# Patient Record
Sex: MALE | Race: BLACK OR AFRICAN AMERICAN | Employment: OTHER | URBAN - METROPOLITAN AREA
[De-identification: names, ages, dates, MRNs, and addresses within clinical notes are randomized per-mention and may not be internally consistent; named-entity substitution may affect disease eponyms.]

---

## 2017-01-03 RX ORDER — DILTIAZEM HYDROCHLORIDE 120 MG/1
120 CAPSULE, COATED, EXTENDED RELEASE ORAL DAILY
Qty: 30 CAPSULE | Refills: 0
Start: 2017-01-03 | End: 2017-01-04 | Stop reason: SDUPTHER

## 2017-01-04 DIAGNOSIS — I10 ESSENTIAL HYPERTENSION: Chronic | ICD-10-CM

## 2017-01-04 DIAGNOSIS — E87.6 POTASSIUM DEFICIENCY: ICD-10-CM

## 2017-01-04 RX ORDER — DILTIAZEM HYDROCHLORIDE 120 MG/1
120 CAPSULE, COATED, EXTENDED RELEASE ORAL DAILY
Qty: 30 CAPSULE | Refills: 0 | OUTPATIENT
Start: 2017-01-04 | End: 2017-01-16 | Stop reason: SDUPTHER

## 2017-01-04 RX ORDER — FUROSEMIDE 80 MG
80 TABLET ORAL 2 TIMES DAILY
Qty: 60 TABLET | Refills: 0 | OUTPATIENT
Start: 2017-01-04 | End: 2017-01-16 | Stop reason: SDUPTHER

## 2017-01-04 RX ORDER — LISINOPRIL 20 MG/1
20 TABLET ORAL DAILY
Qty: 30 TABLET | Refills: 0 | OUTPATIENT
Start: 2017-01-04 | End: 2017-01-16 | Stop reason: SDUPTHER

## 2017-01-04 RX ORDER — POTASSIUM CHLORIDE 20 MEQ/1
20 TABLET, EXTENDED RELEASE ORAL
Qty: 30 TABLET | Refills: 0 | OUTPATIENT
Start: 2017-01-04 | End: 2017-01-16 | Stop reason: SDUPTHER

## 2017-01-10 DIAGNOSIS — B37.0 THRUSH, ORAL: ICD-10-CM

## 2017-01-10 RX ORDER — MULTIVIT-MIN/IRON FUM/FOLIC AC 7.5 MG-4
1 TABLET ORAL DAILY
Qty: 30 TABLET | Refills: 0
Start: 2017-01-10 | End: 2017-03-06 | Stop reason: SDUPTHER

## 2017-01-16 ENCOUNTER — OFFICE VISIT (OUTPATIENT)
Dept: INTERNAL MEDICINE | Age: 47
End: 2017-01-16
Attending: STUDENT IN AN ORGANIZED HEALTH CARE EDUCATION/TRAINING PROGRAM

## 2017-01-16 VITALS
HEART RATE: 58 BPM | TEMPERATURE: 97.3 F | WEIGHT: 315 LBS | BODY MASS INDEX: 42.66 KG/M2 | OXYGEN SATURATION: 94 % | DIASTOLIC BLOOD PRESSURE: 76 MMHG | HEIGHT: 72 IN | RESPIRATION RATE: 18 BRPM | SYSTOLIC BLOOD PRESSURE: 128 MMHG

## 2017-01-16 DIAGNOSIS — I10 ESSENTIAL HYPERTENSION: Chronic | ICD-10-CM

## 2017-01-16 DIAGNOSIS — J03.90 TONSILLITIS WITH EXUDATE: ICD-10-CM

## 2017-01-16 DIAGNOSIS — I48.0 PAROXYSMAL A-FIB (HCC): ICD-10-CM

## 2017-01-16 DIAGNOSIS — J06.9 ACUTE URI OF MULTIPLE SITES: ICD-10-CM

## 2017-01-16 DIAGNOSIS — E87.6 POTASSIUM DEFICIENCY: ICD-10-CM

## 2017-01-16 DIAGNOSIS — J01.20 ACUTE ETHMOIDAL SINUSITIS, RECURRENCE NOT SPECIFIED: ICD-10-CM

## 2017-01-16 RX ORDER — ASPIRIN 81 MG/1
81 TABLET ORAL DAILY
Qty: 30 TABLET | Refills: 2 | Status: SHIPPED | OUTPATIENT
Start: 2017-01-16 | End: 2017-04-17 | Stop reason: SDUPTHER

## 2017-01-16 RX ORDER — FEXOFENADINE HCL 180 MG/1
180 TABLET ORAL DAILY
Qty: 30 TABLET | Refills: 3 | Status: SHIPPED | OUTPATIENT
Start: 2017-01-16 | End: 2017-04-17 | Stop reason: SDUPTHER

## 2017-01-16 RX ORDER — DILTIAZEM HYDROCHLORIDE 120 MG/1
120 CAPSULE, COATED, EXTENDED RELEASE ORAL DAILY
Qty: 30 CAPSULE | Refills: 0 | Status: SHIPPED | OUTPATIENT
Start: 2017-01-16 | End: 2017-02-20 | Stop reason: SDUPTHER

## 2017-01-16 RX ORDER — BLOOD PRESSURE TEST KIT
1 KIT MISCELLANEOUS DAILY
Qty: 1 KIT | Refills: 0 | Status: SHIPPED | OUTPATIENT
Start: 2017-01-16 | End: 2017-04-17 | Stop reason: SDUPTHER

## 2017-01-16 RX ORDER — POTASSIUM CHLORIDE 20 MEQ/1
20 TABLET, EXTENDED RELEASE ORAL
Qty: 30 TABLET | Refills: 2 | Status: SHIPPED | OUTPATIENT
Start: 2017-01-16 | End: 2017-04-27 | Stop reason: SDUPTHER

## 2017-01-16 RX ORDER — FLUTICASONE PROPIONATE 50 MCG
1 SPRAY, SUSPENSION (ML) NASAL DAILY
Qty: 1 BOTTLE | Refills: 3 | Status: SHIPPED | OUTPATIENT
Start: 2017-01-16 | End: 2017-04-17 | Stop reason: SDUPTHER

## 2017-01-16 RX ORDER — ECHINACEA PURPUREA EXTRACT 125 MG
1 TABLET ORAL PRN
Qty: 1 BOTTLE | Refills: 3 | Status: SHIPPED | OUTPATIENT
Start: 2017-01-16 | End: 2017-12-15 | Stop reason: SDUPTHER

## 2017-01-16 RX ORDER — FUROSEMIDE 80 MG
80 TABLET ORAL 2 TIMES DAILY
Qty: 60 TABLET | Refills: 2 | Status: SHIPPED | OUTPATIENT
Start: 2017-01-16 | End: 2017-04-17 | Stop reason: SDUPTHER

## 2017-01-16 RX ORDER — LISINOPRIL 20 MG/1
20 TABLET ORAL DAILY
Qty: 30 TABLET | Refills: 2 | Status: SHIPPED | OUTPATIENT
Start: 2017-01-16 | End: 2017-04-17 | Stop reason: SDUPTHER

## 2017-01-16 RX ORDER — ATORVASTATIN CALCIUM 20 MG/1
20 TABLET, FILM COATED ORAL DAILY
Qty: 30 TABLET | Refills: 3 | Status: SHIPPED | OUTPATIENT
Start: 2017-01-16 | End: 2017-04-17 | Stop reason: SDUPTHER

## 2017-01-16 RX ORDER — INSULIN GLARGINE 100 [IU]/ML
20 INJECTION, SOLUTION SUBCUTANEOUS NIGHTLY
Qty: 1 VIAL | Refills: 3 | Status: SHIPPED | OUTPATIENT
Start: 2017-01-16 | End: 2017-04-17 | Stop reason: SDUPTHER

## 2017-01-16 RX ORDER — OMEPRAZOLE 20 MG/1
20 CAPSULE, DELAYED RELEASE ORAL DAILY
Qty: 30 CAPSULE | Refills: 2 | Status: SHIPPED | OUTPATIENT
Start: 2017-01-16 | End: 2017-04-17 | Stop reason: SDUPTHER

## 2017-01-16 ASSESSMENT — ENCOUNTER SYMPTOMS
COUGH: 1
SORE THROAT: 0
DIARRHEA: 0
CONSTIPATION: 0
SPUTUM PRODUCTION: 0
DOUBLE VISION: 0
SHORTNESS OF BREATH: 0
BLURRED VISION: 0
VOMITING: 0
ABDOMINAL PAIN: 0
NAUSEA: 0
WHEEZING: 0

## 2017-02-20 DIAGNOSIS — E11.9 DIABETES MELLITUS TYPE 2, INSULIN DEPENDENT (HCC): Primary | ICD-10-CM

## 2017-02-20 DIAGNOSIS — Z79.4 DIABETES MELLITUS TYPE 2, INSULIN DEPENDENT (HCC): Primary | ICD-10-CM

## 2017-02-20 RX ORDER — BLOOD SUGAR DIAGNOSTIC
4 STRIP MISCELLANEOUS
Qty: 100 EACH | Refills: 0
Start: 2017-02-20 | End: 2017-04-21 | Stop reason: SDUPTHER

## 2017-02-20 RX ORDER — DILTIAZEM HYDROCHLORIDE 120 MG/1
120 CAPSULE, COATED, EXTENDED RELEASE ORAL DAILY
Qty: 30 CAPSULE | Refills: 1 | OUTPATIENT
Start: 2017-02-20 | End: 2017-04-17 | Stop reason: SDUPTHER

## 2017-03-06 DIAGNOSIS — B37.0 THRUSH, ORAL: ICD-10-CM

## 2017-03-06 RX ORDER — MULTIVIT-MIN/IRON FUM/FOLIC AC 7.5 MG-4
1 TABLET ORAL DAILY
Qty: 30 TABLET | Refills: 2
Start: 2017-03-06 | End: 2017-03-06 | Stop reason: SDUPTHER

## 2017-03-06 RX ORDER — MULTIVIT-MIN/IRON FUM/FOLIC AC 7.5 MG-4
1 TABLET ORAL DAILY
Qty: 30 TABLET | Refills: 2
Start: 2017-03-06 | End: 2017-06-19

## 2017-03-14 DIAGNOSIS — I48.0 PAROXYSMAL A-FIB (HCC): ICD-10-CM

## 2017-03-15 ENCOUNTER — TELEPHONE (OUTPATIENT)
Dept: INTERNAL MEDICINE | Age: 47
End: 2017-03-15

## 2017-04-17 ENCOUNTER — OFFICE VISIT (OUTPATIENT)
Dept: INTERNAL MEDICINE | Age: 47
End: 2017-04-17
Attending: STUDENT IN AN ORGANIZED HEALTH CARE EDUCATION/TRAINING PROGRAM

## 2017-04-17 VITALS
OXYGEN SATURATION: 96 % | HEIGHT: 72 IN | HEART RATE: 54 BPM | RESPIRATION RATE: 18 BRPM | SYSTOLIC BLOOD PRESSURE: 133 MMHG | DIASTOLIC BLOOD PRESSURE: 81 MMHG | BODY MASS INDEX: 42.66 KG/M2 | WEIGHT: 315 LBS | TEMPERATURE: 97.7 F

## 2017-04-17 DIAGNOSIS — I48.0 PAROXYSMAL A-FIB (HCC): ICD-10-CM

## 2017-04-17 DIAGNOSIS — I10 ESSENTIAL HYPERTENSION: Chronic | ICD-10-CM

## 2017-04-17 DIAGNOSIS — E87.6 POTASSIUM DEFICIENCY: ICD-10-CM

## 2017-04-17 LAB
GLUCOSE BLD-MCNC: 149 MG/DL (ref 70–99)
PERFORMED ON: ABNORMAL

## 2017-04-17 RX ORDER — FUROSEMIDE 80 MG
80 TABLET ORAL 2 TIMES DAILY
Qty: 60 TABLET | Refills: 3 | Status: SHIPPED | OUTPATIENT
Start: 2017-04-17 | End: 2017-08-25 | Stop reason: SDUPTHER

## 2017-04-17 RX ORDER — DIPHENHYDRAMINE HYDROCHLORIDE 25 MG/1
1 CAPSULE, LIQUID FILLED ORAL DAILY
Qty: 1 KIT | Refills: 0 | Status: SHIPPED | OUTPATIENT
Start: 2017-04-17

## 2017-04-17 RX ORDER — BLOOD PRESSURE TEST KIT
1 KIT MISCELLANEOUS DAILY
Qty: 1 KIT | Refills: 0 | Status: SHIPPED | OUTPATIENT
Start: 2017-04-17

## 2017-04-17 RX ORDER — FLUTICASONE PROPIONATE 50 MCG
1 SPRAY, SUSPENSION (ML) NASAL DAILY
Qty: 1 BOTTLE | Refills: 3 | Status: SHIPPED | OUTPATIENT
Start: 2017-04-17 | End: 2018-12-27 | Stop reason: SDUPTHER

## 2017-04-17 RX ORDER — OMEPRAZOLE 20 MG/1
20 CAPSULE, DELAYED RELEASE ORAL DAILY
Qty: 30 CAPSULE | Refills: 2 | Status: SHIPPED | OUTPATIENT
Start: 2017-04-17 | End: 2017-08-01 | Stop reason: SDUPTHER

## 2017-04-17 RX ORDER — DILTIAZEM HYDROCHLORIDE 120 MG/1
120 CAPSULE, COATED, EXTENDED RELEASE ORAL DAILY
Qty: 30 CAPSULE | Refills: 3 | Status: SHIPPED | OUTPATIENT
Start: 2017-04-17 | End: 2017-09-06 | Stop reason: SDUPTHER

## 2017-04-17 RX ORDER — FEXOFENADINE HCL 180 MG/1
180 TABLET ORAL DAILY
Qty: 30 TABLET | Refills: 3 | Status: SHIPPED | OUTPATIENT
Start: 2017-04-17 | End: 2017-12-15 | Stop reason: ALTCHOICE

## 2017-04-17 RX ORDER — LISINOPRIL 20 MG/1
20 TABLET ORAL DAILY
Qty: 30 TABLET | Refills: 2 | Status: SHIPPED | OUTPATIENT
Start: 2017-04-17 | End: 2017-07-20 | Stop reason: SDUPTHER

## 2017-04-17 RX ORDER — INSULIN GLARGINE 100 [IU]/ML
20 INJECTION, SOLUTION SUBCUTANEOUS NIGHTLY
Qty: 1 VIAL | Refills: 3 | Status: SHIPPED | OUTPATIENT
Start: 2017-04-17 | End: 2017-09-06 | Stop reason: SDUPTHER

## 2017-04-17 RX ORDER — GLUCOSAMINE HCL/CHONDROITIN SU 500-400 MG
1 CAPSULE ORAL
Qty: 120 STRIP | Refills: 5 | Status: SHIPPED | OUTPATIENT
Start: 2017-04-17 | End: 2017-12-13 | Stop reason: SDUPTHER

## 2017-04-17 RX ORDER — ASPIRIN 81 MG/1
81 TABLET ORAL DAILY
Qty: 30 TABLET | Refills: 3 | Status: ON HOLD | OUTPATIENT
Start: 2017-04-17 | End: 2018-08-05 | Stop reason: HOSPADM

## 2017-04-17 RX ORDER — ATORVASTATIN CALCIUM 20 MG/1
20 TABLET, FILM COATED ORAL DAILY
Qty: 30 TABLET | Refills: 3 | Status: SHIPPED | OUTPATIENT
Start: 2017-04-17 | End: 2017-09-06 | Stop reason: SDUPTHER

## 2017-04-17 ASSESSMENT — ENCOUNTER SYMPTOMS
ABDOMINAL PAIN: 0
SHORTNESS OF BREATH: 0
WHEEZING: 0
NAUSEA: 0
VOMITING: 0
COUGH: 0
DIARRHEA: 0

## 2017-04-21 DIAGNOSIS — E11.9 DIABETES MELLITUS TYPE 2, INSULIN DEPENDENT (HCC): ICD-10-CM

## 2017-04-21 DIAGNOSIS — Z79.4 DIABETES MELLITUS TYPE 2, INSULIN DEPENDENT (HCC): ICD-10-CM

## 2017-04-21 RX ORDER — BLOOD SUGAR DIAGNOSTIC
4 STRIP MISCELLANEOUS
Qty: 100 EACH | Refills: 2
Start: 2017-04-21 | End: 2017-08-30 | Stop reason: SDUPTHER

## 2017-04-27 DIAGNOSIS — I10 ESSENTIAL HYPERTENSION: Chronic | ICD-10-CM

## 2017-04-27 DIAGNOSIS — E87.6 POTASSIUM DEFICIENCY: ICD-10-CM

## 2017-04-27 RX ORDER — POTASSIUM CHLORIDE 20 MEQ/1
20 TABLET, EXTENDED RELEASE ORAL
Qty: 30 TABLET | Refills: 3
Start: 2017-04-27 | End: 2017-08-07 | Stop reason: SDUPTHER

## 2017-05-30 ENCOUNTER — TELEPHONE (OUTPATIENT)
Dept: INTERNAL MEDICINE | Age: 47
End: 2017-05-30

## 2017-06-19 ENCOUNTER — OFFICE VISIT (OUTPATIENT)
Dept: INTERNAL MEDICINE | Age: 47
End: 2017-06-19
Attending: STUDENT IN AN ORGANIZED HEALTH CARE EDUCATION/TRAINING PROGRAM

## 2017-06-19 VITALS
HEIGHT: 72 IN | OXYGEN SATURATION: 94 % | TEMPERATURE: 97.3 F | HEART RATE: 72 BPM | DIASTOLIC BLOOD PRESSURE: 81 MMHG | WEIGHT: 315 LBS | BODY MASS INDEX: 42.66 KG/M2 | RESPIRATION RATE: 18 BRPM | SYSTOLIC BLOOD PRESSURE: 131 MMHG

## 2017-06-19 DIAGNOSIS — E11.9 TYPE 2 DIABETES MELLITUS WITHOUT COMPLICATION, UNSPECIFIED LONG TERM INSULIN USE STATUS: Primary | ICD-10-CM

## 2017-06-19 ASSESSMENT — ENCOUNTER SYMPTOMS
VOMITING: 0
BLURRED VISION: 0
SHORTNESS OF BREATH: 0
CONSTIPATION: 0
ABDOMINAL PAIN: 0
DIARRHEA: 0
COUGH: 0
NAUSEA: 0

## 2017-07-07 ENCOUNTER — TELEPHONE (OUTPATIENT)
Dept: INTERNAL MEDICINE | Age: 47
End: 2017-07-07

## 2017-07-20 RX ORDER — LISINOPRIL 20 MG/1
20 TABLET ORAL DAILY
Qty: 30 TABLET | Refills: 2
Start: 2017-07-20 | End: 2017-09-15 | Stop reason: SDUPTHER

## 2017-08-01 RX ORDER — OMEPRAZOLE 20 MG/1
20 CAPSULE, DELAYED RELEASE ORAL DAILY
Qty: 30 CAPSULE | Refills: 1
Start: 2017-08-01 | End: 2017-09-15 | Stop reason: SDUPTHER

## 2017-08-07 DIAGNOSIS — E87.6 POTASSIUM DEFICIENCY: ICD-10-CM

## 2017-08-07 DIAGNOSIS — I10 ESSENTIAL HYPERTENSION: Chronic | ICD-10-CM

## 2017-08-07 RX ORDER — POTASSIUM CHLORIDE 1500 MG/1
TABLET, EXTENDED RELEASE ORAL
Qty: 30 TABLET | Refills: 2 | Status: SHIPPED | OUTPATIENT
Start: 2017-08-07 | End: 2017-11-07 | Stop reason: SDUPTHER

## 2017-08-07 RX ORDER — POTASSIUM CHLORIDE 20 MEQ/1
20 TABLET, EXTENDED RELEASE ORAL
Qty: 30 TABLET | Refills: 1 | Status: SHIPPED | OUTPATIENT
Start: 2017-08-07 | End: 2017-08-07 | Stop reason: SDUPTHER

## 2017-08-25 RX ORDER — FUROSEMIDE 80 MG
80 TABLET ORAL 2 TIMES DAILY
Qty: 60 TABLET | Refills: 0
Start: 2017-08-25 | End: 2017-10-04 | Stop reason: SDUPTHER

## 2017-08-29 DIAGNOSIS — I48.0 PAROXYSMAL A-FIB (HCC): ICD-10-CM

## 2017-08-29 RX ORDER — APIXABAN 5 MG/1
TABLET, FILM COATED ORAL
Qty: 60 TABLET | Refills: 0 | Status: SHIPPED | OUTPATIENT
Start: 2017-08-29 | End: 2017-08-29 | Stop reason: SDUPTHER

## 2017-08-30 DIAGNOSIS — Z79.4 DIABETES MELLITUS TYPE 2, INSULIN DEPENDENT (HCC): ICD-10-CM

## 2017-08-30 DIAGNOSIS — I48.0 PAROXYSMAL A-FIB (HCC): ICD-10-CM

## 2017-08-30 DIAGNOSIS — E11.9 DIABETES MELLITUS TYPE 2, INSULIN DEPENDENT (HCC): ICD-10-CM

## 2017-08-30 RX ORDER — BLOOD SUGAR DIAGNOSTIC
4 STRIP MISCELLANEOUS
Qty: 100 EACH | Refills: 1
Start: 2017-08-30 | End: 2017-11-27 | Stop reason: SDUPTHER

## 2017-09-06 DIAGNOSIS — I48.0 PAROXYSMAL A-FIB (HCC): ICD-10-CM

## 2017-09-06 RX ORDER — DILTIAZEM HYDROCHLORIDE 120 MG/1
120 CAPSULE, COATED, EXTENDED RELEASE ORAL DAILY
Qty: 30 CAPSULE | Refills: 0
Start: 2017-09-06 | End: 2017-10-23 | Stop reason: SDUPTHER

## 2017-09-06 RX ORDER — INSULIN GLARGINE 100 [IU]/ML
20 INJECTION, SOLUTION SUBCUTANEOUS NIGHTLY
Qty: 1 VIAL | Refills: 0
Start: 2017-09-06 | End: 2019-10-01 | Stop reason: SDUPTHER

## 2017-09-06 RX ORDER — ATORVASTATIN CALCIUM 20 MG/1
20 TABLET, FILM COATED ORAL DAILY
Qty: 30 TABLET | Refills: 0
Start: 2017-09-06 | End: 2017-11-06 | Stop reason: SDUPTHER

## 2017-09-15 ENCOUNTER — OFFICE VISIT (OUTPATIENT)
Dept: INTERNAL MEDICINE | Age: 47
End: 2017-09-15
Attending: STUDENT IN AN ORGANIZED HEALTH CARE EDUCATION/TRAINING PROGRAM

## 2017-09-15 VITALS
WEIGHT: 315 LBS | SYSTOLIC BLOOD PRESSURE: 132 MMHG | HEART RATE: 52 BPM | OXYGEN SATURATION: 94 % | DIASTOLIC BLOOD PRESSURE: 76 MMHG | RESPIRATION RATE: 18 BRPM | HEIGHT: 72 IN | BODY MASS INDEX: 42.66 KG/M2 | TEMPERATURE: 98.8 F

## 2017-09-15 DIAGNOSIS — M10.9 GOUT OF LEFT ANKLE, UNSPECIFIED CAUSE, UNSPECIFIED CHRONICITY: Primary | ICD-10-CM

## 2017-09-15 DIAGNOSIS — I10 ESSENTIAL HYPERTENSION: Chronic | ICD-10-CM

## 2017-09-15 DIAGNOSIS — K21.9 GASTROESOPHAGEAL REFLUX DISEASE WITHOUT ESOPHAGITIS: ICD-10-CM

## 2017-09-15 DIAGNOSIS — E11.9 TYPE 2 DIABETES MELLITUS WITHOUT COMPLICATION, UNSPECIFIED LONG TERM INSULIN USE STATUS: Chronic | ICD-10-CM

## 2017-09-15 DIAGNOSIS — Z23 NEEDS FLU SHOT: ICD-10-CM

## 2017-09-15 DIAGNOSIS — I48.0 PAROXYSMAL ATRIAL FIBRILLATION (HCC): ICD-10-CM

## 2017-09-15 LAB
ALBUMIN SERPL-MCNC: 4.4 G/DL (ref 3.4–5)
ANION GAP SERPL CALCULATED.3IONS-SCNC: 13 MMOL/L (ref 3–16)
BASOPHILS ABSOLUTE: 0 K/UL (ref 0–0.2)
BASOPHILS RELATIVE PERCENT: 0.6 %
BUN BLDV-MCNC: 13 MG/DL (ref 7–20)
CALCIUM SERPL-MCNC: 9.7 MG/DL (ref 8.3–10.6)
CHLORIDE BLD-SCNC: 103 MMOL/L (ref 99–110)
CO2: 29 MMOL/L (ref 21–32)
CREAT SERPL-MCNC: 0.8 MG/DL (ref 0.9–1.3)
EOSINOPHILS ABSOLUTE: 0 K/UL (ref 0–0.6)
EOSINOPHILS RELATIVE PERCENT: 0.5 %
GFR AFRICAN AMERICAN: >60
GFR NON-AFRICAN AMERICAN: >60
GLUCOSE BLD-MCNC: 76 MG/DL (ref 70–99)
HCT VFR BLD CALC: 45.1 % (ref 40.5–52.5)
HEMOGLOBIN: 15 G/DL (ref 13.5–17.5)
LYMPHOCYTES ABSOLUTE: 2.7 K/UL (ref 1–5.1)
LYMPHOCYTES RELATIVE PERCENT: 45.1 %
MCH RBC QN AUTO: 30 PG (ref 26–34)
MCHC RBC AUTO-ENTMCNC: 33.3 G/DL (ref 31–36)
MCV RBC AUTO: 89.9 FL (ref 80–100)
MONOCYTES ABSOLUTE: 0.5 K/UL (ref 0–1.3)
MONOCYTES RELATIVE PERCENT: 7.5 %
NEUTROPHILS ABSOLUTE: 2.8 K/UL (ref 1.7–7.7)
NEUTROPHILS RELATIVE PERCENT: 46.3 %
PDW BLD-RTO: 13.9 % (ref 12.4–15.4)
PHOSPHORUS: 3.1 MG/DL (ref 2.5–4.9)
PLATELET # BLD: 184 K/UL (ref 135–450)
PMV BLD AUTO: 9.6 FL (ref 5–10.5)
POTASSIUM SERPL-SCNC: 3.3 MMOL/L (ref 3.5–5.1)
RBC # BLD: 5.02 M/UL (ref 4.2–5.9)
SODIUM BLD-SCNC: 145 MMOL/L (ref 136–145)
URIC ACID, SERUM: 9.3 MG/DL (ref 3.5–7.2)
WBC # BLD: 6.1 K/UL (ref 4–11)

## 2017-09-15 RX ORDER — LISINOPRIL 20 MG/1
20 TABLET ORAL DAILY
Qty: 30 TABLET | Refills: 2 | Status: SHIPPED | OUTPATIENT
Start: 2017-09-15 | End: 2017-12-15 | Stop reason: SDUPTHER

## 2017-09-15 RX ORDER — INDOMETHACIN 25 MG/1
25 CAPSULE ORAL
Qty: 60 CAPSULE | Refills: 3 | Status: SHIPPED | OUTPATIENT
Start: 2017-09-15 | End: 2017-12-15 | Stop reason: ALTCHOICE

## 2017-09-15 RX ORDER — NAPROXEN 500 MG/1
500 TABLET ORAL 2 TIMES DAILY WITH MEALS
Qty: 30 TABLET | Refills: 0 | Status: SHIPPED | OUTPATIENT
Start: 2017-09-15 | End: 2017-10-02 | Stop reason: SDUPTHER

## 2017-09-15 RX ORDER — OMEPRAZOLE 20 MG/1
20 CAPSULE, DELAYED RELEASE ORAL DAILY
Qty: 30 CAPSULE | Refills: 1 | Status: SHIPPED | OUTPATIENT
Start: 2017-09-15 | End: 2017-12-15 | Stop reason: ALTCHOICE

## 2017-09-16 LAB
ESTIMATED AVERAGE GLUCOSE: 116.9 MG/DL
HBA1C MFR BLD: 5.7 %

## 2017-10-02 DIAGNOSIS — M10.9 GOUT OF LEFT ANKLE, UNSPECIFIED CAUSE, UNSPECIFIED CHRONICITY: ICD-10-CM

## 2017-10-02 RX ORDER — NAPROXEN 500 MG/1
500 TABLET ORAL 2 TIMES DAILY WITH MEALS
Qty: 30 TABLET | Refills: 0
Start: 2017-10-02 | End: 2017-10-17 | Stop reason: SDUPTHER

## 2017-10-04 RX ORDER — FUROSEMIDE 80 MG
80 TABLET ORAL 2 TIMES DAILY
Qty: 60 TABLET | Refills: 0
Start: 2017-10-04 | End: 2017-12-08 | Stop reason: SDUPTHER

## 2017-10-17 DIAGNOSIS — M10.9 GOUT OF LEFT ANKLE, UNSPECIFIED CAUSE, UNSPECIFIED CHRONICITY: ICD-10-CM

## 2017-10-17 RX ORDER — NAPROXEN 500 MG/1
500 TABLET ORAL 2 TIMES DAILY WITH MEALS
Qty: 30 TABLET | Refills: 0
Start: 2017-10-17 | End: 2019-04-16

## 2017-10-23 RX ORDER — DILTIAZEM HYDROCHLORIDE 120 MG/1
120 CAPSULE, COATED, EXTENDED RELEASE ORAL DAILY
Qty: 30 CAPSULE | Refills: 2
Start: 2017-10-23 | End: 2017-11-03 | Stop reason: ALTCHOICE

## 2017-11-03 ENCOUNTER — OFFICE VISIT (OUTPATIENT)
Dept: INTERNAL MEDICINE | Age: 47
End: 2017-11-03
Attending: INTERNAL MEDICINE

## 2017-11-03 VITALS
TEMPERATURE: 98.4 F | BODY MASS INDEX: 42.66 KG/M2 | OXYGEN SATURATION: 94 % | HEIGHT: 72 IN | HEART RATE: 57 BPM | WEIGHT: 315 LBS | SYSTOLIC BLOOD PRESSURE: 112 MMHG | DIASTOLIC BLOOD PRESSURE: 72 MMHG | RESPIRATION RATE: 20 BRPM

## 2017-11-03 DIAGNOSIS — M10.9 GOUT OF LEFT ANKLE, UNSPECIFIED CAUSE, UNSPECIFIED CHRONICITY: ICD-10-CM

## 2017-11-03 DIAGNOSIS — E11.9 TYPE 2 DIABETES MELLITUS WITHOUT COMPLICATION, UNSPECIFIED LONG TERM INSULIN USE STATUS: Chronic | ICD-10-CM

## 2017-11-03 DIAGNOSIS — I10 ESSENTIAL HYPERTENSION: Chronic | ICD-10-CM

## 2017-11-03 LAB
GLUCOSE BLD-MCNC: 123 MG/DL (ref 70–99)
PERFORMED ON: ABNORMAL

## 2017-11-03 RX ORDER — ALLOPURINOL 300 MG/1
300 TABLET ORAL DAILY
Qty: 30 TABLET | Refills: 3 | Status: SHIPPED | OUTPATIENT
Start: 2017-11-03 | End: 2018-03-19 | Stop reason: SDUPTHER

## 2017-11-03 RX ORDER — SULFAMETHOXAZOLE AND TRIMETHOPRIM 800; 160 MG/1; MG/1
1 TABLET ORAL 2 TIMES DAILY
Qty: 20 TABLET | Refills: 0 | Status: SHIPPED | OUTPATIENT
Start: 2017-11-03 | End: 2019-04-16 | Stop reason: SDUPTHER

## 2017-11-03 NOTE — PROGRESS NOTES
[address] [facility]       NURSING PROGRESS NOTE      November 3, 2017  Anju De La Cruz    Chief Complaint:   Chief Complaint   Patient presents with    Abscess     abdominal (recurrent)       Constitutional: alert and oriented; in no acute distress; planned weight loss 27lbs in one year  Eyes: negative  overdue for eye exam; will schedule with past provider  Ears, nose, mouth, throat, and face: negative  Respiratory: negative  Cardiovascular: uses BiPap; states he walks 4 miles regularly. Gastrointestinal: negative  Genitourinary: negative  Integument/breast: tells physician he's had abdominal abscess for 6 days and it is chronic and recurrent  Hematologic/lymphatic: negative  Musculoskeletal: states he saw a rheumatologist who told him he has gout and he was started on Allopurinol and also states he has arthritis in both knees. Neurological: negative  Diabetes: Yes  Yes:  Medication compliance:  compliant all of the time  Diabetic diet compliance:  compliant most of the time  Home glucose monitoring:  is performed regularly  Last eye exam:  2 years  Acute symptoms hyperglycemia:  none  States blood sugars run 120s-140s on average.   Acute symptoms hypoglycemia:  none  POC glucose today: not taken at home; taken in exam room is 123    Pain Assessment:  Pain Present: no  Pain Score: 0  Pain Quality/Description: no c/o pain now    Mobility/Safety/Self-Care:  Steady Gait: Yes  History of Falls: No   History of a Fall within the last 30 days No  Assistive Device: None  Poor Hygiene: No    Psychosocial:   Depression: No  If YES,    Does Patient express current thoughts of harming self or others?: No  Anxious: No  Insomnia: No  Inappropriate Behavior: No  Alcohol Abuse: no  Substance Abuse: no  Signs of Physical Abuse: No  Signs of Emotional Abuse: No    Educational:  Identify the learner who is being assessed for education:  patient                    Ability to Learn:  Exhibits ability to grasp concepts and
distress, appears stated age and cooperative  · CVS - Bradycardic, continues to be bradycardic with ambulation  · Resp - No accessory muscle use. Speaking in full sentences  · GI - No tenderness  · Skin - Warm and dry. Two punctate lesions on RLQ of abdominal wall, not draining any pus. · MSK - No cyanosis. No joint deformity. No clubbing  · Neuro - Awake. Follows commands. · Psych - Oriented to person, place, time. No anxiety or agitation. LABS  Lipid:  Lab Results   Component Value Date    CHOL 103 06/09/2016    HDL 33 (L) 06/09/2016    LDLCALC 48 06/09/2016    TRIG 109 06/09/2016       U/A:  Lab Results   Component Value Date    LABMICR YES 03/12/2015              Assessment/Plan:   Marie De La Cruz is a 49 y.o.male who presents for furunculosis. Recurrent furunculosis  -Bactrim x 10 days  -Suppressive therapy for MRSA with nasal bactroban for 7 days following Bactrim therapy. Then repeat every month for the 1st week of the month. Symptomatic bradycardia, likely 2/2 meds  -DC diltiazem, continue metoprolol  -f/u Dr Sandi Hanna (Cardiology)  -Advised that using cocaine and metoprolol could be fatal    Case will be discussed with preceptor.      Rossy Brooks MD PGY3  Internal Medicine Resident  Pager: [phone]  11/3/2017, 10:26 AM

## 2017-11-03 NOTE — PATIENT INSTRUCTIONS
Call your pharmacy for medication refills at least 48 hours ahead at [phone] (Monday -Friday, 08:00 AM to 4:00 PM .If you become ill when the clinic is closed, please call [facility]  at [phone] and ask the  to page the AOD between 6:00 AM and 6:00 PM or page the AON between 6:00 PM & 6:00 AM.    The clinic is not able to process [facility] requests for appointments or messages including refill requests. Please call the [employer] at [phone] for appointments and messages. The [employer]  Telephone:[phone]    NEW PRESCRIPTION FOR BACTRIM ANTIBIOTIC  TAKE ONE TABLET BY MOUTH 2 TIMES DAILY FOR 10 DAYS    NEW PRESCRIPTION FOR BACTROBAN OINTMENT : APPLY IN BOTH NARES TWICE PER DAY FOR 7 DAYS,  START THIS OINTMENT AFTER YOU FINISH THE COURSE OF BACTRIM                                                                                                THEN ON THE FIRST DAY OF THE NEXT MONTH USE THE BACTROBAN TWICE PER DAY FOR 7 DAYS. REPEAT THE OINTMENT THE FIRST DAY OF EVERY MONTH AND USE IT THE FIRST WEEK OF EVERY MONTH FOR 7 DAYS      STOP DILTIAZEM    MAKE APPOINTMENT WITH  [facility]  [phone]    BLOOD SUGAR CHECKED TODAY    FLU VACCINE GIVEN TODAY WITH INFORMATION SHEET    MAKE EYE APPOINTMENT    [employer] [facility] COMPLETED    FOLLOWUP WITH DR. [facility] IN MID DECEMBER    Instructions reviewed with patient and copy given to patient upon discharge.      10:20 AM11/3/2017

## 2017-11-06 DIAGNOSIS — I48.0 PAROXYSMAL A-FIB (HCC): ICD-10-CM

## 2017-11-06 RX ORDER — ATORVASTATIN CALCIUM 20 MG/1
20 TABLET, FILM COATED ORAL DAILY
Qty: 30 TABLET | Refills: 3
Start: 2017-11-06 | End: 2018-03-05 | Stop reason: SDUPTHER

## 2017-11-07 ENCOUNTER — TELEPHONE (OUTPATIENT)
Dept: INTERNAL MEDICINE | Age: 47
End: 2017-11-07

## 2017-11-07 DIAGNOSIS — E87.6 POTASSIUM DEFICIENCY: ICD-10-CM

## 2017-11-07 DIAGNOSIS — I10 ESSENTIAL HYPERTENSION: Chronic | ICD-10-CM

## 2017-11-07 RX ORDER — POTASSIUM CHLORIDE 20 MEQ/1
TABLET, EXTENDED RELEASE ORAL
Qty: 30 TABLET | Refills: 2 | OUTPATIENT
Start: 2017-11-07 | End: 2018-02-14 | Stop reason: SDUPTHER

## 2017-11-07 RX ORDER — POTASSIUM CHLORIDE 1500 MG/1
TABLET, EXTENDED RELEASE ORAL
Qty: 30 TABLET | Refills: 2 | OUTPATIENT
Start: 2017-11-07

## 2017-11-07 NOTE — TELEPHONE ENCOUNTER
States he has draing abscess on buttock. He is currently on antibiotics. Told to do aquino soak and finish antibiotics.  Call if not better in a few days

## 2017-11-15 DIAGNOSIS — I48.0 PAROXYSMAL A-FIB (HCC): ICD-10-CM

## 2017-11-27 DIAGNOSIS — E11.9 DIABETES MELLITUS TYPE 2, INSULIN DEPENDENT (HCC): ICD-10-CM

## 2017-11-27 DIAGNOSIS — Z79.4 DIABETES MELLITUS TYPE 2, INSULIN DEPENDENT (HCC): ICD-10-CM

## 2017-11-27 RX ORDER — BLOOD SUGAR DIAGNOSTIC
4 STRIP MISCELLANEOUS
Qty: 100 EACH | Refills: 5
Start: 2017-11-27 | End: 2018-07-05 | Stop reason: SDUPTHER

## 2017-12-08 RX ORDER — FUROSEMIDE 80 MG
80 TABLET ORAL 2 TIMES DAILY
Qty: 60 TABLET | Refills: 2 | Status: SHIPPED | OUTPATIENT
Start: 2017-12-08 | End: 2018-03-06 | Stop reason: SDUPTHER

## 2017-12-13 RX ORDER — GLUCOSAMINE HCL/CHONDROITIN SU 500-400 MG
1 CAPSULE ORAL
Qty: 120 STRIP | Refills: 5
Start: 2017-12-13 | End: 2019-10-01 | Stop reason: SDUPTHER

## 2017-12-15 ENCOUNTER — OFFICE VISIT (OUTPATIENT)
Dept: INTERNAL MEDICINE | Age: 47
End: 2017-12-15
Attending: STUDENT IN AN ORGANIZED HEALTH CARE EDUCATION/TRAINING PROGRAM

## 2017-12-15 VITALS
SYSTOLIC BLOOD PRESSURE: 126 MMHG | RESPIRATION RATE: 22 BRPM | HEIGHT: 72 IN | HEART RATE: 64 BPM | OXYGEN SATURATION: 96 % | WEIGHT: 315 LBS | TEMPERATURE: 97.5 F | DIASTOLIC BLOOD PRESSURE: 71 MMHG | BODY MASS INDEX: 42.66 KG/M2

## 2017-12-15 DIAGNOSIS — Z79.4 DIABETES MELLITUS TYPE 2, INSULIN DEPENDENT (HCC): Primary | ICD-10-CM

## 2017-12-15 DIAGNOSIS — I10 ESSENTIAL HYPERTENSION: Chronic | ICD-10-CM

## 2017-12-15 DIAGNOSIS — E11.9 DIABETES MELLITUS TYPE 2, INSULIN DEPENDENT (HCC): Primary | ICD-10-CM

## 2017-12-15 DIAGNOSIS — J06.9 ACUTE URI OF MULTIPLE SITES: ICD-10-CM

## 2017-12-15 DIAGNOSIS — J03.90 TONSILLITIS WITH EXUDATE: ICD-10-CM

## 2017-12-15 DIAGNOSIS — I48.0 PAROXYSMAL ATRIAL FIBRILLATION (HCC): ICD-10-CM

## 2017-12-15 DIAGNOSIS — E78.5 HYPERLIPIDEMIA, UNSPECIFIED HYPERLIPIDEMIA TYPE: ICD-10-CM

## 2017-12-15 DIAGNOSIS — J01.20 ACUTE ETHMOIDAL SINUSITIS, RECURRENCE NOT SPECIFIED: ICD-10-CM

## 2017-12-15 DIAGNOSIS — I48.0 PAROXYSMAL A-FIB (HCC): ICD-10-CM

## 2017-12-15 RX ORDER — ECHINACEA PURPUREA EXTRACT 125 MG
1 TABLET ORAL PRN
Qty: 1 BOTTLE | Refills: 3 | Status: SHIPPED | OUTPATIENT
Start: 2017-12-15 | End: 2018-03-02

## 2017-12-15 RX ORDER — LISINOPRIL 20 MG/1
20 TABLET ORAL DAILY
Qty: 30 TABLET | Refills: 2 | Status: SHIPPED | OUTPATIENT
Start: 2017-12-15 | End: 2018-04-24 | Stop reason: SDUPTHER

## 2017-12-15 RX ORDER — CEPHALEXIN 250 MG/1
40 CAPSULE ORAL 4 TIMES DAILY
COMMUNITY
Start: 2017-12-13 | End: 2018-03-02 | Stop reason: ALTCHOICE

## 2017-12-15 NOTE — PROGRESS NOTES
[address] [facility]       NURSING PROGRESS NOTE      December 15, 2017  Colton Medina Jono    Chief Complaint:   Chief Complaint   Patient presents with    Check-Up         Constitutional: negative  Eyes: positive for visual disturbance, wants a referral for diabetic eye exam  Ears, nose, mouth, throat, and face: positive for recent \"head cold\"  Respiratory: negative  Cardiovascular: negative  Gastrointestinal: negative  Genitourinary: negative  Integument/breast: negative  Hematologic/lymphatic: negative  Musculoskeletal: positive for knee pain  Neurological: negative  Diabetes: Yes  Yes:  Medication compliance:compliant most of the time  Diabetic diet compliance:  compliant most of the time  Home glucose monitoring:  is performed regularly  Last eye exam:  2 years  Acute symptoms hyperglycemia:  none  Acute symptoms hypoglycemia:  none  POC glucose today:  mg/dL    Pain Assessment:  Pain Present: yes, knees  Pain Score: 4  Pain Quality/Description: Burning and Sharp    Mobility/Safety/Self-Care:  Steady Gait: Yes  History of Falls: No   History of a Fall within the last 30 days No  Assistive Device: None  Poor Hygiene: No    Psychosocial:   Depression: No  If YES,    Does Patient express current thoughts of harming self or others?: No  Anxious: No  Insomnia: No  Inappropriate Behavior: No  Alcohol Abuse: no  Substance Abuse: no  Signs of Physical Abuse: No  Signs of Emotional Abuse: No    Educational:  Identify the learner who is being assessed for education:  patient                    Ability to Learn:  Exhibits ability to grasp concepts and respond to questions: Medium  Ready to Learn: Yes  calm   Preferred Method of Learning:  written  Barriers to Learning: none  Special Considerations due to cultural, Quaker, spiritual beliefs:  No  Language:  English  :  No    Note:   Here for follow up appointment  Went to Dr. Makenzie Partida for his feet and had the left great toenail removed.  States it is infected.  She has ordered an antibiotic      1:31 PM 12/15/2017

## 2017-12-15 NOTE — PATIENT INSTRUCTIONS
Before any of you medication is completely gone, call your pharmacy AT LEAST 1 WEEK ahead for refills. Review all information regarding your medication before starting. If you become ill when the clinic is closed, please call the [facility]  at   #[phone] and ask the  to page the AOD between 6:00 AM and 6:00 PM or page the AON between 6:00 PM and 6:00 am    Labs are done Tuesday thru Friday from 8:00 to 9:00 AM. For fasting labs do not eat anything for 12 hours prior. You may drink water. The clinic is not able to process MY CHART requests for appointments or messages including requests. Please call the [address] [place] [address] at [phone]  For appointments and messages. Call your pharmacy for medication refills. Return to clinic in 3 months    Continue medication as listed on discharge sheet    Please schedule an appointment with cardiology. Call for an appointment. Please go to the outpatient lab one week before next visit and have fasting blood work drawn. Nothing to eat or drink for 8 hours before the test except water. Instructions reviewed before discharge and copy given to patient by SHERLYN Cabrera RN, Kash Kemp [facility] [phone]

## 2017-12-15 NOTE — PROGRESS NOTES
Outpatient Clinic Visit Note    Patient: Tess De La Cruz  : 1970 (49 y.o.)  Date: 12/15/2017    CC: follow up    HPI:      Patient is a 50 yo male who presents for follow up. He states that he is doing well but has not lost weight since his last appointment. He has had a toe infection and has seen podiatry who gave him cephalexin for it. However, he has not been able to walk and exercise. However, he states that he has been doing well with his diet and that is the reason he has been able to maintain his weight. His diabetes and blood pressure have been well controlled. He is hopeful to discontinue his lantus in the future but is agreeable to continue lantus while he continues to work on his diet and exercise. Patient states that he has not been having much GERD and would like to discontinue the prilosec. He also states that his allergic rhinitis and would like to discontinue his allegra. Patient was instructed to make appointment with eye doctor for diabetic eye exam. He was also instructed to follow up with cardiologist for his afib and CHF. Past Medical History:    Past Medical History:   Diagnosis Date    Arthritis     Atrial fibrillation ([address]     Congestive heart failure (HCC)     Depression     Diabetes mellitus (HCC)     GERD (gastroesophageal reflux disease)     Hyperlipidemia     Hypertension     Morbid obesity ([address]     Sleep apnea     uses cpap at home q hs     Type II or unspecified type diabetes mellitus without mention of complication, not stated as uncontrolled        Past Surgical History:  No past surgical history on file. Home Medications:  Has been reviewed and as documented. Allergies:    Review of patient's allergies indicates no known allergies.     Family History:       Problem Relation Age of Onset    Diabetes Mother     High Blood Pressure Mother     Diabetes Maternal Aunt     High Blood Pressure Maternal Grandmother        ROS: A 10-organ Review Of Systems was obtained and otherwise unremarkable except as per HPI. Data: Old records have been reviewed electronically. PHYSICAL EXAM:  /71 (Site: Right Arm, Position: Sitting, Cuff Size: Large Adult)   Pulse 64   Temp 97.5 °F (36.4 °C) (Oral)   Resp 22   Ht 6' (1.829 m)   Wt (!) 370 lb (167.8 kg)   SpO2 96%   BMI 50.18 kg/m²   Physical Exam   Constitutional: He is oriented to person, place, and time. He appears well-developed and well-nourished. obese   HENT:   Head: Normocephalic and atraumatic. Eyes: EOM are normal. Pupils are equal, round, and reactive to light. Neck: Normal range of motion. Neck supple. Cardiovascular: Normal rate and regular rhythm. No murmur heard. Distant heart sounds   Pulmonary/Chest: Effort normal and breath sounds normal. No respiratory distress. He has no wheezes. Distant breath sounds   Abdominal: Soft. Bowel sounds are normal.   Musculoskeletal: Normal range of motion. He exhibits no edema. Neurological: He is alert and oriented to person, place, and time. Skin: Skin is warm and dry. Assessment & Plan:      Essential hypertension - controlled  Continue ambulatory monitoring, under control today. Patient has home BP cuff.   - continue potassium chloride   - continue lasix 80 BID  - continue lisinopril 20 qd  - continue lopressor 12.5 BID      Type 2 diabetes mellitus without complication, unspecified long term insulin use status (HCC)  HgA1c 5.7 (9/15/2017)  - Continue ambulatory monitoring  - continue metformin 500 BID  - continue lantus 20 qhs; instructed to keep log of BS for next month to consider decreasing doses as patient continues to lose weight      Morbid obesity due to excess calories (HCC)  Weight 434 -> 413 -> 410 -> 397 -> 387->388 -> 383 -> 369 -> 370  - Patient has group that he walks 4 miles with every morning.  - patient had previously considered Bariatric Weight Loss solutions; however he is not currently interested and is using diet and exercise on his own right now. May consider in the future    Recurrent furunculosis  -s/p Bactrim x 10 days  -Suppressive therapy for MRSA with nasal bactroban for 7 days following Bactrim therapy. Then repeat every month for the 1st week of the month.  -patient was given hibiclens for skin care  -patient states he has not had any recurrences    Left Ankle Gout  ED visit on 8/15. Patient was told he had gout and was started on colchicine. Xray of left and right foot and ankle were normal.  - pain now resolved, colchicine and indomethacin discontinued  - allopurinol prescribed by his rheumatologist      Right 3rd MCP Joint Overuse Injury  Patient with continued right 3rd MCP joint pain. No swelling, erythema, or inciting trauma. Patient is an [address] and is right handed. Likely from overuse. Pain is after use. -recommended rest and continue current pain regimen for knee osteoarthritis  -has appointment with rheumatologist at [facility]     Paroxysmal atrial fibrillation [facility])  Followed with Dr Xuan Mccabe cardiologist. Referral was made for Dr. Jhon Harris. Encouraged patient to follow up with Cardiologist to verify current medications. Patient going to follow up with his cardiologist.   - Continue eliquis 5 mg PO BID  - Instructed to refrain from EtOH and cocaine   - will make referrel for new cardiologist Dr. Jhon Harris      Obstructive sleep apnea  Dx by Sleep study about 4 years ago. On Bi PAP 18/14 cm H2O, without issues. Patient planning on calling [facility] to have another sleep study since he has lost weight.  - Continue f/u w/ Dr Leif Jacobson as instructed.     Diastolic congestive heart failure, unspecified congestive heart failure chronicity ([address]  ECHO 4/2016 w/ EF of 55-60% DCHF  - On Lasix 80 mg PO BID  - Leg edema stable  - Instructed to monitor his weight        Health Maintenance  - will check fasting lipids before next appointment    Dispo: Pt has been staffed with  Lynda Corona  _______________  Dmitry Amador MD 12/15/2017 1:59 PM   PGY1 Internal Medicine  Pager: [phone]

## 2018-02-14 DIAGNOSIS — E87.6 POTASSIUM DEFICIENCY: ICD-10-CM

## 2018-02-14 DIAGNOSIS — I10 ESSENTIAL HYPERTENSION: Chronic | ICD-10-CM

## 2018-02-14 RX ORDER — POTASSIUM CHLORIDE 20 MEQ/1
TABLET, EXTENDED RELEASE ORAL
Qty: 30 TABLET | Refills: 2 | OUTPATIENT
Start: 2018-02-14 | End: 2018-05-23 | Stop reason: SDUPTHER

## 2018-03-02 ENCOUNTER — OFFICE VISIT (OUTPATIENT)
Dept: INTERNAL MEDICINE | Age: 48
End: 2018-03-02
Attending: STUDENT IN AN ORGANIZED HEALTH CARE EDUCATION/TRAINING PROGRAM

## 2018-03-02 VITALS
RESPIRATION RATE: 20 BRPM | HEART RATE: 55 BPM | OXYGEN SATURATION: 95 % | TEMPERATURE: 97.9 F | BODY MASS INDEX: 42.66 KG/M2 | DIASTOLIC BLOOD PRESSURE: 64 MMHG | HEIGHT: 72 IN | WEIGHT: 315 LBS | SYSTOLIC BLOOD PRESSURE: 104 MMHG

## 2018-03-02 DIAGNOSIS — N52.9 ERECTILE DYSFUNCTION, UNSPECIFIED ERECTILE DYSFUNCTION TYPE: ICD-10-CM

## 2018-03-02 DIAGNOSIS — E66.01 MORBID OBESITY (HCC): ICD-10-CM

## 2018-03-02 DIAGNOSIS — L02.92 FURUNCLES: Primary | ICD-10-CM

## 2018-03-02 RX ORDER — SULFAMETHOXAZOLE AND TRIMETHOPRIM 800; 160 MG/1; MG/1
1 TABLET ORAL 2 TIMES DAILY
Qty: 20 TABLET | Refills: 0 | Status: SHIPPED | OUTPATIENT
Start: 2018-03-02 | End: 2019-03-21 | Stop reason: SDUPTHER

## 2018-03-02 RX ORDER — M-VIT,TX,IRON,MINS/CALC/FOLIC 27MG-0.4MG
1 TABLET ORAL DAILY
COMMUNITY
End: 2018-04-02 | Stop reason: SDUPTHER

## 2018-03-02 RX ORDER — SILDENAFIL CITRATE 20 MG/1
20 TABLET ORAL PRN
Qty: 30 TABLET | Refills: 3 | Status: SHIPPED | OUTPATIENT
Start: 2018-03-02 | End: 2018-03-02 | Stop reason: SDUPTHER

## 2018-03-02 RX ORDER — SILDENAFIL CITRATE 20 MG/1
60 TABLET ORAL PRN
Qty: 30 TABLET | Refills: 3 | Status: SHIPPED | OUTPATIENT
Start: 2018-03-02 | End: 2018-12-27

## 2018-03-02 NOTE — PROGRESS NOTES
[address] [facility] PROGRESS NOTE      March 2, 2018  Chance De La Cruz    Chief Complaint:   Chief Complaint   Patient presents with    Follow-up     diabetes,HTN; GERD; CHF       Constitutional: alert and oriented; calm; weight stable. Eyes: negative  Ears, nose, mouth, throat, and face: negative  Respiratory: negative  Cardiovascular: negative  Gastrointestinal: negative  Genitourinary: negative  Integument/breast: states hx for recurrent abscesses. currently has right buttock abscess that has drained after applying warm compresses; drainage continues small amount now; states second abscess on left buttock that has not drained and is very painful 10/10 pain level. he has problem sitting due to the pain. Hematologic/lymphatic: negative  Musculoskeletal: negative  Neurological: negative  Diabetes: Yes  Yes:  Medication compliance:  compliant most of the time  Diabetic diet compliance:  noncompliant some of the time  Home glucose monitoring:  is performed regularly  Last eye exam:   Acute symptoms hyperglycemia:  none   states blood sugars run from low 100s-140s consistently  Acute symptoms hypoglycemia:  none  POC glucose today: 110    Pain Assessment:  Pain Present: yes  Pain Score: 10  Pain Quality/Description: left buttock \"very painful\".     Mobility/Safety/Self-Care:  Steady Gait: Yes  History of Falls: No   History of a Fall within the last 30 days No  Assistive Device: None  Poor Hygiene: No    Psychosocial:   Depression: no  If YES,    Does Patient express current thoughts of harming self or others?: No  Anxious: No  Insomnia: No  Inappropriate Behavior: No  Alcohol Abuse: no  Substance Abuse: no  Signs of Physical Abuse: No  Signs of Emotional Abuse: No    Educational:  Identify the learner who is being assessed for education:  patient                    Ability to Learn:  Exhibits ability to grasp concepts and respond to questions: High  Ready to Learn: Yes  calm   Preferred Method

## 2018-03-02 NOTE — PROGRESS NOTES
UPON DISCHARGE patient requested 100 mg tab of Revation . Pt left his handicap placard prescription called and notified and states he will come to pick it up on Monday.

## 2018-03-02 NOTE — PROGRESS NOTES
Outpatient Clinic Visit Note    Patient: Raad De La Cruz  : 1970 (49 y.o.)  Date: 3/2/2018    CC: follow up    HPI:      Patient is here for a follow up appointment. He states that he is doing well but has not been able to walk as much because of the weather. Patient states that he is eager to start walking and exercising more now that the weather is starting to get better. He states that he has been following his diet well and is very motivated to lose weight. Patient has history of recurrent furuncles and has noticed a few more recently. He has had two on his posterior and states that one of them has been draining. We will give him another 10 day course of bactrim, long term Bactroban as suppressive therapy, and continue use of Hibiclens. Patient had eye exam last week with no issues. He will follow up again next year for his annual exam.    Past Medical History:    Past Medical History:   Diagnosis Date    Arthritis     Atrial fibrillation ([address]     Congestive heart failure ([address]     Depression     Diabetes mellitus ([address]     GERD (gastroesophageal reflux disease)     Hyperlipidemia     Hypertension     Morbid obesity ([address]     Sleep apnea     uses cpap at home q hs     Type II or unspecified type diabetes mellitus without mention of complication, not stated as uncontrolled        Past Surgical History:  No past surgical history on file. Home Medications:  Has been reviewed and as documented. Allergies:    Patient has no known allergies. Family History:       Problem Relation Age of Onset    Diabetes Mother     High Blood Pressure Mother     Diabetes Maternal Aunt     High Blood Pressure Maternal Grandmother        ROS: A 10-organ Review Of Systems was obtained and otherwise unremarkable except as per HPI. Data: Old records have been reviewed electronically. PHYSICAL EXAM:  There were no vitals taken for this visit.   Physical Exam  Constitutional: He is oriented to

## 2018-03-02 NOTE — PATIENT INSTRUCTIONS
ritonavir, or saquinavir; or  · drugs to treat high blood pressure or a prostate disorder. This list is not complete. Other drugs may interact with sildenafil, including prescription and over-the-counter medicines, vitamins, and herbal products. Not all possible interactions are listed in this medication guide. Where can I get more information? Your pharmacist can provide more information about sildenafil. Remember, keep this and all other medicines out of the reach of children, never share your medicines with others, and use this medication only for the indication prescribed. Every effort has been made to ensure that the information provided by [address] is accurate, up-to-date, and complete, but no guarantee is made to that effect. Drug information contained herein may be time sensitive. [facility] information has been compiled for use by healthcare practitioners and consumers in the [place] and therefore [facility] does not warrant that uses outside of the [place] are appropriate, unless specifically indicated otherwise. [facility]'s drug information does not endorse drugs, diagnose patients or recommend therapy. [facility]'s drug information is an informational resource designed to assist licensed healthcare practitioners in caring for their patients and/or to serve consumers viewing this service as a supplement to, and not a substitute for, the expertise, skill, knowledge and judgment of healthcare practitioners. The absence of a warning for a given drug or drug combination in no way should be construed to indicate that the drug or drug combination is safe, effective or appropriate for any given patient. [facility] does not assume any responsibility for any aspect of healthcare administered with the aid of information [facility] provides.  The information contained herein is not intended to cover all possible uses, directions, precautions, warnings, drug interactions, allergic reactions, or adverse effects. If you have questions about the drugs you are taking, check with your doctor, nurse or pharmacist.  Copyright 2976-2687 [address]. Version: 11.04. Revision date: 11/9/2015. Care instructions adapted under license by [facility] ([facility]). If you have questions about a medical condition or this instruction, always ask your healthcare professional. [place] [address] any warranty or liability for your use of this information. trimethoprim  Pronunciation:  trye METH oh prim  Brand:  Primsol  What is the most important information I should know about trimethoprim? You should not use trimethoprim if you are allergic to it, or if you have a certain type of anemia caused by a folate (folic acid) deficiency. Before using trimethoprim, tell your doctor if you have kidney disease, liver disease, or a folic acid deficiency. Take this medicine for the full prescribed length of time. Your symptoms may improve before the infection is completely cleared. Skipping doses may also increase your risk of further infection that is resistant to antibiotics. Trimethoprim will not treat a viral infection such as the common cold or flu. Avoid exposure to sunlight or tanning beds. Trimethoprim can make you sunburn more easily. Wear protective clothing and use sunscreen (SPF 30 or higher) when you are outdoors. What is trimethoprim? Trimethoprim is an antibiotic that fights bacteria in the body. Trimethoprim is used to treat bladder or kidney infections, or ear infections caused by certain bacteria. Trimethoprim may also be used for purposes not listed in this medication guide. What should I discuss with my healthcare provider before taking trimethoprim? You should not use trimethoprim if you are allergic to it, or if you have a certain type of anemia caused by a folate (folic acid) deficiency.   To make sure trimethoprim is safe for you, tell your doctor if you have any of these depression. Overdose can also occur slowly over a long period of time if your daily doses are too high. This can cause severe forms of some of the side effects listed in this medication guide. What should I avoid while taking trimethoprim? Avoid exposure to sunlight or tanning beds. Trimethoprim can make you sunburn more easily. Wear protective clothing and use sunscreen (SPF 30 or higher) when you are outdoors. What are the possible side effects of trimethoprim? Get emergency medical help if you have any of these signs of an allergic reaction: hives; difficult breathing; swelling of your face, lips, tongue, or throat. Call your doctor at once if you have a serious side effect such as:  · fever, chills, body aches, flu symptoms, sores in your mouth and throat;  · pale skin, easy bruising, unusual bleeding (nose, mouth, vagina, or rectum), purple or red pinpoint spots under your skin;  · high potassium (slow heart rate, weak pulse, muscle weakness, tingly feeling); or  · severe headache with muscle cramps, confusion, weakness, loss of coordination, feeling unsteady, and weak or shallow breathing. Other common side effects may include:  · stomach pain, vomiting, diarrhea;  · sore or swollen tongue; or  · mild itching or skin rash. This is not a complete list of side effects and others may occur. Call your doctor for medical advice about side effects. You may report side effects to FDA at 2-582-FDA-8888. What other drugs will affect trimethoprim? Tell your doctor about all medications you use, and those you start or stop using during your treatment with trimethoprim, especially:  · a diuretic (water pill);  · dofetilide (Tikosyn);  · heart or blood pressure medication such as procainamide (Procan, Pronestyl) and others;  · leucovorin (folinic acid);  · methotrexate (Rheumatrex, Trexall); or  · phenytoin (Dilantin). This list is not complete.  Other drugs may interact with trimethoprim, including prescription, 11/15/2012. Care instructions adapted under license by [facility] ([facility]). If you have questions about a medical condition or this instruction, always ask your healthcare professional. [address]  any warranty or liability for your use of this information.          dO TREATMENT FIRST WEEK OF EVERY MONTH   rETURN TO CLINIC IN 3 MONTHS    [facility]  Telephone:[phone]    Instructions reviewed with patient and copy given to patient upon discharge by Ko Trujillo H.RN     3:13 PM3/2/2018

## 2018-03-05 DIAGNOSIS — I48.0 PAROXYSMAL A-FIB (HCC): ICD-10-CM

## 2018-03-05 RX ORDER — ATORVASTATIN CALCIUM 20 MG/1
20 TABLET, FILM COATED ORAL DAILY
Qty: 30 TABLET | Refills: 3 | Status: SHIPPED | OUTPATIENT
Start: 2018-03-05 | End: 2018-05-31 | Stop reason: SDUPTHER

## 2018-03-06 RX ORDER — FUROSEMIDE 80 MG
80 TABLET ORAL 2 TIMES DAILY
Qty: 60 TABLET | Refills: 1
Start: 2018-03-06 | End: 2018-05-03 | Stop reason: SDUPTHER

## 2018-03-06 RX ORDER — FUROSEMIDE 80 MG
80 TABLET ORAL 2 TIMES DAILY
Qty: 60 TABLET | Refills: 2 | OUTPATIENT
Start: 2018-03-06

## 2018-03-19 RX ORDER — ALLOPURINOL 300 MG/1
300 TABLET ORAL DAILY
Qty: 30 TABLET | Refills: 2
Start: 2018-03-19 | End: 2018-06-27 | Stop reason: SDUPTHER

## 2018-04-02 RX ORDER — M-VIT,TX,IRON,MINS/CALC/FOLIC 27MG-0.4MG
1 TABLET ORAL DAILY
Qty: 30 TABLET | Refills: 1
Start: 2018-04-02 | End: 2018-12-27 | Stop reason: SDUPTHER

## 2018-04-24 DIAGNOSIS — I10 ESSENTIAL HYPERTENSION: Chronic | ICD-10-CM

## 2018-04-24 RX ORDER — LISINOPRIL 20 MG/1
20 TABLET ORAL DAILY
Qty: 30 TABLET | Refills: 0
Start: 2018-04-24 | End: 2018-06-21 | Stop reason: SDUPTHER

## 2018-05-03 RX ORDER — FUROSEMIDE 80 MG
80 TABLET ORAL 2 TIMES DAILY
Qty: 60 TABLET | Refills: 2
Start: 2018-05-03 | End: 2018-05-04 | Stop reason: SDUPTHER

## 2018-05-04 RX ORDER — FUROSEMIDE 80 MG
80 TABLET ORAL 2 TIMES DAILY
Qty: 60 TABLET | Refills: 2
Start: 2018-05-04 | End: 2018-09-13 | Stop reason: SDUPTHER

## 2018-05-11 DIAGNOSIS — I48.0 PAROXYSMAL A-FIB (HCC): ICD-10-CM

## 2018-05-23 DIAGNOSIS — E87.6 POTASSIUM DEFICIENCY: ICD-10-CM

## 2018-05-23 DIAGNOSIS — I10 ESSENTIAL HYPERTENSION: Chronic | ICD-10-CM

## 2018-05-23 RX ORDER — POTASSIUM CHLORIDE 20 MEQ/1
TABLET, EXTENDED RELEASE ORAL
Qty: 30 TABLET | Refills: 0 | OUTPATIENT
Start: 2018-05-23 | End: 2018-06-21 | Stop reason: SDUPTHER

## 2018-05-31 DIAGNOSIS — I48.0 PAROXYSMAL A-FIB (HCC): ICD-10-CM

## 2018-05-31 RX ORDER — ATORVASTATIN CALCIUM 20 MG/1
20 TABLET, FILM COATED ORAL DAILY
Qty: 30 TABLET | Refills: 1
Start: 2018-05-31 | End: 2018-08-23 | Stop reason: SDUPTHER

## 2018-06-14 ENCOUNTER — OFFICE VISIT (OUTPATIENT)
Dept: INTERNAL MEDICINE | Age: 48
End: 2018-06-14
Attending: STUDENT IN AN ORGANIZED HEALTH CARE EDUCATION/TRAINING PROGRAM

## 2018-06-14 VITALS
HEART RATE: 75 BPM | WEIGHT: 315 LBS | TEMPERATURE: 97.7 F | SYSTOLIC BLOOD PRESSURE: 144 MMHG | RESPIRATION RATE: 18 BRPM | BODY MASS INDEX: 51.94 KG/M2 | OXYGEN SATURATION: 96 % | DIASTOLIC BLOOD PRESSURE: 90 MMHG

## 2018-06-14 DIAGNOSIS — H92.09 EARACHE: Primary | ICD-10-CM

## 2018-06-14 RX ORDER — METHYLPREDNISOLONE 4 MG/1
TABLET ORAL
Qty: 1 KIT | Refills: 0 | Status: SHIPPED | OUTPATIENT
Start: 2018-06-14 | End: 2018-06-20

## 2018-06-21 DIAGNOSIS — E87.6 POTASSIUM DEFICIENCY: ICD-10-CM

## 2018-06-21 DIAGNOSIS — I10 ESSENTIAL HYPERTENSION: Chronic | ICD-10-CM

## 2018-06-21 RX ORDER — POTASSIUM CHLORIDE 20 MEQ/1
TABLET, EXTENDED RELEASE ORAL
Qty: 30 TABLET | Refills: 2
Start: 2018-06-21 | End: 2018-09-26 | Stop reason: SDUPTHER

## 2018-06-21 RX ORDER — LISINOPRIL 20 MG/1
20 TABLET ORAL DAILY
Qty: 30 TABLET | Refills: 2 | Status: ON HOLD
Start: 2018-06-21 | End: 2018-08-05

## 2018-06-27 RX ORDER — ALLOPURINOL 300 MG/1
300 TABLET ORAL DAILY
Qty: 30 TABLET | Refills: 0
Start: 2018-06-27 | End: 2018-08-23 | Stop reason: SDUPTHER

## 2018-06-28 DIAGNOSIS — I48.0 PAROXYSMAL A-FIB (HCC): ICD-10-CM

## 2018-07-05 DIAGNOSIS — E11.9 DIABETES MELLITUS TYPE 2, INSULIN DEPENDENT (HCC): ICD-10-CM

## 2018-07-05 DIAGNOSIS — Z79.4 DIABETES MELLITUS TYPE 2, INSULIN DEPENDENT (HCC): ICD-10-CM

## 2018-07-05 RX ORDER — BLOOD SUGAR DIAGNOSTIC
4 STRIP MISCELLANEOUS
Qty: 100 EACH | Refills: 5 | Status: SHIPPED | OUTPATIENT
Start: 2018-07-05 | End: 2019-10-14 | Stop reason: SDUPTHER

## 2018-07-13 ENCOUNTER — TELEPHONE (OUTPATIENT)
Dept: INTERNAL MEDICINE | Age: 48
End: 2018-07-13

## 2018-07-13 NOTE — TELEPHONE ENCOUNTER
PA approved by Ayaan Pandey for Jason #[id number] per [address] EFFECTIVE 07/13/18 TO 07/13/19. [facility] called again and states the PA is in their system. Patient was called and will  his medication today. Pharmacy aware. Pt reminded to be sure and have his PA for his medication renewed next June 2019.

## 2018-08-04 PROBLEM — I48.91 A-FIB (HCC): Status: ACTIVE | Noted: 2018-08-04

## 2018-08-05 PROBLEM — I48.0 PAF (PAROXYSMAL ATRIAL FIBRILLATION) (HCC): Status: ACTIVE | Noted: 2018-08-04

## 2018-08-08 ENCOUNTER — OFFICE VISIT (OUTPATIENT)
Dept: SLEEP MEDICINE | Age: 48
End: 2018-08-08

## 2018-08-08 VITALS
OXYGEN SATURATION: 93 % | TEMPERATURE: 98.7 F | WEIGHT: 315 LBS | BODY MASS INDEX: 42.66 KG/M2 | SYSTOLIC BLOOD PRESSURE: 120 MMHG | DIASTOLIC BLOOD PRESSURE: 82 MMHG | RESPIRATION RATE: 16 BRPM | HEART RATE: 62 BPM | HEIGHT: 72 IN

## 2018-08-08 DIAGNOSIS — Z86.79 H/O CHF: ICD-10-CM

## 2018-08-08 DIAGNOSIS — E66.01 MORBID OBESITY (HCC): ICD-10-CM

## 2018-08-08 DIAGNOSIS — G47.33 OSA (OBSTRUCTIVE SLEEP APNEA): Primary | ICD-10-CM

## 2018-08-08 DIAGNOSIS — I48.0 PAROXYSMAL ATRIAL FIBRILLATION (HCC): ICD-10-CM

## 2018-08-08 DIAGNOSIS — E66.2 OBESITY HYPOVENTILATION SYNDROME (HCC): ICD-10-CM

## 2018-08-08 PROCEDURE — G8417 CALC BMI ABV UP PARAM F/U: HCPCS | Performed by: PSYCHIATRY & NEUROLOGY

## 2018-08-08 PROCEDURE — 1111F DSCHRG MED/CURRENT MED MERGE: CPT | Performed by: PSYCHIATRY & NEUROLOGY

## 2018-08-08 PROCEDURE — G8427 DOCREV CUR MEDS BY ELIG CLIN: HCPCS | Performed by: PSYCHIATRY & NEUROLOGY

## 2018-08-08 PROCEDURE — 1036F TOBACCO NON-USER: CPT | Performed by: PSYCHIATRY & NEUROLOGY

## 2018-08-08 PROCEDURE — 99204 OFFICE O/P NEW MOD 45 MIN: CPT | Performed by: PSYCHIATRY & NEUROLOGY

## 2018-08-08 ASSESSMENT — SLEEP AND FATIGUE QUESTIONNAIRES
HOW LIKELY ARE YOU TO NOD OFF OR FALL ASLEEP WHILE SITTING AND READING: 3
HOW LIKELY ARE YOU TO NOD OFF OR FALL ASLEEP WHILE SITTING AND TALKING TO SOMEONE: 1
NECK CIRCUMFERENCE (INCHES): 19
HOW LIKELY ARE YOU TO NOD OFF OR FALL ASLEEP IN A CAR, WHILE STOPPED FOR A FEW MINUTES IN TRAFFIC: 2
HOW LIKELY ARE YOU TO NOD OFF OR FALL ASLEEP WHEN YOU ARE A PASSENGER IN A CAR FOR AN HOUR WITHOUT A BREAK: 3
HOW LIKELY ARE YOU TO NOD OFF OR FALL ASLEEP WHILE LYING DOWN TO REST IN THE AFTERNOON WHEN CIRCUMSTANCES PERMIT: 3
ESS TOTAL SCORE: 19
HOW LIKELY ARE YOU TO NOD OFF OR FALL ASLEEP WHILE SITTING INACTIVE IN A PUBLIC PLACE: 3
HOW LIKELY ARE YOU TO NOD OFF OR FALL ASLEEP WHILE WATCHING TV: 3
HOW LIKELY ARE YOU TO NOD OFF OR FALL ASLEEP WHILE SITTING QUIETLY AFTER LUNCH WITHOUT ALCOHOL: 1

## 2018-08-08 ASSESSMENT — ENCOUNTER SYMPTOMS
GASTROINTESTINAL NEGATIVE: 1
ALLERGIC/IMMUNOLOGIC NEGATIVE: 1
COUGH: 1
SHORTNESS OF BREATH: 1
EYES NEGATIVE: 1
WHEEZING: 1

## 2018-08-08 NOTE — PROGRESS NOTES
Caffeine: SODA - 20 oz daily diet  TEA - 0  COFFEE - 0
usually is not refreshing nap. Previous evaluation and treatment has included- PSG with C PAP titration. Last time he used the BiPAP with O2 3 years ago. DOT/CDL - N/A  FAA/'s license - N/A      Previous Report(s) Reviewed: historical medical records         Social History     Social History    Marital status: Single     Spouse name: N/A    Number of children: N/A    Years of education: N/A     Occupational History    Not on file. Social History Main Topics    Smoking status: Never Smoker    Smokeless tobacco: Never Used    Alcohol use No    Drug use: Yes     Types: Marijuana, Cocaine      Comment: no cocaine for over 2 years, marijuana occ    Sexual activity: Not Currently     Other Topics Concern    Not on file     Social History Narrative    No narrative on file       Prior to Admission medications    Medication Sig Start Date End Date Taking?  Authorizing Provider   lisinopril (PRINIVIL;ZESTRIL) 20 MG tablet Take 0.5 tablets by mouth daily 8/5/18  Yes Nasim Werner MD   diltiazem (CARDIZEM CD) 120 MG extended release capsule Take 1 capsule by mouth daily 8/6/18  Yes Michelle Charles MD   Insulin Syringe-Needle U-100 ([employer]ER INSULIN SYRINGE) 31G X 5/16\" 1 ML MISC 4 each by Does not apply route 4 times daily (before meals and nightly) 7/5/18  Yes Kerry Grossman MD   apixaban (ELIQUIS) 5 MG TABS tablet Take 1 tablet by mouth 2 times daily 6/28/18  Yes Glen Castle MD   allopurinol (ZYLOPRIM) 300 MG tablet Take 1 tablet by mouth daily 6/27/18  Yes Reyna Fofana MD   metFORMIN (GLUCOPHAGE) 500 MG tablet Take 1 tablet by mouth 2 times daily (with meals) 6/21/18  Yes Diana Christy MD   potassium chloride (KLOR-CON M20) 20 MEQ extended release tablet TAKE 1 TABLET BY MOUTH EVERY MORNING WITH BREAKFAST 6/21/18  Yes Diana Christy MD   atorvastatin (LIPITOR) 20 MG tablet Take 1 tablet by mouth daily 5/31/18  Yes Praneeth Dyson MD   furosemide (LASIX) 80 MG tablet Take 1 tablet by mouth

## 2018-08-08 NOTE — PATIENT INSTRUCTIONS
Patient Education        Learning About CPAP for Sleep Apnea  What is CPAP? CPAP is a small machine that you use at home every night while you sleep. It increases air pressure in your throat to keep your airway open. When you have sleep apnea, this can help you sleep better so you feel much better. CPAP stands for \"continuous positive airway pressure. \"  The CPAP machine will have one of the following:  · A mask that covers your nose and mouth  · Prongs that fit into your nose  · A mask that covers your nose only, the most common type. This type is called NCPAP. The N stands for \"nasal.\"  Why is it done? CPAP is usually the best treatment for obstructive sleep apnea. It is the first treatment choice and the most widely used. Your doctor may suggest CPAP if you have:  · Moderate to severe sleep apnea. · Sleep apnea and coronary artery disease (CAD). · Sleep apnea and heart failure. How does it help? · CPAP can help you have more normal sleep, so you feel less sleepy and more alert during the daytime. · CPAP may help keep heart failure or other heart problems from getting worse. · CPAP may help lower your blood pressure. · If you use CPAP, your bed partner may also sleep better because you are not snoring or restless. What are the side effects? Some people who use CPAP have:  · A dry or stuffy nose and a sore throat. · Irritated skin on the face. · Sore eyes. · Bloating. If you have any of these problems, work with your doctor to fix them. Here are some things you can try:  · Be sure the mask or nasal prongs fit well. · See if your doctor can adjust the pressure of your CPAP. · If your nose is dry, try a humidifier. · If your nose is runny or stuffy, try decongestant medicine or a steroid nasal spray. Be safe with medicines. Read and follow all instructions on the label. Do not use the medicine longer than the label says. If these things do not help, you might try a different type of machine.

## 2018-08-16 ENCOUNTER — OFFICE VISIT (OUTPATIENT)
Dept: INTERNAL MEDICINE CLINIC | Age: 48
End: 2018-08-16
Payer: MEDICAID

## 2018-08-16 VITALS
RESPIRATION RATE: 16 BRPM | DIASTOLIC BLOOD PRESSURE: 77 MMHG | OXYGEN SATURATION: 94 % | TEMPERATURE: 99.3 F | BODY MASS INDEX: 53.71 KG/M2 | SYSTOLIC BLOOD PRESSURE: 127 MMHG | HEART RATE: 60 BPM | WEIGHT: 315 LBS

## 2018-08-16 DIAGNOSIS — I10 ESSENTIAL HYPERTENSION: Primary | Chronic | ICD-10-CM

## 2018-08-16 PROCEDURE — 99213 OFFICE O/P EST LOW 20 MIN: CPT | Performed by: STUDENT IN AN ORGANIZED HEALTH CARE EDUCATION/TRAINING PROGRAM

## 2018-08-16 RX ORDER — LISINOPRIL 10 MG/1
10 TABLET ORAL DAILY
Qty: 30 TABLET | Refills: 3 | Status: SHIPPED | OUTPATIENT
Start: 2018-08-16 | End: 2018-08-16 | Stop reason: SDUPTHER

## 2018-08-16 RX ORDER — LISINOPRIL 10 MG/1
10 TABLET ORAL DAILY
Qty: 30 TABLET | Refills: 3 | Status: SHIPPED | OUTPATIENT
Start: 2018-08-16 | End: 2018-10-18 | Stop reason: SDUPTHER

## 2018-08-16 NOTE — PROGRESS NOTES
U-100 ([employer] INSULIN SYRINGE) 31G X 5/16\" 1 ML MISC 4 each by Does not apply route 4 times daily (before meals and nightly) 7/5/18   Tristin Gonsales MD   apixaban (ELIQUIS) 5 MG TABS tablet Take 1 tablet by mouth 2 times daily 6/28/18   Yoel Hancock MD   allopurinol (ZYLOPRIM) 300 MG tablet Take 1 tablet by mouth daily 6/27/18   Janusz Harris MD   potassium chloride (KLOR-CON M20) 20 MEQ extended release tablet TAKE 1 TABLET BY MOUTH EVERY MORNING WITH BREAKFAST 6/21/18   Antonia Bender MD   atorvastatin (LIPITOR) 20 MG tablet Take 1 tablet by mouth daily 5/31/18   Froylan Schofield MD   furosemide (LASIX) 80 MG tablet Take 1 tablet by mouth 2 times daily 5/4/18   Tristin Gonsales MD   Multiple Vitamins-Minerals (THERAPEUTIC MULTIVITAMIN-MINERALS) tablet Take 1 tablet by mouth daily 4/2/18   Nan Mcclain MD   sildenafil (REVATIO) 20 MG tablet Take 3 tablets by mouth as needed (erectile dysfunction) 3/2/18   Froylan Schofield MD   Glucose Blood (BLOOD GLUCOSE TEST STRIPS) STRP 1 Units by In Vitro route 4 times daily (after meals and at bedtime) 12/13/17   Janusz Harris MD   mupirocin (BACTROBAN) 2 % ointment Apply to both nares twice a day for 7 days following your 10 days of Bactrim. Then on the 1st day of the next month, resume twice a day in each nare for 7 days. Repeat for the first 7 days of each month.  11/3/17   Yuri Jean-Baptiste MD   naproxen (NAPROSYN) 500 MG tablet Take 1 tablet by mouth 2 times daily (with meals) for 30 doses 10/17/17 8/8/18  Siva Blue MD   insulin glargine (LANTUS) 100 UNIT/ML injection vial Inject 20 Units into the skin nightly 9/6/17   Froylan Schofield MD   Blood Pressure KIT 1 Units by Does not apply route daily 4/17/17   Fercho Kruse MD   insulin lispro (HUMALOG) 100 UNIT/ML injection vial Inject 7 Units into the skin 3 times daily (with meals) 4/17/17   Fercho Kruse MD   fluticasone (FLONASE) 50 MCG/ACT nasal spray 1 spray by Nasal route daily 4/17/17   Fercho Kruse MD   Blood Glucose PROVIDED HISTORY: Fall TECHNOLOGIST PROVIDED HISTORY: Technologist Provided Reason for Exam: fall Acuity: Acute Type of Encounter: Initial Additional signs and symptoms: syncope FINDINGS: Evaluation is limited by patient's body habitus. The ilioischial and iliopectineal lines are intact. No acute fractures are identified. No soft tissue abnormalities are found. No acute abnormality detected. Ct Head Wo Contrast    Result Date: 8/5/2018  EXAMINATION: CT OF THE HEAD WITHOUT CONTRAST  8/5/2018 11:49 am TECHNIQUE: CT of the head was performed without the administration of intravenous contrast. Dose modulation, iterative reconstruction, and/or weight based adjustment of the mA/kV was utilized to reduce the radiation dose to as low as reasonably achievable. COMPARISON: 08/04/2018 HISTORY: ORDERING PHYSICIAN PROVIDED HISTORY: ENCEPHALOPATHY TECHNOLOGIST PROVIDED HISTORY: Has a \"code stroke\" or \"stroke alert\" been called? ->No Technologist Provided Reason for Exam: Encephalopathy; Follow-up CT patient on anticoagulation Acuity: Acute Type of Encounter: Subsequent/Follow-up FINDINGS: BRAIN/VENTRICLES: There is no acute intracranial hemorrhage, mass effect or midline shift. No abnormal extra-axial fluid collection. The gray-white differentiation is maintained without evidence of an acute infarct. There is no evidence of hydrocephalus. ORBITS: The visualized portion of the orbits demonstrate no acute abnormality. SINUSES: The visualized paranasal sinuses and mastoid air cells demonstrate no acute abnormality. SOFT TISSUES/SKULL:  No acute abnormality of the visualized skull or soft tissues. No acute intracranial abnormality.      Ct Head Wo Contrast    Result Date: 8/4/2018  EXAMINATION: CT OF THE HEAD WITHOUT CONTRAST  8/4/2018 4:08 pm TECHNIQUE: CT of the head was performed without the administration of intravenous contrast. Dose modulation, iterative reconstruction, and/or weight based adjustment of the mA/kV was utilized to reduce the radiation dose to as low as reasonably achievable. COMPARISON: None. HISTORY: ORDERING PHYSICIAN PROVIDED HISTORY: HEADACHE, POST TRAUMA TECHNOLOGIST PROVIDED HISTORY: Has a \"code stroke\" or \"stroke alert\" been called? ->No Technologist Provided Reason for Exam: he was at a [place] 6 hrs when he felt \"bad\" went inside to restroom/was on toilet have a diarrhea BM and experienced a syncopal episode/+ scalp lac left parietal lobe Acuity: Acute Type of Encounter: Initial FINDINGS: BRAIN/VENTRICLES: There is no acute intracranial hemorrhage, mass effect or midline shift. No abnormal extra-axial fluid collection. The gray-white differentiation is maintained without evidence of an acute infarct. There is no evidence of hydrocephalus. ORBITS: The visualized portion of the orbits demonstrate no acute abnormality. SINUSES: The visualized paranasal sinuses and mastoid air cells demonstrate no acute abnormality. SOFT TISSUES/SKULL:  No acute abnormality of the visualized skull or soft tissues. No acute intracranial abnormality. Xr Chest Portable    Result Date: 8/4/2018  EXAMINATION: SINGLE XRAY VIEW OF THE CHEST 8/4/2018 3:46 pm COMPARISON: 06/19/2018 HISTORY: ORDERING PHYSICIAN PROVIDED HISTORY: SOB TECHNOLOGIST PROVIDED HISTORY: Technologist Provided Reason for Exam: SOB Acuity: Acute Type of Encounter: Initial Additional signs and symptoms: syncope FINDINGS: Mild cardiomegaly and pulmonary vascular congestion. No focal airspace disease. No pleural effusion. No pneumothorax. Cardiomegaly with pulmonary vascular congestion. Otherwise, stable chest         Assessment/Plan:   The patient is a 49 y.o. male who presents after being treated at the ED after a fall resulting in a laceration to the left side of his scalp. He received 2 stitches. He reports no additional symptoms.     2 Stiches: wound is clean and dry  Stiches removed    Case will be discussed with preceptor,     Glen Arzola MD  Internal Medicine Resident  Pager: [phone]  8/16/2018, 2:38 PM

## 2018-08-16 NOTE — PROGRESS NOTES
[facility]       NURSING PROGRESS NOTE      August 16, 2018  Valerie De La Cruz    Chief Complaint: No chief complaint on file. Constitutional: negative  Eyes: negative  Ears, nose, mouth, throat, and face: negative, Has staple in scalp from fall 10 days ago   Respiratory: negative  Cardiovascular: negative, states he fell due to pressure drop . States he  was admitted for 2 days and was told he has A-fib.   Gastrointestinal: negative  Genitourinary: negative  Integument/breast: negative  Hematologic/lymphatic: negative  Musculoskeletal: negative  Neurological: negative  Diabetes: Yes  Yes:  Medication compliance:  compliant all of the time  Diabetic diet compliance:  compliant most of the time  Home glucose monitoring:  is performed regularly  Last eye exam:  6 months  Acute symptoms hyperglycemia:  none  Acute symptoms hypoglycemia:  none  POC glucose today: 110-254 mg/dL    Pain Assessment:  Pain Present: no  Pain Score: 0  Pain Quality/Description:     Mobility/Safety/Self-Care:  Steady Gait: Yes  History of Falls: Yes fell 10 days ago seen in [facility]   History of a Fall within the last 30 days Yes  Assistive Device: None  Poor Hygiene: No    Psychosocial:   Depression: No  If YES,    Does Patient express current thoughts of harming self or others?: No  Anxious: No  Insomnia: No  Inappropriate Behavior: No  Alcohol Abuse: no  Substance Abuse: no  Signs of Physical Abuse: No  Signs of Emotional Abuse: No    Educational:  Identify the learner who is being assessed for education:  patient                    Ability to Learn:  Exhibits ability to grasp concepts and respond to questions: Medium  Ready to Learn: Yes  calm   Preferred Method of Learning:  written  Barriers to Learning: Verbalizes interest  Special Considerations due to cultural, Gnosticism, spiritual beliefs:  No  Language:  English  :  No    Note:   States his pressure dropped which caused his fall States his Lisinopril

## 2018-08-20 NOTE — PROGRESS NOTES
[address]   Electrophysiology Consultation   Date: 8/20/2018  Reason for Consultation: Syncope & PAF  Consult Requesting Physician: Selvin Shearer MD    Chief Complaint:   Chief Complaint   Patient presents with    Follow-Up from [facility]     8/4-8/5. PAF,syncope and collapse      HPI: Marilyn De La Cruz is a 49 y.o. with a PMH significant for obesity, LEONA non-compliant with CPAP, DM II, HTN and PAF who was recently admitted to [facility] 8/2018 s/p syncopal episode. He reported that he has been trying to loose weight and had made diet modifications resulting in diarrhea for several days prior. Regardless, he opted to go out with friends fishing when he noticed stomach cramping followed by the need to use the restroom. After having a bowel movement, he broke out into a heavy sweat, became lightheaded, and shortly thereafter \"passed out. \"  He reportedly felt fine when he \"came to. \" He has a known history of symptomatic paroxysmal afib and was back in the arrhythmia after the event. He states that he \"knows\" when he is in Afib but did not feel it that specific day. He believes that he has only been in afib 2-3 times. He denies chest pain, palpitations, orthopnea or edema. He is interested in learning more about treatment options for atrial fibrillation. Past Medical History:   Diagnosis Date    Arthritis     [facility]     Congestive heart failure (HCC)     Depression     Diabetes mellitus (HCC)     GERD (gastroesophageal reflux disease)     Hyperlipidemia     Hypertension     Morbid obesity ([facility]     Sleep apnea     uses cpap at home q hs     Type II or unspecified type diabetes mellitus without mention of complication, not stated as uncontrolled         No past surgical history on file. Allergies:  No Known Allergies    Medication:   Prior to Admission medications    Medication Sig Start Date End Date Taking?  Authorizing Provider   lisinopril (PRINIVIL;ZESTRIL) 10 MG tablet 100 UNIT/ML injection vial Inject 7 Units into the skin 3 times daily (with meals) 4/17/17  Yes Luis Miguel Joseph MD   fluticasone (FLONASE) 50 MCG/ACT nasal spray 1 spray by Nasal route daily 4/17/17  Yes LuisM iguel Joseph MD   Blood Glucose Monitoring Suppl (BLOOD GLUCOSE MONITOR SYSTEM) W/DEVICE KIT 1 Units by Does not apply route daily 4/17/17  Yes Luis Miguel Joseph MD   ascorbic acid (V-R VITAMIN C) 250 MG tablet Take 1 tablet by mouth daily 9/10/15  Yes Vandana Huber MD   Handicap Placard MISC by Does not apply route Valid for 1 year. Mobility limitations due to severe Osteoarthritis of L Knee 8/15/15  Yes Vandana Huber MD   Alcohol Swabs PADS 1 Container by Does not apply route three times daily. 8/25/14  Yes Leonard Palafox MD   Blood Glucose Monitoring Suppl (ACCU-CHEK VIK PLUS) W/DEVICE KIT 1 kit by Does not apply route 4 times daily (before meals and nightly). 1/6/14  Yes Scott Davenport   ACCU-CHEK FASTCLIX LANCETS MISC 20 Sticks by Does not apply route 2 times daily. 1/6/14  Yes Scott Davenport   naproxen (NAPROSYN) 500 MG tablet Take 1 tablet by mouth 2 times daily (with meals) for 30 doses 10/17/17 8/8/18  Gino Farmer MD       Social History:   reports that he has never smoked. He has never used smokeless tobacco. He reports that he uses drugs, including Marijuana and Cocaine. He reports that he does not drink alcohol. Family History:  family history includes Diabetes in his maternal aunt and mother; High Blood Pressure in his maternal grandmother and mother. Reviewed.  Denies family history of sudden cardiac death, arrhythmia, premature CAD    Review of System:    · General ROS: negative for - chills, fever   · Psychological ROS: negative for - anxiety or depression  · Ophthalmic ROS: negative for - eye pain or loss of vision  · ENT ROS: negative for - epistaxis, headaches, nasal discharge, sore throat   · Allergy and Immunology ROS: negative for - hives, nasal congestion · Hematological and Lymphatic ROS: negative for - bleeding problems, blood clots, bruising or jaundice  · Endocrine ROS: negative for - skin changes, temperature intolerance or unexpected weight changes  · Respiratory ROS: negative for - cough, hemoptysis, pleuritic pain, SOB, sputum changes or wheezing  · Cardiovascular ROS: Per HPI. · Gastrointestinal ROS: negative for - abdominal pain, blood in stools, diarrhea, hematemesis, melena,  nausea/vomiting or swallowing difficulty/pain +obesity  · Genito-Urinary ROS: negative for - dysuria or incontinence  · Musculoskeletal ROS: negative for - joint swelling or muscle pain  · Neurological ROS: negative for - confusion, dizziness, gait disturbance, headaches, numbness/tingling, seizures,  speech problems, tremors, visual changes or weakness  · Dermatological ROS: negative for - rash    Physical Examination:  Vitals:    08/21/18 1303   BP: 122/80   Pulse: 60   SpO2: 95%       · Constitutional: Oriented. No distress. · Head: Normocephalic and atraumatic. · Mouth/Throat: Oropharynx is clear and moist.   · Eyes: Conjunctivae normal. EOM are normal.   · Neck: Normal range of motion. Neck supple. No rigidity. No JVD present. · Cardiovascular: Bradycardia, regular rhythm, S1&S2 and intact distal pulses. · Pulmonary/Chest: Bilateral respiratory sounds. No wheezes. No rhonchi. · Abdominal: Soft. Bowel sounds present. No distension, No tenderness. · Musculoskeletal: No tenderness. No edema    · Lymphadenopathy: Has no cervical adenopathy. · Neurological: Alert and oriented. Cranial nerve appears intact, No Gross deficit   · Skin: Skin is warm and dry. No rash noted. · Psychiatric: Has a normal mood, affect and behavior     Labs:  Reviewed.   Cr 0.9 (8/2018)     ECG: reviewed, 8/21/18 Sinus  Bradycardia  - frequent PAC s, # PACs = 3, Low voltage in precordial leads,  -Left axis,    consider septal infarct, age indeterminate, -nonspecific inferolateral T wave abnormalities, Nonspecific T-abnormality. Other Non-Invasive Studies:   1. Event monitor: 11/2014  NSR with rare PVC's    2. Echo: 1/16/14  Left ventricle size is normal. There is moderate concentric left ventricular  hypertrophy. No regional wall motion abnormalities are noted. Ejection  fraction is visually estimated to be 65%. There is diastolic dysfunction. The left atrium is mildly dilated. Mild pulmonary regurgitation. No significant aortic or mitral valvular disease; no pericardial effusion. LA Dimension: 4.2 cm     3. Stress ECHO: 4/19/16  Stress ECG: Negative for ischemia. Baseline resting echocardiogram shows normal global LV systolic function  with an ejection fraction of 50-55%. No obvious regional wall motion  abnormalitieis were present. Post exercise, images were acquired well below  target and endomyocardial visualization was difficult, even with Definity  echo contrast use. No obvious ischemia was noted, but the echo is very  insensitive for detection of ischemia. 4. Cath:   None    Procedures:  1.  None    Assessment:   Patient Active Problem List    Diagnosis Date Noted    Syncope and collapse      Priority: High    [facility] 08/04/2018    Precordial pain     Hx of atrial fibrillation without current medication     Morbid obesity with BMI of 50.0-59.9, adult ([facility] [facility] 01/27/2015    Hypertension     Congestive heart failure (HCC)     GERD (gastroesophageal reflux disease)     Dyspnea     Bradycardia     Non compliance w medication regimen     LEONA (obstructive sleep apnea)     Atrial fibrillation     Morbid obesity ([facility]     HTN (hypertension) 01/01/2014    Acute and chronic respiratory failure with hypercapnia (HCC) 01/01/2014    Obesity hypoventilation syndrome 01/01/2014    DM2 (diabetes mellitus, type 2) ([facility] [facility] 01/01/2014    SIRS (systemic inflammatory response syndrome) ([facility] 01/01/2014        Plan:    Syncope:  -Vasovagal or else due to our most potent anti-arrhythmic medication there is limited long term efficacy (clinical studies have shown that 40% of patients remain atrial fibrillation-free after 4 years of follow-up after starting one of the more powerful anti-arrhythmic medication (amiodarone), and, if extrapolated, may have further diminishing success as time goes on). Atrial fibrillation ablation is a potentially curative therapy with very reasonable success rate after a first time procedure and with improving success rates with subsequent procedures. The risks and benefits of the procedure were explained in full detail. Risk, benefit, alternative, and rationale of the procedure has been discussed with the patient. Risks associated with procedure include but not limited to allergic reaction to the medications, pain , bleeding, infection, nerve injury, injury to diaphragm(breathing muscle), pulmonary embolus(blood clot in lungs), deep vein blood clot, pneumothorax, hemothorax, acute renal failure, cardiac perforation,  tamponade, need for emergent surgery (open heart), permanent pacemaker, pulmonary vein stenosis, left atrial to esophageal fistula, stroke, myocardial infarction and death. The patient wishes to pursue a paroxysmal atrial fibrillation with Carto/CHON prior to. Cardiac CTA prior to procedure for pulmonary vein mapping. No need to hold Cardizem. Hold Eliquis 2 days prior to procedure. Will order BMP, INR, CBC, and T&S prior to the procedure. Will plan on starting Flecainide 50mg BID after the procedure for the next 3 months prior to clinic visit. Obesity:  -Educated on the importance of weight loss with daily exercise and low fat/ low cholesterol diet  -He has considered weight loss surgery in the past but to date has not pursue.  He did successfully loose 50-60 lbs over the past few years.  -Avoid processed sugars- watch the \"That Sugar Film\"    Follow up in 3 months s/p procedure    Thank you for allowing me to participate in the care of Marian De La Cruz. All questions and concerns were addressed to the patient/family. Alternatives to my treatment were discussed. This note was scribed in the presence of Dr. Rasheed Darby MD by Serg Garner RN. The scribe's documentation has been prepared under my direction and personally reviewed by me in its entirety. I confirm that the note above accurately reflects all work, physical examination, the discussion of treatments and procedures, and medical decision making performed by me.       Penelope Carrizales MD, MS, [facility] [place], [facility]

## 2018-08-21 ENCOUNTER — OFFICE VISIT (OUTPATIENT)
Dept: CARDIOLOGY CLINIC | Age: 48
End: 2018-08-21

## 2018-08-21 ENCOUNTER — TELEPHONE (OUTPATIENT)
Dept: CARDIOLOGY CLINIC | Age: 48
End: 2018-08-21

## 2018-08-21 VITALS
SYSTOLIC BLOOD PRESSURE: 122 MMHG | WEIGHT: 315 LBS | HEART RATE: 60 BPM | OXYGEN SATURATION: 95 % | BODY MASS INDEX: 42.66 KG/M2 | HEIGHT: 72 IN | DIASTOLIC BLOOD PRESSURE: 80 MMHG

## 2018-08-21 DIAGNOSIS — E66.01 CLASS 3 SEVERE OBESITY DUE TO EXCESS CALORIES WITHOUT SERIOUS COMORBIDITY WITH BODY MASS INDEX (BMI) OF 50.0 TO 59.9 IN ADULT (HCC): ICD-10-CM

## 2018-08-21 DIAGNOSIS — I48.0 PAROXYSMAL ATRIAL FIBRILLATION (HCC): Primary | ICD-10-CM

## 2018-08-21 DIAGNOSIS — R55 VASOVAGAL SYNCOPE: ICD-10-CM

## 2018-08-21 PROCEDURE — 99205 OFFICE O/P NEW HI 60 MIN: CPT | Performed by: INTERNAL MEDICINE

## 2018-08-21 PROCEDURE — 93000 ELECTROCARDIOGRAM COMPLETE: CPT | Performed by: INTERNAL MEDICINE

## 2018-08-21 NOTE — TELEPHONE ENCOUNTER
Sayra Aguero was seen in the office today and is to be scheduled for an AFIB ABLATION/CHON with Dr. Gabriel Coello. I sent a date/time request to anesthesia. There is no time available on Tuesday 8/28/18, because of block time. [address] [place], [place] [address] [address] is checking with a few offices, to see if they are going to use their block time. I reviewed all of the prep, with the patient. I told him once I have a time/ date for the ablation, then I will schedule his CTA & then contact him and we'll fill in the blanks on his handouts.    He voiced understanding

## 2018-08-21 NOTE — PATIENT INSTRUCTIONS
Patient Education        Atrial Fibrillation: Care Instructions  Your Care Instructions    Atrial fibrillation is an irregular and often fast heartbeat. Treating this condition is important for several reasons. It can cause blood clots, which can travel from your heart to your brain and cause a stroke. If you have a fast heartbeat, you may feel lightheaded, dizzy, and weak. An irregular heartbeat can also increase your risk for heart failure. Atrial fibrillation is often the result of another heart condition, such as high blood pressure or coronary artery disease. Making changes to improve your heart condition will help you stay healthy and active. Follow-up care is a key part of your treatment and safety. Be sure to make and go to all appointments, and call your doctor if you are having problems. It's also a good idea to know your test results and keep a list of the medicines you take. How can you care for yourself at home? Medicines    · Take your medicines exactly as prescribed. Call your doctor if you think you are having a problem with your medicine. You will get more details on the specific medicines your doctor prescribes.     · If your doctor has given you a blood thinner to prevent a stroke, be sure you get instructions about how to take your medicine safely. Blood thinners can cause serious bleeding problems.     · Do not take any vitamins, over-the-counter drugs, or herbal products without talking to your doctor first.    Lifestyle changes    · Do not smoke. Smoking can increase your chance of a stroke and heart attack. If you need help quitting, talk to your doctor about stop-smoking programs and medicines. These can increase your chances of quitting for good.     · Eat a heart-healthy diet.     · Stay at a healthy weight. Lose weight if you need to.     · Limit alcohol to 2 drinks a day for men and 1 drink a day for women. Too much alcohol can cause health problems.     · Avoid colds and flu.  Get the heart's electrical system. Sometimes that system misfires. This causes a heartbeat that is too fast and isn't steady. Catheter ablation is a way to get into your heart and fix the problem. Ablation is not surgery. How is catheter ablation done? Your doctor inserts thin tubes called catheters into a blood vessel in your groin, arm, or neck. Then your doctor feeds them into the heart. Wires in the catheters help the doctor find the problem areas. Then he or she uses the wires to send energy to destroy the tiny areas of heart tissue that are causing the problems. It may seem like a bad idea to destroy parts of your heart on purpose. But the areas that are destroyed are very tiny. They should not affect your heart's ability to do its job. You may be awake during the procedure. Or you may be asleep. The doctor will give you medicines to help you feel relaxed and to numb the areas where the catheters go in. You may feel a little uncomfortable, but you should not feel pain. This procedure usually takes 2 to 6 hours. In rare cases, it can take longer. You may stay overnight in the hospital. How long you stay in the hospital depends on the type of ablation you have. What can you expect after catheter ablation? Do not exercise hard or lift anything heavy for a week. You will probably be able to go back to work and to your normal routine in 1 or 2 days. You may have swelling, bruising, or a small lump around the site where the catheters went into your body. These should go away in 3 to 4 weeks. You may have to take some medicines for a while. Follow-up care is a key part of your treatment and safety. Be sure to make and go to all appointments, and call your doctor if you are having problems. It's also a good idea to know your test results and keep a list of the medicines you take. Where can you learn more? Go to https://dusty.Pacinian. org and sign in to your Paper[employer] account.  Enter V506 in the Search Health Information box to learn more about \"Learning About Catheter Ablation for Heart Rhythm Problems. \"     If you do not have an account, please click on the \"Sign Up Now\" link. Current as of: December 6, 2017  Content Version: 11.7  © 20065722-4187 [employer]. Care instructions adapted under license by [facility] ([facility]). If you have questions about a medical condition or this instruction, always ask your healthcare professional. [address] any warranty or liability for your use of this information. Patient Education        Electrophysiology Study and Catheter Ablation: Before Your Procedure  What is an electrophysiology study and catheter ablation? An electrophysiology study is a test to see if there is a problem with your heart rhythm and to find out how to fix it. It is also called an EPS. Sometimes a procedure called catheter ablation is done during an EPS. This procedure destroys (ablates) small areas of your heart that are causing your heart rhythm problem. The doctor puts plastic tubes called catheters into blood vessels in your groin, arm, or neck. He or she then uses an X-ray machine to guide long wires through the tubes to your heart. The doctor can use these wires to record your heart's electrical signals. If the doctor thinks your problem can be fixed with ablation, he or she can use the wires to destroy a small part of your heart tissue. This is most often done with radio waves. You will probably be awake during the procedure. But you could be asleep. The doctor will give you medicines to help you feel relaxed and to numb the areas where the catheters go in. An EPS and ablation can take 2 to 6 hours. In rare cases, it can take longer. If you have EPS only and you do not need more treatment, you may go home the same day.  But if you also have ablation, you may stay overnight in the hospital. How long you stay in the hospital depends on the type of ablation you have. Do not exercise hard or lift anything heavy for a week. You may be able to go back to work and to your normal routine in 1 or 2 days. Follow-up care is a key part of your treatment and safety. Be sure to make and go to all appointments, and call your doctor if you are having problems. It's also a good idea to know your test results and keep a list of the medicines you take. What happens before the procedure?   Preparing for the procedure    · Understand exactly what procedure is planned, along with the risks, benefits, and other options. · Tell your doctors ALL the medicines, vitamins, supplements, and herbal remedies you take. Some of these can increase the risk of bleeding or interact with anesthesia.     · If you take blood thinners, such as warfarin (Coumadin), clopidogrel (Plavix), or aspirin, be sure to talk to your doctor. He or she will tell you if you should stop taking these medicines before your procedure. Make sure that you understand exactly what your doctor wants you to do.     · Your doctor will tell you which medicines to take or stop before your procedure. You may need to stop taking certain medicines a week or more before the procedure. So talk to your doctor as soon as you can.     · If you have an advance directive, let your doctor know. It may include a living will and a durable power of  for health care. Bring a copy to the hospital. If you don't have one, you may want to prepare one. It lets your doctor and loved ones know your health care wishes. Doctors advise that everyone prepare these papers before any type of surgery or procedure. Procedures can be stressful. This information will help you understand what you can expect. And it will help you safely prepare for your procedure. What happens on the day of the procedure? · Follow the instructions exactly about when to stop eating and drinking. If you don't, your procedure may be canceled.  If your doctor

## 2018-08-23 DIAGNOSIS — I48.0 PAROXYSMAL A-FIB (HCC): ICD-10-CM

## 2018-08-23 RX ORDER — ALLOPURINOL 300 MG/1
300 TABLET ORAL DAILY
Qty: 30 TABLET | Refills: 3
Start: 2018-08-23 | End: 2019-01-17 | Stop reason: SDUPTHER

## 2018-08-23 RX ORDER — ATORVASTATIN CALCIUM 20 MG/1
20 TABLET, FILM COATED ORAL DAILY
Qty: 30 TABLET | Refills: 2 | Status: SHIPPED | OUTPATIENT
Start: 2018-08-23 | End: 2019-01-02 | Stop reason: SDUPTHER

## 2018-09-05 ENCOUNTER — HOSPITAL ENCOUNTER (OUTPATIENT)
Dept: OTHER | Age: 48
Discharge: HOME | End: 2018-09-07
Attending: PSYCHIATRY & NEUROLOGY | Admitting: PSYCHIATRY & NEUROLOGY

## 2018-09-05 DIAGNOSIS — G47.33 OSA (OBSTRUCTIVE SLEEP APNEA): ICD-10-CM

## 2018-09-05 DIAGNOSIS — E66.01 MORBID OBESITY (HCC): ICD-10-CM

## 2018-09-05 DIAGNOSIS — Z86.79 H/O CHF: ICD-10-CM

## 2018-09-05 DIAGNOSIS — I48.0 PAROXYSMAL ATRIAL FIBRILLATION (HCC): ICD-10-CM

## 2018-09-05 DIAGNOSIS — E66.2 OBESITY HYPOVENTILATION SYNDROME (HCC): ICD-10-CM

## 2018-09-05 PROCEDURE — 95811 POLYSOM 6/>YRS CPAP 4/> PARM: CPT | Performed by: PSYCHIATRY & NEUROLOGY

## 2018-09-07 ENCOUNTER — TELEPHONE (OUTPATIENT)
Dept: PULMONOLOGY | Age: 48
End: 2018-09-07

## 2018-09-07 NOTE — TELEPHONE ENCOUNTER
I called Whit Lozano is being denied. Mel Upton spoke to , writing a letter and will refax info to [employer] next week. I also spoke to Elias Chan and updated him on where we are.      He expressed understanding

## 2018-09-07 NOTE — TELEPHONE ENCOUNTER
Sleep study showed severe LEONA. AHI was 86.3  per hr. And O2 Desaturations to 79%.   Events controlled on Bipap pressure of 14/10    No dme preference will send to [facility]    Pt advised

## 2018-09-13 RX ORDER — DILTIAZEM HYDROCHLORIDE 120 MG/1
120 CAPSULE, COATED, EXTENDED RELEASE ORAL DAILY
Qty: 30 CAPSULE | Refills: 0 | OUTPATIENT
Start: 2018-09-13 | End: 2018-10-29 | Stop reason: SDUPTHER

## 2018-09-13 RX ORDER — FUROSEMIDE 80 MG
80 TABLET ORAL 2 TIMES DAILY
Qty: 60 TABLET | Refills: 0 | OUTPATIENT
Start: 2018-09-13 | End: 2018-10-18 | Stop reason: SDUPTHER

## 2018-09-20 ENCOUNTER — OFFICE VISIT (OUTPATIENT)
Dept: INTERNAL MEDICINE CLINIC | Age: 48
End: 2018-09-20
Payer: MEDICAID

## 2018-09-20 ENCOUNTER — HOSPITAL ENCOUNTER (EMERGENCY)
Age: 48
Discharge: HOME | End: 2018-09-20
Attending: EMERGENCY MEDICINE
Payer: MEDICAID

## 2018-09-20 VITALS
HEART RATE: 50 BPM | DIASTOLIC BLOOD PRESSURE: 77 MMHG | OXYGEN SATURATION: 95 % | SYSTOLIC BLOOD PRESSURE: 100 MMHG | RESPIRATION RATE: 16 BRPM

## 2018-09-20 VITALS
HEART RATE: 77 BPM | RESPIRATION RATE: 16 BRPM | TEMPERATURE: 98.6 F | BODY MASS INDEX: 53.33 KG/M2 | SYSTOLIC BLOOD PRESSURE: 136 MMHG | DIASTOLIC BLOOD PRESSURE: 73 MMHG | WEIGHT: 315 LBS | OXYGEN SATURATION: 96 %

## 2018-09-20 DIAGNOSIS — R19.7 DIARRHEA, UNSPECIFIED TYPE: Primary | ICD-10-CM

## 2018-09-20 DIAGNOSIS — Z79.4 TYPE 2 DIABETES MELLITUS WITHOUT COMPLICATION, WITH LONG-TERM CURRENT USE OF INSULIN (HCC): ICD-10-CM

## 2018-09-20 DIAGNOSIS — E11.9 TYPE 2 DIABETES MELLITUS WITHOUT COMPLICATION, WITH LONG-TERM CURRENT USE OF INSULIN (HCC): ICD-10-CM

## 2018-09-20 DIAGNOSIS — K92.1 MELENA: Primary | ICD-10-CM

## 2018-09-20 DIAGNOSIS — R55 PRE-SYNCOPE: ICD-10-CM

## 2018-09-20 LAB
ABO/RH: NORMAL
ANTIBODY SCREEN: NORMAL
APTT: 37.5 SEC (ref 26–36)
BASOPHILS ABSOLUTE: 0 K/UL (ref 0–0.2)
BASOPHILS RELATIVE PERCENT: 0.6 %
CALCIUM IONIZED: 1.14 MMOL/L (ref 1.12–1.32)
CO2: 31 MMOL/L (ref 21–32)
EOSINOPHILS ABSOLUTE: 0.1 K/UL (ref 0–0.6)
EOSINOPHILS RELATIVE PERCENT: 1.1 %
GFR AFRICAN AMERICAN: >60
GFR NON-AFRICAN AMERICAN: >60
GLUCOSE BLD-MCNC: 114 MG/DL (ref 70–99)
HCT VFR BLD CALC: 47.7 % (ref 40.5–52.5)
HEMOGLOBIN: 15.5 G/DL (ref 13.5–17.5)
INR BLD: 1.12 (ref 0.86–1.14)
LYMPHOCYTES ABSOLUTE: 2.1 K/UL (ref 1–5.1)
LYMPHOCYTES RELATIVE PERCENT: 39.9 %
MCH RBC QN AUTO: 30.3 PG (ref 26–34)
MCHC RBC AUTO-ENTMCNC: 32.5 G/DL (ref 31–36)
MCV RBC AUTO: 93.3 FL (ref 80–100)
MONOCYTES ABSOLUTE: 0.4 K/UL (ref 0–1.3)
MONOCYTES RELATIVE PERCENT: 7.9 %
NEUTROPHILS ABSOLUTE: 2.6 K/UL (ref 1.7–7.7)
NEUTROPHILS RELATIVE PERCENT: 50.5 %
PDW BLD-RTO: 13.8 % (ref 12.4–15.4)
PERFORMED ON: ABNORMAL
PLATELET # BLD: 223 K/UL (ref 135–450)
PMV BLD AUTO: 9.2 FL (ref 5–10.5)
POC ANION GAP: 13 (ref 10–20)
POC BUN: 15 MG/DL (ref 7–18)
POC CHLORIDE: 99 MMOL/L (ref 99–110)
POC CREATININE: 0.9 MG/DL (ref 0.9–1.3)
POC POTASSIUM: 3.4 MMOL/L (ref 3.5–5.1)
POC SAMPLE TYPE: ABNORMAL
POC SODIUM: 143 MMOL/L (ref 136–145)
PROTHROMBIN TIME: 12.8 SEC (ref 9.8–13)
RBC # BLD: 5.12 M/UL (ref 4.2–5.9)
WBC # BLD: 5.1 K/UL (ref 4–11)

## 2018-09-20 PROCEDURE — 82272 OCCULT BLD FECES 1-3 TESTS: CPT

## 2018-09-20 PROCEDURE — 6360000002 HC RX W HCPCS: Performed by: EMERGENCY MEDICINE

## 2018-09-20 PROCEDURE — 85730 THROMBOPLASTIN TIME PARTIAL: CPT

## 2018-09-20 PROCEDURE — 96372 THER/PROPH/DIAG INJ SC/IM: CPT

## 2018-09-20 PROCEDURE — 86900 BLOOD TYPING SEROLOGIC ABO: CPT

## 2018-09-20 PROCEDURE — 93005 ELECTROCARDIOGRAM TRACING: CPT | Performed by: EMERGENCY MEDICINE

## 2018-09-20 PROCEDURE — 99284 EMERGENCY DEPT VISIT MOD MDM: CPT

## 2018-09-20 PROCEDURE — 86901 BLOOD TYPING SEROLOGIC RH(D): CPT

## 2018-09-20 PROCEDURE — 99213 OFFICE O/P EST LOW 20 MIN: CPT | Performed by: STUDENT IN AN ORGANIZED HEALTH CARE EDUCATION/TRAINING PROGRAM

## 2018-09-20 PROCEDURE — 85025 COMPLETE CBC W/AUTO DIFF WBC: CPT

## 2018-09-20 PROCEDURE — 80047 BASIC METABLC PNL IONIZED CA: CPT

## 2018-09-20 PROCEDURE — 85610 PROTHROMBIN TIME: CPT

## 2018-09-20 PROCEDURE — 86850 RBC ANTIBODY SCREEN: CPT

## 2018-09-20 RX ORDER — DICYCLOMINE HYDROCHLORIDE 10 MG/ML
20 INJECTION INTRAMUSCULAR ONCE
Status: COMPLETED | OUTPATIENT
Start: 2018-09-20 | End: 2018-09-20

## 2018-09-20 RX ADMIN — DICYCLOMINE HYDROCHLORIDE 20 MG: 20 INJECTION, SOLUTION INTRAMUSCULAR at 17:37

## 2018-09-20 NOTE — PROGRESS NOTES
Department Of Internal Medicine  General Medicine/Primary Care  Established Patient Visit    Patient:  Nikki De La Cruz                                               : 1970  Age: 49 y.o. MRN: [id number]  Date : 2018    History Obtained From:  patient    REASON FOR VISIT:  Follow up from hospital visit for syncope    HISTORY OF PRESENT ILLNESS:   The patient is a 49 y.o. male who presents after being discharged  from [facility] - was seen for onset of Afib, rvr, syncope and low bp. Cardiology was consulted and recommended he get EP study as an outpatient. He was prescribed CCB and beta-blocker; and is on Eliquis. He was also instructed to use his CPAP regularly for sleep apnea. Currently his cardiologist, Dr Anne Gunn is working with his insurance to pay for an ablation. He presents today as a follow up from the hospital.  He received his CPAP yesterday and said he was able to sleep better than he ever has. He reports that he has gotten chills 3 times when he sats on the toilet accompanied by lightheadedness and dizziness w/ red coated black soft stool; most recently 2 days ago. He also feels short of breath during these periods. No fever or night sweats. No palpitations, chest pain, or chest tightness. No changes in appetite or unintentional weight loss. He also complains of extra yellowish mucous clearing from his eyes for a number of years that has increased in frequency over the past 2 months.     Past Medical History:        Diagnosis Date    Arthritis     Atrial fibrillation ([address]     Congestive heart failure (HCC)     Depression     Diabetes mellitus (HCC)     GERD (gastroesophageal reflux disease)     Hyperlipidemia     Hypertension     Morbid obesity ([address]     Sleep apnea     uses cpap at home q hs     Type II or unspecified type diabetes mellitus without mention of complication, not stated as uncontrolled        Past Surgical History:    No past surgical history Vitro route 4 times daily (after meals and at bedtime) 12/13/17  Yes Han Richard MD   mupirocin (BACTROBAN) 2 % ointment Apply to both nares twice a day for 7 days following your 10 days of Bactrim. Then on the 1st day of the next month, resume twice a day in each nare for 7 days. Repeat for the first 7 days of each month. 11/3/17  Yes Soni Walton MD   Blood Pressure KIT 1 Units by Does not apply route daily 4/17/17  Yes Brown Briceno MD   fluticasone (FLONASE) 50 MCG/ACT nasal spray 1 spray by Nasal route daily 4/17/17  Yes Brown Briceno MD   Blood Glucose Monitoring Suppl (BLOOD GLUCOSE MONITOR SYSTEM) W/DEVICE KIT 1 Units by Does not apply route daily 4/17/17  Yes Brown Briceno MD   ascorbic acid (V-R VITAMIN C) 250 MG tablet Take 1 tablet by mouth daily 9/10/15  Yes José Miguel Linares MD   Alcohol Swabs PADS 1 Container by Does not apply route three times daily. 8/25/14  Yes Emerson Yoo MD   Blood Glucose Monitoring Suppl (ACCU-CHEK VIK PLUS) W/DEVICE KIT 1 kit by Does not apply route 4 times daily (before meals and nightly). 1/6/14  Yes Scott Davenport   ACCU-CHEK FASTCLIX LANCETS MISC 20 Sticks by Does not apply route 2 times daily. 1/6/14  Yes Scott B Davenport   naproxen (NAPROSYN) 500 MG tablet Take 1 tablet by mouth 2 times daily (with meals) for 30 doses 10/17/17 8/8/18  Gracia Lozano MD   insulin glargine (LANTUS) 100 UNIT/ML injection vial Inject 20 Units into the skin nightly 9/6/17   Manohar Contreras MD   insulin lispro (HUMALOG) 100 UNIT/ML injection vial Inject 7 Units into the skin 3 times daily (with meals) 4/17/17   Brown Briceno MD   Handicap Placard MISC by Does not apply route Valid for 1 year.  Mobility limitations due to severe Osteoarthritis of L Knee 8/15/15   José Miguel Linares MD       ROS negative except as above    Physical Exam:      Vitals: /73 (Site: Right Upper Arm, Position: Sitting, Cuff Size: Medium Adult)   Pulse 77   Temp 98.6 °F (37 °C) (Oral)   Resp 16

## 2018-09-21 LAB — POC OCCULT BLOOD STOOL: NEGATIVE

## 2018-09-21 PROCEDURE — 93010 ELECTROCARDIOGRAM REPORT: CPT | Performed by: INTERNAL MEDICINE

## 2018-09-25 NOTE — TELEPHONE ENCOUNTER
Notification received from Coral Moreira, the CTA has been approved. Valid 8/23/18 to 11/21/18. HOPEFULLY, I can get his procedure scheduled before then.

## 2018-09-26 ENCOUNTER — TELEPHONE (OUTPATIENT)
Dept: CARDIOLOGY CLINIC | Age: 48
End: 2018-09-26

## 2018-09-26 DIAGNOSIS — E87.6 POTASSIUM DEFICIENCY: ICD-10-CM

## 2018-09-26 DIAGNOSIS — I10 ESSENTIAL HYPERTENSION: Chronic | ICD-10-CM

## 2018-09-26 DIAGNOSIS — I48.0 PAROXYSMAL A-FIB (HCC): ICD-10-CM

## 2018-09-26 RX ORDER — POTASSIUM CHLORIDE 20 MEQ/1
TABLET, EXTENDED RELEASE ORAL
Qty: 30 TABLET | Refills: 2 | Status: SHIPPED | OUTPATIENT
Start: 2018-09-26 | End: 2019-01-02 | Stop reason: SDUPTHER

## 2018-09-26 NOTE — TELEPHONE ENCOUNTER
Authorization received for CTA. YAY  Scheduled CTA & Ablation. I tried to contact Pennie, no answer. No voicemail set up. He has an email address in his chart, I sent him an email asking him to please call the office.

## 2018-09-28 NOTE — TELEPHONE ENCOUNTER
Tabitha Hagen called me on Thursday and we are proceeding with his CTA and ablation. I will email him his ppw./prep    Procedure scheduled  10/9/18   12:30pm         Pt to arrive & report to [facility] cath lab   11:00am  Pre procedure labs due 9/28-10/8  (or you can have them done, when you are here for your CTA)  You are on ELIQUIS, this will need to be stopped 2 full days before the ablatio. You will take your last dose in the evening on 10/6/18. SO,  No ELIQUIS on 10/7, 10/8 & the day of the procedure. The night before your procedure, Please only take 1/2 of your normal LANTUS dose. NPO Midnight (or 4:00am )  Day of the procedure:  DO NOT TAKE ANY DIABETIC MEDICATIONS. DO NOT TAKE ANY VITAMINS or SUPPLEMENTS. Ok to take all other med's in am with sip of water. Plan to spend the night in the hospital.   at discharge. HFU ov  / 3 month fu ov with . Our schedule isn't open yet for 2019. We will contact you to schedule, once available.      Patient expressed understanding

## 2018-10-04 LAB
EKG ATRIAL RATE: 44 BPM
EKG DIAGNOSIS: NORMAL
EKG P AXIS: 32 DEGREES
EKG P-R INTERVAL: 142 MS
EKG Q-T INTERVAL: 468 MS
EKG QRS DURATION: 96 MS
EKG QTC CALCULATION (BAZETT): 400 MS
EKG R AXIS: -11 DEGREES
EKG T AXIS: 24 DEGREES
EKG VENTRICULAR RATE: 44 BPM

## 2018-10-08 ENCOUNTER — HOSPITAL ENCOUNTER (OUTPATIENT)
Dept: CT IMAGING | Age: 48
Discharge: HOME | End: 2018-10-08
Payer: MEDICAID

## 2018-10-08 DIAGNOSIS — I48.0 PAROXYSMAL ATRIAL FIBRILLATION (HCC): ICD-10-CM

## 2018-10-08 PROCEDURE — 6360000004 HC RX CONTRAST MEDICATION: Performed by: INTERNAL MEDICINE

## 2018-10-08 PROCEDURE — 71275 CT ANGIOGRAPHY CHEST: CPT

## 2018-10-08 RX ADMIN — IOPAMIDOL 75 ML: 755 INJECTION, SOLUTION INTRAVENOUS at 09:07

## 2018-10-09 ENCOUNTER — HOSPITAL ENCOUNTER (OUTPATIENT)
Dept: CARDIAC CATH/INVASIVE PROCEDURES | Age: 48
Discharge: HOME | End: 2018-10-09
Attending: INTERNAL MEDICINE | Admitting: INTERNAL MEDICINE
Payer: MEDICAID

## 2018-10-09 VITALS — HEIGHT: 72 IN | BODY MASS INDEX: 42.66 KG/M2 | WEIGHT: 315 LBS

## 2018-10-09 LAB
ANION GAP SERPL CALCULATED.3IONS-SCNC: 10 MMOL/L (ref 3–16)
BUN BLDV-MCNC: 10 MG/DL (ref 7–20)
CALCIUM SERPL-MCNC: 9.3 MG/DL (ref 8.3–10.6)
CHLORIDE BLD-SCNC: 101 MMOL/L (ref 99–110)
CO2: 32 MMOL/L (ref 21–32)
CREAT SERPL-MCNC: 0.8 MG/DL (ref 0.9–1.3)
GFR AFRICAN AMERICAN: >60
GFR NON-AFRICAN AMERICAN: >60
GLUCOSE BLD-MCNC: 133 MG/DL (ref 70–99)
HCT VFR BLD CALC: 47.1 % (ref 40.5–52.5)
HEMOGLOBIN: 15.5 G/DL (ref 13.5–17.5)
MCH RBC QN AUTO: 30.6 PG (ref 26–34)
MCHC RBC AUTO-ENTMCNC: 32.9 G/DL (ref 31–36)
MCV RBC AUTO: 93 FL (ref 80–100)
PDW BLD-RTO: 13.5 % (ref 12.4–15.4)
PLATELET # BLD: 209 K/UL (ref 135–450)
PMV BLD AUTO: 9.2 FL (ref 5–10.5)
POTASSIUM SERPL-SCNC: 3.5 MMOL/L (ref 3.5–5.1)
RBC # BLD: 5.06 M/UL (ref 4.2–5.9)
REASON FOR REJECTION: NORMAL
REJECTED TEST: NORMAL
SODIUM BLD-SCNC: 143 MMOL/L (ref 136–145)
WBC # BLD: 7 K/UL (ref 4–11)

## 2018-10-09 PROCEDURE — 85027 COMPLETE CBC AUTOMATED: CPT

## 2018-10-09 PROCEDURE — 80048 BASIC METABOLIC PNL TOTAL CA: CPT

## 2018-10-09 PROCEDURE — 6360000002 HC RX W HCPCS

## 2018-10-09 PROCEDURE — 2580000003 HC RX 258

## 2018-10-09 PROCEDURE — 2500000003 HC RX 250 WO HCPCS

## 2018-10-09 RX ORDER — SODIUM CHLORIDE 0.9 % (FLUSH) 0.9 %
10 SYRINGE (ML) INJECTION EVERY 12 HOURS SCHEDULED
Status: DISCONTINUED | OUTPATIENT
Start: 2018-10-09 | End: 2018-10-09 | Stop reason: SDUPTHER

## 2018-10-09 RX ORDER — SODIUM CHLORIDE 0.9 % (FLUSH) 0.9 %
10 SYRINGE (ML) INJECTION EVERY 12 HOURS SCHEDULED
Status: DISCONTINUED | OUTPATIENT
Start: 2018-10-09 | End: 2018-10-10 | Stop reason: HOSPADM

## 2018-10-09 RX ORDER — SODIUM CHLORIDE 9 MG/ML
INJECTION, SOLUTION INTRAVENOUS CONTINUOUS
Status: DISCONTINUED | OUTPATIENT
Start: 2018-10-09 | End: 2018-10-10 | Stop reason: HOSPADM

## 2018-10-09 RX ORDER — SODIUM CHLORIDE 0.9 % (FLUSH) 0.9 %
10 SYRINGE (ML) INJECTION PRN
Status: DISCONTINUED | OUTPATIENT
Start: 2018-10-09 | End: 2018-10-10 | Stop reason: HOSPADM

## 2018-10-09 RX ORDER — SODIUM CHLORIDE 9 MG/ML
INJECTION, SOLUTION INTRAVENOUS CONTINUOUS
Status: DISCONTINUED | OUTPATIENT
Start: 2018-10-09 | End: 2018-10-09 | Stop reason: SDUPTHER

## 2018-10-09 RX ORDER — SODIUM CHLORIDE 0.9 % (FLUSH) 0.9 %
10 SYRINGE (ML) INJECTION PRN
Status: DISCONTINUED | OUTPATIENT
Start: 2018-10-09 | End: 2018-10-09 | Stop reason: SDUPTHER

## 2018-10-18 DIAGNOSIS — I10 ESSENTIAL HYPERTENSION: Chronic | ICD-10-CM

## 2018-10-18 RX ORDER — LISINOPRIL 10 MG/1
10 TABLET ORAL DAILY
Qty: 30 TABLET | Refills: 1 | OUTPATIENT
Start: 2018-10-18 | End: 2019-02-19 | Stop reason: SDUPTHER

## 2018-10-18 RX ORDER — FUROSEMIDE 80 MG
80 TABLET ORAL 2 TIMES DAILY
Qty: 60 TABLET | Refills: 1 | OUTPATIENT
Start: 2018-10-18 | End: 2018-12-27 | Stop reason: SDUPTHER

## 2018-10-25 ENCOUNTER — TELEPHONE (OUTPATIENT)
Dept: CARDIOLOGY CLINIC | Age: 48
End: 2018-10-25

## 2018-10-25 DIAGNOSIS — I48.0 PAROXYSMAL ATRIAL FIBRILLATION (HCC): Primary | ICD-10-CM

## 2018-10-25 NOTE — TELEPHONE ENCOUNTER
afib ablation rescheduled   Procedure scheduled  Tuesday 11/13/18  7:30am        Pt to arrive & report to [facility] cath lab 6:45am  Pre procedure labs due (11/2-11/12)  ELIQUIS- needs to be held (not taken) for 2 whole days prior to procedure. Last dose to be taken 11/10/18. LANTUS- only take 1/2 of your normal dose the night before. NPO Midnight  Hold ALL DIabetic meds the day of the procedure, Ok to take all med's in am with sip of water   at discharge  If you go home the same day as your procedure someone needs to stay with you overnight. HFU ov   3 months I [facility]    Patient expressed understanding.   ppw Li Garcia emailed to the patient at his request

## 2018-10-29 RX ORDER — DILTIAZEM HYDROCHLORIDE 120 MG/1
120 CAPSULE, COATED, EXTENDED RELEASE ORAL DAILY
Qty: 30 CAPSULE | Refills: 0
Start: 2018-10-29 | End: 2018-12-12 | Stop reason: SDUPTHER

## 2018-11-12 DIAGNOSIS — I48.0 PAROXYSMAL ATRIAL FIBRILLATION (HCC): ICD-10-CM

## 2018-11-12 LAB
ABO/RH: NORMAL
ANION GAP SERPL CALCULATED.3IONS-SCNC: 14 MMOL/L (ref 3–16)
ANTIBODY SCREEN: NORMAL
BUN BLDV-MCNC: 13 MG/DL (ref 7–20)
CALCIUM SERPL-MCNC: 9.7 MG/DL (ref 8.3–10.6)
CHLORIDE BLD-SCNC: 104 MMOL/L (ref 99–110)
CO2: 31 MMOL/L (ref 21–32)
CREAT SERPL-MCNC: 1 MG/DL (ref 0.9–1.3)
GFR AFRICAN AMERICAN: >60
GFR NON-AFRICAN AMERICAN: >60
GLUCOSE BLD-MCNC: 132 MG/DL (ref 70–99)
HCT VFR BLD CALC: 46.8 % (ref 40.5–52.5)
HEMOGLOBIN: 15.7 G/DL (ref 13.5–17.5)
INR BLD: 1.03 (ref 0.86–1.14)
MCH RBC QN AUTO: 31.5 PG (ref 26–34)
MCHC RBC AUTO-ENTMCNC: 33.5 G/DL (ref 31–36)
MCV RBC AUTO: 94.1 FL (ref 80–100)
PDW BLD-RTO: 14.1 % (ref 12.4–15.4)
PLATELET # BLD: 200 K/UL (ref 135–450)
PMV BLD AUTO: 9.5 FL (ref 5–10.5)
POTASSIUM SERPL-SCNC: 3.6 MMOL/L (ref 3.5–5.1)
PROTHROMBIN TIME: 11.7 SEC (ref 9.8–13)
RBC # BLD: 4.98 M/UL (ref 4.2–5.9)
SODIUM BLD-SCNC: 149 MMOL/L (ref 136–145)
WBC # BLD: 6.8 K/UL (ref 4–11)

## 2018-11-13 ENCOUNTER — ANESTHESIA EVENT (OUTPATIENT)
Dept: CARDIAC CATH/INVASIVE PROCEDURES | Age: 48
End: 2018-11-13

## 2018-11-13 ENCOUNTER — HOSPITAL ENCOUNTER (OUTPATIENT)
Dept: CARDIAC CATH/INVASIVE PROCEDURES | Age: 48
Discharge: HOME | DRG: 175 | End: 2018-11-14
Attending: INTERNAL MEDICINE | Admitting: INTERNAL MEDICINE
Payer: MEDICAID

## 2018-11-13 ENCOUNTER — ANESTHESIA (OUTPATIENT)
Dept: CARDIAC CATH/INVASIVE PROCEDURES | Age: 48
End: 2018-11-13

## 2018-11-13 VITALS
DIASTOLIC BLOOD PRESSURE: 50 MMHG | RESPIRATION RATE: 8 BRPM | SYSTOLIC BLOOD PRESSURE: 99 MMHG | TEMPERATURE: 97.9 F | OXYGEN SATURATION: 98 %

## 2018-11-13 DIAGNOSIS — Z98.890 S/P ABLATION OF ATRIAL FIBRILLATION: ICD-10-CM

## 2018-11-13 DIAGNOSIS — Z86.79 S/P ABLATION OF ATRIAL FIBRILLATION: ICD-10-CM

## 2018-11-13 PROBLEM — I48.19 PERSISTENT ATRIAL FIBRILLATION (HCC): Status: ACTIVE | Noted: 2018-11-13

## 2018-11-13 LAB
GLUCOSE BLD-MCNC: 190 MG/DL (ref 70–99)
GLUCOSE BLD-MCNC: 190 MG/DL (ref 70–99)
PERFORMED ON: ABNORMAL
PERFORMED ON: ABNORMAL
POC ACT LR: 241 SEC
POC ACT LR: 325 SEC
POC ACT LR: 349 SEC
POC ACT LR: 377 SEC
POC ACT LR: 398 SEC
POC ACT LR: >400 SEC

## 2018-11-13 PROCEDURE — 92960 CARDIOVERSION ELECTRIC EXT: CPT

## 2018-11-13 PROCEDURE — 93656 COMPRE EP EVAL ABLTJ ATR FIB: CPT | Performed by: INTERNAL MEDICINE

## 2018-11-13 PROCEDURE — 7100000001 HC PACU RECOVERY - ADDTL 15 MIN

## 2018-11-13 PROCEDURE — 6360000002 HC RX W HCPCS: Performed by: NURSE ANESTHETIST, CERTIFIED REGISTERED

## 2018-11-13 PROCEDURE — C1894 INTRO/SHEATH, NON-LASER: HCPCS

## 2018-11-13 PROCEDURE — C1759 CATH, INTRA ECHOCARDIOGRAPHY: HCPCS

## 2018-11-13 PROCEDURE — 3700000001 HC ADD 15 MINUTES (ANESTHESIA)

## 2018-11-13 PROCEDURE — 2500000003 HC RX 250 WO HCPCS

## 2018-11-13 PROCEDURE — 93320 DOPPLER ECHO COMPLETE: CPT | Performed by: FAMILY MEDICINE

## 2018-11-13 PROCEDURE — C1893 INTRO/SHEATH, FIXED,NON-PEEL: HCPCS

## 2018-11-13 PROCEDURE — 6370000000 HC RX 637 (ALT 250 FOR IP): Performed by: INTERNAL MEDICINE

## 2018-11-13 PROCEDURE — 2580000003 HC RX 258: Performed by: INTERNAL MEDICINE

## 2018-11-13 PROCEDURE — 3700000000 HC ANESTHESIA ATTENDED CARE

## 2018-11-13 PROCEDURE — 6360000002 HC RX W HCPCS

## 2018-11-13 PROCEDURE — 7100000000 HC PACU RECOVERY - FIRST 15 MIN

## 2018-11-13 PROCEDURE — C1732 CATH, EP, DIAG/ABL, 3D/VECT: HCPCS

## 2018-11-13 PROCEDURE — 2700000000 HC OXYGEN THERAPY PER DAY

## 2018-11-13 PROCEDURE — 94660 CPAP INITIATION&MGMT: CPT

## 2018-11-13 PROCEDURE — 6360000002 HC RX W HCPCS: Performed by: ANESTHESIOLOGY

## 2018-11-13 PROCEDURE — 93623 PRGRMD STIMJ&PACG IV RX NFS: CPT | Performed by: INTERNAL MEDICINE

## 2018-11-13 PROCEDURE — 93657 TX L/R ATRIAL FIB ADDL: CPT | Performed by: FAMILY MEDICINE

## 2018-11-13 PROCEDURE — 93312 ECHO TRANSESOPHAGEAL: CPT | Performed by: FAMILY MEDICINE

## 2018-11-13 PROCEDURE — 6370000000 HC RX 637 (ALT 250 FOR IP): Performed by: ANESTHESIOLOGY

## 2018-11-13 PROCEDURE — C1730 CATH, EP, 19 OR FEW ELECT: HCPCS

## 2018-11-13 PROCEDURE — 2580000003 HC RX 258

## 2018-11-13 PROCEDURE — 2500000003 HC RX 250 WO HCPCS: Performed by: NURSE ANESTHETIST, CERTIFIED REGISTERED

## 2018-11-13 PROCEDURE — 93613 INTRACARDIAC EPHYS 3D MAPG: CPT | Performed by: FAMILY MEDICINE

## 2018-11-13 PROCEDURE — 93662 INTRACARDIAC ECG (ICE): CPT | Performed by: INTERNAL MEDICINE

## 2018-11-13 PROCEDURE — 93325 DOPPLER ECHO COLOR FLOW MAPG: CPT | Performed by: FAMILY MEDICINE

## 2018-11-13 PROCEDURE — 85347 COAGULATION TIME ACTIVATED: CPT

## 2018-11-13 PROCEDURE — 2720000010 HC SURG SUPPLY STERILE

## 2018-11-13 PROCEDURE — 94762 N-INVAS EAR/PLS OXIMTRY CONT: CPT

## 2018-11-13 PROCEDURE — 93656 COMPRE EP EVAL ABLTJ ATR FIB: CPT | Performed by: FAMILY MEDICINE

## 2018-11-13 PROCEDURE — 93662 INTRACARDIAC ECG (ICE): CPT | Performed by: FAMILY MEDICINE

## 2018-11-13 PROCEDURE — 93657 TX L/R ATRIAL FIB ADDL: CPT | Performed by: INTERNAL MEDICINE

## 2018-11-13 PROCEDURE — 93613 INTRACARDIAC EPHYS 3D MAPG: CPT | Performed by: INTERNAL MEDICINE

## 2018-11-13 PROCEDURE — 2140000000 HC CCU INTERMEDIATE R&B

## 2018-11-13 RX ORDER — M-VIT,TX,IRON,MINS/CALC/FOLIC 27MG-0.4MG
1 TABLET ORAL DAILY
Status: DISCONTINUED | OUTPATIENT
Start: 2018-11-13 | End: 2018-11-14 | Stop reason: HOSPADM

## 2018-11-13 RX ORDER — POTASSIUM CHLORIDE 20 MEQ/1
20 TABLET, EXTENDED RELEASE ORAL
Status: DISCONTINUED | OUTPATIENT
Start: 2018-11-13 | End: 2018-11-14 | Stop reason: HOSPADM

## 2018-11-13 RX ORDER — FENTANYL CITRATE 50 UG/ML
25 INJECTION, SOLUTION INTRAMUSCULAR; INTRAVENOUS EVERY 5 MIN PRN
Status: DISCONTINUED | OUTPATIENT
Start: 2018-11-13 | End: 2018-11-14 | Stop reason: HOSPADM

## 2018-11-13 RX ORDER — FUROSEMIDE 40 MG/1
80 TABLET ORAL 2 TIMES DAILY
Status: DISCONTINUED | OUTPATIENT
Start: 2018-11-13 | End: 2018-11-14 | Stop reason: HOSPADM

## 2018-11-13 RX ORDER — DEXTROSE MONOHYDRATE 25 G/50ML
12.5 INJECTION, SOLUTION INTRAVENOUS PRN
Status: DISCONTINUED | OUTPATIENT
Start: 2018-11-13 | End: 2018-11-14 | Stop reason: HOSPADM

## 2018-11-13 RX ORDER — SODIUM CHLORIDE 9 MG/ML
INJECTION, SOLUTION INTRAVENOUS CONTINUOUS
Status: DISCONTINUED | OUTPATIENT
Start: 2018-11-13 | End: 2018-11-13

## 2018-11-13 RX ORDER — BLOOD-GLUCOSE METER
1 EACH MISCELLANEOUS
Status: DISCONTINUED | OUTPATIENT
Start: 2018-11-13 | End: 2018-11-13

## 2018-11-13 RX ORDER — DEXAMETHASONE SODIUM PHOSPHATE 4 MG/ML
INJECTION, SOLUTION INTRA-ARTICULAR; INTRALESIONAL; INTRAMUSCULAR; INTRAVENOUS; SOFT TISSUE PRN
Status: DISCONTINUED | OUTPATIENT
Start: 2018-11-13 | End: 2018-11-13 | Stop reason: SDUPTHER

## 2018-11-13 RX ORDER — DEXTROSE MONOHYDRATE 50 MG/ML
100 INJECTION, SOLUTION INTRAVENOUS PRN
Status: DISCONTINUED | OUTPATIENT
Start: 2018-11-13 | End: 2018-11-14 | Stop reason: HOSPADM

## 2018-11-13 RX ORDER — SODIUM CHLORIDE 0.9 % (FLUSH) 0.9 %
10 SYRINGE (ML) INJECTION PRN
Status: DISCONTINUED | OUTPATIENT
Start: 2018-11-13 | End: 2018-11-14 | Stop reason: HOSPADM

## 2018-11-13 RX ORDER — GLYCOPYRROLATE 0.2 MG/ML
INJECTION INTRAMUSCULAR; INTRAVENOUS PRN
Status: DISCONTINUED | OUTPATIENT
Start: 2018-11-13 | End: 2018-11-13 | Stop reason: SDUPTHER

## 2018-11-13 RX ORDER — MEPERIDINE HYDROCHLORIDE 25 MG/ML
12.5 INJECTION INTRAMUSCULAR; INTRAVENOUS; SUBCUTANEOUS EVERY 5 MIN PRN
Status: DISCONTINUED | OUTPATIENT
Start: 2018-11-13 | End: 2018-11-14 | Stop reason: HOSPADM

## 2018-11-13 RX ORDER — LANCETS
20 EACH MISCELLANEOUS 2 TIMES DAILY
Status: DISCONTINUED | OUTPATIENT
Start: 2018-11-13 | End: 2018-11-13

## 2018-11-13 RX ORDER — ASCORBIC ACID 500 MG
250 TABLET ORAL DAILY
Status: DISCONTINUED | OUTPATIENT
Start: 2018-11-13 | End: 2018-11-14 | Stop reason: HOSPADM

## 2018-11-13 RX ORDER — SODIUM CHLORIDE 0.9 % (FLUSH) 0.9 %
10 SYRINGE (ML) INJECTION EVERY 12 HOURS SCHEDULED
Status: DISCONTINUED | OUTPATIENT
Start: 2018-11-13 | End: 2018-11-14 | Stop reason: HOSPADM

## 2018-11-13 RX ORDER — DILTIAZEM HYDROCHLORIDE 120 MG/1
120 CAPSULE, COATED, EXTENDED RELEASE ORAL DAILY
Status: DISCONTINUED | OUTPATIENT
Start: 2018-11-13 | End: 2018-11-14 | Stop reason: HOSPADM

## 2018-11-13 RX ORDER — LIDOCAINE HYDROCHLORIDE 10 MG/ML
INJECTION, SOLUTION INFILTRATION; PERINEURAL PRN
Status: DISCONTINUED | OUTPATIENT
Start: 2018-11-13 | End: 2018-11-13 | Stop reason: SDUPTHER

## 2018-11-13 RX ORDER — FUROSEMIDE 10 MG/ML
INJECTION INTRAMUSCULAR; INTRAVENOUS PRN
Status: DISCONTINUED | OUTPATIENT
Start: 2018-11-13 | End: 2018-11-13 | Stop reason: SDUPTHER

## 2018-11-13 RX ORDER — VECURONIUM BROMIDE 1 MG/ML
INJECTION, POWDER, LYOPHILIZED, FOR SOLUTION INTRAVENOUS PRN
Status: DISCONTINUED | OUTPATIENT
Start: 2018-11-13 | End: 2018-11-13 | Stop reason: SDUPTHER

## 2018-11-13 RX ORDER — MORPHINE SULFATE 2 MG/ML
2 INJECTION, SOLUTION INTRAMUSCULAR; INTRAVENOUS EVERY 5 MIN PRN
Status: DISCONTINUED | OUTPATIENT
Start: 2018-11-13 | End: 2018-11-14 | Stop reason: HOSPADM

## 2018-11-13 RX ORDER — NICOTINE POLACRILEX 4 MG
15 LOZENGE BUCCAL PRN
Status: DISCONTINUED | OUTPATIENT
Start: 2018-11-13 | End: 2018-11-14 | Stop reason: HOSPADM

## 2018-11-13 RX ORDER — INSULIN GLARGINE 100 [IU]/ML
20 INJECTION, SOLUTION SUBCUTANEOUS NIGHTLY
Status: DISCONTINUED | OUTPATIENT
Start: 2018-11-13 | End: 2018-11-14 | Stop reason: HOSPADM

## 2018-11-13 RX ORDER — HEPARIN SODIUM 10000 [USP'U]/100ML
INJECTION, SOLUTION INTRAVENOUS CONTINUOUS PRN
Status: DISCONTINUED | OUTPATIENT
Start: 2018-11-13 | End: 2018-11-13 | Stop reason: SDUPTHER

## 2018-11-13 RX ORDER — PHENYLEPHRINE HCL IN 0.9% NACL 0.5 MG/5ML
SYRINGE (ML) INTRAVENOUS PRN
Status: DISCONTINUED | OUTPATIENT
Start: 2018-11-13 | End: 2018-11-13 | Stop reason: SDUPTHER

## 2018-11-13 RX ORDER — FENTANYL CITRATE 50 UG/ML
INJECTION, SOLUTION INTRAMUSCULAR; INTRAVENOUS PRN
Status: DISCONTINUED | OUTPATIENT
Start: 2018-11-13 | End: 2018-11-13 | Stop reason: SDUPTHER

## 2018-11-13 RX ORDER — OXYCODONE HYDROCHLORIDE 5 MG/1
5 TABLET ORAL PRN
Status: COMPLETED | OUTPATIENT
Start: 2018-11-13 | End: 2018-11-13

## 2018-11-13 RX ORDER — HEPARIN SODIUM 1000 [USP'U]/ML
INJECTION, SOLUTION INTRAVENOUS; SUBCUTANEOUS PRN
Status: DISCONTINUED | OUTPATIENT
Start: 2018-11-13 | End: 2018-11-13 | Stop reason: SDUPTHER

## 2018-11-13 RX ORDER — LISINOPRIL 10 MG/1
10 TABLET ORAL DAILY
Status: DISCONTINUED | OUTPATIENT
Start: 2018-11-14 | End: 2018-11-14 | Stop reason: HOSPADM

## 2018-11-13 RX ORDER — SUCCINYLCHOLINE CHLORIDE 20 MG/ML
INJECTION INTRAMUSCULAR; INTRAVENOUS PRN
Status: DISCONTINUED | OUTPATIENT
Start: 2018-11-13 | End: 2018-11-13 | Stop reason: SDUPTHER

## 2018-11-13 RX ORDER — DIPHENHYDRAMINE HYDROCHLORIDE 25 MG/1
1 CAPSULE, LIQUID FILLED ORAL DAILY
Status: DISCONTINUED | OUTPATIENT
Start: 2018-11-13 | End: 2018-11-13

## 2018-11-13 RX ORDER — ATORVASTATIN CALCIUM 20 MG/1
20 TABLET, FILM COATED ORAL DAILY
Status: DISCONTINUED | OUTPATIENT
Start: 2018-11-13 | End: 2018-11-14 | Stop reason: HOSPADM

## 2018-11-13 RX ORDER — FENTANYL CITRATE 50 UG/ML
50 INJECTION, SOLUTION INTRAMUSCULAR; INTRAVENOUS EVERY 5 MIN PRN
Status: DISCONTINUED | OUTPATIENT
Start: 2018-11-13 | End: 2018-11-14 | Stop reason: HOSPADM

## 2018-11-13 RX ORDER — ONDANSETRON 2 MG/ML
4 INJECTION INTRAMUSCULAR; INTRAVENOUS
Status: ACTIVE | OUTPATIENT
Start: 2018-11-13 | End: 2018-11-13

## 2018-11-13 RX ORDER — MORPHINE SULFATE 2 MG/ML
1 INJECTION, SOLUTION INTRAMUSCULAR; INTRAVENOUS EVERY 5 MIN PRN
Status: DISCONTINUED | OUTPATIENT
Start: 2018-11-13 | End: 2018-11-14 | Stop reason: HOSPADM

## 2018-11-13 RX ORDER — PROPOFOL 10 MG/ML
INJECTION, EMULSION INTRAVENOUS PRN
Status: DISCONTINUED | OUTPATIENT
Start: 2018-11-13 | End: 2018-11-13 | Stop reason: SDUPTHER

## 2018-11-13 RX ORDER — BLOOD SUGAR DIAGNOSTIC
4 STRIP MISCELLANEOUS
Status: DISCONTINUED | OUTPATIENT
Start: 2018-11-13 | End: 2018-11-13

## 2018-11-13 RX ORDER — ACETAMINOPHEN 325 MG/1
650 TABLET ORAL EVERY 4 HOURS PRN
Status: DISCONTINUED | OUTPATIENT
Start: 2018-11-13 | End: 2018-11-14 | Stop reason: HOSPADM

## 2018-11-13 RX ORDER — FLECAINIDE ACETATE 100 MG/1
50 TABLET ORAL EVERY 12 HOURS SCHEDULED
Status: DISCONTINUED | OUTPATIENT
Start: 2018-11-13 | End: 2018-11-14 | Stop reason: HOSPADM

## 2018-11-13 RX ORDER — OXYCODONE HYDROCHLORIDE 5 MG/1
10 TABLET ORAL PRN
Status: COMPLETED | OUTPATIENT
Start: 2018-11-13 | End: 2018-11-13

## 2018-11-13 RX ORDER — FLUTICASONE PROPIONATE 50 MCG
1 SPRAY, SUSPENSION (ML) NASAL DAILY PRN
Status: DISCONTINUED | OUTPATIENT
Start: 2018-11-13 | End: 2018-11-14 | Stop reason: HOSPADM

## 2018-11-13 RX ORDER — MIDAZOLAM HYDROCHLORIDE 1 MG/ML
INJECTION INTRAMUSCULAR; INTRAVENOUS PRN
Status: DISCONTINUED | OUTPATIENT
Start: 2018-11-13 | End: 2018-11-13 | Stop reason: SDUPTHER

## 2018-11-13 RX ORDER — ALLOPURINOL 300 MG/1
300 TABLET ORAL DAILY
Status: DISCONTINUED | OUTPATIENT
Start: 2018-11-13 | End: 2018-11-14 | Stop reason: HOSPADM

## 2018-11-13 RX ORDER — ONDANSETRON 2 MG/ML
INJECTION INTRAMUSCULAR; INTRAVENOUS PRN
Status: DISCONTINUED | OUTPATIENT
Start: 2018-11-13 | End: 2018-11-13 | Stop reason: SDUPTHER

## 2018-11-13 RX ADMIN — VECURONIUM BROMIDE 10 MG: 1 INJECTION, POWDER, LYOPHILIZED, FOR SOLUTION INTRAVENOUS at 08:07

## 2018-11-13 RX ADMIN — ONDANSETRON 4 MG: 2 INJECTION INTRAMUSCULAR; INTRAVENOUS at 08:11

## 2018-11-13 RX ADMIN — Medication 10 ML: at 21:21

## 2018-11-13 RX ADMIN — SODIUM CHLORIDE: 9 INJECTION, SOLUTION INTRAVENOUS at 07:55

## 2018-11-13 RX ADMIN — SUGAMMADEX 400 MG: 100 INJECTION, SOLUTION INTRAVENOUS at 11:44

## 2018-11-13 RX ADMIN — POTASSIUM CHLORIDE 20 MEQ: 20 TABLET, EXTENDED RELEASE ORAL at 16:13

## 2018-11-13 RX ADMIN — MIDAZOLAM 2 MG: 1 INJECTION INTRAMUSCULAR; INTRAVENOUS at 07:58

## 2018-11-13 RX ADMIN — ATORVASTATIN CALCIUM 20 MG: 20 TABLET, FILM COATED ORAL at 16:14

## 2018-11-13 RX ADMIN — HEPARIN SODIUM AND DEXTROSE 16000 UNITS/HR: 10000; 5 INJECTION INTRAVENOUS at 09:23

## 2018-11-13 RX ADMIN — FENTANYL CITRATE 100 MCG: 50 INJECTION INTRAMUSCULAR; INTRAVENOUS at 08:03

## 2018-11-13 RX ADMIN — FUROSEMIDE 40 MG: 10 INJECTION, SOLUTION INTRAMUSCULAR; INTRAVENOUS at 10:31

## 2018-11-13 RX ADMIN — FENTANYL CITRATE 50 MCG: 50 INJECTION, SOLUTION INTRAMUSCULAR; INTRAVENOUS at 12:46

## 2018-11-13 RX ADMIN — SUCCINYLCHOLINE CHLORIDE 200 MG: 20 INJECTION, SOLUTION INTRAMUSCULAR; INTRAVENOUS at 08:03

## 2018-11-13 RX ADMIN — DILTIAZEM HYDROCHLORIDE 120 MG: 120 CAPSULE, COATED, EXTENDED RELEASE ORAL at 18:26

## 2018-11-13 RX ADMIN — PROPOFOL 200 MG: 10 INJECTION, EMULSION INTRAVENOUS at 08:03

## 2018-11-13 RX ADMIN — OXYCODONE HYDROCHLORIDE 10 MG: 5 TABLET ORAL at 16:13

## 2018-11-13 RX ADMIN — ALLOPURINOL 300 MG: 300 TABLET ORAL at 16:13

## 2018-11-13 RX ADMIN — BENZOCAINE AND MENTHOL 1 LOZENGE: 15; 3.6 LOZENGE ORAL at 21:22

## 2018-11-13 RX ADMIN — MULTIPLE VITAMINS W/ MINERALS TAB 1 TABLET: TAB at 16:13

## 2018-11-13 RX ADMIN — DEXAMETHASONE SODIUM PHOSPHATE 8 MG: 4 INJECTION, SOLUTION INTRAMUSCULAR; INTRAVENOUS at 08:11

## 2018-11-13 RX ADMIN — HEPARIN SODIUM 8000 UNITS: 1000 INJECTION, SOLUTION INTRAVENOUS; SUBCUTANEOUS at 09:04

## 2018-11-13 RX ADMIN — BENZOCAINE AND MENTHOL 1 LOZENGE: 15; 3.6 LOZENGE ORAL at 16:13

## 2018-11-13 RX ADMIN — VECURONIUM BROMIDE 5 MG: 1 INJECTION, POWDER, LYOPHILIZED, FOR SOLUTION INTRAVENOUS at 09:45

## 2018-11-13 RX ADMIN — Medication 200 MCG: at 11:28

## 2018-11-13 RX ADMIN — INSULIN GLARGINE 20 UNITS: 100 INJECTION, SOLUTION SUBCUTANEOUS at 21:21

## 2018-11-13 RX ADMIN — FENTANYL CITRATE 50 MCG: 50 INJECTION, SOLUTION INTRAMUSCULAR; INTRAVENOUS at 12:36

## 2018-11-13 RX ADMIN — APIXABAN 5 MG: 5 TABLET, FILM COATED ORAL at 21:19

## 2018-11-13 RX ADMIN — Medication 100 MCG: at 08:59

## 2018-11-13 RX ADMIN — FUROSEMIDE 80 MG: 40 TABLET ORAL at 17:55

## 2018-11-13 RX ADMIN — APIXABAN 5 MG: 5 TABLET, FILM COATED ORAL at 16:13

## 2018-11-13 RX ADMIN — OXYCODONE HYDROCHLORIDE AND ACETAMINOPHEN 250 MG: 500 TABLET ORAL at 16:16

## 2018-11-13 RX ADMIN — VECURONIUM BROMIDE 5 MG: 1 INJECTION, POWDER, LYOPHILIZED, FOR SOLUTION INTRAVENOUS at 09:09

## 2018-11-13 RX ADMIN — LIDOCAINE HYDROCHLORIDE 50 MG: 10 INJECTION, SOLUTION INFILTRATION; PERINEURAL at 08:03

## 2018-11-13 RX ADMIN — GLYCOPYRROLATE 0.2 MG: 0.2 INJECTION, SOLUTION INTRAMUSCULAR; INTRAVENOUS at 08:11

## 2018-11-13 RX ADMIN — HEPARIN SODIUM 8000 UNITS: 1000 INJECTION, SOLUTION INTRAVENOUS; SUBCUTANEOUS at 08:59

## 2018-11-13 RX ADMIN — FUROSEMIDE 80 MG: 10 INJECTION, SOLUTION INTRAMUSCULAR; INTRAVENOUS at 11:21

## 2018-11-13 RX ADMIN — FLECAINIDE ACETATE 50 MG: 100 TABLET ORAL at 21:19

## 2018-11-13 ASSESSMENT — PULMONARY FUNCTION TESTS
PIF_VALUE: 0
PIF_VALUE: 38
PIF_VALUE: 35
PIF_VALUE: 38
PIF_VALUE: 36
PIF_VALUE: 27
PIF_VALUE: 39
PIF_VALUE: 36
PIF_VALUE: 39
PIF_VALUE: 44
PIF_VALUE: 34
PIF_VALUE: 36
PIF_VALUE: 34
PIF_VALUE: 36
PIF_VALUE: 45
PIF_VALUE: 42
PIF_VALUE: 34
PIF_VALUE: 37
PIF_VALUE: 32
PIF_VALUE: 37
PIF_VALUE: 34
PIF_VALUE: 1
PIF_VALUE: 34
PIF_VALUE: 35
PIF_VALUE: 32
PIF_VALUE: 41
PIF_VALUE: 44
PIF_VALUE: 35
PIF_VALUE: 38
PIF_VALUE: 36
PIF_VALUE: 33
PIF_VALUE: 32
PIF_VALUE: 42
PIF_VALUE: 39
PIF_VALUE: 35
PIF_VALUE: 34
PIF_VALUE: 38
PIF_VALUE: 32
PIF_VALUE: 36
PIF_VALUE: 34
PIF_VALUE: 35
PIF_VALUE: 40
PIF_VALUE: 35
PIF_VALUE: 1
PIF_VALUE: 35
PIF_VALUE: 40
PIF_VALUE: 38
PIF_VALUE: 35
PIF_VALUE: 43
PIF_VALUE: 40
PIF_VALUE: 36
PIF_VALUE: 27
PIF_VALUE: 35
PIF_VALUE: 33
PIF_VALUE: 35
PIF_VALUE: 26
PIF_VALUE: 32
PIF_VALUE: 33
PIF_VALUE: 36
PIF_VALUE: 21
PIF_VALUE: 32
PIF_VALUE: 36
PIF_VALUE: 40
PIF_VALUE: 35
PIF_VALUE: 37
PIF_VALUE: 33
PIF_VALUE: 33
PIF_VALUE: 42
PIF_VALUE: 36
PIF_VALUE: 33
PIF_VALUE: 35
PIF_VALUE: 33
PIF_VALUE: 11
PIF_VALUE: 33
PIF_VALUE: 34
PIF_VALUE: 32
PIF_VALUE: 38
PIF_VALUE: 38
PIF_VALUE: 36
PIF_VALUE: 35
PIF_VALUE: 37
PIF_VALUE: 34
PIF_VALUE: 35
PIF_VALUE: 35
PIF_VALUE: 31
PIF_VALUE: 36
PIF_VALUE: 35
PIF_VALUE: 38
PIF_VALUE: 35
PIF_VALUE: 37
PIF_VALUE: 32
PIF_VALUE: 33
PIF_VALUE: 34
PIF_VALUE: 35
PIF_VALUE: 40
PIF_VALUE: 35
PIF_VALUE: 39
PIF_VALUE: 33
PIF_VALUE: 35
PIF_VALUE: 35
PIF_VALUE: 33
PIF_VALUE: 34
PIF_VALUE: 45
PIF_VALUE: 34
PIF_VALUE: 35
PIF_VALUE: 34
PIF_VALUE: 32
PIF_VALUE: 35
PIF_VALUE: 36
PIF_VALUE: 41
PIF_VALUE: 36
PIF_VALUE: 42
PIF_VALUE: 39
PIF_VALUE: 39
PIF_VALUE: 33
PIF_VALUE: 2
PIF_VALUE: 35
PIF_VALUE: 35
PIF_VALUE: 1
PIF_VALUE: 4
PIF_VALUE: 38
PIF_VALUE: 36
PIF_VALUE: 37
PIF_VALUE: 34
PIF_VALUE: 34
PIF_VALUE: 35
PIF_VALUE: 35
PIF_VALUE: 33
PIF_VALUE: 35
PIF_VALUE: 37
PIF_VALUE: 36
PIF_VALUE: 37
PIF_VALUE: 32
PIF_VALUE: 34
PIF_VALUE: 35
PIF_VALUE: 36
PIF_VALUE: 38
PIF_VALUE: 36
PIF_VALUE: 33
PIF_VALUE: 39
PIF_VALUE: 36
PIF_VALUE: 38
PIF_VALUE: 37
PIF_VALUE: 32
PIF_VALUE: 35
PIF_VALUE: 36
PIF_VALUE: 37
PIF_VALUE: 37
PIF_VALUE: 42
PIF_VALUE: 33
PIF_VALUE: 36
PIF_VALUE: 45
PIF_VALUE: 41
PIF_VALUE: 44
PIF_VALUE: 35
PIF_VALUE: 32
PIF_VALUE: 39
PIF_VALUE: 35
PIF_VALUE: 35
PIF_VALUE: 32
PIF_VALUE: 39
PIF_VALUE: 1
PIF_VALUE: 35
PIF_VALUE: 42
PIF_VALUE: 36
PIF_VALUE: 40
PIF_VALUE: 32
PIF_VALUE: 36
PIF_VALUE: 39
PIF_VALUE: 35
PIF_VALUE: 38
PIF_VALUE: 37
PIF_VALUE: 33
PIF_VALUE: 0
PIF_VALUE: 34
PIF_VALUE: 36
PIF_VALUE: 35
PIF_VALUE: 1
PIF_VALUE: 34
PIF_VALUE: 42
PIF_VALUE: 35
PIF_VALUE: 31
PIF_VALUE: 37
PIF_VALUE: 37
PIF_VALUE: 36
PIF_VALUE: 20
PIF_VALUE: 1
PIF_VALUE: 39
PIF_VALUE: 35
PIF_VALUE: 33
PIF_VALUE: 33
PIF_VALUE: 32
PIF_VALUE: 36
PIF_VALUE: 34
PIF_VALUE: 44
PIF_VALUE: 36
PIF_VALUE: 39
PIF_VALUE: 44
PIF_VALUE: 37
PIF_VALUE: 35
PIF_VALUE: 41
PIF_VALUE: 44
PIF_VALUE: 34
PIF_VALUE: 33
PIF_VALUE: 32
PIF_VALUE: 35
PIF_VALUE: 35
PIF_VALUE: 32
PIF_VALUE: 44
PIF_VALUE: 35
PIF_VALUE: 37
PIF_VALUE: 35
PIF_VALUE: 40
PIF_VALUE: 36
PIF_VALUE: 36
PIF_VALUE: 39
PIF_VALUE: 34
PIF_VALUE: 34
PIF_VALUE: 33
PIF_VALUE: 38
PIF_VALUE: 36
PIF_VALUE: 35
PIF_VALUE: 35
PIF_VALUE: 33
PIF_VALUE: 38
PIF_VALUE: 35

## 2018-11-13 ASSESSMENT — LIFESTYLE VARIABLES: SMOKING_STATUS: 0

## 2018-11-13 ASSESSMENT — PAIN SCALES - GENERAL
PAINLEVEL_OUTOF10: 4
PAINLEVEL_OUTOF10: 7
PAINLEVEL_OUTOF10: 7
PAINLEVEL_OUTOF10: 8
PAINLEVEL_OUTOF10: 7
PAINLEVEL_OUTOF10: 4

## 2018-11-13 ASSESSMENT — PAIN DESCRIPTION - FREQUENCY: FREQUENCY: CONTINUOUS

## 2018-11-13 ASSESSMENT — PAIN DESCRIPTION - PAIN TYPE
TYPE: SURGICAL PAIN
TYPE: CHRONIC PAIN

## 2018-11-13 ASSESSMENT — ENCOUNTER SYMPTOMS
DYSPNEA ACTIVITY LEVEL: AFTER AMBULATING 1 FLIGHT OF STAIRS
SHORTNESS OF BREATH: 1

## 2018-11-13 ASSESSMENT — PAIN DESCRIPTION - ORIENTATION
ORIENTATION: RIGHT;LEFT
ORIENTATION: RIGHT

## 2018-11-13 ASSESSMENT — PAIN DESCRIPTION - LOCATION
LOCATION: GROIN
LOCATION: GROIN;KNEE

## 2018-11-13 ASSESSMENT — PAIN DESCRIPTION - ONSET: ONSET: ON-GOING

## 2018-11-13 ASSESSMENT — PAIN DESCRIPTION - DESCRIPTORS: DESCRIPTORS: ACHING

## 2018-11-13 ASSESSMENT — PAIN DESCRIPTION - PROGRESSION: CLINICAL_PROGRESSION: GRADUALLY IMPROVING

## 2018-11-13 NOTE — ANESTHESIA POSTPROCEDURE EVALUATION
Department of Anesthesiology  Postprocedure Note    Patient: Burton Farmer  MRN: [id number]  YOB: 1970  Date of evaluation: 11/13/2018  Time:  2:29 PM     Procedure Summary     Date:  11/13/18 Room / Location:  [facility] Cath Lab; [facility] Echo    Anesthesia Start:  5751 Anesthesia Stop:  0532    Procedure:  WST CHON AFIB ABLATION W ANES Diagnosis:      Scheduled Providers:   Responsible Provider:  Lexa Villa MD    Anesthesia Type:  general ASA Status:  3          Anesthesia Type: general    John Phase I: John Score: 8    John Phase II:      Last vitals: Reviewed and per EMR flowsheets.        Anesthesia Post Evaluation    Patient location during evaluation: ICU  Patient participation: complete - patient participated  Level of consciousness: awake and alert  Pain score: 0  Airway patency: patent  Nausea & Vomiting: no nausea and no vomiting  Complications: no  Cardiovascular status: blood pressure returned to baseline  Respiratory status: acceptable  Hydration status: euvolemic

## 2018-11-13 NOTE — ANESTHESIA PRE PROCEDURE
10 days of Bactrim. Then on the 1st day of the next month, resume twice a day in each nare for 7 days. Repeat for the first 7 days of each month. 11/3/17   Cesario Pardo MD   naproxen (NAPROSYN) 500 MG tablet Take 1 tablet by mouth 2 times daily (with meals) for 30 doses 10/17/17 8/8/18  Rosy Christianson MD   insulin glargine (LANTUS) 100 UNIT/ML injection vial Inject 20 Units into the skin nightly 9/6/17   Taco Avilez MD   Blood Pressure KIT 1 Units by Does not apply route daily 4/17/17   Sohan Issa MD   insulin lispro (HUMALOG) 100 UNIT/ML injection vial Inject 7 Units into the skin 3 times daily (with meals) 4/17/17   Sohan Issa MD   fluticasone (FLONASE) 50 MCG/ACT nasal spray 1 spray by Nasal route daily 4/17/17   Sohan Issa MD   Blood Glucose Monitoring Suppl (BLOOD GLUCOSE MONITOR SYSTEM) W/DEVICE KIT 1 Units by Does not apply route daily 4/17/17   Sohan Issa MD   ascorbic acid (V-R VITAMIN C) 250 MG tablet Take 1 tablet by mouth daily 9/10/15   Rodriguez Sanford MD   Handicap Placard MISC by Does not apply route Valid for 1 year. Mobility limitations due to severe Osteoarthritis of L Knee 8/15/15   Rodriguez Sanford MD   Alcohol Swabs PADS 1 Container by Does not apply route three times daily. 8/25/14   Flaco Louise MD   Blood Glucose Monitoring Suppl (ACCU-CHEK VIK PLUS) W/DEVICE KIT 1 kit by Does not apply route 4 times daily (before meals and nightly). 1/6/14   Scott Davenport   ACCU-CHEK FASTCLIX LANCETS MISC 20 Sticks by Does not apply route 2 times daily.  1/6/14   Melissa Lower       Current medications:    Current Outpatient Prescriptions   Medication Sig Dispense Refill    diltiazem (CARDIZEM CD) 120 MG extended release capsule Take 1 capsule by mouth daily 30 capsule 0    furosemide (LASIX) 80 MG tablet Take 1 tablet by mouth 2 times daily 60 tablet 1    lisinopril (PRINIVIL;ZESTRIL) 10 MG tablet Take 1 tablet by mouth daily 30 tablet 1    apixaban (ELIQUIS) 5 MG TABS

## 2018-11-14 VITALS
HEIGHT: 72 IN | DIASTOLIC BLOOD PRESSURE: 82 MMHG | BODY MASS INDEX: 42.66 KG/M2 | SYSTOLIC BLOOD PRESSURE: 139 MMHG | HEART RATE: 65 BPM | WEIGHT: 315 LBS | RESPIRATION RATE: 13 BRPM | OXYGEN SATURATION: 96 % | TEMPERATURE: 98.4 F

## 2018-11-14 LAB
GLUCOSE BLD-MCNC: 125 MG/DL (ref 70–99)
PERFORMED ON: ABNORMAL

## 2018-11-14 PROCEDURE — 94660 CPAP INITIATION&MGMT: CPT

## 2018-11-14 PROCEDURE — 99238 HOSP IP/OBS DSCHRG MGMT 30/<: CPT | Performed by: INTERNAL MEDICINE

## 2018-11-14 PROCEDURE — 2580000003 HC RX 258: Performed by: INTERNAL MEDICINE

## 2018-11-14 PROCEDURE — 90686 IIV4 VACC NO PRSV 0.5 ML IM: CPT | Performed by: INTERNAL MEDICINE

## 2018-11-14 PROCEDURE — 6360000002 HC RX W HCPCS: Performed by: INTERNAL MEDICINE

## 2018-11-14 PROCEDURE — 6370000000 HC RX 637 (ALT 250 FOR IP): Performed by: INTERNAL MEDICINE

## 2018-11-14 PROCEDURE — G0008 ADMIN INFLUENZA VIRUS VAC: HCPCS | Performed by: INTERNAL MEDICINE

## 2018-11-14 RX ORDER — FLECAINIDE ACETATE 50 MG/1
50 TABLET ORAL EVERY 12 HOURS SCHEDULED
Qty: 60 TABLET | Refills: 3 | Status: SHIPPED | OUTPATIENT
Start: 2018-11-14 | End: 2019-01-02 | Stop reason: SDUPTHER

## 2018-11-14 RX ADMIN — BENZOCAINE AND MENTHOL 1 LOZENGE: 15; 3.6 LOZENGE ORAL at 08:09

## 2018-11-14 RX ADMIN — ALLOPURINOL 300 MG: 300 TABLET ORAL at 08:54

## 2018-11-14 RX ADMIN — APIXABAN 5 MG: 5 TABLET, FILM COATED ORAL at 08:54

## 2018-11-14 RX ADMIN — FUROSEMIDE 80 MG: 40 TABLET ORAL at 08:54

## 2018-11-14 RX ADMIN — INFLUENZA A VIRUS A/MICHIGAN/45/2015 X-275 (H1N1) ANTIGEN (FORMALDEHYDE INACTIVATED), INFLUENZA A VIRUS A/SINGAPORE/INFIMH-16-0019/2016 IVR-186 (H3N2) ANTIGEN (FORMALDEHYDE INACTIVATED), INFLUENZA B VIRUS B/PHUKET/3073/2013 ANTIGEN (FORMALDEHYDE INACTIVATED), AND INFLUENZA B VIRUS B/MARYLAND/15/2016 BX-69A ANTIGEN (FORMALDEHYDE INACTIVATED) 0.5 ML: 15; 15; 15; 15 INJECTION, SUSPENSION INTRAMUSCULAR at 09:02

## 2018-11-14 RX ADMIN — LISINOPRIL 10 MG: 10 TABLET ORAL at 08:54

## 2018-11-14 RX ADMIN — OXYCODONE HYDROCHLORIDE AND ACETAMINOPHEN 250 MG: 500 TABLET ORAL at 08:54

## 2018-11-14 RX ADMIN — Medication 10 ML: at 09:08

## 2018-11-14 RX ADMIN — POTASSIUM CHLORIDE 20 MEQ: 20 TABLET, EXTENDED RELEASE ORAL at 08:54

## 2018-11-14 RX ADMIN — ATORVASTATIN CALCIUM 20 MG: 20 TABLET, FILM COATED ORAL at 08:54

## 2018-11-14 RX ADMIN — FLECAINIDE ACETATE 50 MG: 100 TABLET ORAL at 08:54

## 2018-11-14 RX ADMIN — MULTIPLE VITAMINS W/ MINERALS TAB 1 TABLET: TAB at 08:54

## 2018-11-14 RX ADMIN — INSULIN LISPRO 7 UNITS: 100 INJECTION, SOLUTION INTRAVENOUS; SUBCUTANEOUS at 09:01

## 2018-11-14 RX ADMIN — DILTIAZEM HYDROCHLORIDE 120 MG: 120 CAPSULE, COATED, EXTENDED RELEASE ORAL at 08:54

## 2018-11-14 ASSESSMENT — PAIN SCALES - GENERAL
PAINLEVEL_OUTOF10: 0
PAINLEVEL_OUTOF10: 0

## 2018-12-12 RX ORDER — DILTIAZEM HYDROCHLORIDE 120 MG/1
120 CAPSULE, COATED, EXTENDED RELEASE ORAL DAILY
Qty: 30 CAPSULE | Refills: 0
Start: 2018-12-12 | End: 2018-12-27 | Stop reason: ALTCHOICE

## 2018-12-18 ENCOUNTER — TELEPHONE (OUTPATIENT)
Dept: CARDIOLOGY CLINIC | Age: 48
End: 2018-12-18

## 2018-12-27 ENCOUNTER — OFFICE VISIT (OUTPATIENT)
Dept: INTERNAL MEDICINE CLINIC | Age: 48
End: 2018-12-27
Payer: MEDICAID

## 2018-12-27 VITALS
BODY MASS INDEX: 51.02 KG/M2 | TEMPERATURE: 97 F | SYSTOLIC BLOOD PRESSURE: 141 MMHG | WEIGHT: 315 LBS | HEART RATE: 57 BPM | DIASTOLIC BLOOD PRESSURE: 90 MMHG | OXYGEN SATURATION: 96 % | RESPIRATION RATE: 16 BRPM

## 2018-12-27 DIAGNOSIS — I48.0 PAROXYSMAL A-FIB (HCC): ICD-10-CM

## 2018-12-27 DIAGNOSIS — J03.90 TONSILLITIS WITH EXUDATE: ICD-10-CM

## 2018-12-27 DIAGNOSIS — N52.9 ERECTILE DYSFUNCTION, UNSPECIFIED ERECTILE DYSFUNCTION TYPE: ICD-10-CM

## 2018-12-27 DIAGNOSIS — J01.20 ACUTE ETHMOIDAL SINUSITIS, RECURRENCE NOT SPECIFIED: ICD-10-CM

## 2018-12-27 DIAGNOSIS — I10 ESSENTIAL HYPERTENSION: Chronic | ICD-10-CM

## 2018-12-27 DIAGNOSIS — J06.9 ACUTE URI OF MULTIPLE SITES: ICD-10-CM

## 2018-12-27 DIAGNOSIS — E87.6 POTASSIUM DEFICIENCY: ICD-10-CM

## 2018-12-27 PROCEDURE — 99213 OFFICE O/P EST LOW 20 MIN: CPT | Performed by: STUDENT IN AN ORGANIZED HEALTH CARE EDUCATION/TRAINING PROGRAM

## 2018-12-27 RX ORDER — ASCORBIC ACID 250 MG
250 TABLET ORAL DAILY
Qty: 30 TABLET | Refills: 3 | Status: SHIPPED | OUTPATIENT
Start: 2018-12-27 | End: 2019-04-16 | Stop reason: SDUPTHER

## 2018-12-27 RX ORDER — FLUTICASONE PROPIONATE 50 MCG
1 SPRAY, SUSPENSION (ML) NASAL DAILY
Qty: 1 BOTTLE | Refills: 3 | Status: SHIPPED | OUTPATIENT
Start: 2018-12-27 | End: 2019-08-16 | Stop reason: SDUPTHER

## 2018-12-27 RX ORDER — FUROSEMIDE 80 MG
80 TABLET ORAL 2 TIMES DAILY
Qty: 60 TABLET | Refills: 1 | Status: SHIPPED | OUTPATIENT
Start: 2018-12-27 | End: 2019-01-17 | Stop reason: SDUPTHER

## 2018-12-27 RX ORDER — BLOOD PRESSURE TEST KIT
1 KIT MISCELLANEOUS ONCE
Qty: 1 EACH | Refills: 0 | Status: SHIPPED | OUTPATIENT
Start: 2018-12-27 | End: 2022-10-05

## 2018-12-27 RX ORDER — M-VIT,TX,IRON,MINS/CALC/FOLIC 27MG-0.4MG
1 TABLET ORAL DAILY
Qty: 30 TABLET | Refills: 1 | Status: SHIPPED | OUTPATIENT
Start: 2018-12-27 | End: 2019-02-19 | Stop reason: SDUPTHER

## 2018-12-27 RX ORDER — SILDENAFIL 50 MG/1
50 TABLET, FILM COATED ORAL PRN
Qty: 30 TABLET | Refills: 3 | Status: SHIPPED | OUTPATIENT
Start: 2018-12-27 | End: 2020-02-14 | Stop reason: SDUPTHER

## 2018-12-27 NOTE — PROGRESS NOTES
Medication Sig Start Date End Date Taking? Authorizing Provider   metFORMIN (GLUCOPHAGE) 500 MG tablet Take 1 tablet by mouth 2 times daily (with meals) 12/20/18   Ekta Maguire MD   diltiazem (CARDIZEM CD) 120 MG extended release capsule Take 1 capsule by mouth daily 12/12/18   Mundo Perez MD   flecainide (TAMBOCOR) 50 MG tablet Take 1 tablet by mouth every 12 hours 11/14/18   Milind Prasad MD   furosemide (LASIX) 80 MG tablet Take 1 tablet by mouth 2 times daily 10/18/18   Mundo Perez MD   lisinopril (PRINIVIL;ZESTRIL) 10 MG tablet Take 1 tablet by mouth daily 10/18/18   Mundo Perez MD   apixaban (ELIQUIS) 5 MG TABS tablet Take 1 tablet by mouth 2 times daily 9/26/18   Mortimer Gay, MD   potassium chloride (KLOR-CON M20) 20 MEQ extended release tablet TAKE 1 TABLET BY MOUTH EVERY MORNING WITH BREAKFAST 9/26/18   Kimmie Oquendo MD   atorvastatin (LIPITOR) 20 MG tablet Take 1 tablet by mouth daily 8/23/18   Mundo Perez MD   allopurinol (ZYLOPRIM) 300 MG tablet Take 1 tablet by mouth daily 8/23/18   Carol Sanchez MD   Insulin Syringe-Needle U-100 ([employer] INSULIN SYRINGE) 31G X 5/16\" 1 ML MISC 4 each by Does not apply route 4 times daily (before meals and nightly) 7/5/18   Nick Dumont MD   Multiple Vitamins-Minerals (THERAPEUTIC MULTIVITAMIN-MINERALS) tablet Take 1 tablet by mouth daily 4/2/18   Cecilia Cortes MD   sildenafil (REVATIO) 20 MG tablet Take 3 tablets by mouth as needed (erectile dysfunction) 3/2/18   Porsha Solo MD   Glucose Blood (BLOOD GLUCOSE TEST STRIPS) STRP 1 Units by In Vitro route 4 times daily (after meals and at bedtime) 12/13/17   Mortimer Gay, MD   mupirocin (BACTROBAN) 2 % ointment Apply to both nares twice a day for 7 days following your 10 days of Bactrim. Then on the 1st day of the next month, resume twice a day in each nare for 7 days. Repeat for the first 7 days of each month.  11/3/17   Deirdre Purdue, MD   naproxen (NAPROSYN) 500 MG tablet Take 1 tablet by mouth 2 times daily (with meals) for 30 doses 10/17/17 8/8/18  Marisa Olmos MD   insulin glargine (LANTUS) 100 UNIT/ML injection vial Inject 20 Units into the skin nightly 9/6/17   Janeth Pearson MD   Blood Pressure KIT 1 Units by Does not apply route daily 4/17/17   Cassandra Ambriz MD   insulin lispro (HUMALOG) 100 UNIT/ML injection vial Inject 7 Units into the skin 3 times daily (with meals) 4/17/17   Cassandra Ambriz MD   fluticasone (FLONASE) 50 MCG/ACT nasal spray 1 spray by Nasal route daily 4/17/17   Cassandra Ambriz MD   Blood Glucose Monitoring Suppl (BLOOD GLUCOSE MONITOR SYSTEM) W/DEVICE KIT 1 Units by Does not apply route daily 4/17/17   Cassandra Ambriz MD   ascorbic acid (V-R VITAMIN C) 250 MG tablet Take 1 tablet by mouth daily 9/10/15   Deepika Castorena MD   Handicap Placard MISC by Does not apply route Valid for 1 year. Mobility limitations due to severe Osteoarthritis of L Knee 8/15/15   Deepika Castorena MD   Alcohol Swabs PADS 1 Container by Does not apply route three times daily. 8/25/14   Zabrina Roper MD   Blood Glucose Monitoring Suppl (ACCU-CHEK VIK PLUS) W/DEVICE KIT 1 kit by Does not apply route 4 times daily (before meals and nightly). 1/6/14   Scott Davenport   ACCU-CHEK FASTCLIX LANCETS MISC 20 Sticks by Does not apply route 2 times daily. 1/6/14   Scott B Davenport       ROS negative except as above    Physical Exam:      Vitals: There were no vitals taken for this visit. There is no height or weight on file to calculate BMI. Wt Readings from Last 3 Encounters:   11/14/18 (!) 388 lb 0.2 oz (176 kg)   10/09/18 (!) 389 lb 12.4 oz (176.8 kg)   09/20/18 (!) 393 lb 3.2 oz (178.4 kg)       Physical Exam   Constitutional: He is oriented to person, place, and time. He appears well-developed and well-nourished. No distress. HENT:   Head: Normocephalic and atraumatic. Eyes: Pupils are equal, round, and reactive to light. No scleral icterus. Cardiovascular: Normal rate and regular rhythm.

## 2018-12-27 NOTE — PROGRESS NOTES
MD   Glucose Blood (BLOOD GLUCOSE TEST STRIPS) STRP 1 Units by In Vitro route 4 times daily (after meals and at bedtime) 12/13/17   Walter Kessler MD   mupirocin (BACTROBAN) 2 % ointment Apply to both nares twice a day for 7 days following your 10 days of Bactrim. Then on the 1st day of the next month, resume twice a day in each nare for 7 days. Repeat for the first 7 days of each month. 11/3/17   Ede Reed MD   naproxen (NAPROSYN) 500 MG tablet Take 1 tablet by mouth 2 times daily (with meals) for 30 doses 10/17/17 8/8/18  Maria Esther Stephens MD   insulin glargine (LANTUS) 100 UNIT/ML injection vial Inject 20 Units into the skin nightly 9/6/17   Brea Cisneros MD   Blood Pressure KIT 1 Units by Does not apply route daily 4/17/17   Rosibel Dorsey MD   insulin lispro (HUMALOG) 100 UNIT/ML injection vial Inject 7 Units into the skin 3 times daily (with meals) 4/17/17   Rosibel Dorsey MD   fluticasone (FLONASE) 50 MCG/ACT nasal spray 1 spray by Nasal route daily 4/17/17   Rosibel Dorsey MD   Blood Glucose Monitoring Suppl (BLOOD GLUCOSE MONITOR SYSTEM) W/DEVICE KIT 1 Units by Does not apply route daily 4/17/17   Rosibel Dorsey MD   ascorbic acid (V-R VITAMIN C) 250 MG tablet Take 1 tablet by mouth daily 9/10/15   Jorge Luis Alvarado MD   Handicap Placard MISC by Does not apply route Valid for 1 year. Mobility limitations due to severe Osteoarthritis of L Knee 8/15/15   Jorge Luis Alvarado MD   Alcohol Swabs PADS 1 Container by Does not apply route three times daily. 8/25/14   Dexter Ferrera MD   Blood Glucose Monitoring Suppl (ACCU-CHEK VIK PLUS) W/DEVICE KIT 1 kit by Does not apply route 4 times daily (before meals and nightly). 1/6/14   Scott B Davenport   ACCU-CHEK FASTCLIX LANCETS MISC 20 Sticks by Does not apply route 2 times daily. 1/6/14   Scott B Davenport       ROS negative except as above    Physical Exam:      Vitals: There were no vitals taken for this visit.     There is no height or weight on file to

## 2019-01-02 ENCOUNTER — TELEPHONE (OUTPATIENT)
Dept: CARDIOLOGY CLINIC | Age: 49
End: 2019-01-02

## 2019-01-02 DIAGNOSIS — I48.0 PAROXYSMAL A-FIB (HCC): ICD-10-CM

## 2019-01-02 DIAGNOSIS — E87.6 POTASSIUM DEFICIENCY: ICD-10-CM

## 2019-01-02 DIAGNOSIS — I10 ESSENTIAL HYPERTENSION: Chronic | ICD-10-CM

## 2019-01-02 RX ORDER — ATORVASTATIN CALCIUM 20 MG/1
20 TABLET, FILM COATED ORAL DAILY
Qty: 30 TABLET | Refills: 1 | OUTPATIENT
Start: 2019-01-02 | End: 2019-01-08

## 2019-01-02 RX ORDER — POTASSIUM CHLORIDE 20 MEQ/1
TABLET, EXTENDED RELEASE ORAL
Qty: 30 TABLET | Refills: 1 | OUTPATIENT
Start: 2019-01-02 | End: 2019-01-08 | Stop reason: SDUPTHER

## 2019-01-02 RX ORDER — ATORVASTATIN CALCIUM 20 MG/1
TABLET, FILM COATED ORAL
Qty: 30 TABLET | Refills: 2 | OUTPATIENT
Start: 2019-01-02

## 2019-01-02 RX ORDER — DILTIAZEM HYDROCHLORIDE 120 MG/1
120 CAPSULE, COATED, EXTENDED RELEASE ORAL DAILY
Qty: 90 CAPSULE | Refills: 2 | Status: SHIPPED | OUTPATIENT
Start: 2019-01-02 | End: 2019-10-01 | Stop reason: SDUPTHER

## 2019-01-02 RX ORDER — FLECAINIDE ACETATE 50 MG/1
50 TABLET ORAL EVERY 12 HOURS SCHEDULED
Qty: 60 TABLET | Refills: 1 | OUTPATIENT
Start: 2019-01-02 | End: 2019-03-06

## 2019-01-08 DIAGNOSIS — I48.0 PAROXYSMAL A-FIB (HCC): ICD-10-CM

## 2019-01-08 DIAGNOSIS — I10 ESSENTIAL HYPERTENSION: Chronic | ICD-10-CM

## 2019-01-08 DIAGNOSIS — E87.6 POTASSIUM DEFICIENCY: ICD-10-CM

## 2019-01-08 RX ORDER — ATORVASTATIN CALCIUM 20 MG/1
TABLET, FILM COATED ORAL
Qty: 30 TABLET | Refills: 1
Start: 2019-01-08 | End: 2019-03-15 | Stop reason: SDUPTHER

## 2019-01-08 RX ORDER — POTASSIUM CHLORIDE 20 MEQ/1
TABLET, EXTENDED RELEASE ORAL
Qty: 30 TABLET | Refills: 1 | OUTPATIENT
Start: 2019-01-08 | End: 2019-03-15 | Stop reason: SDUPTHER

## 2019-01-17 ENCOUNTER — OFFICE VISIT (OUTPATIENT)
Dept: INTERNAL MEDICINE CLINIC | Age: 49
End: 2019-01-17
Payer: MEDICAID

## 2019-01-17 VITALS
WEIGHT: 315 LBS | BODY MASS INDEX: 52.03 KG/M2 | TEMPERATURE: 98.2 F | SYSTOLIC BLOOD PRESSURE: 143 MMHG | RESPIRATION RATE: 18 BRPM | HEART RATE: 76 BPM | DIASTOLIC BLOOD PRESSURE: 88 MMHG | OXYGEN SATURATION: 95 %

## 2019-01-17 DIAGNOSIS — I48.0 PAROXYSMAL A-FIB (HCC): ICD-10-CM

## 2019-01-17 PROCEDURE — 99213 OFFICE O/P EST LOW 20 MIN: CPT | Performed by: STUDENT IN AN ORGANIZED HEALTH CARE EDUCATION/TRAINING PROGRAM

## 2019-01-17 RX ORDER — FUROSEMIDE 80 MG
80 TABLET ORAL 2 TIMES DAILY
Qty: 60 TABLET | Refills: 2 | Status: SHIPPED | OUTPATIENT
Start: 2019-01-17 | End: 2019-05-29 | Stop reason: SDUPTHER

## 2019-01-17 RX ORDER — ALLOPURINOL 300 MG/1
300 TABLET ORAL DAILY
Qty: 30 TABLET | Refills: 3 | Status: SHIPPED | OUTPATIENT
Start: 2019-01-17 | End: 2019-05-29 | Stop reason: SDUPTHER

## 2019-01-17 ASSESSMENT — ENCOUNTER SYMPTOMS
COUGH: 1
WHEEZING: 1

## 2019-02-13 ENCOUNTER — TELEPHONE (OUTPATIENT)
Dept: INTERNAL MEDICINE CLINIC | Age: 49
End: 2019-02-13

## 2019-02-14 ENCOUNTER — TELEPHONE (OUTPATIENT)
Dept: INTERNAL MEDICINE CLINIC | Age: 49
End: 2019-02-14

## 2019-02-19 DIAGNOSIS — I10 ESSENTIAL HYPERTENSION: Chronic | ICD-10-CM

## 2019-02-19 RX ORDER — M-VIT,TX,IRON,MINS/CALC/FOLIC 27MG-0.4MG
1 TABLET ORAL DAILY
Qty: 30 TABLET | Refills: 1 | OUTPATIENT
Start: 2019-02-19 | End: 2019-04-16 | Stop reason: SDUPTHER

## 2019-02-19 RX ORDER — LISINOPRIL 10 MG/1
10 TABLET ORAL DAILY
Qty: 30 TABLET | Refills: 1 | OUTPATIENT
Start: 2019-02-19 | End: 2019-04-16 | Stop reason: SDUPTHER

## 2019-03-06 ENCOUNTER — OFFICE VISIT (OUTPATIENT)
Dept: CARDIOLOGY CLINIC | Age: 49
End: 2019-03-06
Payer: MEDICAID

## 2019-03-06 VITALS
OXYGEN SATURATION: 96 % | DIASTOLIC BLOOD PRESSURE: 68 MMHG | BODY MASS INDEX: 42.66 KG/M2 | SYSTOLIC BLOOD PRESSURE: 108 MMHG | HEIGHT: 72 IN | HEART RATE: 72 BPM | WEIGHT: 315 LBS

## 2019-03-06 DIAGNOSIS — I48.0 PAROXYSMAL ATRIAL FIBRILLATION (HCC): Primary | ICD-10-CM

## 2019-03-06 PROCEDURE — 1036F TOBACCO NON-USER: CPT | Performed by: INTERNAL MEDICINE

## 2019-03-06 PROCEDURE — G8427 DOCREV CUR MEDS BY ELIG CLIN: HCPCS | Performed by: INTERNAL MEDICINE

## 2019-03-06 PROCEDURE — G8482 FLU IMMUNIZE ORDER/ADMIN: HCPCS | Performed by: INTERNAL MEDICINE

## 2019-03-06 PROCEDURE — 99214 OFFICE O/P EST MOD 30 MIN: CPT | Performed by: INTERNAL MEDICINE

## 2019-03-06 PROCEDURE — G8417 CALC BMI ABV UP PARAM F/U: HCPCS | Performed by: INTERNAL MEDICINE

## 2019-03-06 PROCEDURE — 93000 ELECTROCARDIOGRAM COMPLETE: CPT | Performed by: INTERNAL MEDICINE

## 2019-03-12 RX ORDER — FLECAINIDE ACETATE 50 MG/1
50 TABLET ORAL EVERY 12 HOURS SCHEDULED
Qty: 60 TABLET | Refills: 5 | Status: SHIPPED | OUTPATIENT
Start: 2019-03-12 | End: 2019-03-21 | Stop reason: ALTCHOICE

## 2019-03-15 DIAGNOSIS — I48.0 PAROXYSMAL A-FIB (HCC): ICD-10-CM

## 2019-03-15 DIAGNOSIS — E87.6 POTASSIUM DEFICIENCY: ICD-10-CM

## 2019-03-15 DIAGNOSIS — I10 ESSENTIAL HYPERTENSION: Chronic | ICD-10-CM

## 2019-03-15 RX ORDER — ATORVASTATIN CALCIUM 20 MG/1
TABLET, FILM COATED ORAL
Qty: 30 TABLET | Refills: 1 | OUTPATIENT
Start: 2019-03-15

## 2019-03-15 RX ORDER — POTASSIUM CHLORIDE 20 MEQ/1
TABLET, EXTENDED RELEASE ORAL
Qty: 30 TABLET | Refills: 0
Start: 2019-03-15 | End: 2019-04-16 | Stop reason: SDUPTHER

## 2019-03-15 RX ORDER — ATORVASTATIN CALCIUM 20 MG/1
TABLET, FILM COATED ORAL
Qty: 30 TABLET | Refills: 0
Start: 2019-03-15 | End: 2019-04-18 | Stop reason: SDUPTHER

## 2019-03-15 RX ORDER — POTASSIUM CHLORIDE 20 MEQ/1
TABLET, EXTENDED RELEASE ORAL
Qty: 30 TABLET | Refills: 1 | OUTPATIENT
Start: 2019-03-15

## 2019-03-21 ENCOUNTER — OFFICE VISIT (OUTPATIENT)
Dept: INTERNAL MEDICINE CLINIC | Age: 49
End: 2019-03-21
Payer: MEDICAID

## 2019-03-21 ENCOUNTER — TELEPHONE (OUTPATIENT)
Dept: CARDIOLOGY CLINIC | Age: 49
End: 2019-03-21

## 2019-03-21 VITALS
TEMPERATURE: 98 F | DIASTOLIC BLOOD PRESSURE: 81 MMHG | SYSTOLIC BLOOD PRESSURE: 127 MMHG | OXYGEN SATURATION: 96 % | WEIGHT: 315 LBS | HEART RATE: 66 BPM | RESPIRATION RATE: 16 BRPM | BODY MASS INDEX: 51.24 KG/M2

## 2019-03-21 DIAGNOSIS — L02.92 FURUNCLES: ICD-10-CM

## 2019-03-21 PROCEDURE — 99213 OFFICE O/P EST LOW 20 MIN: CPT | Performed by: STUDENT IN AN ORGANIZED HEALTH CARE EDUCATION/TRAINING PROGRAM

## 2019-03-21 RX ORDER — SULFAMETHOXAZOLE AND TRIMETHOPRIM 800; 160 MG/1; MG/1
1 TABLET ORAL 2 TIMES DAILY
Qty: 20 TABLET | Refills: 0 | Status: SHIPPED | OUTPATIENT
Start: 2019-03-21 | End: 2019-03-31

## 2019-04-16 ENCOUNTER — OFFICE VISIT (OUTPATIENT)
Dept: INTERNAL MEDICINE CLINIC | Age: 49
End: 2019-04-16
Payer: MEDICAID

## 2019-04-16 VITALS
WEIGHT: 315 LBS | SYSTOLIC BLOOD PRESSURE: 124 MMHG | HEART RATE: 67 BPM | HEIGHT: 72 IN | DIASTOLIC BLOOD PRESSURE: 76 MMHG | OXYGEN SATURATION: 95 % | TEMPERATURE: 98.6 F | BODY MASS INDEX: 42.66 KG/M2 | RESPIRATION RATE: 16 BRPM

## 2019-04-16 DIAGNOSIS — I10 ESSENTIAL HYPERTENSION: Chronic | ICD-10-CM

## 2019-04-16 DIAGNOSIS — J03.90 TONSILLITIS WITH EXUDATE: ICD-10-CM

## 2019-04-16 DIAGNOSIS — J01.20 ACUTE ETHMOIDAL SINUSITIS, RECURRENCE NOT SPECIFIED: ICD-10-CM

## 2019-04-16 DIAGNOSIS — E87.6 POTASSIUM DEFICIENCY: ICD-10-CM

## 2019-04-16 DIAGNOSIS — J06.9 ACUTE URI OF MULTIPLE SITES: ICD-10-CM

## 2019-04-16 PROCEDURE — 99213 OFFICE O/P EST LOW 20 MIN: CPT | Performed by: STUDENT IN AN ORGANIZED HEALTH CARE EDUCATION/TRAINING PROGRAM

## 2019-04-16 RX ORDER — M-VIT,TX,IRON,MINS/CALC/FOLIC 27MG-0.4MG
1 TABLET ORAL DAILY
Qty: 30 TABLET | Refills: 3 | Status: SHIPPED | OUTPATIENT
Start: 2019-04-16 | End: 2020-05-13 | Stop reason: SDUPTHER

## 2019-04-16 RX ORDER — SULFAMETHOXAZOLE AND TRIMETHOPRIM 800; 160 MG/1; MG/1
1 TABLET ORAL 2 TIMES DAILY
Qty: 20 TABLET | Refills: 0 | Status: SHIPPED | OUTPATIENT
Start: 2019-04-16 | End: 2019-04-26

## 2019-04-16 RX ORDER — ASCORBIC ACID 250 MG
250 TABLET ORAL DAILY
Qty: 30 TABLET | Refills: 3 | Status: SHIPPED | OUTPATIENT
Start: 2019-04-16 | End: 2019-08-08 | Stop reason: SDUPTHER

## 2019-04-16 RX ORDER — POTASSIUM CHLORIDE 20 MEQ/1
TABLET, EXTENDED RELEASE ORAL
Qty: 30 TABLET | Refills: 3 | Status: SHIPPED | OUTPATIENT
Start: 2019-04-16 | End: 2019-08-08 | Stop reason: SDUPTHER

## 2019-04-16 RX ORDER — LISINOPRIL 10 MG/1
10 TABLET ORAL DAILY
Qty: 30 TABLET | Refills: 3 | Status: SHIPPED | OUTPATIENT
Start: 2019-04-16 | End: 2019-08-08 | Stop reason: SDUPTHER

## 2019-04-16 NOTE — PATIENT INSTRUCTIONS
Before any of you medication is completely gone, call your pharmacy AT LEAST 1 WEEK ahead for refills. Review all information regarding your medication before starting. If you become ill when the clinic is closed, please call the [facility]  at   #[phone] and ask the  to page the AOD between 6:00 AM and 6:00 PM or page the AON between 6:00 PM and 6:00 am    The clinic is not able to process MY CHART requests for appointments or messages including requests. Please call the [address] [place] [address] at [phone]  For appointments and messages. Call your pharmacy for medication refills. Return to clinic August 2019;  Call after July 1st for appointment  #[phone]. New prescription for Bactrim DS    antibiotic   take one tablet by mouth 2 times daily for 10 daysPatient Education        sulfamethoxazole and trimethoprim  Pronunciation:  SUL fa meth OX a zole and trye METH oh prim  Brand:  Bactrim, Bactrim DS, Septra DS, SMZ-TMP DS  What is the most important information I should know about sulfamethoxazole and trimethoprim? You should not use this medication if you have severe liver or kidney disease, anemia caused by folic acid deficiency, or a history of low blood platelets caused by taking trimethoprim or any sulfa drug. What is sulfamethoxazole and trimethoprim? Sulfamethoxazole and trimethoprim are both antibiotics that treat different types of infection caused by bacteria. Sulfamethoxazole and trimethoprim is used a combination antibiotic used to treat ear infections, urinary tract infections, bronchitis, traveler's diarrhea, shigellosis, and Pneumocystis jiroveci pneumonia. Sulfamethoxazole and trimethoprim may also be used for purposes not listed in this medication guide. What should I discuss with my healthcare provider before taking sulfamethoxazole and trimethoprim?   You should not use this medication if you are allergic to sulfamethoxazole or trimethoprim, or stones while you are taking trimethoprim and sulfamethoxazole. This medication can cause unusual results with certain medical tests. Tell any doctor who treats you that you are using sulfamethoxazole and trimethoprim. Store at room temperature away from moisture, heat, and light. What happens if I miss a dose? Take the missed dose as soon as you remember. Skip the missed dose if it is almost time for your next scheduled dose. Do not  take extra medicine to make up the missed dose. What happens if I overdose? Seek emergency medical attention or call the Poison Help line at 1-724.947.1745. What should I avoid while taking sulfamethoxazole and trimethoprim? Antibiotic medicines can cause diarrhea, which may be a sign of a new infection. If you have diarrhea that is watery or bloody, stop taking this medication and call your doctor. Do not use anti-diarrhea medicine unless your doctor tells you to. Avoid exposure to sunlight or tanning beds. This medication can make you sunburn more easily. Wear protective clothing and use sunscreen (SPF 30 or higher) when you are outdoors. What are the possible side effects of sulfamethoxazole and trimethoprim? Get emergency medical help if you have any of these signs of an allergic reaction: hives; difficult breathing; swelling of your face, lips, tongue, or throat.   Call your doctor at once if you have:  · diarrhea that is watery or bloody;  · pale skin, feeling light-headed or short of breath, rapid heart rate, trouble concentrating;  · sudden weakness or ill feeling, fever, chills, sore throat, new or worsening cough;  · cold or flu symptoms, swollen gums, painful mouth sores, pain when swallowing, skin sores;  · low levels of sodium in the body --headache, confusion, slurred speech, severe weakness, vomiting, loss of coordination, feeling unsteady;  · liver problems --upper stomach pain, tired feeling, dark urine, blue-colored stools, jaundice (yellowing of the skin or eyes); or  · severe skin reaction --fever, sore throat, swelling in your face or tongue, burning in your eyes, skin pain, followed by a red or purple skin rash that spreads (especially in the face or upper body) and causes blistering and peeling. Common side effects may include:  · nausea, vomiting, loss of appetite; or  · mild itching or rash. This is not a complete list of side effects and others may occur. Call your doctor for medical advice about side effects. You may report side effects to FDA at 3-144-FZH-8310. What other drugs will affect sulfamethoxazole and trimethoprim? Tell your doctor about all medicines you use, and those you start or stop using during your treatment with sulfamethoxazole and trimethoprim, especially:  · leucovorin; or  · methotrexate. This list is not complete. Other drugs may interact with sulfamethoxazole and trimethoprim, including prescription and over-the-counter medicines, vitamins, and herbal products. Not all possible interactions are listed in this medication guide. Where can I get more information? Your pharmacist can provide more information about sulfamethoxazole and trimethoprim. Remember, keep this and all other medicines out of the reach of children, never share your medicines with others, and use this medication only for the indication prescribed. Every effort has been made to ensure that the information provided by [address] is accurate, up-to-date, and complete, but no guarantee is made to that effect. Drug information contained herein may be time sensitive. [facility] information has been compiled for use by healthcare practitioners and consumers in the [place] and therefore [employer] does not warrant that uses outside of the [place] are appropriate, unless specifically indicated otherwise. [facility]'s drug information does not endorse drugs, diagnose patients or recommend therapy.  [employer]'s drug information is an informational resource designed to assist licensed healthcare practitioners in caring for their patients and/or to serve consumers viewing this service as a supplement to, and not a substitute for, the expertise, skill, knowledge and judgment of healthcare practitioners. The absence of a warning for a given drug or drug combination in no way should be construed to indicate that the drug or drug combination is safe, effective or appropriate for any given patient. [facility] does not assume any responsibility for any aspect of healthcare administered with the aid of information [facility] provides. The information contained herein is not intended to cover all possible uses, directions, precautions, warnings, drug interactions, allergic reactions, or adverse effects. If you have questions about the drugs you are taking, check with your doctor, nurse or pharmacist.  Copyright 6208-8590 [address]. Version: 8.01. Revision date: 12/15/2013. Care instructions adapted under license by [facility] ([facility]). If you have questions about a medical condition or this instruction, always ask your healthcare professional. [place] [address] any warranty or liability for your use of this information.        Refills on Vitamin C, Multi Vitamin, Lisinopril and Potassium    Restart walking for weight loss     Continue medication as listed on discharge sheet    Instructions reviewed before discharge and copy given to patient    [phone] [phone]

## 2019-04-16 NOTE — PROGRESS NOTES
Department Of Internal Medicine  General Medicine/Primary Care  Established Patient Visit    Patient:  Mey De La Cruz                                               : 1970  Age: 51 y.o. MRN: [id number]  Date : 2019    History Obtained From:  patient    REASON FOR VISIT:  Follow up for prescription refills. HISTORY OF PRESENT ILLNESS:   The patient is a 51 y.o. male w/ Hx of atrial fibrillation (s/p ablation 2018 by Dr Ambreen Rg). No more episodes of lightheadedness during BM. No more lightheadedness after BM. He reports normal BM with small episodes of constipation. No SOB or HAYS. Last visit he complained of painful raised lesions on his right lower abdomen and right buttocks. He has a history of recurrent furunculosis. He was prescribed 10 day course of bactrim. He is complaining of similar on the back of his neck - will prescribe bactrim. He will be returning to his cardiologist Dr Ambreen Rg in May and will review the 30 day event monitor. Continues to use CPAP and reports no problems with his sleep. No more insomnia. No fever, chills, or night sweats. No palpitations, chest pain, or chest tightness. No changes in appetite or unintentional weight loss. Past Medical History:        Diagnosis Date    Arthritis     Atrial fibrillation ([address]     Congestive heart failure (HCC)     Depression     Diabetes mellitus (HCC)     GERD (gastroesophageal reflux disease)     Hyperlipidemia     Hypertension     Morbid obesity ([address]     Sleep apnea     uses cpap at home q hs     Type II or unspecified type diabetes mellitus without mention of complication, not stated as uncontrolled        Past Surgical History:    No past surgical history on file.     Family History:       Problem Relation Age of Onset    Diabetes Mother     High Blood Pressure Mother     Diabetes Maternal Aunt     High Blood Pressure Maternal Grandmother        Social History:   TOBACCO:   reports that he has never smoked. He has never used smokeless tobacco.  ETOH:   reports that he does not drink alcohol. OCCUPATION:      Allergies:  Patient has no known allergies. Current Medications:    Prior to Admission medications    Medication Sig Start Date End Date Taking?  Authorizing Provider   atorvastatin (LIPITOR) 20 MG tablet TAKE 1 TABLET BY MOUTH EVERY DAY 3/15/19  Yes Katheryn Sandoval MD   potassium chloride (KLOR-CON M20) 20 MEQ extended release tablet TAKE 1 TABLET BY MOUTH EVERY MORNING WITH BREAKFAST 3/15/19  Yes Katheryn Sandoval MD   lisinopril (PRINIVIL;ZESTRIL) 10 MG tablet Take 1 tablet by mouth daily 2/19/19  Yes Katheryn Sandoval MD   Multiple Vitamins-Minerals (THERAPEUTIC MULTIVITAMIN-MINERALS) tablet Take 1 tablet by mouth daily 2/19/19  Yes Katheryn Sandoval MD   insulin lispro (ADMELOG) 100 UNIT/ML injection vial Inject 7 Units into the skin 3 times daily (before meals) 2/15/19  Yes Tony Rivera MD   allopurinol (ZYLOPRIM) 300 MG tablet Take 1 tablet by mouth daily 1/17/19  Yes Katheryn Sandoval MD   metFORMIN (GLUCOPHAGE) 500 MG tablet Take 1 tablet by mouth 2 times daily (with meals) 1/17/19  Yes Katheryn Sandoval MD   apixaban (ELIQUIS) 5 MG TABS tablet Take 1 tablet by mouth 2 times daily 1/17/19  Yes Katheryn Sandoval MD   furosemide (LASIX) 80 MG tablet Take 1 tablet by mouth 2 times daily 1/17/19  Yes Katheryn Sandoval MD   diltiazem (CARDIZEM CD) 120 MG extended release capsule Take 1 capsule by mouth daily 1/2/19  Yes Dinorah Short MD   fluticasone [facility]) 50 MCG/ACT nasal spray 1 spray by Nasal route daily 12/27/18  Yes Katheryn Sandoval MD   ascorbic acid (V-R VITAMIN C) 250 MG tablet Take 1 tablet by mouth daily 12/27/18  Yes Katheryn Sandoval MD   sildenafil (VIAGRA) 50 MG tablet Take 1 tablet by mouth as needed for Erectile Dysfunction 12/27/18  Yes Katheryn Sandoval MD   Insulin Syringe-Needle U-100 ([employer] INSULIN SYRINGE) 31G X 5/16\" 1 ML MISC 4 each by Does not apply route 4 times daily (before meals and nightly) 7/5/18  Yes Dalton Calhoun MD   Glucose Blood (BLOOD GLUCOSE TEST STRIPS) STRP 1 Units by In Vitro route 4 times daily (after meals and at bedtime) 12/13/17  Yes Lisa Ureña MD   mupirocin (BACTROBAN) 2 % ointment Apply to both nares twice a day for 7 days following your 10 days of Bactrim. Then on the 1st day of the next month, resume twice a day in each nare for 7 days. Repeat for the first 7 days of each month. 11/3/17  Yes Tiffany Page MD   insulin glargine (LANTUS) 100 UNIT/ML injection vial Inject 20 Units into the skin nightly 9/6/17  Yes Deon Santiago MD   Blood Pressure KIT 1 Units by Does not apply route daily 4/17/17  Yes Adelita Shafer MD   Blood Glucose Monitoring Suppl (BLOOD GLUCOSE MONITOR SYSTEM) W/DEVICE KIT 1 Units by Does not apply route daily 4/17/17  Yes Adelita Shafer MD   Handicap Placard MISC by Does not apply route Valid for 1 year. Mobility limitations due to severe Osteoarthritis of L Knee 8/15/15  Yes Jerrell Saravia MD   Alcohol Swabs PADS 1 Container by Does not apply route three times daily. 8/25/14  Yes Ag Gaffney MD   Blood Glucose Monitoring Suppl (ACCU-CHEK VIK PLUS) W/DEVICE KIT 1 kit by Does not apply route 4 times daily (before meals and nightly). 1/6/14  Yes Scott B Davenport   ACCU-CHEK FASTCLIX LANCETS MISC 20 Sticks by Does not apply route 2 times daily. 1/6/14  Yes Scott B Davenport   Blood Pressure Monitoring (WRIST BLOOD PRESSURE MONITOR) MISC 1 Units by Does not apply route once for 1 dose 12/27/18 12/27/18  Addis Penaloza MD       ROS negative except as above    Physical Exam:      Vitals: /76 (Site: Left Upper Arm, Position: Sitting, Cuff Size: Large Adult)   Pulse 67   Temp 98.6 °F (37 °C) (Oral)   Resp 16   Ht 6' (1.829 m)   Wt (!) 385 lb 12.8 oz (175 kg)   SpO2 95%   BMI 52.32 kg/m²     Body mass index is 52.32 kg/m².   Wt Readings from Last 3 Encounters:   04/16/19 (!) 385 lb 12.8 oz (175 kg)   03/21/19 (!) 377 lb 12.8 oz (171.4 kg)   03/06/19 (!) 376 lb (170.6 kg)       Physical Exam   Constitutional: He is oriented to person, place, and time. He appears well-developed and well-nourished. No distress. HENT:   Head: Normocephalic and atraumatic. Eyes: Pupils are equal, round, and reactive to light. No scleral icterus. Cardiovascular: Normal rate and regular rhythm. Exam reveals no gallop and no friction rub. No murmur heard. Pulmonary/Chest: Effort normal. No respiratory distress. He has no wheezes. He has no rales. Abdominal: Soft. He exhibits no distension. There is no tenderness. There is no guarding. Musculoskeletal: He exhibits no edema or deformity. Neurological: He is alert and oriented to person, place, and time. No loss of sensation at toes of feet   Skin: Skin is warm and dry. No rash noted. No erythema. No pallor. raised lesions on his right lower abdomen and right buttocks   Psychiatric: He has a normal mood and affect. His behavior is normal. Thought content normal.       LABS:    Chemistry:  No results for input(s): BUN, CREATININE, NA, K, CO2, CL, MG, PHOS, AST, ALT, ALB, PROT in the last 72 hours. Invalid input(s): GLU, CA, TBILI, DBILI, ALP, GLUFASTING    No results for input(s): ALKPHOS, ALT, AST, PROT, BILITOT, BILIDIR, LABALBU in the last 72 hours. [unfilled]    No results for input(s): Orien Marker, LABMICR, MICROALBUR, [place] in the last 72 hours. Lab Results   Component Value Date    TSH 1.60 06/19/2018       Hematology:  No results for input(s): WBC, HGB, HCT, PLT, MCV, MCH, MCHC, RDW, EOSABS in the last 72 hours.     Invalid input(s): NEUTP, LYMPHP, MONOSP, EOSP, BASOP, NEUTABS, LYMPHABS, MONOABS, BASOABS    No results found for: IRON, TIBC, FERRITIN, FOLATE, [id number], PTH    Lipid:  Lab Results   Component Value Date    CHOL 103 06/09/2016    HDL 33 (L) 06/09/2016    LDLCALC 48 06/09/2016    TRIG 109 06/09/2016       U/A:  Lab Results Component Value Date    LABMICR YES 08/04/2018       Imaging:   No results found. EKG:  Health Maintenance   Pap Smear:    Flex Sig/Colonoscopy:    Mammography   PSA   INFLUENZA:               PNEUMONIA VACCINE:    Tdap:      Assessment/Plan:   49 y.o. male who presents s/p ablation of atrial fibrillation (11/13/2018)    Essential hypertension - controlled; 143/88 today  - continue potassium chloride 20 mg mEq  - continue lasix 80 mg BID  - continue lisinopril 10 mg qd     Type 2 diabetes mellitus without complication, unspecified long term insulin use status (HCC)  HgA1c 6.2 (8/04/2018)  - continue metformin 500 BID  - continue lantus 20 qhs  - had diabetic eye exam on 2/27/18 with no issues. Will follow up again next year for his annual exam      Morbid obesity due to excess calories ([facility])  Weight 434 -> 413 -> 410 -> 397 -> 387->388 -> 383 -> 369 -> 370 -> 373 --> 376 --> 383 -->377.8-->385.8  - No longer walking 4 miles with every morning  - patient is considering Bariatric Weight Loss solutions; but believes he can do it with diet and exercise on his own right now. May consider in the future     Recurrent furunculosis  -controlled with bactroban; Bactrim x 10 days for reoccurence  -suppressive therapy for MRSA with nasal bactroban for 7 days following Bactrim therapy.  Then repeat every month for the 1st week of the month.  -patient uses hibiclens for skin care  - use Hibiclens for painful furuncles on the back of his neck     Paroxysmal atrial fibrillation ([address] - resolved with ablation (Dr. Luis Enrique Cortez)  - given a 30 day monitor    Obstructive sleep apnea  - continue using CPap     Diastolic congestive heart failure, unspecified congestive heart failure chronicity ([address]  - no recent echo after ablation, follows up w/ Dr Luis Enrique Cortez     Erectile dysfn  - cont sildenafil 50 mg PRN    RTC in 3 months    Graham Lu MD  Internal Medicine Resident  Pager: [phone]  4/16/2019, 1:47 PM

## 2019-04-18 DIAGNOSIS — I48.0 PAROXYSMAL A-FIB (HCC): ICD-10-CM

## 2019-04-18 RX ORDER — ATORVASTATIN CALCIUM 20 MG/1
TABLET, FILM COATED ORAL
Qty: 30 TABLET | Refills: 2
Start: 2019-04-18 | End: 2019-07-15 | Stop reason: SDUPTHER

## 2019-04-23 PROCEDURE — 93272 ECG/REVIEW INTERPRET ONLY: CPT | Performed by: INTERNAL MEDICINE

## 2019-04-25 DIAGNOSIS — I48.0 PAROXYSMAL ATRIAL FIBRILLATION (HCC): ICD-10-CM

## 2019-04-26 DIAGNOSIS — I48.0 PAROXYSMAL A-FIB (HCC): ICD-10-CM

## 2019-04-26 RX ORDER — APIXABAN 5 MG/1
TABLET, FILM COATED ORAL
Qty: 60 TABLET | Refills: 2 | OUTPATIENT
Start: 2019-04-26

## 2019-05-13 NOTE — PROGRESS NOTES
[address]   Electrophysiology Consultation   Date: 5/15/2019  Reason for Consultation: Syncope & PAF  Consult Requesting Physician: Lizzie Beaver MD    Chief Complaint: s/p Afib ablation      HPI: Ally DeL a Cruz is a 51 y.o. with a PMH significant for obesity, LEONA non-compliant with CPAP, DM II, HTN and PAF who was recently admitted to [facility] 8/2018 s/p syncopal episode. He reported that he has been trying to loose weight and had made diet modifications resulting in diarrhea for several days prior. Regardless, he opted to go out with friends fishing when he noticed stomach cramping followed by the need to use the restroom. After having a bowel movement, he broke out into a heavy sweat, became lightheaded, and shortly thereafter \"passed out. \"  He reportedly felt fine when he \"came to. \" He has a known history of symptomatic paroxysmal afib and was back in the arrhythmia after the event. He states that he \"knows\" when he is in Afib but did not feel it that specific day. He believes that he has only been in afib 2-3 times. He denies chest pain, palpitations, orthopnea or edema. Patient was admitted to [facility] 11/13/18 and underwent paroxysmal atrial fibrillation ablation. Discharged home on flecainide 50 mg BID, Cardizem  mg daily and Eliquis 5mg BID. Interval history:  Patient returns today for f/u to discuss 30 day event monitor results after stopping flecainide s/p atrial fibrillation ablation 11/13//18. Past Medical History:   Diagnosis Date    Arthritis     Atrial fibrillation ([address]     Congestive heart failure (HCC)     Depression     Diabetes mellitus (HCC)     GERD (gastroesophageal reflux disease)     Hyperlipidemia     Hypertension     Morbid obesity ([address]     Sleep apnea     uses cpap at home q hs     Type II or unspecified type diabetes mellitus without mention of complication, not stated as uncontrolled         No past surgical history on file.     Allergies:  No Known Allergies    Medication:   Prior to Admission medications    Medication Sig Start Date End Date Taking?  Authorizing Provider   apixaban (ELIQUIS) 5 MG TABS tablet Take 1 tablet by mouth 2 times daily 4/26/19  Yes Muna Garcia MD   atorvastatin (LIPITOR) 20 MG tablet TAKE 1 TABLET BY MOUTH AT BEDTIME 4/18/19  Yes Muna Garcia MD   potassium chloride (KLOR-CON M20) 20 MEQ extended release tablet TAKE 1 TABLET BY MOUTH EVERY MORNING WITH BREAKFAST 4/16/19  Yes Muna Garcia MD   lisinopril (PRINIVIL;ZESTRIL) 10 MG tablet Take 1 tablet by mouth daily 4/16/19  Yes Muna Garcia MD   Multiple Vitamins-Minerals (THERAPEUTIC MULTIVITAMIN-MINERALS) tablet Take 1 tablet by mouth daily 4/16/19  Yes Muna Garcia MD   ascorbic acid (V-R VITAMIN C) 250 MG tablet Take 1 tablet by mouth daily 4/16/19  Yes Muna Garcia MD   insulin lispro (ADMELOG) 100 UNIT/ML injection vial Inject 7 Units into the skin 3 times daily (before meals) 2/15/19  Yes Myesha Moreno MD   allopurinol (ZYLOPRIM) 300 MG tablet Take 1 tablet by mouth daily 1/17/19  Yes Muna Garcia MD   metFORMIN (GLUCOPHAGE) 500 MG tablet Take 1 tablet by mouth 2 times daily (with meals) 1/17/19  Yes Muna Gacria MD   furosemide (LASIX) 80 MG tablet Take 1 tablet by mouth 2 times daily 1/17/19  Yes Muna Garcia MD   diltiazem (CARDIZEM CD) 120 MG extended release capsule Take 1 capsule by mouth daily 1/2/19  Yes Tawnya Cedillo MD   fluticasone [facility]) 50 MCG/ACT nasal spray 1 spray by Nasal route daily 12/27/18  Yes Muna Garcia MD   sildenafil (VIAGRA) 50 MG tablet Take 1 tablet by mouth as needed for Erectile Dysfunction 12/27/18  Yes Muna Garcia MD   Insulin Syringe-Needle U-100 ([employer] INSULIN SYRINGE) 31G X 5/16\" 1 ML MISC 4 each by Does not apply route 4 times daily (before meals and nightly) 7/5/18  Yes Juan Inman MD   Glucose Blood (BLOOD GLUCOSE TEST STRIPS) STRP 1 Units by In Vitro route 4 times daily (after meals and at bedtime) 12/13/17  Yes Katia Garcia MD   mupirocin (BACTROBAN) 2 % ointment Apply to both nares twice a day for 7 days following your 10 days of Bactrim. Then on the 1st day of the next month, resume twice a day in each nare for 7 days. Repeat for the first 7 days of each month. 11/3/17  Yes Cat Avalos MD   insulin glargine (LANTUS) 100 UNIT/ML injection vial Inject 20 Units into the skin nightly 9/6/17  Yes Fernando Fonseca MD   Blood Pressure KIT 1 Units by Does not apply route daily 4/17/17  Yes Magda Will MD   Blood Glucose Monitoring Suppl (BLOOD GLUCOSE MONITOR SYSTEM) W/DEVICE KIT 1 Units by Does not apply route daily 4/17/17  Yes Magda Will MD   Handicap Placard MISC by Does not apply route Valid for 1 year. Mobility limitations due to severe Osteoarthritis of L Knee 8/15/15  Yes Felicia Leventhal, MD   Alcohol Swabs PADS 1 Container by Does not apply route three times daily. 8/25/14  Yes Martin Thibodeaux MD   Blood Glucose Monitoring Suppl (ACCU-CHEK VIK PLUS) W/DEVICE KIT 1 kit by Does not apply route 4 times daily (before meals and nightly). 1/6/14  Yes Scott Davenport   ACCU-CHEK FASTCLIX LANCETS MISC 20 Sticks by Does not apply route 2 times daily. 1/6/14  Yes Scott Riverai   Blood Pressure Monitoring (WRIST BLOOD PRESSURE MONITOR) MISC 1 Units by Does not apply route once for 1 dose 12/27/18 12/27/18  Nandini Gaitan MD       Social History:   reports that he has never smoked. He has never used smokeless tobacco. He reports that he has current or past drug history. Drugs: Marijuana and Cocaine. He reports that he does not drink alcohol. Family History:  family history includes Diabetes in his maternal aunt and mother; High Blood Pressure in his maternal grandmother and mother. Reviewed.  Denies family history of sudden cardiac death, arrhythmia, premature CAD    Review of System:    · General ROS: negative for - chills, fever   · Psychological ROS: negative for - anxiety or depression  · Ophthalmic ROS: negative for - eye pain or loss of vision  · ENT ROS: negative for - epistaxis, headaches, nasal discharge, sore throat   · Allergy and Immunology ROS: negative for - hives, nasal congestion   · Hematological and Lymphatic ROS: negative for - bleeding problems, blood clots, bruising or jaundice  · Endocrine ROS: negative for - skin changes, temperature intolerance or unexpected weight changes  · Respiratory ROS: negative for - cough, hemoptysis, pleuritic pain, SOB, sputum changes or wheezing  · Cardiovascular ROS: Per HPI. · Gastrointestinal ROS: negative for - abdominal pain, blood in stools, diarrhea, hematemesis, melena,  nausea/vomiting or swallowing difficulty/pain +obesity  · Genito-Urinary ROS: negative for - dysuria or incontinence  · Musculoskeletal ROS: negative for - joint swelling or muscle pain  · Neurological ROS: negative for - confusion, dizziness, gait disturbance, headaches, numbness/tingling, seizures, speech problems, tremors, visual changes or weakness  · Dermatological ROS: negative for - rash    Physical Examination:  Vitals:    05/15/19 1034   BP: 124/82   Pulse: 64   SpO2: 96%       · Constitutional: Oriented. No distress. · Head: Normocephalic and atraumatic. · Mouth/Throat: Oropharynx is clear and moist.   · Eyes: Conjunctivae normal. EOM are normal.   · Neck: Normal range of motion. Neck supple. No rigidity. No JVD present. · Cardiovascular: regualr rate, regular rhythm, Normal S1&S2 and intact distal pulses. · Pulmonary/Chest: Bilateral respiratory sounds. No wheezes. No rhonchi. · Abdominal: Soft. Bowel sounds present. No distension, No tenderness. · Musculoskeletal: No tenderness. No edema    · Lymphadenopathy: Has no cervical adenopathy. · Neurological: Alert and oriented. Cranial nerve appears intact, No Gross deficit   · Skin: Skin is warm and dry. No rash noted.    · Psychiatric: Has a normal mood, affect and behavior     Labs:  Reviewed. Cr 1.0 (11/12/2018)     ECG: reviewed, Sinus rhythm with v-rate of 64 bpm with QRS duration 98 ms. No pathologic Q waves, ventricular pre-excitation, or QT prolongation. Other Non-Invasive Studies:   1. Event monitor: 11/2014  NSR with rare PVC's  30 day event monitor 3/21/19-4/19/19 showed SR with PAC. 2. Echo: 1/16/14  Left ventricle size is normal. There is moderate concentric left ventricular  hypertrophy. No regional wall motion abnormalities are noted. Ejection  fraction is visually estimated to be 65%. There is diastolic dysfunction. The left atrium is mildly dilated. Mild pulmonary regurgitation. No significant aortic or mitral valvular disease; no pericardial effusion. LA Dimension: 4.2 cm     3. Stress ECHO: 4/19/16  Stress ECG: Negative for ischemia. Baseline resting echocardiogram shows normal global LV systolic function  with an ejection fraction of 50-55%. No obvious regional wall motion  abnormalitieis were present. Post exercise, images were acquired well below  target and endomyocardial visualization was difficult, even with Definity  echo contrast use. No obvious ischemia was noted, but the echo is very  insensitive for detection of ischemia. 4. Cath:   None    5. ECG  8/21/18: Sinus  Bradycardia  - frequent PAC s, # PACs = 3, Low voltage in precordial leads,  -Left axis, consider septal infarct, age indeterminate, -nonspecific inferolateral T wave abnormalities, Nonspecific T-abnormality. 3/6/19: Sinus rhythm with v-rate 73 bpm with QRS duration 102ms. The MCOT, echocardiogram, stress test, and coronary angiography/PCI were reviewed by myself and used for my plan of care. Procedures:  1.  Afib ablation 11/13/18      Patient Active Problem List    Diagnosis Date Noted    Syncope and collapse      Priority: High    S/P ablation of atrial fibrillation 11/13/2018    Persistent atrial fibrillation ([address] 11/13/2018    PAF (paroxysmal atrial fibrillation) ([facility] 08/04/2018    Precordial pain     Hx of atrial fibrillation without current medication     Morbid obesity with BMI of 50.0-59.9, adult ([facility] 01/27/2015    Hypertension     Congestive heart failure (HCC)     GERD (gastroesophageal reflux disease)     Dyspnea     Bradycardia     Non compliance w medication regimen     LEONA (obstructive sleep apnea)     Atrial fibrillation     Morbid obesity (HCC)     HTN (hypertension) 01/01/2014    Acute and chronic respiratory failure with hypercapnia (HCC) 01/01/2014    Obesity hypoventilation syndrome 01/01/2014    DM2 (diabetes mellitus, type 2) ([facility] 01/01/2014    SIRS (systemic inflammatory response syndrome) ([place] [id number] 01/01/2014        Assessment & Plan:    Atrial Fibrillation:  -Paroxysmal  -Per 2015 records, episodes occurred with cocaine use  -Asymptomatic  -s/p atrial fibrillation ablation 11/13/18.  -Today EKG confirms sinus rhythm   -30 day event monitor 3/21/19-4/19/19 showed SR with PAC. -CHADS Vasc 2-3 (HTN, DM, pt reports hx of HF (possibly diastolic) but this is not corroborated in prior notes) - on Eliquis  -LA Dimension: 4.2 cm  -Much time was spent counseling the patient on healthier lifestyle, particularly dramatically decreasing the intake of processed sugar and increasing physical activity.   -Discussed s/s to monitor for (fatigue, dyspnea) and the need for a 12 lead EKG should these symptoms develop, to evaluate whether there is recurrence of atrial fibrillation. .  LEONA  -Non-compliant with CPAP    Follow up in one year. Thank you for allowing me to participate in the care of Kemal LALI De La Cruz. All questions and concerns were addressed to the patient/family. Alternatives to my treatment were discussed. This note was scribed in the presence of Xuan Briggs MD by Ari Archer RN. The scribe's documentation has been prepared under my direction and personally reviewed by me in its entirety.  I confirm that the note above accurately reflects all work, physical examination, the discussion of treatments and procedures, and medical decision making performed by me.     Gomez Diaz MD, MS, [facility], [facility]

## 2019-05-15 ENCOUNTER — OFFICE VISIT (OUTPATIENT)
Dept: CARDIOLOGY CLINIC | Age: 49
End: 2019-05-15
Payer: MEDICAID

## 2019-05-15 VITALS
BODY MASS INDEX: 42.66 KG/M2 | HEIGHT: 72 IN | HEART RATE: 64 BPM | OXYGEN SATURATION: 96 % | DIASTOLIC BLOOD PRESSURE: 82 MMHG | WEIGHT: 315 LBS | SYSTOLIC BLOOD PRESSURE: 124 MMHG

## 2019-05-15 DIAGNOSIS — I48.0 PAROXYSMAL ATRIAL FIBRILLATION (HCC): Primary | ICD-10-CM

## 2019-05-15 DIAGNOSIS — G47.33 OSA (OBSTRUCTIVE SLEEP APNEA): ICD-10-CM

## 2019-05-15 PROCEDURE — 93000 ELECTROCARDIOGRAM COMPLETE: CPT | Performed by: INTERNAL MEDICINE

## 2019-05-15 PROCEDURE — 99214 OFFICE O/P EST MOD 30 MIN: CPT | Performed by: INTERNAL MEDICINE

## 2019-05-15 PROCEDURE — 1036F TOBACCO NON-USER: CPT | Performed by: INTERNAL MEDICINE

## 2019-05-15 PROCEDURE — G8417 CALC BMI ABV UP PARAM F/U: HCPCS | Performed by: INTERNAL MEDICINE

## 2019-05-15 PROCEDURE — G8427 DOCREV CUR MEDS BY ELIG CLIN: HCPCS | Performed by: INTERNAL MEDICINE

## 2019-05-15 NOTE — PATIENT INSTRUCTIONS
of sodium is given in the list under the title. It is given in milligrams (mg). ? Check the serving size carefully. A single serving is often very small, and you may eat more than one serving. If this is the case, you will eat more sodium than listed on the label. For example, if the serving size for a canned soup is 1 cup and the sodium amount is 470 mg, if you have 2 cups you will eat 940 mg of sodium. · The nutrition facts for fresh fruits and vegetables are not listed on the food. They may be listed somewhere in the store. These foods usually have no sodium or low sodium. · The Nutrition Facts label also gives you the Percent Daily Value for sodium. This is how much of the recommended amount of sodium a serving contains. The daily value for sodium is less than 2,300 mg. So if the Percent Daily Value says 50%, this means one serving is giving you half of this, or 1,150 mg. Buy low-sodium foods  · Look for foods that are made with less sodium. Watch for the following words on the label. ? \"Unsalted\" means there is no sodium added to the food. But there may be sodium already in the food naturally. ? \"Sodium-free\" means a serving has less than 5 mg of sodium. ? \"Very low sodium\" means a serving has 35 mg or less of sodium. ? \"Low-sodium\" means a serving has 140 mg or less of sodium. · \"Reduced-sodium\" means that there is 25% less sodium than what the food normally has. This is still usually too much sodium. Try not to buy foods with this on the label. · Buy fresh vegetables, or frozen vegetables without added sauces. Buy low-sodium versions of canned vegetables, soups, and other canned goods. Where can you learn more? Go to https://StormWindkarenCollabNet.Nomad Games. org and sign in to your DocRun account. Enter 53 [id number] in the [facility] box to learn more about \"How to Read a Food Label to Limit Sodium: Care Instructions. \"     If you do not have an account, please click on the \"Sign Up Now\" link.  Current as of: November 7, 2018  Content Version: 12.0  © 7510-9174 Healthwise, [employer]. Care instructions adapted under license by [facility] ([facility]). If you have questions about a medical condition or this instruction, always ask your healthcare professional. [address] any warranty or liability for your use of this information. Patient Education        Heart-Healthy Diet: Care Instructions  Your Care Instructions    A heart-healthy diet has lots of vegetables, fruits, nuts, beans, and whole grains, and is low in salt. It limits foods that are high in saturated fat, such as meats, cheeses, and fried foods. It may be hard to change your diet, but even small changes can lower your risk of heart attack and heart disease. Follow-up care is a key part of your treatment and safety. Be sure to make and go to all appointments, and call your doctor if you are having problems. It's also a good idea to know your test results and keep a list of the medicines you take. How can you care for yourself at home? Watch your portions  · Learn what a serving is. A \"serving\" and a \"portion\" are not always the same thing. Make sure that you are not eating larger portions than are recommended. For example, a serving of pasta is ½ cup. A serving size of meat is 2 to 3 ounces. A 3-ounce serving is about the size of a deck of cards. Measure serving sizes until you are good at Gatzke" them. Keep in mind that restaurants often serve portions that are 2 or 3 times the size of one serving. · To keep your energy level up and keep you from feeling hungry, eat often but in smaller portions. · Eat only the number of calories you need to stay at a healthy weight. If you need to lose weight, eat fewer calories than your body burns (through exercise and other physical activity). Eat more fruits and vegetables  · Eat a variety of fruit and vegetables every day.  Dark green, deep orange, red, or yellow fruits and vegetables are especially good for you. Examples include spinach, carrots, peaches, and berries. · Keep carrots, celery, and other veggies handy for snacks. Buy fruit that is in season and store it where you can see it so that you will be tempted to eat it. · Cook dishes that have a lot of veggies in them, such as stir-fries and soups. Limit saturated and trans fat  · Read food labels, and try to avoid saturated and trans fats. They increase your risk of heart disease. Trans fat is found in many processed foods such as cookies and crackers. · Use olive or canola oil when you cook. Try cholesterol-lowering spreads, such as Benecol or Take Control. · Bake, broil, grill, or steam foods instead of frying them. · Choose lean meats instead of high-fat meats such as hot dogs and sausages. Cut off all visible fat when you prepare meat. · Eat fish, skinless poultry, and meat alternatives such as soy products instead of high-fat meats. Soy products, such as tofu, may be especially good for your heart. · Choose low-fat or fat-free milk and dairy products. Eat fish  · Eat at least two servings of fish a week. Certain fish, such as salmon and tuna, contain omega-3 fatty acids, which may help reduce your risk of heart attack. Eat foods high in fiber  · Eat a variety of grain products every day. Include whole-grain foods that have lots of fiber and nutrients. Examples of whole-grain foods include oats, whole wheat bread, and brown rice. · Buy whole-grain breads and cereals, instead of white bread or pastries. Limit salt and sodium  · Limit how much salt and sodium you eat to help lower your blood pressure. · Taste food before you salt it. Add only a little salt when you think you need it. With time, your taste buds will adjust to less salt. · Eat fewer snack items, fast foods, and other high-salt, processed foods. Check food labels for the amount of sodium in packaged foods.   · Choose low-sodium versions of canned goods (such as soups, vegetables, and beans). Limit sugar  · Limit drinks and foods with added sugar. These include candy, desserts, and soda pop. Limit alcohol  · Limit alcohol to no more than 2 drinks a day for men and 1 drink a day for women. Too much alcohol can cause health problems. When should you call for help? Watch closely for changes in your health, and be sure to contact your doctor if:    · You would like help planning heart-healthy meals. Where can you learn more? Go to https://Villijpegeorgeeweb.Ventive. org and sign in to your [employer] account. Enter V137 in the [employer] box to learn more about \"Heart-Healthy Diet: Care Instructions. \"     If you do not have an account, please click on the \"Sign Up Now\" link. Current as of: July 22, 2018  Content Version: 12.0  © 9363-7067 [employer]. Care instructions adapted under license by [facility] ([facility]). If you have questions about a medical condition or this instruction, always ask your healthcare professional. [place] [address] any warranty or liability for your use of this information. Patient Education        How to Read a Food Label to Limit Sodium: Care Instructions  Your Care Instructions  Sodium causes your body to hold on to extra water. This can raise your blood pressure and force your heart and kidneys to work harder. In very serious cases, this could cause you to be put in the hospital. It might even be life-threatening. By limiting sodium, you will feel better and lower your risk of serious problems. Processed foods, fast food, and restaurant foods are the major sources of dietary sodium. The most common name for sodium is salt. Try to limit how much sodium you eat to less than 2,300 milligrams (mg) a day. If you limit your sodium to 1,500 mg a day, you can lower your blood pressure even more. This limit counts all the salt that you eat in foods you cook or in packaged foods.

## 2019-05-15 NOTE — LETTER
[facility]   Electrophysiology Consultation   Date: 5/15/2019  Reason for Consultation: Syncope & PAF  Consult Requesting Physician: Alicia Sigala MD    Chief Complaint: s/p Afib ablation      HPI: Micheline De La Cruz is a 51 y.o. with a PMH significant for obesity, LEONA non-compliant with CPAP, DM II, HTN and PAF who was recently admitted to [facility] 8/2018 s/p syncopal episode. He reported that he has been trying to loose weight and had made diet modifications resulting in diarrhea for several days prior. Regardless, he opted to go out with friends fishing when he noticed stomach cramping followed by the need to use the restroom. After having a bowel movement, he broke out into a heavy sweat, became lightheaded, and shortly thereafter \"passed out. \"  He reportedly felt fine when he \"came to. \" He has a known history of symptomatic paroxysmal afib and was back in the arrhythmia after the event. He states that he \"knows\" when he is in Afib but did not feel it that specific day. He believes that he has only been in afib 2-3 times. He denies chest pain, palpitations, orthopnea or edema. Patient was admitted to [facility] 11/13/18 and underwent paroxysmal atrial fibrillation ablation. Discharged home on flecainide 50 mg BID, Cardizem  mg daily and Eliquis 5mg BID. Interval history:  Patient returns today for f/u to discuss 30 day event monitor results after stopping flecainide s/p atrial fibrillation ablation 11/13//18. Past Medical History:   Diagnosis Date    Arthritis     Atrial fibrillation ([address]     Congestive heart failure (HCC)     Depression     Diabetes mellitus (HCC)     GERD (gastroesophageal reflux disease)     Hyperlipidemia     Hypertension     Morbid obesity ([facility]     Sleep apnea     uses cpap at home q hs     Type II or unspecified type diabetes mellitus without mention of complication, not stated as uncontrolled         No past surgical history on file.     Allergies: No Known Allergies    Medication:   Prior to Admission medications    Medication Sig Start Date End Date Taking?  Authorizing Provider   apixaban (ELIQUIS) 5 MG TABS tablet Take 1 tablet by mouth 2 times daily 4/26/19  Yes Mary Lou Gatica MD   atorvastatin (LIPITOR) 20 MG tablet TAKE 1 TABLET BY MOUTH AT BEDTIME 4/18/19  Yes Mary Lou Gatica MD   potassium chloride (KLOR-CON M20) 20 MEQ extended release tablet TAKE 1 TABLET BY MOUTH EVERY MORNING WITH BREAKFAST 4/16/19  Yes Mary Lou Gatica MD   lisinopril (PRINIVIL;ZESTRIL) 10 MG tablet Take 1 tablet by mouth daily 4/16/19  Yes Mary Lou Gatica MD   Multiple Vitamins-Minerals (THERAPEUTIC MULTIVITAMIN-MINERALS) tablet Take 1 tablet by mouth daily 4/16/19  Yes Mary Lou Gatica MD   ascorbic acid (V-R VITAMIN C) 250 MG tablet Take 1 tablet by mouth daily 4/16/19  Yes Mary Lou Gatica MD   insulin lispro (ADMELOG) 100 UNIT/ML injection vial Inject 7 Units into the skin 3 times daily (before meals) 2/15/19  Yes Luis A Patel MD   allopurinol (ZYLOPRIM) 300 MG tablet Take 1 tablet by mouth daily 1/17/19  Yes Mary Lou Gatica MD   metFORMIN (GLUCOPHAGE) 500 MG tablet Take 1 tablet by mouth 2 times daily (with meals) 1/17/19  Yes Mary Lou Gatica MD   furosemide (LASIX) 80 MG tablet Take 1 tablet by mouth 2 times daily 1/17/19  Yes Mary Lou Gatica MD   diltiazem (CARDIZEM CD) 120 MG extended release capsule Take 1 capsule by mouth daily 1/2/19  Yes Barrington Borjas MD   fluticasone [facility]) 50 MCG/ACT nasal spray 1 spray by Nasal route daily 12/27/18  Yes Mary Lou Gatica MD   sildenafil (VIAGRA) 50 MG tablet Take 1 tablet by mouth as needed for Erectile Dysfunction 12/27/18  Yes Mary Lou Gatica MD   Insulin Syringe-Needle U-100 ([employer] INSULIN SYRINGE) 31G X 5/16\" 1 ML MISC 4 each by Does not apply route 4 times daily (before meals and nightly) 7/5/18  Yes Abril Keller MD   Glucose Blood (BLOOD GLUCOSE TEST STRIPS) STRP 1 Units by In Vitro route 4 times daily (after meals and at bedtime) 12/13/17  Yes Frank Chinchilla MD   mupirocin (BACTROBAN) 2 % ointment Apply to both nares twice a day for 7 days following your 10 days of Bactrim. Then on the 1st day of the next month, resume twice a day in each nare for 7 days. Repeat for the first 7 days of each month. 11/3/17  Yes Daniela Allen MD   insulin glargine (LANTUS) 100 UNIT/ML injection vial Inject 20 Units into the skin nightly 9/6/17  Yes Carmen Antoine MD   Blood Pressure KIT 1 Units by Does not apply route daily 4/17/17  Yes Carlos Riggins MD   Blood Glucose Monitoring Suppl (BLOOD GLUCOSE MONITOR SYSTEM) W/DEVICE KIT 1 Units by Does not apply route daily 4/17/17  Yes Carlos Riggins MD   Handicap Placard MISC by Does not apply route Valid for 1 year. Mobility limitations due to severe Osteoarthritis of L Knee 8/15/15  Yes Janel Trniidad MD   Alcohol Swabs PADS 1 Container by Does not apply route three times daily. 8/25/14  Yes Adis Alves MD   Blood Glucose Monitoring Suppl (ACCU-CHEK VIK PLUS) W/DEVICE KIT 1 kit by Does not apply route 4 times daily (before meals and nightly). 1/6/14  Yes Scott ROWLAND Davenport   ACCU-CHEK FASTCLIX LANCETS MISC 20 Sticks by Does not apply route 2 times daily. 1/6/14  Yes Scott B Davenport   Blood Pressure Monitoring (WRIST BLOOD PRESSURE MONITOR) MISC 1 Units by Does not apply route once for 1 dose 12/27/18 12/27/18  Cira Palafox MD       Social History:   reports that he has never smoked. He has never used smokeless tobacco. He reports that he has current or past drug history. Drugs: Marijuana and Cocaine. He reports that he does not drink alcohol. Family History:  family history includes Diabetes in his maternal aunt and mother; High Blood Pressure in his maternal grandmother and mother. Reviewed.  Denies family history of sudden cardiac death, arrhythmia, premature CAD    Review of System:    · General ROS: negative for - chills, fever · Psychological ROS: negative for - anxiety or depression  · Ophthalmic ROS: negative for - eye pain or loss of vision  · ENT ROS: negative for - epistaxis, headaches, nasal discharge, sore throat   · Allergy and Immunology ROS: negative for - hives, nasal congestion   · Hematological and Lymphatic ROS: negative for - bleeding problems, blood clots, bruising or jaundice  · Endocrine ROS: negative for - skin changes, temperature intolerance or unexpected weight changes  · Respiratory ROS: negative for - cough, hemoptysis, pleuritic pain, SOB, sputum changes or wheezing  · Cardiovascular ROS: Per HPI. · Gastrointestinal ROS: negative for - abdominal pain, blood in stools, diarrhea, hematemesis, melena,  nausea/vomiting or swallowing difficulty/pain +obesity  · Genito-Urinary ROS: negative for - dysuria or incontinence  · Musculoskeletal ROS: negative for - joint swelling or muscle pain  · Neurological ROS: negative for - confusion, dizziness, gait disturbance, headaches, numbness/tingling, seizures, speech problems, tremors, visual changes or weakness  · Dermatological ROS: negative for - rash    Physical Examination:  Vitals:    05/15/19 1034   BP: 124/82   Pulse: 64   SpO2: 96%       · Constitutional: Oriented. No distress. · Head: Normocephalic and atraumatic. · Mouth/Throat: Oropharynx is clear and moist.   · Eyes: Conjunctivae normal. EOM are normal.   · Neck: Normal range of motion. Neck supple. No rigidity. No JVD present. · Cardiovascular: regualr rate, regular rhythm, Normal S1&S2 and intact distal pulses. · Pulmonary/Chest: Bilateral respiratory sounds. No wheezes. No rhonchi. · Abdominal: Soft. Bowel sounds present. No distension, No tenderness. · Musculoskeletal: No tenderness. No edema    · Lymphadenopathy: Has no cervical adenopathy. · Neurological: Alert and oriented. Cranial nerve appears intact, No Gross deficit   · Skin: Skin is warm and dry. No rash noted.  Persistent atrial fibrillation ([facility] 11/13/2018    PAF (paroxysmal atrial fibrillation) ([facility]) 08/04/2018    Precordial pain     Hx of atrial fibrillation without current medication     Morbid obesity with BMI of 50.0-59.9, adult ([facility] 01/27/2015    Hypertension     Congestive heart failure (HCC)     GERD (gastroesophageal reflux disease)     Dyspnea     Bradycardia     Non compliance w medication regimen     LEONA (obstructive sleep apnea)     Atrial fibrillation     Morbid obesity ([facility])     HTN (hypertension) 01/01/2014    Acute and chronic respiratory failure with hypercapnia ([facility]) 01/01/2014    Obesity hypoventilation syndrome 01/01/2014    DM2 (diabetes mellitus, type 2) ([facility] 01/01/2014    SIRS (systemic inflammatory response syndrome) ([place] [id number].) 01/01/2014        Assessment & Plan:    Atrial Fibrillation:  -Paroxysmal  -Per 2015 records, episodes occurred with cocaine use  -Asymptomatic  -s/p atrial fibrillation ablation 11/13/18.  -Today EKG confirms sinus rhythm   -30 day event monitor 3/21/19-4/19/19 showed SR with PAC. -CHADS Vasc 2-3 (HTN, DM, pt reports hx of HF (possibly diastolic) but this is not corroborated in prior notes) - on Eliquis  -LA Dimension: 4.2 cm  -Much time was spent counseling the patient on healthier lifestyle, particularly dramatically decreasing the intake of processed sugar and increasing physical activity.   -Discussed s/s to monitor for (fatigue, dyspnea) and the need for a 12 lead EKG should these symptoms develop, to evaluate whether there is recurrence of atrial fibrillation. .  LEONA  -Non-compliant with CPAP    Follow up in one year. Thank you for allowing me to participate in the care of Kemal LALI De La Cruz. All questions and concerns were addressed to the patient/family. Alternatives to my treatment were discussed. This note was scribed in the presence of Anu Pedraza MD by Melissa Echeverria RN. The scribe's documentation has been prepared under my direction and personally reviewed by me in its entirety. I confirm that the note above accurately reflects all work, physical examination, the discussion of treatments and procedures, and medical decision making performed by me.     Jp Lucia MD, MS, [address] [facility]

## 2019-05-29 RX ORDER — FUROSEMIDE 80 MG
80 TABLET ORAL 2 TIMES DAILY
Qty: 60 TABLET | Refills: 2 | OUTPATIENT
Start: 2019-05-29 | End: 2019-07-19 | Stop reason: SDUPTHER

## 2019-05-29 RX ORDER — ALLOPURINOL 300 MG/1
300 TABLET ORAL DAILY
Qty: 30 TABLET | Refills: 2 | OUTPATIENT
Start: 2019-05-29 | End: 2019-07-19 | Stop reason: SDUPTHER

## 2019-05-30 ENCOUNTER — HOSPITAL ENCOUNTER (EMERGENCY)
Age: 49
Discharge: HOME | End: 2019-05-30
Attending: EMERGENCY MEDICINE
Payer: MEDICAID

## 2019-05-30 ENCOUNTER — OFFICE VISIT (OUTPATIENT)
Dept: INTERNAL MEDICINE CLINIC | Age: 49
End: 2019-05-30
Payer: MEDICAID

## 2019-05-30 VITALS
SYSTOLIC BLOOD PRESSURE: 119 MMHG | HEIGHT: 72 IN | HEART RATE: 54 BPM | RESPIRATION RATE: 12 BRPM | WEIGHT: 315 LBS | TEMPERATURE: 98.2 F | BODY MASS INDEX: 42.66 KG/M2 | DIASTOLIC BLOOD PRESSURE: 68 MMHG | OXYGEN SATURATION: 94 %

## 2019-05-30 VITALS
SYSTOLIC BLOOD PRESSURE: 118 MMHG | TEMPERATURE: 98.4 F | HEIGHT: 72 IN | RESPIRATION RATE: 20 BRPM | HEART RATE: 68 BPM | OXYGEN SATURATION: 98 % | DIASTOLIC BLOOD PRESSURE: 78 MMHG | WEIGHT: 315 LBS | BODY MASS INDEX: 42.66 KG/M2

## 2019-05-30 DIAGNOSIS — L02.31 ABSCESS, GLUTEAL, RIGHT: Primary | ICD-10-CM

## 2019-05-30 DIAGNOSIS — L02.91 ABSCESS: Primary | ICD-10-CM

## 2019-05-30 LAB
GLUCOSE BLD-MCNC: 138 MG/DL (ref 70–99)
PERFORMED ON: ABNORMAL

## 2019-05-30 PROCEDURE — 99213 OFFICE O/P EST LOW 20 MIN: CPT

## 2019-05-30 PROCEDURE — 99283 EMERGENCY DEPT VISIT LOW MDM: CPT

## 2019-05-30 PROCEDURE — 4500000023 HC ED LEVEL 3 PROCEDURE

## 2019-05-30 PROCEDURE — 2500000003 HC RX 250 WO HCPCS: Performed by: PHYSICIAN ASSISTANT

## 2019-05-30 PROCEDURE — 6370000000 HC RX 637 (ALT 250 FOR IP): Performed by: PHYSICIAN ASSISTANT

## 2019-05-30 RX ORDER — OXYCODONE HYDROCHLORIDE AND ACETAMINOPHEN 5; 325 MG/1; MG/1
1 TABLET ORAL ONCE
Status: COMPLETED | OUTPATIENT
Start: 2019-05-30 | End: 2019-05-30

## 2019-05-30 RX ORDER — LIDOCAINE HYDROCHLORIDE AND EPINEPHRINE 10; 10 MG/ML; UG/ML
20 INJECTION, SOLUTION INFILTRATION; PERINEURAL ONCE
Status: COMPLETED | OUTPATIENT
Start: 2019-05-30 | End: 2019-05-30

## 2019-05-30 RX ORDER — OXYCODONE HYDROCHLORIDE AND ACETAMINOPHEN 5; 325 MG/1; MG/1
1 TABLET ORAL EVERY 6 HOURS PRN
Qty: 12 TABLET | Refills: 0 | Status: SHIPPED | OUTPATIENT
Start: 2019-05-30 | End: 2019-06-02

## 2019-05-30 RX ORDER — DOXYCYCLINE 50 MG/1
100 CAPSULE ORAL ONCE
Status: COMPLETED | OUTPATIENT
Start: 2019-05-30 | End: 2019-05-30

## 2019-05-30 RX ORDER — DOXYCYCLINE 100 MG/1
100 TABLET ORAL 2 TIMES DAILY
Qty: 14 TABLET | Refills: 0 | Status: SHIPPED | OUTPATIENT
Start: 2019-05-30 | End: 2019-06-06

## 2019-05-30 RX ADMIN — DOXYCYCLINE 100 MG: 50 CAPSULE ORAL at 22:23

## 2019-05-30 RX ADMIN — OXYCODONE HYDROCHLORIDE AND ACETAMINOPHEN 1 TABLET: 5; 325 TABLET ORAL at 20:07

## 2019-05-30 RX ADMIN — OXYCODONE HYDROCHLORIDE AND ACETAMINOPHEN 1 TABLET: 5; 325 TABLET ORAL at 22:03

## 2019-05-30 RX ADMIN — LIDOCAINE HYDROCHLORIDE,EPINEPHRINE BITARTRATE 5 ML: 10; .01 INJECTION, SOLUTION INFILTRATION; PERINEURAL at 22:29

## 2019-05-30 ASSESSMENT — PAIN SCALES - GENERAL
PAINLEVEL_OUTOF10: 8
PAINLEVEL_OUTOF10: 8
PAINLEVEL_OUTOF10: 10
PAINLEVEL_OUTOF10: 10

## 2019-05-30 ASSESSMENT — PAIN DESCRIPTION - DESCRIPTORS: DESCRIPTORS: CONSTANT;PRESSURE

## 2019-05-30 ASSESSMENT — ENCOUNTER SYMPTOMS
NAUSEA: 0
COLOR CHANGE: 0
VOMITING: 0
ABDOMINAL PAIN: 0
SORE THROAT: 0
DIARRHEA: 0
SHORTNESS OF BREATH: 0
BACK PAIN: 0

## 2019-05-30 ASSESSMENT — PAIN DESCRIPTION - LOCATION: LOCATION: BUTTOCKS

## 2019-05-30 ASSESSMENT — PAIN DESCRIPTION - FREQUENCY: FREQUENCY: CONTINUOUS

## 2019-05-30 NOTE — PATIENT INSTRUCTIONS
Before any of you medication is completely gone, call your pharmacy AT LEAST 1 WEEK ahead for refills. Review all information regarding your medication before starting. If you become ill when the clinic is closed, please call the [facility] [employer]  at   #[phone] and ask the  to page the AOD between 6:00 AM and 6:00 PM or page the AON between 6:00 PM and 6:00 am    The clinic is not able to process MY CHART requests for appointments or messages including requests. Please call the [address] [place] [address] at [phone]  For appointments and messages. Call your pharmacy for medication refills.        Go to ER now    Continue medication as listed on discharge sheet    Instructions reviewed before discharge and copy given to patient    [address] [phone]

## 2019-05-30 NOTE — PROGRESS NOTES
1st day of the next month, resume twice a day in each nare for 7 days. Repeat for the first 7 days of each month. 11/3/17   Renu Farooq MD   insulin glargine (LANTUS) 100 UNIT/ML injection vial Inject 20 Units into the skin nightly 9/6/17   Keli Greenberg MD   Blood Pressure KIT 1 Units by Does not apply route daily 4/17/17   Tejal Heath MD   Blood Glucose Monitoring Suppl (BLOOD GLUCOSE MONITOR SYSTEM) W/DEVICE KIT 1 Units by Does not apply route daily 4/17/17   Tejal Heath MD   Handicap Placard MISC by Does not apply route Valid for 1 year. Mobility limitations due to severe Osteoarthritis of L Knee 8/15/15   Mini Waters MD   Alcohol Swabs PADS 1 Container by Does not apply route three times daily. 8/25/14   Karl George MD   Blood Glucose Monitoring Suppl (ACCU-CHEK VIK PLUS) W/DEVICE KIT 1 kit by Does not apply route 4 times daily (before meals and nightly). 1/6/14   Scott Davenport   ACCU-CHEK FASTCLIX LANCETS MISC 20 Sticks by Does not apply route 2 times daily. 1/6/14   Siddharth Davies       Review of Systems   Constitutional: Negative. Skin:        Gluteal sore    negative except as above    Physical Exam:      Vitals: There were no vitals taken for this visit. There is no height or weight on file to calculate BMI. Wt Readings from Last 3 Encounters:   05/15/19 (!) 393 lb (178.3 kg)   04/16/19 (!) 385 lb 12.8 oz (175 kg)   03/21/19 (!) 377 lb 12.8 oz (171.4 kg)       Physical Exam   Constitutional: He is oriented to person, place, and time. He appears well-developed and well-nourished. No distress. HENT:   Head: Normocephalic and atraumatic. Cardiovascular: Normal rate and regular rhythm. Exam reveals no gallop and no friction rub. No murmur heard. Pulmonary/Chest: Effort normal. No respiratory distress. He has no wheezes. He has no rales. Abdominal: Soft. He exhibits no distension. There is no tenderness. There is no guarding.    Musculoskeletal: He exhibits no edema or deformity. Neurological: He is alert and oriented to person, place, and time. No loss of sensation at toes of feet   Skin: Skin is warm and dry. He is not diaphoretic. No erythema. No pallor. Area of induration, evolving fluctuance in mid right gluteal cleft, roughly 3 in x 3 in, tender to palpation   Psychiatric: He has a normal mood and affect. His behavior is normal. Thought content normal.       LABS:    Lipid:  Lab Results   Component Value Date    CHOL 103 06/09/2016    HDL 33 (L) 06/09/2016    LDLCALC 48 06/09/2016    TRIG 109 06/09/2016       Assessment/Plan:   Non-draining furuncle, evolving abscess - 3 in by 3 inch area of fluctuance in right mid gluteal cleft area. Not perianal. May benefit from CT soft tissue.   - Needs examination by general surgery in ED. Call given    Wilmar Olivares MD  Internal Medicine Resident  5/30/2019, 1:44 PM     Addendum to Resident H& P/Progress note:  I have personally seen,examined and evaluated the patient.  I have reviewed the current history, physical findings, labs and assessment and plan and agree with note as documented by resident MD ( Eladia Hendricks)      Bryan Geller MD, [address]

## 2019-05-30 NOTE — ED PROVIDER NOTES
ED Attending Attestation Note     Date of evaluation: 5/30/2019    This patient was seen by the advance practice provider. I have seen and examined the patient, agree with the workup, evaluation, management and diagnosis. The care plan has been discussed. My assessment reveals a gentleman who presents with abscess on his buttock. He does have a discrete area of fluctuance noted on the right buttock with minimal overlying erythema.      Glo Evans MD  05/30/19 9812

## 2019-05-31 NOTE — ED NOTES
Dc inst and scripts given to pt and verb understanding.   Oscar'd ambulatory to private auto with significant other     Verneta Lombard, RN  05/30/19 9950

## 2019-05-31 NOTE — ED PROVIDER NOTES
[facility]          PHYSICIAN ASSISTANT NOTE       Date of evaluation: 5/30/2019    Chief Complaint     Abscess (Pt noted abscess on right glut. Pt notes pain to touch and sit. )      History of Present Illness     Justin De La Cruz is a 51 y.o. male who presents with complaint of an abscess to the right buttock. Patient has had multiple abscesses in similar locations and as needed I and D's in the past.  He states that he notices a couple of days ago and finally went to the clinic today to be evaluated and was told that he would need to come here for evaluation. Patient describes a deep, pressure type pain pain that is worse when he applies pressure. Denies alleviating factors. Rates his pain as an 8 out of 10. Denies radiation of pain. He has not taken any medication for the pain. He states he does use a special kind of soap but cannot prevent these from occurring. Denies fevers or chills. He is a diabetic. Review of Systems     Review of Systems   Constitutional: Negative for chills and fever. HENT: Negative for sore throat. Respiratory: Negative for shortness of breath. Cardiovascular: Negative for chest pain. Gastrointestinal: Negative for abdominal pain, diarrhea, nausea and vomiting. Genitourinary: Negative for difficulty urinating. Musculoskeletal: Positive for myalgias (right buttock). Negative for back pain. Skin: Negative for color change, rash and wound. Neurological: Negative for weakness and numbness. Psychiatric/Behavioral: Negative for confusion. All other systems reviewed and are negative.       Past Medical, Surgical, Family, and Social History     He has a past medical history of Arthritis, Atrial fibrillation ([address] Congestive heart failure ([address] Depression, Diabetes mellitus ([address] GERD (gastroesophageal reflux disease), Hyperlipidemia, Hypertension, Morbid obesity ([address] Sleep apnea, and Type II or unspecified type diabetes mellitus without mention of complication, not stated as uncontrolled. He has no past surgical history on file. His family history includes Diabetes in his maternal aunt and mother; High Blood Pressure in his maternal grandmother and mother. He reports that he has never smoked. He has never used smokeless tobacco. He reports that he has current or past drug history. Drugs: Marijuana and Cocaine. He reports that he does not drink alcohol. Medications     Previous Medications    ACCU-CHEK FASTCLIX LANCETS MISC    20 Sticks by Does not apply route 2 times daily. ALCOHOL SWABS PADS    1 Container by Does not apply route three times daily. ALLOPURINOL (ZYLOPRIM) 300 MG TABLET    Take 1 tablet by mouth daily    APIXABAN (ELIQUIS) 5 MG TABS TABLET    Take 1 tablet by mouth 2 times daily    ASCORBIC ACID (V-R VITAMIN C) 250 MG TABLET    Take 1 tablet by mouth daily    ATORVASTATIN (LIPITOR) 20 MG TABLET    TAKE 1 TABLET BY MOUTH AT BEDTIME    BLOOD GLUCOSE MONITORING SUPPL (ACCU-CHEK VIK PLUS) W/DEVICE KIT    1 kit by Does not apply route 4 times daily (before meals and nightly). BLOOD GLUCOSE MONITORING SUPPL (BLOOD GLUCOSE MONITOR SYSTEM) W/DEVICE KIT    1 Units by Does not apply route daily    BLOOD PRESSURE KIT    1 Units by Does not apply route daily    BLOOD PRESSURE MONITORING (WRIST BLOOD PRESSURE MONITOR) MISC    1 Units by Does not apply route once for 1 dose    DILTIAZEM (CARDIZEM CD) 120 MG EXTENDED RELEASE CAPSULE    Take 1 capsule by mouth daily    FLUTICASONE (FLONASE) 50 MCG/ACT NASAL SPRAY    1 spray by Nasal route daily    FUROSEMIDE (LASIX) 80 MG TABLET    Take 1 tablet by mouth 2 times daily    GLUCOSE BLOOD (BLOOD GLUCOSE TEST STRIPS) STRP    1 Units by In Vitro route 4 times daily (after meals and at bedtime)    HANDICAP PLACARD MISC    by Does not apply route Valid for 1 year.  Mobility limitations due to severe Osteoarthritis of L Knee    INSULIN GLARGINE (LANTUS) 100 UNIT/ML INJECTION VIAL    Inject 20 Units into the skin nightly    INSULIN LISPRO (ADMELOG) 100 UNIT/ML INJECTION VIAL    Inject 7 Units into the skin 3 times daily (before meals)    INSULIN SYRINGE-NEEDLE U-100 ([employer] INSULIN SYRINGE) 31G X 5/16\" 1 ML MISC    4 each by Does not apply route 4 times daily (before meals and nightly)    LISINOPRIL (PRINIVIL;ZESTRIL) 10 MG TABLET    Take 1 tablet by mouth daily    METFORMIN (GLUCOPHAGE) 500 MG TABLET    Take 1 tablet by mouth 2 times daily (with meals)    MULTIPLE VITAMINS-MINERALS (THERAPEUTIC MULTIVITAMIN-MINERALS) TABLET    Take 1 tablet by mouth daily    MUPIROCIN (BACTROBAN) 2 % OINTMENT    Apply to both nares twice a day for 7 days following your 10 days of Bactrim. Then on the 1st day of the next month, resume twice a day in each nare for 7 days. Repeat for the first 7 days of each month. POTASSIUM CHLORIDE (KLOR-CON M20) 20 MEQ EXTENDED RELEASE TABLET    TAKE 1 TABLET BY MOUTH EVERY MORNING WITH BREAKFAST    SILDENAFIL (VIAGRA) 50 MG TABLET    Take 1 tablet by mouth as needed for Erectile Dysfunction       Allergies     He has No Known Allergies. Physical Exam     INITIAL VITALS: BP: 119/68, Temp: 98.2 °F (36.8 °C), Pulse: 54, Resp: 12, SpO2: 94 %  Physical Exam   Constitutional: He is oriented to person, place, and time. He appears well-developed and well-nourished. No distress. HENT:   Head: Normocephalic and atraumatic. Eyes: Conjunctivae are normal.   Neck: Normal range of motion. Cardiovascular: Normal rate, regular rhythm and normal heart sounds. Pulmonary/Chest: Effort normal and breath sounds normal. No respiratory distress. Abdominal: Soft. There is no tenderness. Musculoskeletal: Normal range of motion. He exhibits no edema or tenderness. Neurological: He is alert and oriented to person, place, and time. Skin: Skin is warm and dry. He is not diaphoretic. No erythema. Psychiatric: He has a normal mood and affect.  His behavior is normal. Judgment and thought content normal.   Nursing note and vitals reviewed. Diagnostic Results       RADIOLOGY:  No orders to display       LABS:   No results found for this visit on 05/30/19. ED BEDSIDE ULTRASOUND:      RECENT VITALS:  BP: 119/68, Temp: 98.2 °F (36.8 °C), Pulse: 54, Resp: 12, SpO2: 94 %     Procedures     Incision and Drainage Procedure Note  Indication:  Abscess to right buttock    Procedure: After verbal informed consent was obtained, the patient was positioned appropriately and the skin over the incision site was prepped with betadine prior to the procedure. Approximately 3 cc's of 1% lidocaine with epinephrine was used to locally anesthetize the area. A #11 blade was used to make a 1cm incision in the area of greatest fluctuance. A hemostat was used to explore the cavity and break up any loculations. Ample purulent fluid was expressed. The drainage cavity was then packed 1/4inch gauze. A sterile dressing was placed. The attending physician was present for the key portions of the procedure. The patient tolerated the procedure well. Complications: None    . ED Course     Nursing Notes, Past Medical Hx,Past Surgical Hx, Social Hx, Allergies, and Family Hx were reviewed. The patient was given the following medications:  Orders Placed This Encounter   Medications    lidocaine-EPINEPHrine 1 percent-[id number] injection 20 mL    oxyCODONE-acetaminophen (PERCOCET) 5-325 MG per tablet 1 tablet    oxyCODONE-acetaminophen (PERCOCET) 5-325 MG per tablet 1 tablet    doxycycline monohydrate (MONODOX) capsule 100 mg    doxycycline monohydrate (ADOXA) 100 MG tablet     Sig: Take 1 tablet by mouth 2 times daily for 7 days     Dispense:  14 tablet     Refill:  0    oxyCODONE-acetaminophen (PERCOCET) 5-325 MG per tablet     Sig: Take 1 tablet by mouth every 6 hours as needed for Pain for up to 3 days. WARNING:  May cause drowsiness. May impair ability to operate vehicles or machinery. Do not use in combination with alcohol. Dispense:  12 tablet     Refill:  0       CONSULTS:  None    MEDICAL DECISION MAKING / ASSESSMENT / PLAN     Kenyon De La Cruz is a 51 y.o. male was sent to the emergency department with complaint of an abscess. He has had multiple abscesses and has needed I&D's in the past and is also taking care of some of them at home. Patient denies systemic symptoms. I&D was performed, please see procedure note above. Patient tolerated procedure well. Patient is on Eliquis with the bleeding was controlled. Patient had a fingerstick glucose in the emergency department which was 138. Patient states he has been on Bactrim in the past and it has failed. He also states he's been on Bactrim and another medication, I feel that is likely Keflex, and it has not helped. For this reason I will place the patient on doxycycline. He was given a short course of Percocet for his acute pain. Patient will be advised to follow up with his primary care provider for reevaluation. If patient having new or worsening symptoms he may return to the emergency room. This patient was also evaluated by the attending physician. All care plans were discussed and agreed upon. Clinical Impression     1. Abscess        Disposition     PATIENT REFERRED TO:  Your primary care provider            DISCHARGE MEDICATIONS:  New Prescriptions    DOXYCYCLINE MONOHYDRATE (ADOXA) 100 MG TABLET    Take 1 tablet by mouth 2 times daily for 7 days    OXYCODONE-ACETAMINOPHEN (PERCOCET) 5-325 MG PER TABLET    Take 1 tablet by mouth every 6 hours as needed for Pain for up to 3 days. WARNING:  May cause drowsiness. May impair ability to operate vehicles or machinery. Do not use in combination with alcohol.        DISPOSITION Decision To Discharge 05/30/2019 08:55:57 PM        [place], [place]  05/30/19 [id number]

## 2019-06-19 ENCOUNTER — TELEPHONE (OUTPATIENT)
Dept: INTERNAL MEDICINE CLINIC | Age: 49
End: 2019-06-19

## 2019-06-20 ENCOUNTER — APPOINTMENT (OUTPATIENT)
Dept: CT IMAGING | Age: 49
End: 2019-06-20
Payer: MEDICAID

## 2019-06-20 ENCOUNTER — HOSPITAL ENCOUNTER (EMERGENCY)
Age: 49
Discharge: HOME | End: 2019-06-20
Attending: EMERGENCY MEDICINE
Payer: MEDICAID

## 2019-06-20 ENCOUNTER — OFFICE VISIT (OUTPATIENT)
Dept: INTERNAL MEDICINE CLINIC | Age: 49
End: 2019-06-20
Payer: MEDICAID

## 2019-06-20 VITALS
SYSTOLIC BLOOD PRESSURE: 129 MMHG | TEMPERATURE: 98.2 F | DIASTOLIC BLOOD PRESSURE: 63 MMHG | RESPIRATION RATE: 22 BRPM | HEART RATE: 66 BPM | OXYGEN SATURATION: 96 %

## 2019-06-20 VITALS
RESPIRATION RATE: 16 BRPM | SYSTOLIC BLOOD PRESSURE: 137 MMHG | BODY MASS INDEX: 42.66 KG/M2 | HEIGHT: 72 IN | DIASTOLIC BLOOD PRESSURE: 88 MMHG | HEART RATE: 65 BPM | WEIGHT: 315 LBS | TEMPERATURE: 98.9 F | OXYGEN SATURATION: 94 %

## 2019-06-20 DIAGNOSIS — L02.91 ABSCESS: Primary | ICD-10-CM

## 2019-06-20 LAB
ANION GAP SERPL CALCULATED.3IONS-SCNC: 12 MMOL/L (ref 3–16)
BANDED NEUTROPHILS RELATIVE PERCENT: 1 % (ref 0–7)
BASOPHILS ABSOLUTE: 0 K/UL (ref 0–0.2)
BASOPHILS RELATIVE PERCENT: 0 %
BUN BLDV-MCNC: 14 MG/DL (ref 7–20)
CALCIUM SERPL-MCNC: 9.3 MG/DL (ref 8.3–10.6)
CHLORIDE BLD-SCNC: 102 MMOL/L (ref 99–110)
CO2: 29 MMOL/L (ref 21–32)
CREAT SERPL-MCNC: 0.9 MG/DL (ref 0.9–1.3)
EOSINOPHILS ABSOLUTE: 0 K/UL (ref 0–0.6)
EOSINOPHILS RELATIVE PERCENT: 0 %
GFR AFRICAN AMERICAN: >60
GFR NON-AFRICAN AMERICAN: >60
GLUCOSE BLD-MCNC: 126 MG/DL (ref 70–99)
GLUCOSE BLD-MCNC: 130 MG/DL (ref 70–99)
HCT VFR BLD CALC: 43.8 % (ref 40.5–52.5)
HEMOGLOBIN: 14.5 G/DL (ref 13.5–17.5)
LYMPHOCYTES ABSOLUTE: 3.5 K/UL (ref 1–5.1)
LYMPHOCYTES RELATIVE PERCENT: 47 %
MCH RBC QN AUTO: 31.1 PG (ref 26–34)
MCHC RBC AUTO-ENTMCNC: 33.1 G/DL (ref 31–36)
MCV RBC AUTO: 93.9 FL (ref 80–100)
MONOCYTES ABSOLUTE: 0.2 K/UL (ref 0–1.3)
MONOCYTES RELATIVE PERCENT: 3 %
NEUTROPHILS ABSOLUTE: 3.8 K/UL (ref 1.7–7.7)
NEUTROPHILS RELATIVE PERCENT: 49 %
PDW BLD-RTO: 13.5 % (ref 12.4–15.4)
PERFORMED ON: ABNORMAL
PLATELET # BLD: 216 K/UL (ref 135–450)
PMV BLD AUTO: 9.1 FL (ref 5–10.5)
POTASSIUM REFLEX MAGNESIUM: 3.9 MMOL/L (ref 3.5–5.1)
RBC # BLD: 4.67 M/UL (ref 4.2–5.9)
SODIUM BLD-SCNC: 143 MMOL/L (ref 136–145)
WBC # BLD: 7.5 K/UL (ref 4–11)

## 2019-06-20 PROCEDURE — 85025 COMPLETE CBC W/AUTO DIFF WBC: CPT

## 2019-06-20 PROCEDURE — 99284 EMERGENCY DEPT VISIT MOD MDM: CPT

## 2019-06-20 PROCEDURE — 74177 CT ABD & PELVIS W/CONTRAST: CPT

## 2019-06-20 PROCEDURE — 80048 BASIC METABOLIC PNL TOTAL CA: CPT

## 2019-06-20 PROCEDURE — 4500000024 HC ED LEVEL 4 PROCEDURE

## 2019-06-20 PROCEDURE — 6370000000 HC RX 637 (ALT 250 FOR IP): Performed by: PHYSICIAN ASSISTANT

## 2019-06-20 PROCEDURE — 99213 OFFICE O/P EST LOW 20 MIN: CPT | Performed by: STUDENT IN AN ORGANIZED HEALTH CARE EDUCATION/TRAINING PROGRAM

## 2019-06-20 PROCEDURE — 2500000003 HC RX 250 WO HCPCS: Performed by: PHYSICIAN ASSISTANT

## 2019-06-20 PROCEDURE — 6360000004 HC RX CONTRAST MEDICATION: Performed by: EMERGENCY MEDICINE

## 2019-06-20 RX ORDER — OXYCODONE HYDROCHLORIDE AND ACETAMINOPHEN 5; 325 MG/1; MG/1
2 TABLET ORAL ONCE
Status: COMPLETED | OUTPATIENT
Start: 2019-06-20 | End: 2019-06-20

## 2019-06-20 RX ORDER — SULFAMETHOXAZOLE AND TRIMETHOPRIM 800; 160 MG/1; MG/1
2 TABLET ORAL 2 TIMES DAILY
Qty: 28 TABLET | Refills: 0 | Status: SHIPPED | OUTPATIENT
Start: 2019-06-20 | End: 2019-06-27

## 2019-06-20 RX ORDER — LIDOCAINE HYDROCHLORIDE AND EPINEPHRINE 10; 10 MG/ML; UG/ML
5 INJECTION, SOLUTION INFILTRATION; PERINEURAL ONCE
Status: COMPLETED | OUTPATIENT
Start: 2019-06-20 | End: 2019-06-20

## 2019-06-20 RX ORDER — CEPHALEXIN 500 MG/1
500 CAPSULE ORAL 4 TIMES DAILY
Qty: 28 CAPSULE | Refills: 0 | Status: SHIPPED | OUTPATIENT
Start: 2019-06-20 | End: 2019-06-27

## 2019-06-20 RX ADMIN — OXYCODONE HYDROCHLORIDE AND ACETAMINOPHEN 2 TABLET: 5; 325 TABLET ORAL at 21:31

## 2019-06-20 RX ADMIN — IOPAMIDOL 80 ML: 755 INJECTION, SOLUTION INTRAVENOUS at 20:26

## 2019-06-20 RX ADMIN — LIDOCAINE HYDROCHLORIDE,EPINEPHRINE BITARTRATE 5 ML: 10; .01 INJECTION, SOLUTION INFILTRATION; PERINEURAL at 21:31

## 2019-06-20 ASSESSMENT — PAIN SCALES - GENERAL
PAINLEVEL_OUTOF10: 10
PAINLEVEL_OUTOF10: 10

## 2019-06-20 NOTE — ED NOTES
Pt resting in bed pt states has abscess on rt buttock and rates pain 10 /10.       Yonatan Quijano RN  06/20/19 0902

## 2019-06-20 NOTE — PROGRESS NOTES
Patient states he had light meal today. blood sugar taken now in clinic is 126.  here for recurrent abscess right gluteus. pain 9/10. can't sit. has been using warm compresses without relief.  states area is not draining.

## 2019-06-20 NOTE — ED PROVIDER NOTES
ED Attending Attestation Note     Date of evaluation: 6/20/2019    This patient was seen by the advance practice provider. I have seen and examined the patient, agree with the workup, evaluation, management and diagnosis. The care plan has been discussed. My assessment reveals a 51year old male with T2DM, HTN, OHS, HF, PAF on Eliquis, history of recurrent abscesses s/p I&D of right buttocks abscess treated with doxycycline who re-presents with a buttocks abscess. He has a history of recurrent abscesses. On my evaluation he is well appearing and nontoxic with a 3cm area of tenderness and induration to his right buttocks. There is no obvious deep tracking on CT scan of his abdomen and pelvis. I was present for key portions of the abscess I&D performed by the [facility]. We will treat him with Bactrim and Keflex and have advised ID follow-up given recurrent abscesses.          Jennifer Guevara MD  06/20/19 [id number]

## 2019-06-20 NOTE — PROGRESS NOTES
Glucose Monitoring Suppl (BLOOD GLUCOSE MONITOR SYSTEM) W/DEVICE KIT 1 Units by Does not apply route daily 4/17/17  Yes Corie Hart MD   Handicap Placard MISC by Does not apply route Valid for 1 year. Mobility limitations due to severe Osteoarthritis of L Knee 8/15/15  Yes Charissa Vital MD   Alcohol Swabs PADS 1 Container by Does not apply route three times daily. 8/25/14  Yes Doretha Antonio MD   Blood Glucose Monitoring Suppl (ACCU-CHEK VIK PLUS) W/DEVICE KIT 1 kit by Does not apply route 4 times daily (before meals and nightly). 1/6/14  Yes Scott B Davenport   ACCU-CHEK FASTCLIX LANCETS MISC 20 Sticks by Does not apply route 2 times daily. 1/6/14  Yes Scott B Davenport   Blood Pressure Monitoring (WRIST BLOOD PRESSURE MONITOR) MISC 1 Units by Does not apply route once for 1 dose 12/27/18 12/27/18  Aldo Garcia MD       Review of Systems   Constitutional: Negative. Skin:        Gluteal sore    negative except as above    Physical Exam:      Vitals: /88 (Site: Left Upper Arm, Position: Standing, Cuff Size: Large Adult)   Pulse 65   Temp 98.9 °F (37.2 °C) (Oral)   Resp 16   Ht 6' (1.829 m)   Wt (!) 390 lb (176.9 kg)   SpO2 94%   BMI 52.89 kg/m²     Body mass index is 52.89 kg/m². Wt Readings from Last 3 Encounters:   06/20/19 (!) 390 lb (176.9 kg)   05/30/19 (!) 381 lb (172.8 kg)   05/30/19 (!) 391 lb 12.8 oz (177.7 kg)       Physical Exam   Constitutional: He is oriented to person, place, and time. He appears well-developed and well-nourished. No distress. HENT:   Head: Normocephalic and atraumatic. Cardiovascular: Normal rate and regular rhythm. Exam reveals no gallop and no friction rub. No murmur heard. Pulmonary/Chest: Effort normal. No respiratory distress. He has no wheezes. He has no rales. Abdominal: Soft. He exhibits no distension. There is no tenderness. There is no guarding. Musculoskeletal: He exhibits no edema or deformity.    Neurological: He is alert and oriented to person, place, and time. No loss of sensation at toes of feet   Skin: Skin is warm and dry. He is not diaphoretic. No erythema. No pallor. Area of induration, evolving fluctuance in mid right gluteal cleft, roughly 3 in x 3 in, tender to palpation   Psychiatric: He has a normal mood and affect. His behavior is normal. Thought content normal.       LABS:    Lipid:  Lab Results   Component Value Date    CHOL 103 06/09/2016    HDL 33 (L) 06/09/2016    LDLCALC 48 06/09/2016    TRIG 109 06/09/2016       Assessment/Plan:   Non-draining furuncle, evolving abscess - 3 in by 3 inch area of induration in right mid gluteal cleft area. Not perianal. May benefit from CT soft tissue.   - Needs examination by general surgery in ED. Call given    Kathy Vinson MD  Internal Medicine Resident  6/20/2019, 3:21 PM       Addendum to Resident H& P/Progress note:  I have personally seen,examined and evaluated the patient.  I have reviewed the current history, physical findings, labs and assessment and plan and agree with note as documented by resident MD ( Karolina Farah)      Eda Christensen MD, [address]

## 2019-06-20 NOTE — ED PROVIDER NOTES
[address] [facility] [address] ENCOUNTER          PHYSICIAN ASSISTANT NOTE       Date of evaluation: 6/20/2019    Chief Complaint     Abscess (pt sent here from clinic)      History of Present Illness     Misael De La Cruz is a 51 y.o. male with past medical history of obesity, hypertension, diabetes who presents with recurrent abscess to right gluteal region. Patient states he was seen on 5/30 and had this drained at this time. Patient was placed on doxycycline and took this until completion. Patient saw his PCP prior to presentation, had a fingerstick glucose in the 120s there, and was encouraged to present to the ED for further work-up and possible imaging. Patient states that his abscess has been worsening over the past couple of days and causing him significant pain. Patient does see pain management and takes Percocet already for pain. Patient states that abscess has not been draining on its own. Patient has been using Hibiclens and applying warm compresses to area. Denies systemic symptoms including fevers, chills, nausea, vomiting. Review of Systems     Review of Systems   See HPI, all others negative    Past Medical, Surgical, Family, and Social History     He has a past medical history of Arthritis, Atrial fibrillation ([address] Congestive heart failure ([address] Depression, Diabetes mellitus ([address] GERD (gastroesophageal reflux disease), Hyperlipidemia, Hypertension, Morbid obesity ([address] Sleep apnea, and Type II or unspecified type diabetes mellitus without mention of complication, not stated as uncontrolled. He has no past surgical history on file. His family history includes Diabetes in his maternal aunt and mother; High Blood Pressure in his maternal grandmother and mother. He reports that he has never smoked. He has never used smokeless tobacco. He reports that he has current or past drug history. Drugs: Marijuana and Cocaine. He reports that he does not drink alcohol.     Medications Discharge Medication List as of 6/20/2019 10:51 PM      CONTINUE these medications which have NOT CHANGED    Details   furosemide (LASIX) 80 MG tablet Take 1 tablet by mouth 2 times daily, Disp-60 tablet, R-2Phone In      allopurinol (ZYLOPRIM) 300 MG tablet Take 1 tablet by mouth daily, Disp-30 tablet, R-2Phone In      apixaban (ELIQUIS) 5 MG TABS tablet Take 1 tablet by mouth 2 times daily, Disp-60 tablet, R-2Phone In      atorvastatin (LIPITOR) 20 MG tablet TAKE 1 TABLET BY MOUTH AT BEDTIME, Disp-30 tablet, R-2NO PRINT      potassium chloride (KLOR-CON M20) 20 MEQ extended release tablet TAKE 1 TABLET BY MOUTH EVERY MORNING WITH BREAKFAST, Disp-30 tablet, R-3Print      lisinopril (PRINIVIL;ZESTRIL) 10 MG tablet Take 1 tablet by mouth daily, Disp-30 tablet, R-3Print      Multiple Vitamins-Minerals (THERAPEUTIC MULTIVITAMIN-MINERALS) tablet Take 1 tablet by mouth daily, Disp-30 tablet, R-3Print      ascorbic acid (V-R VITAMIN C) 250 MG tablet Take 1 tablet by mouth daily, Disp-30 tablet, R-3Print      insulin lispro (ADMELOG) 100 UNIT/ML injection vial Inject 7 Units into the skin 3 times daily (before meals), Disp-1 vial, R-0NO PRINT      metFORMIN (GLUCOPHAGE) 500 MG tablet Take 1 tablet by mouth 2 times daily (with meals), Disp-60 tablet, R-2Print      diltiazem (CARDIZEM CD) 120 MG extended release capsule Take 1 capsule by mouth daily, Disp-90 capsule, R-2Normal      fluticasone (FLONASE) 50 MCG/ACT nasal spray 1 spray by Nasal route daily, Disp-1 Bottle, R-3Print      sildenafil (VIAGRA) 50 MG tablet Take 1 tablet by mouth as needed for Erectile Dysfunction, Disp-30 tablet, R-3Print      Blood Pressure Monitoring (WRIST BLOOD PRESSURE MONITOR) MISC ONCE Starting Thu 12/27/2018, 1 dose, Disp-1 each, R-0, Print      Insulin Syringe-Needle U-100 ([employer] INSULIN SYRINGE) 31G X 5/16\" 1 ML MISC 4 TIMES DAILY BEFORE MEALS & NIGHTLY Starting Thu 7/5/2018, Disp-100 each, R-5, Print      Glucose Blood (BLOOD GLUCOSE TEST STRIPS) STRP 1 Units by In Vitro route 4 times daily (after meals and at bedtime), Disp-120 strip, R-5Okay to substitute with any test strips that will work with patient's device and are covered by insuranceNO PRINT      insulin glargine (LANTUS) 100 UNIT/ML injection vial Inject 20 Units into the skin nightly, Disp-1 vial, R-0NO PRINT      Blood Pressure KIT DAILY Starting 4/17/2017, Until Discontinued, Disp-1 kit, R-0, Print      !! Blood Glucose Monitoring Suppl (BLOOD GLUCOSE MONITOR SYSTEM) W/DEVICE KIT DAILY Starting 4/17/2017, Until Discontinued, Disp-1 kit, R-0, Print      Handicap Placard MISC Starting 8/15/2015, Until Discontinued, Disp-1 each, R-0, PrintValid for 1 year. Mobility limitations due to severe Osteoarthritis of L Knee      Alcohol Swabs PADS 3 TIMES DAILY Starting 8/25/2014, Until Discontinued, Disp-100 each, R-5, Print      !! Blood Glucose Monitoring Suppl (ACCU-CHEK VIK PLUS) W/DEVICE KIT 4 TIMES DAILY BEFORE MEALS & NIGHTLY Starting 1/6/2014, Until Discontinued, Disp-2 kit, R-0, Print      ACCU-CHEK FASTCLIX LANCETS MISC 2 TIMES DAILY Starting 1/6/2014, Until Discontinued, Disp-20 each, R-3, Print       !! - Potential duplicate medications found. Please discuss with provider. Allergies     He has No Known Allergies. Physical Exam     INITIAL VITALS: BP: 129/63, Temp: 98.2 °F (36.8 °C), Pulse: 66, Resp: 22, SpO2: 96 %  Physical Exam   Constitutional: He is oriented to person, place, and time. He appears well-developed. Obese -American male. HENT:   Head: Normocephalic and atraumatic. Eyes: Conjunctivae are normal.   Neck: Normal range of motion. Cardiovascular: Normal rate and intact distal pulses. Pulmonary/Chest: Effort normal. No respiratory distress. Abdominal: Soft. There is no tenderness. There is no guarding. Musculoskeletal: Normal range of motion. Neurological: He is alert and oriented to person, place, and time.    Skin:   3 x 3 inch area of induration noted to right gluteal region with small area of central fluctuance. No spontaneous drainage noted. Tender to palpation over entire area. No extension into gluteal cleft. Diagnostic Results     RADIOLOGY:  CT ABDOMEN PELVIS W IV CONTRAST Additional Contrast? None   Final Result      Small subcutaneous mass overlying the left buttock with differential diagnosis including sebaceous cyst or small abscess/phlegmon.       No fistula          LABS:   Results for orders placed or performed during the hospital encounter of 06/20/19   CBC Auto Differential   Result Value Ref Range    WBC 7.5 4.0 - 11.0 K/uL    RBC 4.67 4.20 - 5.90 M/uL    Hemoglobin 14.5 13.5 - 17.5 g/dL    Hematocrit 43.8 40.5 - 52.5 %    MCV 93.9 80.0 - 100.0 fL    MCH 31.1 26.0 - 34.0 pg    MCHC 33.1 31.0 - 36.0 g/dL    RDW 13.5 12.4 - 15.4 %    Platelets 878 635 - 398 K/uL    MPV 9.1 5.0 - 10.5 fL    Neutrophils % 49.0 %    Lymphocytes % 47.0 %    Monocytes % 3.0 %    Eosinophils % 0.0 %    Basophils % 0.0 %    Neutrophils # 3.8 1.7 - 7.7 K/uL    Lymphocytes # 3.5 1.0 - 5.1 K/uL    Monocytes # 0.2 0.0 - 1.3 K/uL    Eosinophils # 0.0 0.0 - 0.6 K/uL    Basophils # 0.0 0.0 - 0.2 K/uL    Bands Relative 1 0 - 7 %   Basic Metabolic Panel w/ Reflex to MG   Result Value Ref Range    Sodium 143 136 - 145 mmol/L    Potassium reflex Magnesium 3.9 3.5 - 5.1 mmol/L    Chloride 102 99 - 110 mmol/L    CO2 29 21 - 32 mmol/L    Anion Gap 12 3 - 16    Glucose 130 (H) 70 - 99 mg/dL    BUN 14 7 - 20 mg/dL    CREATININE 0.9 0.9 - 1.3 mg/dL    GFR Non-African American >60 >60    GFR African American >60 >60    Calcium 9.3 8.3 - 10.6 mg/dL       RECENT VITALS:  BP: 129/63, Temp: 98.2 °F (36.8 °C), Pulse: 66, Resp: 22, SpO2: 96 %     Procedures     Incision/Drainage  Date/Time: 6/21/2019 1:15 AM  Performed by: Trang Eagle PA-C  Authorized by: Michael Smart MD     Consent:     Consent obtained:  Verbal    Consent given by:  Patient Risks discussed:  Bleeding, incomplete drainage and pain    Alternatives discussed:  No treatment and observation  Location:     Type:  Abscess    Size:  3x3in    Location:  Anogenital    Anogenital location: R gluteus. Pre-procedure details:     Skin preparation:  Betadine  Anesthesia (see MAR for exact dosages): Anesthesia method:  Local infiltration    Local anesthetic:  Lidocaine 1% WITH epi  Procedure type:     Complexity:  Complex  Procedure details:     Incision types:  Single straight    Incision depth:  Subcutaneous    Scalpel blade:  11    Wound management:  Probed and deloculated and irrigated with saline    Drainage:  Bloody and purulent    Drainage amount: Moderate    Wound treatment:  Wound left open    Packing materials:  1/4 in iodoform gauze  Post-procedure details:     Patient tolerance of procedure: Tolerated well, no immediate complications        ED Course     Nursing Notes, Past Medical Hx,Past Surgical Hx, Social Hx, Allergies, and Family Hx were reviewed. The patient was given the following medications:  Orders Placed This Encounter   Medications    iopamidol (ISOVUE-370) 76 % injection 80 mL    lidocaine-EPINEPHrine 1 percent-1:221903 injection 5 mL    oxyCODONE-acetaminophen (PERCOCET) 5-325 MG per tablet 2 tablet    sulfamethoxazole-trimethoprim (BACTRIM DS) 800-160 MG per tablet     Sig: Take 2 tablets by mouth 2 times daily for 7 days     Dispense:  28 tablet     Refill:  0    cephALEXin (KEFLEX) 500 MG capsule     Sig: Take 1 capsule by mouth 4 times daily for 7 days     Dispense:  28 capsule     Refill:  0       CONSULTS:  None    MEDICAL DECISION MAKING / ASSESSMENT / Gómez Alfonso De La Cruz is a 51 y.o. male presenting with recurrent right gluteal abscess. CBC showed no significant leukocytosis. BMP results within normal limits and blood sugar was in the 130s. CT abdomen pelvis with IV contrast ordered to assess for extension of abscess with possible fistula.   CT did not show any evidence of deeper space abscess or tracking fistula. Incision and drainage was performed as described in detail above. Patient will be discharged home with Bactrim and Keflex. Patient was encouraged to follow-up with primary care provider. Encouraged to leave packing in place for 2 days and remove on her own. Instructed on applying warm compresses and rinsing area with warm soapy water starting tomorrow. Patient was discharged home in stable condition with normal vitals and given strict return precautions regarding systemic symptoms including fevers, chills, nausea, vomiting, or worsening pain. This patient was also evaluated by the attending physician. All care plans were discussed and agreed upon. Clinical Impression     1. Abscess        Disposition     PATIENT REFERRED TO:  No follow-up provider specified.     DISCHARGE MEDICATIONS:  Discharge Medication List as of 6/20/2019 10:51 PM      START taking these medications    Details   sulfamethoxazole-trimethoprim (BACTRIM DS) 800-160 MG per tablet Take 2 tablets by mouth 2 times daily for 7 days, Disp-28 tablet, R-0Print      cephALEXin (KEFLEX) 500 MG capsule Take 1 capsule by mouth 4 times daily for 7 days, Disp-28 capsule, R-0Print             DISPOSITION Decision To Discharge 06/20/2019 10:24:40 PM        Cecelia Carlos PA-C  06/21/19 0117

## 2019-06-21 NOTE — ED NOTES
Pt discharged to home. IV removed, tip intact and pressure applied. Pt given discharge instructions and verbalized understanding. Pt ambulatory at discharge and left without incident.       Lyric Flower RN  06/20/19 [id number]

## 2019-06-23 ENCOUNTER — HOSPITAL ENCOUNTER (EMERGENCY)
Age: 49
Discharge: HOME | End: 2019-06-23
Attending: EMERGENCY MEDICINE
Payer: MEDICAID

## 2019-06-23 VITALS
BODY MASS INDEX: 42.66 KG/M2 | SYSTOLIC BLOOD PRESSURE: 121 MMHG | OXYGEN SATURATION: 95 % | DIASTOLIC BLOOD PRESSURE: 55 MMHG | HEIGHT: 72 IN | RESPIRATION RATE: 25 BRPM | TEMPERATURE: 97.8 F | WEIGHT: 315 LBS | HEART RATE: 72 BPM

## 2019-06-23 DIAGNOSIS — R55 VASOVAGAL NEAR SYNCOPE: Primary | ICD-10-CM

## 2019-06-23 LAB
ANION GAP SERPL CALCULATED.3IONS-SCNC: 15 MMOL/L (ref 3–16)
BASOPHILS ABSOLUTE: 0 K/UL (ref 0–0.2)
BASOPHILS RELATIVE PERCENT: 0.4 %
BUN BLDV-MCNC: 15 MG/DL (ref 7–20)
CALCIUM SERPL-MCNC: 9.5 MG/DL (ref 8.3–10.6)
CHLORIDE BLD-SCNC: 99 MMOL/L (ref 99–110)
CO2: 28 MMOL/L (ref 21–32)
CREAT SERPL-MCNC: 1.3 MG/DL (ref 0.9–1.3)
EOSINOPHILS ABSOLUTE: 0 K/UL (ref 0–0.6)
EOSINOPHILS RELATIVE PERCENT: 0.3 %
GFR AFRICAN AMERICAN: >60
GFR NON-AFRICAN AMERICAN: 58
GLUCOSE BLD-MCNC: 158 MG/DL (ref 70–99)
HCT VFR BLD CALC: 45.9 % (ref 40.5–52.5)
HEMOGLOBIN: 14.9 G/DL (ref 13.5–17.5)
LYMPHOCYTES ABSOLUTE: 0.9 K/UL (ref 1–5.1)
LYMPHOCYTES RELATIVE PERCENT: 12.4 %
MCH RBC QN AUTO: 30.3 PG (ref 26–34)
MCHC RBC AUTO-ENTMCNC: 32.4 G/DL (ref 31–36)
MCV RBC AUTO: 93.6 FL (ref 80–100)
MONOCYTES ABSOLUTE: 0.3 K/UL (ref 0–1.3)
MONOCYTES RELATIVE PERCENT: 4.2 %
NEUTROPHILS ABSOLUTE: 6 K/UL (ref 1.7–7.7)
NEUTROPHILS RELATIVE PERCENT: 82.7 %
PDW BLD-RTO: 13.7 % (ref 12.4–15.4)
PLATELET # BLD: 201 K/UL (ref 135–450)
PMV BLD AUTO: 8.8 FL (ref 5–10.5)
POTASSIUM SERPL-SCNC: 3.6 MMOL/L (ref 3.5–5.1)
RBC # BLD: 4.91 M/UL (ref 4.2–5.9)
SODIUM BLD-SCNC: 142 MMOL/L (ref 136–145)
TROPONIN: <0.01 NG/ML
WBC # BLD: 7.2 K/UL (ref 4–11)

## 2019-06-23 PROCEDURE — 93005 ELECTROCARDIOGRAM TRACING: CPT | Performed by: EMERGENCY MEDICINE

## 2019-06-23 PROCEDURE — 99284 EMERGENCY DEPT VISIT MOD MDM: CPT

## 2019-06-23 PROCEDURE — 84484 ASSAY OF TROPONIN QUANT: CPT

## 2019-06-23 PROCEDURE — 80048 BASIC METABOLIC PNL TOTAL CA: CPT

## 2019-06-23 PROCEDURE — 85025 COMPLETE CBC W/AUTO DIFF WBC: CPT

## 2019-06-23 PROCEDURE — 36415 COLL VENOUS BLD VENIPUNCTURE: CPT

## 2019-06-23 ASSESSMENT — ENCOUNTER SYMPTOMS
CHEST TIGHTNESS: 0
ABDOMINAL PAIN: 0
BLOOD IN STOOL: 0
NAUSEA: 0
VOMITING: 0
ANAL BLEEDING: 0
SHORTNESS OF BREATH: 0

## 2019-06-24 LAB
EKG ATRIAL RATE: 62 BPM
EKG DIAGNOSIS: NORMAL
EKG P AXIS: 16 DEGREES
EKG P-R INTERVAL: 154 MS
EKG Q-T INTERVAL: 440 MS
EKG QRS DURATION: 88 MS
EKG QTC CALCULATION (BAZETT): 446 MS
EKG R AXIS: -33 DEGREES
EKG T AXIS: 1 DEGREES
EKG VENTRICULAR RATE: 62 BPM

## 2019-06-24 NOTE — ED PROVIDER NOTES
[address] ENCOUNTER          ATTENDING PHYSICIAN NOTE       Date of evaluation: 6/23/2019    Chief Complaint     Loss of Consciousness (near syncopal episode)      History of Present Illness     Alma De La Cruz is a 51 y.o. male who presents with a near syncopal event after having a bowel movement. Patient has had this before. He is on antibiotic right now for an abscess to his buttock that is status post I&D. He has noted an upset stomach and diarrhea for the last few days. After the patient had a bowel movement, he stood up to wash his hands and felt dizzy, described as lightheaded. He became diaphoretic. Patient was helped onto a couch. He became hot and sweaty - did not have any syncopal event, head trauma, or fall. Denies any chest pain, chest pressure, shortness of breath. He has a history of A. fib status post ablation, did not note any palpitations. He has not had a vasovagal event after having a bowel movement in over 6 months. Review of Systems     Review of Systems   Constitutional: Positive for diaphoresis. Negative for activity change, appetite change, chills and fever. Diaphoresis, now resolved    Respiratory: Negative for chest tightness and shortness of breath. Cardiovascular: Negative for chest pain. Gastrointestinal: Negative for abdominal pain, anal bleeding, blood in stool, nausea and vomiting. Genitourinary: Negative. Musculoskeletal: Negative. Skin: Negative. Neurological: Positive for dizziness and light-headedness. Negative for syncope.        Past Medical, Surgical, Family, and Social History     He has a past medical history of Arthritis, Atrial fibrillation ([address] Congestive heart failure ([address] Depression, Diabetes mellitus ([address] GERD (gastroesophageal reflux disease), Hyperlipidemia, Hypertension, Morbid obesity ([address] Sleep apnea, and Type II or unspecified type diabetes mellitus without mention of complication, not stated as uncontrolled. He has no past surgical history on file. His family history includes Diabetes in his maternal aunt and mother; High Blood Pressure in his maternal grandmother and mother. He reports that he has never smoked. He has never used smokeless tobacco. He reports that he has current or past drug history. Drugs: Marijuana and Cocaine. He reports that he does not drink alcohol.     Medications     Discharge Medication List as of 6/23/2019  9:34 PM      CONTINUE these medications which have NOT CHANGED    Details   sulfamethoxazole-trimethoprim (BACTRIM DS) 800-160 MG per tablet Take 2 tablets by mouth 2 times daily for 7 days, Disp-28 tablet, R-0Print      cephALEXin (KEFLEX) 500 MG capsule Take 1 capsule by mouth 4 times daily for 7 days, Disp-28 capsule, R-0Print      furosemide (LASIX) 80 MG tablet Take 1 tablet by mouth 2 times daily, Disp-60 tablet, R-2Phone In      allopurinol (ZYLOPRIM) 300 MG tablet Take 1 tablet by mouth daily, Disp-30 tablet, R-2Phone In      apixaban (ELIQUIS) 5 MG TABS tablet Take 1 tablet by mouth 2 times daily, Disp-60 tablet, R-2Phone In      atorvastatin (LIPITOR) 20 MG tablet TAKE 1 TABLET BY MOUTH AT BEDTIME, Disp-30 tablet, R-2NO PRINT      potassium chloride (KLOR-CON M20) 20 MEQ extended release tablet TAKE 1 TABLET BY MOUTH EVERY MORNING WITH BREAKFAST, Disp-30 tablet, R-3Print      lisinopril (PRINIVIL;ZESTRIL) 10 MG tablet Take 1 tablet by mouth daily, Disp-30 tablet, R-3Print      Multiple Vitamins-Minerals (THERAPEUTIC MULTIVITAMIN-MINERALS) tablet Take 1 tablet by mouth daily, Disp-30 tablet, R-3Print      ascorbic acid (V-R VITAMIN C) 250 MG tablet Take 1 tablet by mouth daily, Disp-30 tablet, R-3Print      insulin lispro (ADMELOG) 100 UNIT/ML injection vial Inject 7 Units into the skin 3 times daily (before meals), Disp-1 vial, R-0NO PRINT      metFORMIN (GLUCOPHAGE) 500 MG tablet Take 1 tablet by mouth 2 times daily (with meals), Disp-60 tablet, R-2Print diltiazem (CARDIZEM CD) 120 MG extended release capsule Take 1 capsule by mouth daily, Disp-90 capsule, R-2Normal      fluticasone (FLONASE) 50 MCG/ACT nasal spray 1 spray by Nasal route daily, Disp-1 Bottle, R-3Print      sildenafil (VIAGRA) 50 MG tablet Take 1 tablet by mouth as needed for Erectile Dysfunction, Disp-30 tablet, R-3Print      Blood Pressure Monitoring (WRIST BLOOD PRESSURE MONITOR) MISC ONCE Starting u 12/27/2018, 1 dose, Disp-1 each, R-0, Print      Insulin Syringe-Needle U-100 ([employer] INSULIN SYRINGE) 31G X 5/16\" 1 ML MISC 4 TIMES DAILY BEFORE MEALS & NIGHTLY Starting u 7/5/2018, Disp-100 each, R-5, Print      Glucose Blood (BLOOD GLUCOSE TEST STRIPS) STRP 1 Units by In Vitro route 4 times daily (after meals and at bedtime), Disp-120 strip, R-5Okay to substitute with any test strips that will work with patient's device and are covered by insuranceNO PRINT      insulin glargine (LANTUS) 100 UNIT/ML injection vial Inject 20 Units into the skin nightly, Disp-1 vial, R-0NO PRINT      Blood Pressure KIT DAILY Starting 4/17/2017, Until Discontinued, Disp-1 kit, R-0, Print      !! Blood Glucose Monitoring Suppl (BLOOD GLUCOSE MONITOR SYSTEM) W/DEVICE KIT DAILY Starting 4/17/2017, Until Discontinued, Disp-1 kit, R-0, Print      Handicap Placard MISC Starting 8/15/2015, Until Discontinued, Disp-1 each, R-0, PrintValid for 1 year. Mobility limitations due to severe Osteoarthritis of L Knee      Alcohol Swabs PADS 3 TIMES DAILY Starting 8/25/2014, Until Discontinued, Disp-100 each, R-5, Print      !! Blood Glucose Monitoring Suppl (ACCU-CHEK VIK PLUS) W/DEVICE KIT 4 TIMES DAILY BEFORE MEALS & NIGHTLY Starting 1/6/2014, Until Discontinued, Disp-2 kit, R-0, Print      ACCU-CHEK FASTCLIX LANCETS MISC 2 TIMES DAILY Starting 1/6/2014, Until Discontinued, Disp-20 each, R-3, Print       !! - Potential duplicate medications found. Please discuss with provider.           Allergies     He has No Known Allergies. Physical Exam     INITIAL VITALS: BP: 131/69, Temp: 97.8 °F (36.6 °C), Pulse: 62, Resp: 16, SpO2: 97 %    Physical Exam   Constitutional: He is oriented to person, place, and time. He appears well-developed and well-nourished. Morbidly obese male, NAD   HENT:   Head: Normocephalic. Mouth/Throat: Oropharynx is clear and moist.   Cardiovascular: Normal rate, regular rhythm, normal heart sounds and intact distal pulses. Exam reveals no friction rub. No murmur heard. Pulmonary/Chest: Effort normal. No stridor. No respiratory distress. He has no wheezes. He has no rales. Abdominal: He exhibits no distension and no mass. There is no tenderness. There is no guarding. Musculoskeletal: Normal range of motion. Neurological: He is alert and oriented to person, place, and time. Skin: Skin is warm and dry. Vitals reviewed.       Diagnostic Results     EKG at 1955  EKG Interpretation    Interpreted by me    Rhythm: normal sinus with sinus arrhythmia  Rate: normal, 62  Axis: LAD  Ectopy: none  Conduction: normal  ST Segments: no acute change  T Waves: no acute change  Q Waves: III, V1, V2 (unchanged from prior)    Clinical Impression: no acute ischemia or infarction, unchanged from prior       RADIOLOGY:  No orders to display       LABS:   Results for orders placed or performed during the hospital encounter of 06/23/19   CBC auto differential   Result Value Ref Range    WBC 7.2 4.0 - 11.0 K/uL    RBC 4.91 4.20 - 5.90 M/uL    Hemoglobin 14.9 13.5 - 17.5 g/dL    Hematocrit 45.9 40.5 - 52.5 %    MCV 93.6 80.0 - 100.0 fL    MCH 30.3 26.0 - 34.0 pg    MCHC 32.4 31.0 - 36.0 g/dL    RDW 13.7 12.4 - 15.4 %    Platelets 940 170 - 148 K/uL    MPV 8.8 5.0 - 10.5 fL    Neutrophils % 82.7 %    Lymphocytes % 12.4 %    Monocytes % 4.2 %    Eosinophils % 0.3 %    Basophils % 0.4 %    Neutrophils # 6.0 1.7 - 7.7 K/uL    Lymphocytes # 0.9 (L) 1.0 - 5.1 K/uL    Monocytes # 0.3 0.0 - 1.3 K/uL    Eosinophils # 0.0 0.0 - 0.6 K/uL    Basophils # 0.0 0.0 - 0.2 K/uL   Basic Metabolic Panel   Result Value Ref Range    Sodium 142 136 - 145 mmol/L    Potassium 3.6 3.5 - 5.1 mmol/L    Chloride 99 99 - 110 mmol/L    CO2 28 21 - 32 mmol/L    Anion Gap 15 3 - 16    Glucose 158 (H) 70 - 99 mg/dL    BUN 15 7 - 20 mg/dL    CREATININE 1.3 0.9 - 1.3 mg/dL    GFR Non-African American 58 (A) >60    GFR African American >60 >60    Calcium 9.5 8.3 - 10.6 mg/dL   Troponin (lab)   Result Value Ref Range    Troponin <0.01 <0.01 ng/mL         RECENT VITALS:  BP: (!) 121/55,Temp: 97.8 °F (36.6 °C), Pulse: 72, Resp: 25, SpO2: 95 %     Procedures     none    ED Course     Nursing Notes, Past Medical Hx, Past Surgical Hx, Social Hx,Allergies, and Family Hx were reviewed. The patient was given the following medications:  No orders of the defined types were placed in this encounter. CONSULTS:  None    MEDICAL DECISIONMAKING / ASSESSMENT / PLAN     Miguel De La Cruz is a 51 y.o. male who presents with a vasovagal episode after having a bowel movement. Patient has had this before. He has a history of A. fib status post ablation. Patient had a bowel movement this evening, when he stood up to wash his hands he felt dizzy, described as lightheaded and diaphoretic. Patient did not have any syncope or falls. Patient has had some loose stools recently 2/2 antibiotics for an abscess, he is otherwise tolerating p.o. He denied any chest pain, chest pressure, shortness of breath. When EMS arrived patient's blood pressure was in the 70s. When he arrived in the ED his blood pressure normalized to 120s/60s. EKG here did not show A. fib, EKG showed normal sinus rhythm, unchanged from prior. Labs and electrolytes here were all normal.  Patient has a primary care doctor and a cardiologist whom he follows with. Patient can clearly delineate the etiology of his vasovagal episode. I do not believe this is related to ACS or the other acute cardiopulmonary pathology. At this time I believe patient is stable for discharge with close outpatient follow-up. Clinical Impression     1.  Vasovagal near syncope        Disposition     PATIENT REFERRED TO:  Katheryn Sandoval MD  [facility]  [phone]    Schedule an appointment as soon as possible for a visit in 1 week        DISCHARGE MEDICATIONS:  Discharge Medication List as of 6/23/2019  9:34 PM          DISPOSITION Decision To Discharge 06/23/2019 09:32:25 PM       Dougie Figueroa MD  06/23/19 [id number]

## 2019-06-24 NOTE — ED TRIAGE NOTES
Pt arrived to ED with vagal episode while having BM. States he did not pass out but felt BP dropping and told fiance to call 911. He was SOB at time of episode with some left arm numbness/tingling. Denies those symptoms now. Per EMS initial BP 70/40s, current /69. States he recently started keflex and bactrim for recurrent cyst on buttocks on Friday.

## 2019-07-15 DIAGNOSIS — I48.0 PAROXYSMAL A-FIB (HCC): ICD-10-CM

## 2019-07-15 RX ORDER — ATORVASTATIN CALCIUM 20 MG/1
TABLET, FILM COATED ORAL
Qty: 90 TABLET | Refills: 0 | Status: SHIPPED | OUTPATIENT
Start: 2019-07-15 | End: 2019-10-01 | Stop reason: SDUPTHER

## 2019-07-18 RX ORDER — FLUTICASONE PROPIONATE 50 MCG
SPRAY, SUSPENSION (ML) NASAL
Qty: 1 BOTTLE | Refills: 0 | Status: SHIPPED | OUTPATIENT
Start: 2019-07-18 | End: 2019-10-01 | Stop reason: SDUPTHER

## 2019-07-19 RX ORDER — ALLOPURINOL 300 MG/1
TABLET ORAL
Qty: 30 TABLET | Refills: 0 | Status: SHIPPED | OUTPATIENT
Start: 2019-07-19 | End: 2019-08-14 | Stop reason: SDUPTHER

## 2019-07-19 RX ORDER — MULTIVITAMIN WITH FOLIC ACID 400 MCG
TABLET ORAL
Qty: 30 TABLET | Refills: 3 | Status: SHIPPED | OUTPATIENT
Start: 2019-07-19 | End: 2019-11-13 | Stop reason: SDUPTHER

## 2019-07-19 RX ORDER — FUROSEMIDE 80 MG
TABLET ORAL
Qty: 60 TABLET | Refills: 0 | Status: SHIPPED | OUTPATIENT
Start: 2019-07-19 | End: 2019-08-14 | Stop reason: SDUPTHER

## 2019-07-23 DIAGNOSIS — I48.0 PAROXYSMAL A-FIB (HCC): ICD-10-CM

## 2019-07-23 RX ORDER — APIXABAN 5 MG/1
TABLET, FILM COATED ORAL
Qty: 60 TABLET | Refills: 0 | Status: SHIPPED | OUTPATIENT
Start: 2019-07-23 | End: 2019-08-22 | Stop reason: SDUPTHER

## 2019-08-01 ENCOUNTER — TELEPHONE (OUTPATIENT)
Dept: INTERNAL MEDICINE CLINIC | Age: 49
End: 2019-08-01

## 2019-08-02 ENCOUNTER — OFFICE VISIT (OUTPATIENT)
Dept: INTERNAL MEDICINE CLINIC | Age: 49
End: 2019-08-02
Payer: MEDICAID

## 2019-08-02 VITALS
OXYGEN SATURATION: 95 % | DIASTOLIC BLOOD PRESSURE: 70 MMHG | TEMPERATURE: 98.2 F | RESPIRATION RATE: 20 BRPM | SYSTOLIC BLOOD PRESSURE: 115 MMHG | WEIGHT: 315 LBS | HEART RATE: 60 BPM | BODY MASS INDEX: 42.66 KG/M2 | HEIGHT: 72 IN

## 2019-08-02 DIAGNOSIS — L02.31 ABSCESS OF GLUTEAL REGION: ICD-10-CM

## 2019-08-02 DIAGNOSIS — M19.90 ARTHRITIS: ICD-10-CM

## 2019-08-02 PROCEDURE — 99213 OFFICE O/P EST LOW 20 MIN: CPT | Performed by: STUDENT IN AN ORGANIZED HEALTH CARE EDUCATION/TRAINING PROGRAM

## 2019-08-02 RX ORDER — DOXYCYCLINE HYCLATE 100 MG
100 TABLET ORAL 2 TIMES DAILY
Qty: 60 TABLET | Refills: 1 | Status: SHIPPED | OUTPATIENT
Start: 2019-08-02 | End: 2019-09-01

## 2019-08-02 RX ORDER — SACCHAROMYCES BOULARDII 250 MG
250 CAPSULE ORAL 2 TIMES DAILY
Qty: 14 CAPSULE | Refills: 0 | Status: SHIPPED | OUTPATIENT
Start: 2019-08-02 | End: 2019-08-09

## 2019-08-08 DIAGNOSIS — E87.6 POTASSIUM DEFICIENCY: ICD-10-CM

## 2019-08-08 DIAGNOSIS — J06.9 ACUTE URI OF MULTIPLE SITES: ICD-10-CM

## 2019-08-08 DIAGNOSIS — J01.20 ACUTE ETHMOIDAL SINUSITIS, RECURRENCE NOT SPECIFIED: ICD-10-CM

## 2019-08-08 DIAGNOSIS — J03.90 TONSILLITIS WITH EXUDATE: ICD-10-CM

## 2019-08-08 DIAGNOSIS — I10 ESSENTIAL HYPERTENSION: Chronic | ICD-10-CM

## 2019-08-08 RX ORDER — FLECAINIDE ACETATE 50 MG/1
50 TABLET ORAL EVERY 12 HOURS SCHEDULED
Qty: 60 TABLET | Refills: 5 | Status: SHIPPED | OUTPATIENT
Start: 2019-08-08 | End: 2019-11-27 | Stop reason: SINTOL

## 2019-08-08 RX ORDER — POTASSIUM CHLORIDE 20 MEQ/1
TABLET, EXTENDED RELEASE ORAL
Qty: 30 TABLET | Refills: 1 | Status: SHIPPED | OUTPATIENT
Start: 2019-08-08 | End: 2019-10-01 | Stop reason: SDUPTHER

## 2019-08-08 RX ORDER — LISINOPRIL 10 MG/1
TABLET ORAL
Qty: 30 TABLET | Refills: 1 | Status: SHIPPED | OUTPATIENT
Start: 2019-08-08 | End: 2019-10-01 | Stop reason: SDUPTHER

## 2019-08-09 RX ORDER — ASCORBIC ACID 250 MG
TABLET ORAL
Qty: 30 TABLET | Refills: 3 | Status: SHIPPED | OUTPATIENT
Start: 2019-08-09 | End: 2021-02-22

## 2019-08-16 RX ORDER — FUROSEMIDE 80 MG
TABLET ORAL
Qty: 60 TABLET | Refills: 1 | Status: SHIPPED | OUTPATIENT
Start: 2019-08-16 | End: 2019-10-01 | Stop reason: SDUPTHER

## 2019-08-16 RX ORDER — ALLOPURINOL 300 MG/1
TABLET ORAL
Qty: 30 TABLET | Refills: 2 | Status: SHIPPED | OUTPATIENT
Start: 2019-08-16 | End: 2019-10-01 | Stop reason: SDUPTHER

## 2019-08-16 RX ORDER — FLUTICASONE PROPIONATE 50 MCG
1 SPRAY, SUSPENSION (ML) NASAL DAILY
Qty: 1 BOTTLE | Refills: 2 | Status: SHIPPED | OUTPATIENT
Start: 2019-08-16 | End: 2019-10-01 | Stop reason: SDUPTHER

## 2019-08-16 NOTE — TELEPHONE ENCOUNTER
refill Fluticasone 50mcg spray last fill 7-18-19  last OV 8-2-19 with Dr. Amy Reyes  next appt 9-26-19

## 2019-08-22 ENCOUNTER — TELEPHONE (OUTPATIENT)
Dept: INTERNAL MEDICINE CLINIC | Age: 49
End: 2019-08-22

## 2019-08-22 DIAGNOSIS — I48.0 PAROXYSMAL A-FIB (HCC): ICD-10-CM

## 2019-08-22 NOTE — TELEPHONE ENCOUNTER
Wants zoe to call him. Wants some dates for for social security. I explained to him that for stuff related to medical records I could give him the number for medical records and he refused.

## 2019-09-27 ENCOUNTER — APPOINTMENT (OUTPATIENT)
Dept: GENERAL RADIOLOGY | Age: 49
End: 2019-09-27
Payer: MEDICAID

## 2019-09-27 ENCOUNTER — HOSPITAL ENCOUNTER (EMERGENCY)
Age: 49
Discharge: HOME | End: 2019-09-27
Payer: MEDICAID

## 2019-09-27 VITALS
WEIGHT: 315 LBS | HEART RATE: 57 BPM | DIASTOLIC BLOOD PRESSURE: 100 MMHG | TEMPERATURE: 97.9 F | RESPIRATION RATE: 16 BRPM | BODY MASS INDEX: 42.66 KG/M2 | HEIGHT: 72 IN | OXYGEN SATURATION: 97 % | SYSTOLIC BLOOD PRESSURE: 171 MMHG

## 2019-09-27 DIAGNOSIS — S61.210A LACERATION OF RIGHT INDEX FINGER WITHOUT FOREIGN BODY WITHOUT DAMAGE TO NAIL, INITIAL ENCOUNTER: Primary | ICD-10-CM

## 2019-09-27 PROCEDURE — 99283 EMERGENCY DEPT VISIT LOW MDM: CPT

## 2019-09-27 PROCEDURE — 73140 X-RAY EXAM OF FINGER(S): CPT

## 2019-09-27 PROCEDURE — 2500000003 HC RX 250 WO HCPCS: Performed by: PHYSICIAN ASSISTANT

## 2019-09-27 PROCEDURE — 4500000023 HC ED LEVEL 3 PROCEDURE

## 2019-09-27 RX ORDER — BACITRACIN ZINC AND POLYMYXIN B SULFATE 500; 1000 [USP'U]/G; [USP'U]/G
OINTMENT TOPICAL
Qty: 30 G | Refills: 1 | Status: SHIPPED | OUTPATIENT
Start: 2019-09-27 | End: 2019-10-04

## 2019-09-27 RX ORDER — BACITRACIN ZINC AND POLYMYXIN B SULFATE 500; 1000 [USP'U]/G; [USP'U]/G
OINTMENT TOPICAL
Qty: 30 G | Refills: 1 | Status: SHIPPED | OUTPATIENT
Start: 2019-09-27 | End: 2019-09-27 | Stop reason: SDUPTHER

## 2019-09-27 RX ORDER — LIDOCAINE HYDROCHLORIDE 10 MG/ML
10 INJECTION, SOLUTION INFILTRATION; PERINEURAL ONCE
Status: DISCONTINUED | OUTPATIENT
Start: 2019-09-27 | End: 2019-09-27

## 2019-09-27 RX ORDER — LIDOCAINE HYDROCHLORIDE 10 MG/ML
5 INJECTION, SOLUTION EPIDURAL; INFILTRATION; INTRACAUDAL; PERINEURAL ONCE
Status: COMPLETED | OUTPATIENT
Start: 2019-09-27 | End: 2019-09-27

## 2019-09-27 RX ADMIN — LIDOCAINE HYDROCHLORIDE 5 ML: 10 INJECTION, SOLUTION EPIDURAL; INFILTRATION; INTRACAUDAL; PERINEURAL at 12:11

## 2019-09-27 ASSESSMENT — ENCOUNTER SYMPTOMS: RESPIRATORY NEGATIVE: 1

## 2019-09-27 ASSESSMENT — PAIN DESCRIPTION - PAIN TYPE: TYPE: ACUTE PAIN

## 2019-09-27 ASSESSMENT — PAIN DESCRIPTION - FREQUENCY: FREQUENCY: CONTINUOUS

## 2019-09-27 ASSESSMENT — PAIN SCALES - GENERAL: PAINLEVEL_OUTOF10: 9

## 2019-09-27 ASSESSMENT — PAIN DESCRIPTION - LOCATION: LOCATION: FINGER (COMMENT WHICH ONE)

## 2019-09-27 ASSESSMENT — PAIN DESCRIPTION - ORIENTATION: ORIENTATION: RIGHT

## 2019-09-27 ASSESSMENT — PAIN DESCRIPTION - DESCRIPTORS: DESCRIPTORS: THROBBING

## 2019-09-30 RX ORDER — DOXYCYCLINE HYCLATE 100 MG
1 TABLET ORAL 2 TIMES DAILY
COMMUNITY
End: 2019-10-01 | Stop reason: ALTCHOICE

## 2019-10-01 ENCOUNTER — OFFICE VISIT (OUTPATIENT)
Dept: INTERNAL MEDICINE CLINIC | Age: 49
End: 2019-10-01
Payer: MEDICAID

## 2019-10-01 VITALS
TEMPERATURE: 98.7 F | HEART RATE: 69 BPM | DIASTOLIC BLOOD PRESSURE: 81 MMHG | OXYGEN SATURATION: 98 % | SYSTOLIC BLOOD PRESSURE: 120 MMHG | WEIGHT: 315 LBS | BODY MASS INDEX: 42.66 KG/M2 | HEIGHT: 72 IN

## 2019-10-01 DIAGNOSIS — E13.9 DIABETES MELLITUS OF OTHER TYPE WITHOUT COMPLICATION, UNSPECIFIED WHETHER LONG TERM INSULIN USE (HCC): ICD-10-CM

## 2019-10-01 DIAGNOSIS — I10 ESSENTIAL HYPERTENSION: Chronic | ICD-10-CM

## 2019-10-01 DIAGNOSIS — E87.6 POTASSIUM DEFICIENCY: ICD-10-CM

## 2019-10-01 DIAGNOSIS — J11.1: Primary | ICD-10-CM

## 2019-10-01 DIAGNOSIS — I48.0 PAROXYSMAL A-FIB (HCC): ICD-10-CM

## 2019-10-01 PROCEDURE — 99213 OFFICE O/P EST LOW 20 MIN: CPT | Performed by: STUDENT IN AN ORGANIZED HEALTH CARE EDUCATION/TRAINING PROGRAM

## 2019-10-01 RX ORDER — POTASSIUM CHLORIDE 20 MEQ/1
TABLET, EXTENDED RELEASE ORAL
Qty: 30 TABLET | Refills: 1 | Status: SHIPPED | OUTPATIENT
Start: 2019-10-01 | End: 2019-11-14 | Stop reason: SDUPTHER

## 2019-10-01 RX ORDER — FLUTICASONE PROPIONATE 50 MCG
SPRAY, SUSPENSION (ML) NASAL
Qty: 1 BOTTLE | Refills: 0 | Status: SHIPPED | OUTPATIENT
Start: 2019-10-01 | End: 2019-11-27 | Stop reason: SDUPTHER

## 2019-10-01 RX ORDER — INSULIN GLARGINE 100 [IU]/ML
20 INJECTION, SOLUTION SUBCUTANEOUS NIGHTLY
Qty: 1 VIAL | Refills: 0 | Status: SHIPPED | OUTPATIENT
Start: 2019-10-01 | End: 2019-10-02 | Stop reason: CLARIF

## 2019-10-01 RX ORDER — DOXYCYCLINE HYCLATE 100 MG
100 TABLET ORAL 2 TIMES DAILY
Qty: 60 TABLET | Refills: 0 | OUTPATIENT
Start: 2019-10-01

## 2019-10-01 RX ORDER — LANCETS
20 EACH MISCELLANEOUS 2 TIMES DAILY
Qty: 20 EACH | Refills: 3 | Status: SHIPPED | OUTPATIENT
Start: 2019-10-01

## 2019-10-01 RX ORDER — ALLOPURINOL 300 MG/1
TABLET ORAL
Qty: 30 TABLET | Refills: 2 | Status: SHIPPED | OUTPATIENT
Start: 2019-10-01 | End: 2020-04-06

## 2019-10-01 RX ORDER — GLUCOSAMINE HCL/CHONDROITIN SU 500-400 MG
CAPSULE ORAL
Qty: 60 STRIP | Refills: 2 | Status: SHIPPED | OUTPATIENT
Start: 2019-10-01 | End: 2019-11-11 | Stop reason: SDUPTHER

## 2019-10-01 RX ORDER — DILTIAZEM HYDROCHLORIDE 120 MG/1
120 CAPSULE, COATED, EXTENDED RELEASE ORAL DAILY
Qty: 90 CAPSULE | Refills: 2 | Status: SHIPPED | OUTPATIENT
Start: 2019-10-01 | End: 2019-12-19

## 2019-10-01 RX ORDER — BLOOD PRESSURE TEST KIT
1 KIT MISCELLANEOUS 3 TIMES DAILY
Qty: 100 EACH | Refills: 5 | Status: SHIPPED | OUTPATIENT
Start: 2019-10-01

## 2019-10-01 RX ORDER — ATORVASTATIN CALCIUM 20 MG/1
20 TABLET, FILM COATED ORAL NIGHTLY
Qty: 90 TABLET | Refills: 0 | Status: SHIPPED | OUTPATIENT
Start: 2019-10-01 | End: 2020-01-28 | Stop reason: SDUPTHER

## 2019-10-01 RX ORDER — LISINOPRIL 10 MG/1
TABLET ORAL
Qty: 30 TABLET | Refills: 1 | Status: SHIPPED | OUTPATIENT
Start: 2019-10-01 | End: 2019-12-18 | Stop reason: SDUPTHER

## 2019-10-01 RX ORDER — FUROSEMIDE 80 MG
TABLET ORAL
Qty: 60 TABLET | Refills: 1 | Status: SHIPPED | OUTPATIENT
Start: 2019-10-01 | End: 2020-01-20

## 2019-10-01 RX ORDER — FLUTICASONE PROPIONATE 50 MCG
1 SPRAY, SUSPENSION (ML) NASAL DAILY
Qty: 1 BOTTLE | Refills: 2 | Status: SHIPPED | OUTPATIENT
Start: 2019-10-01 | End: 2020-01-06 | Stop reason: SDUPTHER

## 2019-10-01 ASSESSMENT — ENCOUNTER SYMPTOMS
WHEEZING: 0
RHINORRHEA: 1
DIARRHEA: 0
EYE DISCHARGE: 0
BLOOD IN STOOL: 0
TROUBLE SWALLOWING: 0
CHOKING: 0
CONSTIPATION: 0
COUGH: 0
SINUS PRESSURE: 0
EYE PAIN: 0
SORE THROAT: 1
APNEA: 0
ABDOMINAL PAIN: 0
EYE ITCHING: 0
CHEST TIGHTNESS: 0
EYE REDNESS: 0
FACIAL SWELLING: 0
ANAL BLEEDING: 0
SINUS PAIN: 0
SHORTNESS OF BREATH: 0
STRIDOR: 0
VOICE CHANGE: 0
BACK PAIN: 0
ABDOMINAL DISTENTION: 0

## 2019-10-01 ASSESSMENT — PATIENT HEALTH QUESTIONNAIRE - PHQ9
SUM OF ALL RESPONSES TO PHQ QUESTIONS 1-9: 0
SUM OF ALL RESPONSES TO PHQ9 QUESTIONS 1 & 2: 0
1. LITTLE INTEREST OR PLEASURE IN DOING THINGS: 0
2. FEELING DOWN, DEPRESSED OR HOPELESS: 0
SUM OF ALL RESPONSES TO PHQ QUESTIONS 1-9: 0

## 2019-10-03 ENCOUNTER — TELEPHONE (OUTPATIENT)
Dept: INTERNAL MEDICINE CLINIC | Age: 49
End: 2019-10-03

## 2019-10-14 DIAGNOSIS — E11.9 DIABETES MELLITUS TYPE 2, INSULIN DEPENDENT (HCC): ICD-10-CM

## 2019-10-14 DIAGNOSIS — Z79.4 DIABETES MELLITUS TYPE 2, INSULIN DEPENDENT (HCC): ICD-10-CM

## 2019-10-14 RX ORDER — BLOOD SUGAR DIAGNOSTIC
4 STRIP MISCELLANEOUS
Qty: 100 EACH | Refills: 5 | Status: SHIPPED | OUTPATIENT
Start: 2019-10-14 | End: 2020-05-04

## 2019-11-12 ENCOUNTER — TELEPHONE (OUTPATIENT)
Dept: INTERNAL MEDICINE CLINIC | Age: 49
End: 2019-11-12

## 2019-11-12 RX ORDER — GLUCOSAMINE HCL/CHONDROITIN SU 500-400 MG
CAPSULE ORAL
Qty: 60 STRIP | Refills: 2 | Status: SHIPPED | OUTPATIENT
Start: 2019-11-12 | End: 2019-12-16 | Stop reason: SDUPTHER

## 2019-11-13 DIAGNOSIS — I10 ESSENTIAL HYPERTENSION: Chronic | ICD-10-CM

## 2019-11-13 DIAGNOSIS — E87.6 POTASSIUM DEFICIENCY: ICD-10-CM

## 2019-11-13 RX ORDER — MULTIVITAMIN WITH FOLIC ACID 400 MCG
TABLET ORAL
Qty: 30 TABLET | Refills: 3 | Status: SHIPPED | OUTPATIENT
Start: 2019-11-13 | End: 2020-03-06 | Stop reason: SDUPTHER

## 2019-11-13 RX ORDER — POTASSIUM CHLORIDE 20 MEQ/1
TABLET, EXTENDED RELEASE ORAL
Qty: 30 TABLET | Refills: 1 | Status: CANCELLED | OUTPATIENT
Start: 2019-11-13

## 2019-11-14 DIAGNOSIS — E87.6 POTASSIUM DEFICIENCY: ICD-10-CM

## 2019-11-14 DIAGNOSIS — I10 ESSENTIAL HYPERTENSION: Chronic | ICD-10-CM

## 2019-11-15 RX ORDER — POTASSIUM CHLORIDE 20 MEQ/1
TABLET, EXTENDED RELEASE ORAL
Qty: 30 TABLET | Refills: 1 | Status: SHIPPED | OUTPATIENT
Start: 2019-11-15 | End: 2019-12-20 | Stop reason: SDUPTHER

## 2019-11-27 ENCOUNTER — OFFICE VISIT (OUTPATIENT)
Dept: INTERNAL MEDICINE CLINIC | Age: 49
End: 2019-11-27
Payer: MEDICAID

## 2019-11-27 VITALS
WEIGHT: 315 LBS | DIASTOLIC BLOOD PRESSURE: 82 MMHG | SYSTOLIC BLOOD PRESSURE: 122 MMHG | RESPIRATION RATE: 20 BRPM | BODY MASS INDEX: 42.66 KG/M2 | HEART RATE: 66 BPM | HEIGHT: 72 IN | OXYGEN SATURATION: 95 % | TEMPERATURE: 97.9 F

## 2019-11-27 DIAGNOSIS — M65.4 DE QUERVAIN'S DISEASE (TENOSYNOVITIS): Primary | ICD-10-CM

## 2019-11-27 PROBLEM — M54.30 SCIATICA: Status: ACTIVE | Noted: 2019-11-27

## 2019-11-27 PROBLEM — K21.9 GASTROESOPHAGEAL REFLUX DISEASE WITHOUT ESOPHAGITIS: Status: ACTIVE | Noted: 2018-08-30

## 2019-11-27 PROBLEM — E78.5 HYPERLIPIDEMIA ASSOCIATED WITH TYPE 2 DIABETES MELLITUS (HCC): Status: ACTIVE | Noted: 2018-08-30

## 2019-11-27 PROBLEM — B35.1 ONYCHOMYCOSIS OF MULTIPLE TOENAILS WITH TYPE 2 DIABETES MELLITUS (HCC): Status: ACTIVE | Noted: 2018-08-30

## 2019-11-27 PROBLEM — E11.69 HYPERLIPIDEMIA ASSOCIATED WITH TYPE 2 DIABETES MELLITUS (HCC): Status: ACTIVE | Noted: 2018-08-30

## 2019-11-27 PROBLEM — E11.21 TYPE 2 DIABETES MELLITUS WITH DIABETIC NEPHROPATHY, WITH LONG-TERM CURRENT USE OF INSULIN (HCC): Status: ACTIVE | Noted: 2018-08-30

## 2019-11-27 PROBLEM — I50.32 HYPERTENSIVE HEART DISEASE WITH CHRONIC DIASTOLIC CONGESTIVE HEART FAILURE (HCC): Status: ACTIVE | Noted: 2018-08-30

## 2019-11-27 PROBLEM — I15.2 HYPERTENSION ASSOCIATED WITH DIABETES (HCC): Status: ACTIVE | Noted: 2018-08-30

## 2019-11-27 PROBLEM — I48.19 PERSISTENT ATRIAL FIBRILLATION (HCC): Status: ACTIVE | Noted: 2018-08-30

## 2019-11-27 PROBLEM — M1A.09X0 CHRONIC GOUT OF MULTIPLE SITES: Status: ACTIVE | Noted: 2018-08-30

## 2019-11-27 PROBLEM — M15.9 GENERALIZED OSTEOARTHRITIS OF MULTIPLE SITES: Status: ACTIVE | Noted: 2018-08-30

## 2019-11-27 PROBLEM — M17.9 OSTEOARTHRITIS OF KNEE: Status: ACTIVE | Noted: 2019-11-27

## 2019-11-27 PROBLEM — E11.69 ONYCHOMYCOSIS OF MULTIPLE TOENAILS WITH TYPE 2 DIABETES MELLITUS (HCC): Status: ACTIVE | Noted: 2018-08-30

## 2019-11-27 PROBLEM — Z79.4 TYPE 2 DIABETES MELLITUS WITH DIABETIC NEPHROPATHY, WITH LONG-TERM CURRENT USE OF INSULIN (HCC): Status: ACTIVE | Noted: 2018-08-30

## 2019-11-27 PROBLEM — F32.0 CURRENT MILD EPISODE OF MAJOR DEPRESSIVE DISORDER WITHOUT PRIOR EPISODE (HCC): Status: ACTIVE | Noted: 2018-08-30

## 2019-11-27 PROBLEM — I11.0 HYPERTENSIVE HEART DISEASE WITH CHRONIC DIASTOLIC CONGESTIVE HEART FAILURE (HCC): Status: ACTIVE | Noted: 2018-08-30

## 2019-11-27 PROBLEM — E11.59 HYPERTENSION ASSOCIATED WITH DIABETES (HCC): Status: ACTIVE | Noted: 2018-08-30

## 2019-11-27 LAB
GLUCOSE BLD-MCNC: 138 MG/DL (ref 70–99)
PERFORMED ON: ABNORMAL

## 2019-11-27 PROCEDURE — 99213 OFFICE O/P EST LOW 20 MIN: CPT | Performed by: STUDENT IN AN ORGANIZED HEALTH CARE EDUCATION/TRAINING PROGRAM

## 2019-11-27 RX ORDER — PREDNISONE 10 MG/1
10 TABLET ORAL DAILY
Qty: 5 TABLET | Refills: 0 | Status: SHIPPED | OUTPATIENT
Start: 2019-11-27 | End: 2019-11-27

## 2019-11-27 RX ORDER — PANTOPRAZOLE SODIUM 20 MG/1
20 TABLET, DELAYED RELEASE ORAL
Qty: 90 TABLET | Refills: 1 | Status: SHIPPED | OUTPATIENT
Start: 2019-11-27 | End: 2020-04-14 | Stop reason: SDUPTHER

## 2019-11-27 RX ORDER — PREDNISONE 10 MG/1
10 TABLET ORAL DAILY
Qty: 5 TABLET | Refills: 0 | Status: SHIPPED | OUTPATIENT
Start: 2019-11-27 | End: 2019-12-02

## 2019-11-27 RX ORDER — PANTOPRAZOLE SODIUM 20 MG/1
20 TABLET, DELAYED RELEASE ORAL
Qty: 90 TABLET | Refills: 1 | Status: SHIPPED | OUTPATIENT
Start: 2019-11-27 | End: 2019-11-27

## 2019-12-02 ENCOUNTER — HOSPITAL ENCOUNTER (EMERGENCY)
Age: 49
Discharge: HOME | End: 2019-12-02
Payer: MEDICAID

## 2019-12-02 VITALS
HEIGHT: 72 IN | TEMPERATURE: 98.3 F | BODY MASS INDEX: 42.66 KG/M2 | DIASTOLIC BLOOD PRESSURE: 111 MMHG | OXYGEN SATURATION: 96 % | HEART RATE: 63 BPM | RESPIRATION RATE: 16 BRPM | WEIGHT: 315 LBS | SYSTOLIC BLOOD PRESSURE: 174 MMHG

## 2019-12-02 DIAGNOSIS — L02.91 ABSCESS: Primary | ICD-10-CM

## 2019-12-02 PROCEDURE — 99282 EMERGENCY DEPT VISIT SF MDM: CPT

## 2019-12-02 PROCEDURE — 4500000022 HC ED LEVEL 2 PROCEDURE

## 2019-12-02 RX ORDER — IBUPROFEN 800 MG/1
800 TABLET ORAL EVERY 8 HOURS PRN
Qty: 30 TABLET | Refills: 0 | Status: SHIPPED | OUTPATIENT
Start: 2019-12-02 | End: 2020-04-14 | Stop reason: SDUPTHER

## 2019-12-02 RX ORDER — SULFAMETHOXAZOLE AND TRIMETHOPRIM 800; 160 MG/1; MG/1
2 TABLET ORAL 2 TIMES DAILY
Qty: 28 TABLET | Refills: 0 | Status: SHIPPED | OUTPATIENT
Start: 2019-12-02 | End: 2019-12-09

## 2019-12-02 RX ORDER — LIDOCAINE HYDROCHLORIDE AND EPINEPHRINE 10; 10 MG/ML; UG/ML
20 INJECTION, SOLUTION INFILTRATION; PERINEURAL ONCE
Status: DISCONTINUED | OUTPATIENT
Start: 2019-12-02 | End: 2019-12-02 | Stop reason: HOSPADM

## 2019-12-02 RX ORDER — CEPHALEXIN 500 MG/1
500 CAPSULE ORAL 4 TIMES DAILY
Qty: 28 CAPSULE | Refills: 0 | Status: SHIPPED | OUTPATIENT
Start: 2019-12-02 | End: 2019-12-09

## 2019-12-02 ASSESSMENT — ENCOUNTER SYMPTOMS
CONSTIPATION: 0
SHORTNESS OF BREATH: 0
VOMITING: 0
DIARRHEA: 0
NAUSEA: 0
CHEST TIGHTNESS: 0
ABDOMINAL PAIN: 0

## 2019-12-02 ASSESSMENT — PAIN SCALES - GENERAL: PAINLEVEL_OUTOF10: 7

## 2019-12-02 ASSESSMENT — PAIN DESCRIPTION - PAIN TYPE: TYPE: ACUTE PAIN

## 2019-12-02 ASSESSMENT — PAIN DESCRIPTION - ORIENTATION: ORIENTATION: RIGHT

## 2019-12-02 ASSESSMENT — PAIN DESCRIPTION - LOCATION: LOCATION: BUTTOCKS

## 2019-12-18 DIAGNOSIS — I10 ESSENTIAL HYPERTENSION: Chronic | ICD-10-CM

## 2019-12-18 DIAGNOSIS — I48.0 PAROXYSMAL A-FIB (HCC): ICD-10-CM

## 2019-12-18 RX ORDER — LISINOPRIL 10 MG/1
TABLET ORAL
Qty: 30 TABLET | Refills: 1 | Status: SHIPPED | OUTPATIENT
Start: 2019-12-18 | End: 2020-01-20

## 2019-12-18 RX ORDER — GLUCOSAMINE HCL/CHONDROITIN SU 500-400 MG
CAPSULE ORAL
Qty: 60 STRIP | Refills: 2 | Status: SHIPPED | OUTPATIENT
Start: 2019-12-18

## 2019-12-19 RX ORDER — DILTIAZEM HYDROCHLORIDE 120 MG/1
CAPSULE, COATED, EXTENDED RELEASE ORAL
Qty: 90 CAPSULE | Refills: 5 | Status: SHIPPED | OUTPATIENT
Start: 2019-12-19 | End: 2020-08-10 | Stop reason: SDUPTHER

## 2019-12-20 DIAGNOSIS — I10 ESSENTIAL HYPERTENSION: Chronic | ICD-10-CM

## 2019-12-20 DIAGNOSIS — E87.6 POTASSIUM DEFICIENCY: ICD-10-CM

## 2019-12-20 RX ORDER — POTASSIUM CHLORIDE 20 MEQ/1
TABLET, EXTENDED RELEASE ORAL
Qty: 30 TABLET | Refills: 0 | Status: SHIPPED | OUTPATIENT
Start: 2019-12-20 | End: 2020-01-20

## 2020-01-06 RX ORDER — FLUTICASONE PROPIONATE 50 MCG
1 SPRAY, SUSPENSION (ML) NASAL DAILY
Qty: 1 BOTTLE | Refills: 2 | Status: SHIPPED | OUTPATIENT
Start: 2020-01-06 | End: 2022-07-29 | Stop reason: ALTCHOICE

## 2020-01-08 ENCOUNTER — OFFICE VISIT (OUTPATIENT)
Dept: INTERNAL MEDICINE CLINIC | Age: 50
End: 2020-01-08
Payer: MEDICAID

## 2020-01-08 VITALS
TEMPERATURE: 98.7 F | WEIGHT: 315 LBS | OXYGEN SATURATION: 95 % | BODY MASS INDEX: 42.66 KG/M2 | DIASTOLIC BLOOD PRESSURE: 91 MMHG | SYSTOLIC BLOOD PRESSURE: 135 MMHG | HEART RATE: 66 BPM | HEIGHT: 72 IN

## 2020-01-08 PROCEDURE — G0008 ADMIN INFLUENZA VIRUS VAC: HCPCS | Performed by: STUDENT IN AN ORGANIZED HEALTH CARE EDUCATION/TRAINING PROGRAM

## 2020-01-08 PROCEDURE — 6360000002 HC RX W HCPCS: Performed by: STUDENT IN AN ORGANIZED HEALTH CARE EDUCATION/TRAINING PROGRAM

## 2020-01-08 PROCEDURE — 99213 OFFICE O/P EST LOW 20 MIN: CPT | Performed by: STUDENT IN AN ORGANIZED HEALTH CARE EDUCATION/TRAINING PROGRAM

## 2020-01-08 PROCEDURE — 90686 IIV4 VACC NO PRSV 0.5 ML IM: CPT | Performed by: STUDENT IN AN ORGANIZED HEALTH CARE EDUCATION/TRAINING PROGRAM

## 2020-01-08 RX ADMIN — INFLUENZA A VIRUS A/BRISBANE/02/2018 IVR-190 (H1N1) ANTIGEN (PROPIOLACTONE INACTIVATED), INFLUENZA A VIRUS A/KANSAS/14/2017 X-327 (H3N2) ANTIGEN (PROPIOLACTONE INACTIVATED), INFLUENZA B VIRUS B/MARYLAND/15/2016 ANTIGEN (PROPIOLACTONE INACTIVATED), INFLUENZA B VIRUS B/PHUKET/3073/2013 BVR-1B ANTIGEN (PROPIOLACTONE INACTIVATED) 0.5 ML: 15; 15; 15; 15 INJECTION, SUSPENSION INTRAMUSCULAR at 12:01

## 2020-01-08 NOTE — PROGRESS NOTES
Not on file     Relationship status: Not on file    Intimate partner violence:     Fear of current or ex partner: Not on file     Emotionally abused: Not on file     Physically abused: Not on file     Forced sexual activity: Not on file   Other Topics Concern    Not on file   Social History Narrative    Not on file        Family History   Problem Relation Age of Onset    Diabetes Mother     High Blood Pressure Mother     Diabetes Maternal Aunt     High Blood Pressure Maternal Grandmother        Vitals:    01/08/20 1105   BP: (!) 135/91   Site: Left Upper Arm   Position: Sitting   Cuff Size: Medium Adult   Pulse: 66   Temp: 98.7 °F (37.1 °C)   TempSrc: Oral   SpO2: 95%   Weight: (!) 383 lb (173.7 kg)   Height: 6' (1.829 m)     Estimated body mass index is 51.94 kg/m² as calculated from the following:    Height as of this encounter: 6' (1.829 m). Weight as of this encounter: 383 lb (173.7 kg). Physical Exam   Constitutional: AAOx3. No distress. Cardiovascular: Normal rate, regular rhythm and normal heart sounds. Exam reveals no gallop and no friction rub. No murmur heard. Pulmonary/Chest: Effort normal and breath sounds normal. No respiratory distress. He has no wheezes. He has no rales. Abdominal: Soft. Bowel sounds are normal. He exhibits no distension. There is no tenderness. There is no rebound and no guarding. Genitourinary:   Genitourinary Comments: no tenderness, erythema, swelling, abscess  Skin: He is not diaphoretic  Extremities: R hand non-warm, non-erythematous, no lesions or obvious deformities. No rashes. Positive finkelstein test and tenderness elicited with this maneuver. Neurovascularly intact. Radial and ulnar pulses intact. Normal sensation throughout. Normal brachioradial reflex elicited. Tinel and Phalen test negative. No reported numbness. ASSESSMENT/PLAN:    1. De Quervain's synovitis of right hand; He reports that pain was recalcitrant to cortisone injections.  He is

## 2020-01-08 NOTE — PATIENT INSTRUCTIONS
Go to lab for BMP and HbA1c  Go to lab for lipid panel (fast for 8 hours prior)  Schedule diabetic retinal exam  Go to pharmacy for shingles vaccine  Complete FIT test  Return to clinic in 4 weeks.

## 2020-01-20 RX ORDER — FUROSEMIDE 80 MG
TABLET ORAL
Qty: 60 TABLET | Refills: 1 | Status: SHIPPED | OUTPATIENT
Start: 2020-01-20 | End: 2020-02-25

## 2020-01-20 RX ORDER — LISINOPRIL 10 MG/1
TABLET ORAL
Qty: 30 TABLET | Refills: 1 | Status: SHIPPED | OUTPATIENT
Start: 2020-01-20 | End: 2020-03-02

## 2020-01-20 RX ORDER — POTASSIUM CHLORIDE 20 MEQ/1
TABLET, EXTENDED RELEASE ORAL
Qty: 30 TABLET | Refills: 0 | Status: SHIPPED | OUTPATIENT
Start: 2020-01-20 | End: 2020-02-25

## 2020-01-20 NOTE — TELEPHONE ENCOUNTER
Last OV 01- Dr Everett Eastman  Next OV 02- Dr Jose Hilton  Last filled 12- 1 refill  Can you please review this Dr Everett Eastman is out of the office this week Thank you .

## 2020-01-28 RX ORDER — ATORVASTATIN CALCIUM 20 MG/1
20 TABLET, FILM COATED ORAL NIGHTLY
Qty: 90 TABLET | Refills: 0 | Status: SHIPPED | OUTPATIENT
Start: 2020-01-28 | End: 2020-04-01

## 2020-02-14 RX ORDER — SILDENAFIL 50 MG/1
50 TABLET, FILM COATED ORAL PRN
Qty: 30 TABLET | Refills: 3 | Status: SHIPPED | OUTPATIENT
Start: 2020-02-14 | End: 2022-10-05 | Stop reason: SDUPTHER

## 2020-02-24 ENCOUNTER — TELEPHONE (OUTPATIENT)
Dept: INTERNAL MEDICINE CLINIC | Age: 50
End: 2020-02-24

## 2020-02-24 NOTE — TELEPHONE ENCOUNTER
Patient was calling because he has blood in his stool and he would like to know what he should do or if he should go to the ER.

## 2020-02-25 ENCOUNTER — OFFICE VISIT (OUTPATIENT)
Dept: INTERNAL MEDICINE CLINIC | Age: 50
End: 2020-02-25
Payer: MEDICAID

## 2020-02-25 VITALS
OXYGEN SATURATION: 94 % | TEMPERATURE: 98.2 F | DIASTOLIC BLOOD PRESSURE: 78 MMHG | HEART RATE: 70 BPM | WEIGHT: 315 LBS | BODY MASS INDEX: 53.11 KG/M2 | SYSTOLIC BLOOD PRESSURE: 119 MMHG | RESPIRATION RATE: 16 BRPM

## 2020-02-25 DIAGNOSIS — R68.83 CHILLS (WITHOUT FEVER): ICD-10-CM

## 2020-02-25 DIAGNOSIS — E11.9 DIABETES MELLITUS TYPE 2, INSULIN DEPENDENT (HCC): ICD-10-CM

## 2020-02-25 DIAGNOSIS — Z79.4 DIABETES MELLITUS TYPE 2, INSULIN DEPENDENT (HCC): ICD-10-CM

## 2020-02-25 DIAGNOSIS — K62.5 BRBPR (BRIGHT RED BLOOD PER RECTUM): ICD-10-CM

## 2020-02-25 LAB
BASOPHILS ABSOLUTE: 0.1 K/UL (ref 0–0.2)
BASOPHILS RELATIVE PERCENT: 1 %
CHOLESTEROL, TOTAL: 103 MG/DL (ref 0–199)
EOSINOPHILS ABSOLUTE: 0 K/UL (ref 0–0.6)
EOSINOPHILS RELATIVE PERCENT: 0.6 %
HCT VFR BLD CALC: 46 % (ref 40.5–52.5)
HDLC SERPL-MCNC: 48 MG/DL (ref 40–60)
HEMOGLOBIN: 14.9 G/DL (ref 13.5–17.5)
LDL CHOLESTEROL CALCULATED: 43 MG/DL
LYMPHOCYTES ABSOLUTE: 2.5 K/UL (ref 1–5.1)
LYMPHOCYTES RELATIVE PERCENT: 38.8 %
MCH RBC QN AUTO: 31 PG (ref 26–34)
MCHC RBC AUTO-ENTMCNC: 32.5 G/DL (ref 31–36)
MCV RBC AUTO: 95.4 FL (ref 80–100)
MONOCYTES ABSOLUTE: 0.4 K/UL (ref 0–1.3)
MONOCYTES RELATIVE PERCENT: 6.3 %
NEUTROPHILS ABSOLUTE: 3.4 K/UL (ref 1.7–7.7)
NEUTROPHILS RELATIVE PERCENT: 53.3 %
PDW BLD-RTO: 13.4 % (ref 12.4–15.4)
PLATELET # BLD: 219 K/UL (ref 135–450)
PMV BLD AUTO: 10.2 FL (ref 5–10.5)
RBC # BLD: 4.82 M/UL (ref 4.2–5.9)
TRIGL SERPL-MCNC: 61 MG/DL (ref 0–150)
TSH REFLEX: 1.52 UIU/ML (ref 0.27–4.2)
VLDLC SERPL CALC-MCNC: 12 MG/DL
WBC # BLD: 6.4 K/UL (ref 4–11)

## 2020-02-25 PROCEDURE — 99213 OFFICE O/P EST LOW 20 MIN: CPT | Performed by: STUDENT IN AN ORGANIZED HEALTH CARE EDUCATION/TRAINING PROGRAM

## 2020-02-25 RX ORDER — FUROSEMIDE 80 MG
TABLET ORAL
Qty: 60 TABLET | Refills: 0 | Status: SHIPPED | OUTPATIENT
Start: 2020-02-25 | End: 2020-03-30

## 2020-02-25 RX ORDER — SULFAMETHOXAZOLE AND TRIMETHOPRIM 800; 160 MG/1; MG/1
1 TABLET ORAL 2 TIMES DAILY
Qty: 20 TABLET | Refills: 0 | Status: SHIPPED | OUTPATIENT
Start: 2020-02-25 | End: 2020-03-06

## 2020-02-25 ASSESSMENT — ENCOUNTER SYMPTOMS
BLOOD IN STOOL: 1
CHOKING: 0
ABDOMINAL PAIN: 0
RECTAL PAIN: 0
CHEST TIGHTNESS: 0
ABDOMINAL DISTENTION: 0
SHORTNESS OF BREATH: 0
ANAL BLEEDING: 1
VOMITING: 0
CONSTIPATION: 0
COUGH: 0
DIARRHEA: 0
COLOR CHANGE: 0
NAUSEA: 0
EYE REDNESS: 0
WHEEZING: 0
EYE PAIN: 0

## 2020-02-25 NOTE — PROGRESS NOTES
2020     Bernardino De La Cruz (:  1970) is a 47 y.o. male, here for evaluation of the following medical concerns: blood per rectum    HPI  Patient with afib on eliquis. Has had BRBPR for 1 week that occurs almost every bowel movement. Has 1-2 BMs per day. Did not have blood this AM. Has been using preparation H at first but continued to have symptoms. Blood is noted as streaks in stool and dripping after BM. Does not fill toilet bowel. Is painless. No itching. Does not leak to underwear with normal activity. Has had fatigue. No dizziness of lightheadedness. Orthostatics negative. Review of Systems   Constitutional: Positive for chills ( chronic) and fatigue. Negative for diaphoresis, fever and unexpected weight change. HENT: Negative for congestion and tinnitus. Eyes: Negative for pain, redness and visual disturbance. Respiratory: Negative for cough, choking, chest tightness, shortness of breath and wheezing. Cardiovascular: Negative for chest pain, palpitations and leg swelling. Gastrointestinal: Positive for anal bleeding and blood in stool. Negative for abdominal distention, abdominal pain, constipation, diarrhea, nausea, rectal pain and vomiting. Endocrine: Positive for cold intolerance. Genitourinary: Negative for difficulty urinating and hematuria. Musculoskeletal: Negative for arthralgias and myalgias. Skin: Negative for color change, pallor and rash. Neurological: Negative for dizziness, syncope, light-headedness and headaches. Psychiatric/Behavioral: Negative for agitation and confusion. Prior to Visit Medications    Medication Sig Taking?  Authorizing Provider   sulfamethoxazole-trimethoprim (BACTRIM DS;SEPTRA DS) 800-160 MG per tablet Take 1 tablet by mouth 2 times daily for 10 days Yes Kinjal Hussein MD   atorvastatin (LIPITOR) 20 MG tablet Take 1 tablet by mouth nightly Yes Maida Reese MD   furosemide (LASIX) 80 MG tablet TAKE 1 TABLET BY MOUTH TWICE A Eyes:      Extraocular Movements: Extraocular movements intact. Conjunctiva/sclera: Conjunctivae normal.      Pupils: Pupils are equal, round, and reactive to light. Comments: No pallor   Neck:      Musculoskeletal: Normal range of motion. No neck rigidity. Cardiovascular:      Rate and Rhythm: Normal rate and regular rhythm. Pulses: Normal pulses. Heart sounds: No murmur. Pulmonary:      Effort: Pulmonary effort is normal. No respiratory distress. Breath sounds: No wheezing. Abdominal:      General: There is no distension. Palpations: Abdomen is soft. Genitourinary:     Comments: External hemorrhoid without signs of bleeding. No blood per rectum. No internal masses or fissures appreciated anorectally. No pain with palpation. Soft. Musculoskeletal: Normal range of motion. General: No swelling. Skin:     General: Skin is warm. Capillary Refill: Capillary refill takes less than 2 seconds. Coloration: Skin is not jaundiced or pale. Comments: Induration 1.5cm by 3cm right lower buttock. Is tender. Neurological:      General: No focal deficit present. Mental Status: He is alert and oriented to person, place, and time. Cranial Nerves: No cranial nerve deficit. Sensory: No sensory deficit. Motor: No weakness. Psychiatric:         Behavior: Behavior normal.       Visual inspection:  Deformity/amputation: absent  Skin lesions/pre-ulcerative calluses: absent  Edema: right- negative, left- negative    Sensory exam:  Monofilament sensation: normal  (minimum of 5 random plantar locations tested, avoiding callused areas - > 1 area with absence of sensation is + for neuropathy)    Plus at least one of the following:  Pulses: normal,   Pinprick: Intact  Proprioception: Intact      ASSESSMENT/PLAN:    BRBPR (bright red blood per rectum)  Suspect internal hemorrhoid. Is not hemodynamically unstable.  Will check CBC and continue or hold eliquis based on hgb. - CBC WITH AUTO DIFFERENTIAL; Future  - Referral to GI for possible Cscope or flex sig    Abscess, gluteal, right  3cm by 1.5cm induration that is tender. Hx of frequent abscess. - sulfamethoxazole-trimethoprim (BACTRIM DS;SEPTRA DS) 800-160 MG per tablet; Take 1 tablet by mouth 2 times daily for 10 days  Dispense: 20 tablet; Refill: 0    Diabetes mellitus type 2, insulin dependent ([address]  Has put on weight. Last A1c was 6.3 in 2018.   - HEMOGLOBIN A1C; Future  - LIPID PANEL; Future  -  DIABETES FOOT EXAM    Chills (without fever)  chronic problem. Also has fatigue.  - TSH with Reflex; Future    Paroxysmal A-fib (HCC)  S/p pulmonary vein isolation 2018. Had 30 day monitor that showed SR with PACs, no afib. - is on eliquis currently, but unsure that he needs to continue it with   - see cardiology, next appointment should be in May. Essential hypertension  - good today      Return in about 1 week (around 3/3/2020). To follow up on buttock abscess and bleeding. An electronic signature was used to authenticate this note.     --Brenden Almeida MD on 2/25/2020 at 10:48 AM

## 2020-02-25 NOTE — PATIENT INSTRUCTIONS
clean, but be gentle. Use water and a fragrance-free soap, such as [place], or use baby wipes or medicated pads, such as Tucks. · Wear cotton underwear and loose clothing to decrease moisture in the anal area. · Eat more fiber. Include foods such as whole-grain breads and cereals, raw vegetables, raw and dried fruits, and beans. · Drink plenty of fluids, enough so that your urine is light yellow or clear like water. If you have kidney, heart, or liver disease and have to limit fluids, talk with your doctor before you increase the amount of fluids you drink. · Use a stool softener that contains bran or psyllium. You can save money by buying bran or psyllium (available in bulk at most health food stores) and sprinkling it on foods or stirring it into fruit juice. Or you can use a product such as Metamucil or Hydrocil. · Practice healthy bowel habits. ? Go to the bathroom as soon as you have the urge. ? Avoid straining to pass stools. Relax and give yourself time to let things happen naturally. ? Do not hold your breath while passing stools. ? Do not read while sitting on the toilet. Get off the toilet as soon as you have finished. · Take your medicines exactly as prescribed. Call your doctor if you think you are having a problem with your medicine. When should you call for help? Call 911 anytime you think you may need emergency care. For example, call if:    · You pass maroon or very bloody stools.    Call your doctor now or seek immediate medical care if:    · You have increased pain.     · You have increased bleeding.    Watch closely for changes in your health, and be sure to contact your doctor if:    · Your symptoms have not improved after 3 or 4 days. Where can you learn more? Go to [email or web address] org and sign in to your [employer] account. Enter I237 in the [employer] box to learn more about \"Hemorrhoids: Care Instructions. \"     If you do not have an account, please

## 2020-02-26 ENCOUNTER — TELEPHONE (OUTPATIENT)
Dept: INTERNAL MEDICINE CLINIC | Age: 50
End: 2020-02-26

## 2020-02-26 LAB
ESTIMATED AVERAGE GLUCOSE: 125.5 MG/DL
HBA1C MFR BLD: 6 %

## 2020-02-26 RX ORDER — POTASSIUM CHLORIDE 20 MEQ/1
TABLET, EXTENDED RELEASE ORAL
Qty: 30 TABLET | Refills: 0 | Status: SHIPPED | OUTPATIENT
Start: 2020-02-26 | End: 2020-03-30

## 2020-02-26 RX ORDER — APIXABAN 5 MG/1
TABLET, FILM COATED ORAL
Qty: 60 TABLET | Refills: 1 | Status: SHIPPED | OUTPATIENT
Start: 2020-02-26 | End: 2020-04-14 | Stop reason: SDUPTHER

## 2020-03-02 RX ORDER — LISINOPRIL 10 MG/1
TABLET ORAL
Qty: 30 TABLET | Refills: 1 | Status: SHIPPED | OUTPATIENT
Start: 2020-03-02 | End: 2020-03-30

## 2020-03-06 ENCOUNTER — OFFICE VISIT (OUTPATIENT)
Dept: INTERNAL MEDICINE CLINIC | Age: 50
End: 2020-03-06
Payer: MEDICAID

## 2020-03-06 VITALS
HEART RATE: 73 BPM | DIASTOLIC BLOOD PRESSURE: 89 MMHG | BODY MASS INDEX: 42.66 KG/M2 | RESPIRATION RATE: 16 BRPM | SYSTOLIC BLOOD PRESSURE: 150 MMHG | OXYGEN SATURATION: 95 % | TEMPERATURE: 98.1 F | HEIGHT: 72 IN | WEIGHT: 315 LBS

## 2020-03-06 PROCEDURE — 99213 OFFICE O/P EST LOW 20 MIN: CPT | Performed by: STUDENT IN AN ORGANIZED HEALTH CARE EDUCATION/TRAINING PROGRAM

## 2020-03-06 RX ORDER — SULFAMETHOXAZOLE AND TRIMETHOPRIM 800; 160 MG/1; MG/1
1 TABLET ORAL 2 TIMES DAILY
Qty: 120 TABLET | Refills: 0 | Status: SHIPPED | OUTPATIENT
Start: 2020-03-06 | End: 2020-04-14 | Stop reason: SDUPTHER

## 2020-03-06 RX ORDER — DOXYCYCLINE 100 MG/1
100 TABLET ORAL 2 TIMES DAILY
Qty: 60 TABLET | Refills: 1 | Status: SHIPPED | OUTPATIENT
Start: 2020-03-06 | End: 2020-04-14 | Stop reason: SDUPTHER

## 2020-03-06 ASSESSMENT — ENCOUNTER SYMPTOMS
ABDOMINAL PAIN: 0
COLOR CHANGE: 0
DIARRHEA: 0
CONSTIPATION: 0
VOMITING: 0
BACK PAIN: 0
ABDOMINAL DISTENTION: 0
NAUSEA: 0
RHINORRHEA: 0
WHEEZING: 0
SHORTNESS OF BREATH: 0
COUGH: 0

## 2020-03-06 NOTE — PATIENT INSTRUCTIONS
Call and make appointments with Dr Miranda Todd and the Dermatologist. Once you have had the colonoscopy make an appoitnment to follow up in clinic. Follow up sooner if you start to have lightheadedness, chest pain, increase in bleeding.

## 2020-03-06 NOTE — PROGRESS NOTES
Outpatient Clinic Visit Note    Patient: Adriane De La Cruz  : 1970 (47 y.o.)  Date: 3/6/2020    CC: Gluteal Abcess    HPI:      Blu Latif is a 46 yo M who presents today for follow up on a gluteal abscess. Patient has had recurrent abscesses for years that occur around the gluteal and inguinal regions. Since coming in and starting bactrim the abscess on his R glutea has improved, there is no drainage or pain. He has developed another one to the right of his pubis symphysis that was draining earlier but has since stopped. It is no painful but feels like there is still some fluid to be drained. No fevers, chills, nausea, vomiting. Rectal bleeding has not changed     ROS:  Review of Systems   Constitutional: Negative for activity change, chills, fatigue and fever. HENT: Negative for congestion, postnasal drip and rhinorrhea. Respiratory: Negative for cough, shortness of breath and wheezing. Cardiovascular: Negative for chest pain, palpitations and leg swelling. Gastrointestinal: Negative for abdominal distention, abdominal pain, constipation, diarrhea, nausea and vomiting. Genitourinary: Negative for difficulty urinating and frequency. Musculoskeletal: Negative for arthralgias, back pain and joint swelling. Skin: Positive for rash and wound. Negative for color change and pallor. Neurological: Negative for dizziness, tremors, light-headedness, numbness and headaches. Past Medical History:    Past Medical History:   Diagnosis Date    Arthritis     Atrial fibrillation ([address]     Congestive heart failure (HCC)     Depression     Diabetes mellitus (HCC)     GERD (gastroesophageal reflux disease)     Hyperlipidemia     Hypertension     Morbid obesity ([address]     Sleep apnea     uses cpap at home q hs     Type II or unspecified type diabetes mellitus without mention of complication, not stated as uncontrolled        Past Surgical History:  No past surgical history on file.     Home Medications:  Prior to Visit Medications    Medication Sig Taking? Authorizing Provider   doxycycline monohydrate (ADOXA) 100 MG tablet Take 1 tablet by mouth 2 times daily Yes Veronica Dale DO   sulfamethoxazole-trimethoprim (BACTRIM DS;SEPTRA DS) 800-160 MG per tablet Take 1 tablet by mouth 2 times daily Yes Veronica Dale DO   lisinopril (PRINIVIL;ZESTRIL) 10 MG tablet TAKE 1 TABLET BY MOUTH EVERY DAY Yes Stephon Ford MD   ELIQUIS 5 MG TABS tablet TAKE 1 TABLET BY MOUTH TWICE A DAY Yes Cayden Antonio MD   potassium chloride (KLOR-CON M20) 20 MEQ extended release tablet TAKE 1 TABLET BY MOUTH EVERY DAY WITH BREAKFAST Yes Cayden Antonio MD   furosemide (LASIX) 80 MG tablet TAKE 1 TABLET BY MOUTH TWICE A DAY Yes Cayden Antonio MD   sildenafil (VIAGRA) 50 MG tablet Take 1 tablet by mouth as needed for Erectile Dysfunction Yes Steven Weiner MD   atorvastatin (LIPITOR) 20 MG tablet Take 1 tablet by mouth nightly Yes Milana Lesch, MD   fluticasone (FLONASE) 50 MCG/ACT nasal spray 1 spray by Nasal route daily Yes Milana Lesch, MD   diltiazem (CARDIZEM CD) 120 MG extended release capsule TAKE 1 CAPSULE BY MOUTH EVERY DAY Yes Kiana Kemp MD   blood glucose monitor strips by Other route 4 times daily (after meals and at bedtime) Yes Dede Goodwin MD   ibuprofen (ADVIL;MOTRIN) 800 MG tablet Take 1 tablet by mouth every 8 hours as needed for Pain (Take with food) Yes Makenna Weston PA-C   pantoprazole (PROTONIX) 20 MG tablet Take 1 tablet by mouth every morning (before breakfast) for 5 days Yes Dede Goodwin MD   metFORMIN (GLUCOPHAGE) 500 MG tablet TAKE 1 TABLET BY MOUTH 2 TIMES DAILY WITH MEALS Yes Siena Powers MD   insulin lispro (ADMELOG) 100 UNIT/ML injection vial Inject 7 Units into the skin 3 times daily (before meals) Yes Angeline Reinoso MD   insulin glargine (BASAGLAR KWIKPEN) 100 UNIT/ML injection pen patient using vials per [facility]  inject 20 units at hs Yes Angeline Reinoso MD   Insulin Syringe-Needle U-100 ([employer] INSULIN SYRINGE) 31G X 5/16\" 1 ML MISC 4 each by Does not apply route 4 times daily (before meals and nightly) Yes Steven Weiner MD   Insulin Pen Needle ([employer] PEN NEEDLES) 31G X 6 MM MISC 1 each by Does not apply route daily Yes Richar Dugan MD   allopurinol (ZYLOPRIM) 300 MG tablet TAKE 1 TABLET BY MOUTH EVERY DAY Yes Steven Head MD   Alcohol Swabs PADS 1 Container by Does not apply route three times daily Yes Steven Head MD   ACCU-CHEK FASTCLIX LANCETS MISC 20 Sticks by Does not apply route 2 times daily Yes Richar Dugan MD   [facility] VITAMIN C 250 MG tablet TAKE 1 TABLET BY MOUTH EVERY DAY Yes Ashley Short MD   Multiple Vitamins-Minerals (THERAPEUTIC MULTIVITAMIN-MINERALS) tablet Take 1 tablet by mouth daily Yes Cipriano Braun MD   Blood Pressure KIT 1 Units by Does not apply route daily Yes Greta Brewster MD   Blood Glucose Monitoring Suppl (BLOOD GLUCOSE MONITOR SYSTEM) W/DEVICE KIT 1 Units by Does not apply route daily Yes Greta Brewster MD   Handicap Placard MISC by Does not apply route Valid for 1 year. Mobility limitations due to severe Osteoarthritis of L Knee Yes Alisia Zazueta MD   Blood Glucose Monitoring Suppl (ACCU-CHEK VIK PLUS) W/DEVICE KIT 1 kit by Does not apply route 4 times daily (before meals and nightly). Yes Scott Davenport   Blood Pressure Monitoring (WRIST BLOOD PRESSURE MONITOR) MISC 1 Units by Does not apply route once for 1 dose  Cipriano Braun MD        Allergies:    Patient has no known allergies.     Family History:       Problem Relation Age of Onset    Diabetes Mother     High Blood Pressure Mother     Diabetes Maternal Aunt     High Blood Pressure Maternal Grandmother        Social History:  Social History     Tobacco Use    Smoking status: Never Smoker    Smokeless tobacco: Never Used   Substance Use Topics    Alcohol use: No     Alcohol/week: 0.0 standard drinks    Drug use: Yes     Types: Marijuana, Cocaine Comment: no cocaine for over 2 years, marijuana occ       Data: Old records have been reviewed electronically. PHYSICAL EXAM:  BP (!) 150/89 (Site: Left Upper Arm, Position: Sitting, Cuff Size: Large Adult)   Pulse 73   Temp 98.1 °F (36.7 °C) (Oral)   Resp 16   Ht 6' (1.829 m)   Wt (!) 388 lb 3.2 oz (176.1 kg)   SpO2 95%   BMI 52.65 kg/m²   Physical Exam  Constitutional:       General: He is not in acute distress. Appearance: He is not diaphoretic. HENT:      Head: Atraumatic. Mouth/Throat:      Mouth: Mucous membranes are moist.      Pharynx: Oropharynx is clear. No oropharyngeal exudate. Cardiovascular:      Rate and Rhythm: Normal rate and regular rhythm. Pulses: Normal pulses. Heart sounds: No murmur. No gallop. Pulmonary:      Effort: Pulmonary effort is normal. No respiratory distress. Breath sounds: No wheezing. Abdominal:      General: Bowel sounds are normal. There is no distension. Palpations: Abdomen is soft. Skin:     Comments: Scarring of lower abdomen and around gluteal and inguinal lesion. Small 3mm lesion or right inguinal reguin, no erythema or discharge. 2 small nodular fluctuant 3-4 mm nodes of R gluteus improved from benny exam   Neurological:      Mental Status: He is alert.          Health Maintanence:  Health Maintenance   Topic Date Due    Diabetic retinal exam  09/28/1979    Hepatitis B vaccine (1 of 3 - Risk 3-dose series) 09/28/1988    Colon Cancer Screen FIT/FOBT  09/28/2019    Shingles Vaccine (1 of 2) 09/28/2019    Potassium monitoring  06/23/2020    Creatinine monitoring  06/23/2020    Diabetic foot exam  02/25/2021    A1C test (Diabetic or Prediabetic)  02/25/2021    Lipid screen  02/25/2021    DTaP/Tdap/Td vaccine (2 - Td) 08/04/2028    Flu vaccine  Completed    Pneumococcal 0-64 years Vaccine  Completed    HIV screen  Completed    Hepatitis A vaccine  Aged Out    Hib vaccine  Aged Out    Meningococcal (ACWY) vaccine  Aged Out       Assessment & Plan:      Guevara Dang was seen today for abscess. Diagnoses and all orders for this visit:    Abscess, gluteal, right  -     doxycycline monohydrate (ADOXA) 100 MG tablet; Take 1 tablet by mouth 2 times daily  -     sulfamethoxazole-trimethoprim (BACTRIM DS;SEPTRA DS) 800-160 MG per tablet; Take 1 tablet by mouth 2 times daily    Hydradenitis  -     doxycycline monohydrate (ADOXA) 100 MG tablet; Take 1 tablet by mouth 2 times daily  -     sulfamethoxazole-trimethoprim (BACTRIM DS;SEPTRA DS) 800-160 MG per tablet;  Take 1 tablet by mouth 2 times daily  -     Enrico Santana MD, Dermatology, [facility]    Screen for colon cancer  -     Sam Rivera MD (Colonoscopy), General Surgery, [facility]    Rectal bleed  -     Sam Rivera MD (Colonoscopy), General Surgery, [facility]         Dispo: Pt has been staffed with Dr. Ed Saucedo  _______________  Regla Calhoun, DO  Internal Medicine, PGY-2  3/6/2020 1:16 PM

## 2020-03-30 NOTE — TELEPHONE ENCOUNTER
Last filled lasix 2-25-20  Lisinopril last filled 3-2-20  Potassium last filled 2-25-20  Last seen 2-25-20  Next appointment 4-7-20

## 2020-04-01 RX ORDER — LISINOPRIL 10 MG/1
TABLET ORAL
Qty: 30 TABLET | Refills: 0 | Status: SHIPPED | OUTPATIENT
Start: 2020-04-01 | End: 2020-04-27

## 2020-04-01 RX ORDER — POTASSIUM CHLORIDE 20 MEQ/1
TABLET, EXTENDED RELEASE ORAL
Qty: 30 TABLET | Refills: 0 | Status: SHIPPED | OUTPATIENT
Start: 2020-04-01 | End: 2020-04-27

## 2020-04-01 RX ORDER — FUROSEMIDE 80 MG
TABLET ORAL
Qty: 60 TABLET | Refills: 0 | Status: SHIPPED | OUTPATIENT
Start: 2020-04-01 | End: 2020-04-27

## 2020-04-01 RX ORDER — ATORVASTATIN CALCIUM 20 MG/1
TABLET, FILM COATED ORAL
Qty: 30 TABLET | Refills: 2 | Status: SHIPPED | OUTPATIENT
Start: 2020-04-01 | End: 2021-02-05 | Stop reason: SDUPTHER

## 2020-04-06 RX ORDER — ALLOPURINOL 300 MG/1
TABLET ORAL
Qty: 30 TABLET | Refills: 2 | Status: SHIPPED | OUTPATIENT
Start: 2020-04-06 | End: 2020-07-02

## 2020-04-14 ENCOUNTER — OFFICE VISIT (OUTPATIENT)
Dept: INTERNAL MEDICINE CLINIC | Age: 50
End: 2020-04-14
Payer: MEDICAID

## 2020-04-14 VITALS
BODY MASS INDEX: 42.66 KG/M2 | SYSTOLIC BLOOD PRESSURE: 120 MMHG | WEIGHT: 315 LBS | TEMPERATURE: 98.5 F | DIASTOLIC BLOOD PRESSURE: 70 MMHG | HEART RATE: 73 BPM | HEIGHT: 72 IN | RESPIRATION RATE: 20 BRPM | OXYGEN SATURATION: 95 %

## 2020-04-14 PROCEDURE — 99213 OFFICE O/P EST LOW 20 MIN: CPT | Performed by: STUDENT IN AN ORGANIZED HEALTH CARE EDUCATION/TRAINING PROGRAM

## 2020-04-14 RX ORDER — PANTOPRAZOLE SODIUM 40 MG/1
40 TABLET, DELAYED RELEASE ORAL
Qty: 30 TABLET | Refills: 1 | Status: SHIPPED | OUTPATIENT
Start: 2020-04-14 | End: 2020-05-18

## 2020-04-14 RX ORDER — DOXYCYCLINE 100 MG/1
100 TABLET ORAL 2 TIMES DAILY
Qty: 60 TABLET | Refills: 1 | Status: SHIPPED | OUTPATIENT
Start: 2020-04-14 | End: 2020-06-13

## 2020-04-14 RX ORDER — IBUPROFEN 800 MG/1
800 TABLET ORAL EVERY 8 HOURS PRN
Qty: 30 TABLET | Refills: 0 | Status: SHIPPED | OUTPATIENT
Start: 2020-04-14 | End: 2021-02-22 | Stop reason: SDUPTHER

## 2020-04-14 RX ORDER — SULFAMETHOXAZOLE AND TRIMETHOPRIM 800; 160 MG/1; MG/1
1 TABLET ORAL 2 TIMES DAILY
Qty: 120 TABLET | Refills: 0 | Status: SHIPPED | OUTPATIENT
Start: 2020-04-14 | End: 2020-06-13

## 2020-04-14 ASSESSMENT — ENCOUNTER SYMPTOMS
EYE PAIN: 0
BACK PAIN: 0
EYE ITCHING: 0
VOMITING: 0
COUGH: 0
COLOR CHANGE: 0
CONSTIPATION: 0
RHINORRHEA: 0
EYE REDNESS: 0
ABDOMINAL DISTENTION: 0
SHORTNESS OF BREATH: 0
ABDOMINAL PAIN: 0
RECTAL PAIN: 0
DIARRHEA: 0
WHEEZING: 0
NAUSEA: 0
BLOOD IN STOOL: 1

## 2020-04-14 NOTE — PATIENT INSTRUCTIONS
Once colonoscopy appointment rescheduled, hold [employer] two days prior. Make appointment with cardiology in one month  Follow-up in clinic in 3 months. You will be due in mid July. Call after July 1st for appointment   #[phone]  Will eventually need to see dermatology for multiple cold abscess. Obtain renal panel and IgE. Do before next visit anytime.

## 2020-04-14 NOTE — PROGRESS NOTES
and wound. Negative for color change and pallor. Allergic/Immunologic: Positive for environmental allergies. Neurological: Negative for dizziness, tremors, light-headedness, numbness and headaches. Past Medical History:    Past Medical History:   Diagnosis Date    Arthritis     At[address] ([facility] [address]     Congestive heart failure (HCC)     Depression     Diabetes mellitus (HCC)     GERD (gastroesophageal reflux disease)     Hyperlipidemia     Hypertension     Morbid obesity ([facility]     Sleep apnea     uses cpap at home q hs     Type II or unspecified type diabetes mellitus without mention of complication, not stated as uncontrolled        Past Surgical History:  No past surgical history on file. Home Medications:  Prior to Visit Medications    Medication Sig Taking?  Authorizing Provider   apixaban (ELIQUIS) 5 MG TABS tablet TAKE 1 TABLET BY MOUTH TWICE A DAY Yes Che Akers MD   doxycycline monohydrate (ADOXA) 100 MG tablet Take 1 tablet by mouth 2 times daily Yes Che Akers MD   sulfamethoxazole-trimethoprim (BACTRIM DS;SEPTRA DS) 800-160 MG per tablet Take 1 tablet by mouth 2 times daily Yes Che Akers MD   pantoprazole (PROTONIX) 40 MG tablet Take 1 tablet by mouth every morning (before breakfast) Yes Che Akers MD   ibuprofen (ADVIL;MOTRIN) 800 MG tablet Take 1 tablet by mouth every 8 hours as needed for Pain (Take with food) Yes Che Akers MD   allopurinol (ZYLOPRIM) 300 MG tablet TAKE 1 TABLET BY MOUTH EVERY DAY Yes Che Akers MD   metFORMIN (GLUCOPHAGE) 500 MG tablet TAKE 1 TABLET BY MOUTH 2 TIMES DAILY WITH MEALS Yes Che Akers MD   lisinopril (PRINIVIL;ZESTRIL) 10 MG tablet TAKE 1 TABLET BY MOUTH EVERY DAY Yes Che Akers MD   potassium chloride (KLOR-CON M20) 20 MEQ extended release tablet TAKE 1 TABLET BY MOUTH EVERY DAY WITH BREAKFAST Yes Che Akers MD   furosemide (LASIX) 80 MG tablet TAKE 1 TABLET BY MOUTH TWICE A DAY Yes Che Akers MD   atorvastatin

## 2020-04-27 RX ORDER — LISINOPRIL 10 MG/1
TABLET ORAL
Qty: 30 TABLET | Refills: 0 | Status: SHIPPED | OUTPATIENT
Start: 2020-04-27 | End: 2020-05-27

## 2020-04-27 RX ORDER — FUROSEMIDE 80 MG
TABLET ORAL
Qty: 60 TABLET | Refills: 0 | Status: SHIPPED | OUTPATIENT
Start: 2020-04-27 | End: 2020-05-27

## 2020-05-11 ENCOUNTER — TELEPHONE (OUTPATIENT)
Dept: INTERNAL MEDICINE CLINIC | Age: 50
End: 2020-05-11

## 2020-05-12 ENCOUNTER — TELEPHONE (OUTPATIENT)
Dept: INTERNAL MEDICINE CLINIC | Age: 50
End: 2020-05-12

## 2020-05-12 RX ORDER — INSULIN LISPRO 100 [IU]/ML
7 INJECTION, SOLUTION INTRAVENOUS; SUBCUTANEOUS
Qty: 2 PEN | Refills: 1 | Status: SHIPPED | OUTPATIENT
Start: 2020-05-12 | End: 2020-06-08

## 2020-05-13 RX ORDER — M-VIT,TX,IRON,MINS/CALC/FOLIC 27MG-0.4MG
1 TABLET ORAL DAILY
Qty: 30 TABLET | Refills: 3 | Status: SHIPPED | OUTPATIENT
Start: 2020-05-13 | End: 2020-05-14 | Stop reason: SDUPTHER

## 2020-05-14 RX ORDER — M-VIT,TX,IRON,MINS/CALC/FOLIC 27MG-0.4MG
1 TABLET ORAL DAILY
Qty: 30 TABLET | Refills: 3 | Status: SHIPPED | OUTPATIENT
Start: 2020-05-14 | End: 2022-04-28 | Stop reason: SDUPTHER

## 2020-05-18 RX ORDER — PANTOPRAZOLE SODIUM 40 MG/1
TABLET, DELAYED RELEASE ORAL
Qty: 30 TABLET | Refills: 1 | Status: SHIPPED | OUTPATIENT
Start: 2020-05-18 | End: 2020-06-03 | Stop reason: SDUPTHER

## 2020-05-20 ENCOUNTER — OFFICE VISIT (OUTPATIENT)
Dept: CARDIOLOGY CLINIC | Age: 50
End: 2020-05-20
Payer: MEDICAID

## 2020-05-20 VITALS
WEIGHT: 315 LBS | DIASTOLIC BLOOD PRESSURE: 68 MMHG | OXYGEN SATURATION: 97 % | HEART RATE: 59 BPM | HEIGHT: 72 IN | BODY MASS INDEX: 42.66 KG/M2 | TEMPERATURE: 98.5 F | SYSTOLIC BLOOD PRESSURE: 112 MMHG

## 2020-05-20 PROCEDURE — G8417 CALC BMI ABV UP PARAM F/U: HCPCS | Performed by: INTERNAL MEDICINE

## 2020-05-20 PROCEDURE — 99214 OFFICE O/P EST MOD 30 MIN: CPT | Performed by: INTERNAL MEDICINE

## 2020-05-20 PROCEDURE — 93000 ELECTROCARDIOGRAM COMPLETE: CPT | Performed by: INTERNAL MEDICINE

## 2020-05-20 PROCEDURE — 1036F TOBACCO NON-USER: CPT | Performed by: INTERNAL MEDICINE

## 2020-05-20 PROCEDURE — G8427 DOCREV CUR MEDS BY ELIG CLIN: HCPCS | Performed by: INTERNAL MEDICINE

## 2020-05-20 PROCEDURE — 3017F COLORECTAL CA SCREEN DOC REV: CPT | Performed by: INTERNAL MEDICINE

## 2020-05-20 NOTE — PROGRESS NOTES
Cardiac Electrophysiology Consultation   Date: 5/20/2020  Reason for Consultation: Syncope & paroxysmal atrial fibrillation  Consult Requesting Physician: none    Chief Complaint   Patient presents with    Atrial Fibrillation        HPI: Viky De La Cruz is a 47 y.o. with a PMH significant for obesity, LEONA non-compliant with CPAP, DM II, HTN and PAF who was admitted to [facility] 8/2018 s/p syncopal episode. He reported that he has been trying to loose weight and had made diet modifications resulting in diarrhea for several days prior. Regardless, he opted to go out with friends fishing when he noticed stomach cramping followed by the need to use the restroom. After having a bowel movement, he broke out into a heavy sweat, became lightheaded, and shortly thereafter \"passed out. \"  He reportedly felt fine when he \"came to. \" He has a known history of symptomatic paroxysmal afib and was back in the arrhythmia after the event. He states that he \"knows\" when he is in Afib but did not feel it that specific day. He believes that he has only been in afib 2-3 times. He denies chest pain, palpitations, orthopnea or edema. Patient was admitted to [facility] 11/13/18 and underwent paroxysmal atrial fibrillation ablation. Discharged home on flecainide 50 mg BID, Cardizem  mg daily and Eliquis 5mg BID. 30 day event monitor was worn from 3/21/19-4/19/19 and  showed SR with PAC. Interval history: Today, he presents to office for follow up for management of atrial fibrillation. He denies any known reoccurrence of the arrhthymia. He is compliant with his medications and tolerating them well. He denies chest pain/pressure, tightness, edema, shortness of breath, heart racing, palpitations, lightheadedness, dizziness, syncope, presyncope,  PND or orthopnea.      Past Medical History:   Diagnosis Date    Arthritis     Atrial fibrillation ([address]     Congestive heart failure (HCC)     Depression     Diabetes mellitus nightly). 1/6/14   Artie Snellen       Social History:   reports that he has never smoked. He has never used smokeless tobacco. He reports current drug use. Drugs: Marijuana and Cocaine. He reports that he does not drink alcohol. Family History:  family history includes Diabetes in his maternal aunt and mother; High Blood Pressure in his maternal grandmother and mother. Reviewed. Denies family history of sudden cardiac death, arrhythmia, premature CAD    Review of System:    · General ROS: negative for - chills, fever   · Psychological ROS: negative for - anxiety or depression  · Ophthalmic ROS: negative for - eye pain or loss of vision  · ENT ROS: negative for - epistaxis, headaches, nasal discharge, sore throat   · Allergy and Immunology ROS: negative for - hives, nasal congestion   · Hematological and Lymphatic ROS: negative for - bleeding problems, blood clots, bruising or jaundice  · Endocrine ROS: negative for - skin changes, temperature intolerance or unexpected weight changes  · Respiratory ROS: negative for - cough, hemoptysis, pleuritic pain, SOB, sputum changes or wheezing  · Cardiovascular ROS: Per HPI. · Gastrointestinal ROS: negative for - abdominal pain, blood in stools, diarrhea, hematemesis, melena,  nausea/vomiting or swallowing difficulty/pain +obesity  · Genito-Urinary ROS: negative for - dysuria or incontinence  · Musculoskeletal ROS: negative for - joint swelling or muscle pain  · Neurological ROS: negative for - confusion, dizziness, gait disturbance, headaches, numbness/tingling, seizures, speech problems, tremors, visual changes or weakness  · Dermatological ROS: negative for - rash    Physical Examination:  There were no vitals filed for this visit. · Constitutional: Oriented. No distress. · Head: Normocephalic and atraumatic. · Mouth/Throat: Oropharynx is clear and moist.   · Eyes: Conjunctivae normal. EOM are normal.   · Neck: Normal range of motion. Neck supple.  No rigidity. No JVD present. · Cardiovascular: regualr rate, regular rhythm, Normal S1&S2 and intact distal pulses. · Pulmonary/Chest: Bilateral respiratory sounds. No wheezes. No rhonchi. · Abdominal: Soft. Bowel sounds present. No distension, No tenderness. · Musculoskeletal: No tenderness. No edema    · Lymphadenopathy: Has no cervical adenopathy. · Neurological: Alert and oriented. Cranial nerve appears intact, No Gross deficit   · Skin: Skin is warm and dry. No rash noted. · Psychiatric: Has a normal mood, affect and behavior     Labs:  Reviewed. Cr 1.0 (2018)     EC20 reviewed, Sinus bradycardia  - frequent PAC s      Other Non-Invasive Studies:   1. Event monitor: 2014  NSR with rare PVC's    30 day event monitor 3/21/19-19  SR with PAC. 2. Echo: 14  Left ventricle size is normal. There is moderate concentric left ventricular  hypertrophy. No regional wall motion abnormalities are noted. Ejection  fraction is visually estimated to be 65%. There is diastolic dysfunction. The left atrium is mildly dilated. Mild pulmonary regurgitation. No significant aortic or mitral valvular disease; no pericardial effusion. LA Dimension: 4.2 cm    3. Stress ECHO: 16  Stress ECG: Negative for ischemia. Baseline resting echocardiogram shows normal global LV systolic function  with an ejection fraction of 50-55%. No obvious regional wall motion  abnormalitieis were present. Post exercise, images were acquired well below  target and endomyocardial visualization was difficult, even with Definity  echo contrast use. No obvious ischemia was noted, but the echo is very  insensitive for detection of ischemia. 4. Cath:   None    5. ECG  18: Sinus  Bradycardia  - frequent PAC s, # PACs = 3, Low voltage in precordial leads,  -Left axis, consider septal infarct, age indeterminate, -nonspecific inferolateral T wave abnormalities, Nonspecific T-abnormality.    3/6/19: Sinus rhythm [facility]  Cardiac Electrophysiology  [address], [address] [facility]  [facility] [address]  [phone]

## 2020-05-27 RX ORDER — FUROSEMIDE 80 MG
TABLET ORAL
Qty: 60 TABLET | Refills: 0 | Status: SHIPPED | OUTPATIENT
Start: 2020-05-27 | End: 2020-06-15 | Stop reason: SDUPTHER

## 2020-05-27 RX ORDER — LISINOPRIL 10 MG/1
TABLET ORAL
Qty: 30 TABLET | Refills: 0 | Status: SHIPPED | OUTPATIENT
Start: 2020-05-27 | End: 2020-06-12 | Stop reason: SDUPTHER

## 2020-06-02 ENCOUNTER — OFFICE VISIT (OUTPATIENT)
Dept: PRIMARY CARE CLINIC | Age: 50
End: 2020-06-02

## 2020-06-03 RX ORDER — PANTOPRAZOLE SODIUM 40 MG/1
TABLET, DELAYED RELEASE ORAL
Qty: 30 TABLET | Refills: 1 | Status: SHIPPED | OUTPATIENT
Start: 2020-06-03 | End: 2020-06-08 | Stop reason: SDUPTHER

## 2020-06-04 ENCOUNTER — NURSE TRIAGE (OUTPATIENT)
Dept: OTHER | Facility: CLINIC | Age: 50
End: 2020-06-04

## 2020-06-04 LAB
SARS-COV-2: DETECTED
SOURCE: ABNORMAL

## 2020-06-08 RX ORDER — INSULIN LISPRO 100 [IU]/ML
7 INJECTION, SOLUTION INTRAVENOUS; SUBCUTANEOUS
Qty: 2 PEN | Refills: 2 | Status: SHIPPED | OUTPATIENT
Start: 2020-06-08 | End: 2020-08-18 | Stop reason: SDUPTHER

## 2020-06-09 RX ORDER — PANTOPRAZOLE SODIUM 40 MG/1
TABLET, DELAYED RELEASE ORAL
Qty: 90 TABLET | Refills: 0 | Status: SHIPPED | OUTPATIENT
Start: 2020-06-09 | End: 2020-10-06 | Stop reason: SDUPTHER

## 2020-06-12 RX ORDER — LISINOPRIL 10 MG/1
TABLET ORAL
Qty: 30 TABLET | Refills: 1 | Status: SHIPPED | OUTPATIENT
Start: 2020-06-12 | End: 2020-06-15 | Stop reason: SDUPTHER

## 2020-06-18 RX ORDER — FUROSEMIDE 80 MG
TABLET ORAL
Qty: 180 TABLET | Refills: 0 | Status: SHIPPED | OUTPATIENT
Start: 2020-06-18 | End: 2020-10-29

## 2020-06-18 RX ORDER — LISINOPRIL 10 MG/1
TABLET ORAL
Qty: 90 TABLET | Refills: 0 | Status: SHIPPED | OUTPATIENT
Start: 2020-06-18 | End: 2020-10-06 | Stop reason: SDUPTHER

## 2020-07-02 RX ORDER — ALLOPURINOL 300 MG/1
TABLET ORAL
Qty: 90 TABLET | Refills: 0 | Status: SHIPPED | OUTPATIENT
Start: 2020-07-02 | End: 2020-09-28

## 2020-07-08 ENCOUNTER — OFFICE VISIT (OUTPATIENT)
Dept: CARDIOLOGY CLINIC | Age: 50
End: 2020-07-08
Payer: MEDICAID

## 2020-07-08 VITALS
BODY MASS INDEX: 42.66 KG/M2 | DIASTOLIC BLOOD PRESSURE: 80 MMHG | HEART RATE: 66 BPM | HEIGHT: 72 IN | SYSTOLIC BLOOD PRESSURE: 122 MMHG | WEIGHT: 315 LBS | TEMPERATURE: 98.2 F | OXYGEN SATURATION: 96 %

## 2020-07-08 PROCEDURE — 93000 ELECTROCARDIOGRAM COMPLETE: CPT | Performed by: INTERNAL MEDICINE

## 2020-07-08 PROCEDURE — 99204 OFFICE O/P NEW MOD 45 MIN: CPT | Performed by: INTERNAL MEDICINE

## 2020-07-08 NOTE — PROGRESS NOTES
resting echocardiogram shows normal global LV systolic function   with an ejection fraction of 50-55%. No obvious regional wall motion   abnormalitieis were present. Post exercise, images were acquired well below   target and endomyocardial visualization was difficult, even with Definity   echo contrast use. No obvious ischemia was noted, but the echo is very   insensitive for detection of ischemia. Cath:       30 day event monitor 4/2019  Sinus rhythm with PAC    Past Medical History:   has a past medical history of Arthritis, Atrial fibrillation ([address] Congestive heart failure ([address] Depression, Diabetes mellitus ([address] GERD (gastroesophageal reflux disease), Hyperlipidemia, Hypertension, Morbid obesity ([address] Sleep apnea, and Type II or unspecified type diabetes mellitus without mention of complication, not stated as uncontrolled. Surgical History:   has no past surgical history on file. Social History:   reports that he has never smoked. He has never used smokeless tobacco. He reports current drug use. Drugs: Marijuana and Cocaine. He reports that he does not drink alcohol. Family History:  family history includes Diabetes in his maternal aunt and mother; High Blood Pressure in his maternal grandmother and mother. Home Medications:  Were reviewed and are listed in nursing record and/or below  Prior to Admission medications    Medication Sig Start Date End Date Taking?  Authorizing Provider   allopurinol (ZYLOPRIM) 300 MG tablet TAKE 1 TABLET BY MOUTH EVERY DAY 7/2/20  Yes Mac Anne MD   metFORMIN (GLUCOPHAGE) 500 MG tablet TAKE 1 TABLET BY MOUTH TWICE A DAY WITH MEALS 7/1/20  Yes Segun Conroy,    apixaban (ELIQUIS) 5 MG TABS tablet TAKE 1 TABLET BY MOUTH TWICE A DAY 6/26/20  Yes Mac Anne MD   furosemide (LASIX) 80 MG tablet TAKE 1 TABLET BY MOUTH TWICE A DAY 6/18/20  Yes Briana Quintero MD   lisinopril (PRINIVIL;ZESTRIL) 10 MG tablet take one tablet by mouth every day 6/18/20 Yes Mariusz Fisher MD   pantoprazole (PROTONIX) 40 MG tablet TAKE 1 TABLET BY MOUTH EVERY DAY BEFORE BREAKFAST 6/9/20  Yes Daphne Vega MD   insulin lispro, 1 Unit Dial, 100 UNIT/ML SOPN INJECT 7 UNITS INTO THE SKIN 3 TIMES DAILY (BEFORE MEALS) 6/8/20  Yes Daphne Vega MD   Multiple Vitamins-Minerals (THERAPEUTIC MULTIVITAMIN-MINERALS) tablet Take 1 tablet by mouth daily 5/14/20  Yes Justen Cotto MD   BD INSULIN SYRINGE U/F 31G X 5/16\" 1 ML MISC USE AS DIRECTED 4 TIMES A DAY 5/4/20  Yes Leonor Higgins MD   potassium chloride (KLOR-CON M20) 20 MEQ extended release tablet TAKE 1 TABLET BY MOUTH EVERY DAY WITH BREAKFAST 4/27/20  Yes Belle Manley MD   ibuprofen (ADVIL;MOTRIN) 800 MG tablet Take 1 tablet by mouth every 8 hours as needed for Pain (Take with food) 4/87/49  Yes Briana Ponce MD   atorvastatin (LIPITOR) 20 MG tablet TAKE 1 TABLET BY MOUTH EVERY DAY AT NIGHT 6/9/54  Yes Briana Ponce MD   sildenafil (VIAGRA) 50 MG tablet Take 1 tablet by mouth as needed for Erectile Dysfunction 2/14/20  Yes Daphne Vega MD   fluticasone (FLONASE) 50 MCG/ACT nasal spray 1 spray by Nasal route daily 1/6/20  Yes Amos Neely MD   diltiazem (CARDIZEM CD) 120 MG extended release capsule TAKE 1 CAPSULE BY MOUTH EVERY DAY 12/19/19  Yes Juan Brewer MD   blood glucose monitor strips by Other route 4 times daily (after meals and at bedtime) 85/79/06  Yes Briana Ponce MD   insulin glargine (BASAGLAR KWIKPEN) 100 UNIT/ML injection pen patient using vials per [facility]  inject 20 units at hs 11/12/19  Yes Jam Lee MD   Insulin Pen Needle ([employer] PEN NEEDLES) 31G X 6 MM MISC 1 each by Does not apply route daily 10/3/19  Yes Daphne Vega MD   Alcohol Swabs PADS 1 Container by Does not apply route three times daily 10/1/19  Yes Daphne Vega MD   ACCU-CHEK FASTCLIX LANCETS MISC 20 Sticks by Does not apply route 2 times daily 10/1/19  Yes Daphne Vega MD   [facility] VITAMIN C 250 MG tablet TAKE 1 TABLET BY MOUTH EVERY DAY 3/2/20  Yes Sandra Oneill MD   Blood Pressure KIT 1 Units by Does not apply route daily 4/17/17  Yes Yahir Martin MD   Blood Glucose Monitoring Suppl (BLOOD GLUCOSE MONITOR SYSTEM) W/DEVICE KIT 1 Units by Does not apply route daily 4/17/17  Yes Yahir Martin MD   Handicap Placard MISC by Does not apply route Valid for 1 year. Mobility limitations due to severe Osteoarthritis of L Knee 8/15/15  Yes Craig Leon MD   Blood Glucose Monitoring Suppl (ACCU-CHEK VIK PLUS) W/DEVICE KIT 1 kit by Does not apply route 4 times daily (before meals and nightly). 1/6/14  Yes Scott JANETT [place]   Blood Pressure Monitoring (WRIST BLOOD PRESSURE MONITOR) MISC 1 Units by Does not apply route once for 1 dose 12/27/18 12/27/18  Xiao England MD          Allergies:  Patient has no known allergies. Review of Systems:   · Constitutional: no unanticipated weight loss. There's been no change in energy level, sleep pattern, or activity level. No fevers, chills. · Eyes: No visual changes or diplopia. No scleral icterus. · ENT: No Headaches, hearing loss or vertigo. No mouth sores or sore throat. · Cardiovascular: as reviewed in HPI  · Respiratory: No cough or wheezing, no sputum production. No hemoptysis. · Gastrointestinal: No abdominal pain, appetite loss, blood in stools. No change in bowel or bladder habits. · Genitourinary: No dysuria, trouble voiding, or hematuria. · Musculoskeletal:  No gait disturbance, no joint complaints. · Integumentary: No rash or pruritis. · Neurological: No headache, diplopia, change in muscle strength, numbness or tingling. · Psychiatric: No anxiety or depression. · Endocrine: No temperature intolerance. No excessive thirst, fluid intake, or urination. No tremor. · Hematologic/Lymphatic: No abnormal bruising or bleeding, blood clots or swollen lymph nodes. · Allergic/Immunologic: No nasal congestion or hives.     Objective:   PHYSICAL EXAM:    Vitals: 07/08/20 1420   BP: 122/80   Pulse: 66   Temp: 98.2 °F (36.8 °C)   SpO2: 96%    Weight: (!) 384 lb (174.2 kg)       General Appearance:  Alert, cooperative, no distress, appears stated age. Head:  Normocephalic, without obvious abnormality, atraumatic. Eyes:  Pupils equal and round. No scleral icterus. Mouth: Moist mucosa, no pharyngeal erythema. Nose: Nares normal. No drainage or sinus tenderness. Neck: Supple, symmetrical, trachea midline. No adenopathy. No tenderness/mass/nodules. No carotid bruit or elevated JVD. Lungs:   Clear to auscultation bilaterally, respirations unlabored. No wheeze, rales, or rhonchi. Chest Wall:  No tenderness or deformity. Heart:  Regular rate. S1/S2 normal. No murmur, rub, or gallop. Abdomen:   Soft, non-tender, bowel sounds active. Musculoskeletal: No muscle wasting or digital clubbing. Extremities: Extremities normal, atraumatic. No cyanosis or edema. Pulses: 2+ radial and carotid pulses, symmetric. Skin: No rashes or lesions. Pysch: Normal mood and affect. Alert and oriented x 4.    Neurologic: Normal gross motor and sensory exam.       Labs     CBC:   Lab Results   Component Value Date    WBC 6.4 02/25/2020    RBC 4.82 02/25/2020    HGB 14.9 02/25/2020    HCT 46.0 02/25/2020    MCV 95.4 02/25/2020    RDW 13.4 02/25/2020     02/25/2020     CMP:  Lab Results   Component Value Date     06/23/2019    K 3.6 06/23/2019    K 3.9 06/20/2019    CL 99 06/23/2019    CO2 28 06/23/2019    BUN 15 06/23/2019    CREATININE 1.3 06/23/2019    GFRAA >60 06/23/2019    AGRATIO 1.6 06/09/2016    LABGLOM 58 06/23/2019    GLUCOSE 158 06/23/2019    PROT 7.0 06/09/2016    PROT 8.1 09/28/2011    CALCIUM 9.5 06/23/2019    BILITOT 0.5 06/09/2016    ALKPHOS 83 06/09/2016    AST 18 06/09/2016    ALT 24 06/09/2016     PT/INR:  No results found for: PTINR  HgBA1c:  Lab Results   Component Value Date    LABA1C 6.0 02/25/2020     Lab Results   Component Value Date TROPONINI <0.01 06/23/2019       CURRENT Medications:  Current Outpatient Medications on File Prior to Visit   Medication Sig Dispense Refill    allopurinol (ZYLOPRIM) 300 MG tablet TAKE 1 TABLET BY MOUTH EVERY DAY 90 tablet 0    metFORMIN (GLUCOPHAGE) 500 MG tablet TAKE 1 TABLET BY MOUTH TWICE A DAY WITH MEALS 180 tablet 1    apixaban (ELIQUIS) 5 MG TABS tablet TAKE 1 TABLET BY MOUTH TWICE A DAY 60 tablet 0    furosemide (LASIX) 80 MG tablet TAKE 1 TABLET BY MOUTH TWICE A  tablet 0    lisinopril (PRINIVIL;ZESTRIL) 10 MG tablet take one tablet by mouth every day 90 tablet 0    pantoprazole (PROTONIX) 40 MG tablet TAKE 1 TABLET BY MOUTH EVERY DAY BEFORE BREAKFAST 90 tablet 0    insulin lispro, 1 Unit Dial, 100 UNIT/ML SOPN INJECT 7 UNITS INTO THE SKIN 3 TIMES DAILY (BEFORE MEALS) 2 pen 2    Multiple Vitamins-Minerals (THERAPEUTIC MULTIVITAMIN-MINERALS) tablet Take 1 tablet by mouth daily 30 tablet 3    BD INSULIN SYRINGE U/F 31G X 5/16\" 1 ML MISC USE AS DIRECTED 4 TIMES A  each 5    potassium chloride (KLOR-CON M20) 20 MEQ extended release tablet TAKE 1 TABLET BY MOUTH EVERY DAY WITH BREAKFAST 30 tablet 2    ibuprofen (ADVIL;MOTRIN) 800 MG tablet Take 1 tablet by mouth every 8 hours as needed for Pain (Take with food) 30 tablet 0    atorvastatin (LIPITOR) 20 MG tablet TAKE 1 TABLET BY MOUTH EVERY DAY AT NIGHT 30 tablet 2    sildenafil (VIAGRA) 50 MG tablet Take 1 tablet by mouth as needed for Erectile Dysfunction 30 tablet 3    fluticasone (FLONASE) 50 MCG/ACT nasal spray 1 spray by Nasal route daily 1 Bottle 2    diltiazem (CARDIZEM CD) 120 MG extended release capsule TAKE 1 CAPSULE BY MOUTH EVERY DAY 90 capsule 5    blood glucose monitor strips by Other route 4 times daily (after meals and at bedtime) 60 strip 2    insulin glargine (BASAGLAR KWIKPEN) 100 UNIT/ML injection pen patient using vials per [facility]  inject 20 units at hs 5 mL 2    Insulin Pen Needle ([employer] PEN NEEDLES) 31G X 6 MM MISC 1 each by Does not apply route daily 100 each 3    Alcohol Swabs PADS 1 Container by Does not apply route three times daily 100 each 5    ACCU-CHEK FASTCLIX LANCETS MISC 20 Sticks by Does not apply route 2 times daily 20 each 3    [facility] VITAMIN C 250 MG tablet TAKE 1 TABLET BY MOUTH EVERY DAY 30 tablet 3    Blood Pressure KIT 1 Units by Does not apply route daily 1 kit 0    Blood Glucose Monitoring Suppl (BLOOD GLUCOSE MONITOR SYSTEM) W/DEVICE KIT 1 Units by Does not apply route daily 1 kit 0    Handicap Placard MISC by Does not apply route Valid for 1 year. Mobility limitations due to severe Osteoarthritis of L Knee 1 each 0    Blood Glucose Monitoring Suppl (ACCU-CHEK VIK PLUS) W/DEVICE KIT 1 kit by Does not apply route 4 times daily (before meals and nightly). 2 kit 0    Blood Pressure Monitoring (WRIST BLOOD PRESSURE MONITOR) MISC 1 Units by Does not apply route once for 1 dose 1 each 0     No current facility-administered medications on file prior to visit. Assessment and Plan   1) Paroxysmal atrial fibrillation  S/p ablation on 11/13/18. EKG confirms regular rhythm today. Asymptomatic. Continue Eliquis    2) Hypertension  Controlled  Continue current medical management    3) LEONA  Complaint with Cpap    4) Dyspnea with exertion  Echocardiogram  GXT    5) Diabetes  Encourage him to establish care with PCP. 6) Obesity   Referral to weigh management    Follow up in 1 year. Thank you for allowing us to participate in the care of Kemal De La Cruz. Ja Joshua MD [address] [place] [address] and Interventional Cardiology   [facility]   (C): [phone]  [facility]): [phone]     This note was scribed in the presence of Dr Barkley Krabbe, by Omar Akers RN\    Physician Attestation:  The scribes documentation has been prepared under my direction and personally reviewed by me in its entirety.      I confirm the note above accurately reflects all work, treatment, procedures, and medical decision making performed by me.     Electronically signed by Clementine Milner MD on 7/12/2020 at 9:21 PM

## 2020-07-13 ENCOUNTER — HOSPITAL ENCOUNTER (EMERGENCY)
Age: 50
Discharge: HOME | End: 2020-07-13
Attending: EMERGENCY MEDICINE
Payer: MEDICAID

## 2020-07-13 VITALS
SYSTOLIC BLOOD PRESSURE: 160 MMHG | HEIGHT: 72 IN | HEART RATE: 69 BPM | DIASTOLIC BLOOD PRESSURE: 95 MMHG | OXYGEN SATURATION: 97 % | TEMPERATURE: 98.1 F | RESPIRATION RATE: 12 BRPM | WEIGHT: 315 LBS | BODY MASS INDEX: 42.66 KG/M2

## 2020-07-13 PROCEDURE — 10061 I&D ABSCESS COMP/MULTIPLE: CPT

## 2020-07-13 PROCEDURE — 99282 EMERGENCY DEPT VISIT SF MDM: CPT

## 2020-07-13 RX ORDER — SULFAMETHOXAZOLE AND TRIMETHOPRIM 800; 160 MG/1; MG/1
1 TABLET ORAL 2 TIMES DAILY
Qty: 20 TABLET | Refills: 0 | Status: SHIPPED | OUTPATIENT
Start: 2020-07-13 | End: 2020-07-23

## 2020-07-13 RX ORDER — LIDOCAINE HYDROCHLORIDE AND EPINEPHRINE 10; 10 MG/ML; UG/ML
20 INJECTION, SOLUTION INFILTRATION; PERINEURAL ONCE
Status: DISCONTINUED | OUTPATIENT
Start: 2020-07-13 | End: 2020-07-14 | Stop reason: HOSPADM

## 2020-07-13 RX ORDER — CEPHALEXIN 250 MG/1
250 CAPSULE ORAL 4 TIMES DAILY
Qty: 28 CAPSULE | Refills: 0 | Status: SHIPPED | OUTPATIENT
Start: 2020-07-13 | End: 2020-07-20

## 2020-07-13 ASSESSMENT — PAIN DESCRIPTION - FREQUENCY: FREQUENCY: CONTINUOUS

## 2020-07-13 ASSESSMENT — PAIN DESCRIPTION - PAIN TYPE: TYPE: ACUTE PAIN

## 2020-07-13 ASSESSMENT — PAIN SCALES - GENERAL: PAINLEVEL_OUTOF10: 8

## 2020-07-13 ASSESSMENT — PAIN DESCRIPTION - LOCATION: LOCATION: BUTTOCKS

## 2020-07-13 ASSESSMENT — PAIN DESCRIPTION - DESCRIPTORS: DESCRIPTORS: ACHING

## 2020-07-13 ASSESSMENT — PAIN DESCRIPTION - ORIENTATION: ORIENTATION: RIGHT

## 2020-07-14 NOTE — ED PROVIDER NOTES
ED Attending Attestation Note     Date of evaluation: 7/13/2020    This patient was seen by the resident. I have seen and examined the patient, agree with the workup, evaluation, management and diagnosis. The care plan has been discussed. My assessment reveals a morbidly obese gentleman, pleasantly conversational, in no acute distress. He presents with a abscess on the inferolateral aspect of the right buttock cheek. There is an obvious central fluctuance, with surrounding well-circumscribed induration, with associated warmth and erythema and tenderness to palpation. There is no evidence of extension toward the rectal canal.    I did supervise the incision and drainage of this abscess by the resident physician, and was present for key portions of that procedure.        Myriam Rangel MD  07/14/20 [id number]

## 2020-07-14 NOTE — ED PROVIDER NOTES
[address] ENCOUNTER          EM RESIDENT NOTE       Date of evaluation: 7/13/2020    Chief Complaint     Abscess (Pt c/o pain to abscess on right buttock)      of Present Illness     Charis De La Cruz is a 47 y.o. male with the PMHx listed below, notable for hypertension, diabetes, morbid obesity, recurrent gluteal abscesses presenting with a gluteal abscess is been going on for the past few days. Denies any fevers or chills. States that he has the pain when he sits down on the toilet bowl. No pain with defecation. No pain with straining. Pain is exacerbated with palpation alleviated with nothing. With the exception of above, the patient denies any aggravating or alleviating factors as well as any other associated signs or symptoms. Review of Systems     ROS: Pertinent positive and negative findings as documented in the HPI otherwise all other systems were reviewed and were negative    Past Medical, Surgical, Family, and Social History     He has a past medical history of Arthritis, Atrial fibrillation ([address] Congestive heart failure ([address] Depression, Diabetes mellitus ([address] GERD (gastroesophageal reflux disease), Hyperlipidemia, Hypertension, Morbid obesity ([address] Sleep apnea, and Type II or unspecified type diabetes mellitus without mention of complication, not stated as uncontrolled. He has no past surgical history on file. His family history includes Diabetes in his maternal aunt and mother; High Blood Pressure in his maternal grandmother and mother. He reports that he has never smoked. He has never used smokeless tobacco. He reports current drug use. Drugs: Marijuana and Cocaine. He reports that he does not drink alcohol.     Medications     Previous Medications    ACCU-CHEK FASTCLIX LANCETS MISC    20 Sticks by Does not apply route 2 times daily    ALCOHOL SWABS PADS    1 Container by Does not apply route three times daily    ALLOPURINOL (ZYLOPRIM) 300 MG TABLET TAKE 1 TABLET BY MOUTH EVERY DAY    APIXABAN (ELIQUIS) 5 MG TABS TABLET    TAKE 1 TABLET BY MOUTH TWICE A DAY    ATORVASTATIN (LIPITOR) 20 MG TABLET    TAKE 1 TABLET BY MOUTH EVERY DAY AT NIGHT    BD INSULIN SYRINGE U/F 31G X 5/16\" 1 ML MISC    USE AS DIRECTED 4 TIMES A DAY    BLOOD GLUCOSE MONITOR STRIPS    by Other route 4 times daily (after meals and at bedtime)    BLOOD GLUCOSE MONITORING SUPPL (ACCU-CHEK VIK PLUS) W/DEVICE KIT    1 kit by Does not apply route 4 times daily (before meals and nightly). BLOOD GLUCOSE MONITORING SUPPL (BLOOD GLUCOSE MONITOR SYSTEM) W/DEVICE KIT    1 Units by Does not apply route daily    BLOOD PRESSURE KIT    1 Units by Does not apply route daily    BLOOD PRESSURE MONITORING (WRIST BLOOD PRESSURE MONITOR) MISC    1 Units by Does not apply route once for 1 dose    [facility] VITAMIN C 250 MG TABLET    TAKE 1 TABLET BY MOUTH EVERY DAY    DILTIAZEM (CARDIZEM CD) 120 MG EXTENDED RELEASE CAPSULE    TAKE 1 CAPSULE BY MOUTH EVERY DAY    FLUTICASONE (FLONASE) 50 MCG/ACT NASAL SPRAY    1 spray by Nasal route daily    FUROSEMIDE (LASIX) 80 MG TABLET    TAKE 1 TABLET BY MOUTH TWICE A DAY    HANDICAP PLACARD MISC    by Does not apply route Valid for 1 year. Mobility limitations due to severe Osteoarthritis of L Knee    IBUPROFEN (ADVIL;MOTRIN) 800 MG TABLET    Take 1 tablet by mouth every 8 hours as needed for Pain (Take with food)    INSULIN GLARGINE (BASAGLAR KWIKPEN) 100 UNIT/ML INJECTION PEN    patient using vials per [facility]  inject 20 units at hs    INSULIN LISPRO, 1 UNIT DIAL, 100 UNIT/ML SOPN    INJECT 7 UNITS INTO THE SKIN 3 TIMES DAILY (BEFORE MEALS)    INSULIN PEN NEEDLE ([employer] PEN NEEDLES) 31G X 6 MM MISC    1 each by Does not apply route daily    LISINOPRIL (PRINIVIL;ZESTRIL) 10 MG TABLET    take one tablet by mouth every day    METFORMIN (GLUCOPHAGE) 500 MG TABLET    TAKE 1 TABLET BY MOUTH TWICE A DAY WITH MEALS    MULTIPLE VITAMINS-MINERALS (THERAPEUTIC MULTIVITAMIN-MINERALS) TABLET    Take 1 tablet by mouth daily    PANTOPRAZOLE (PROTONIX) 40 MG TABLET    TAKE 1 TABLET BY MOUTH EVERY DAY BEFORE BREAKFAST    POTASSIUM CHLORIDE (KLOR-CON M20) 20 MEQ EXTENDED RELEASE TABLET    TAKE 1 TABLET BY MOUTH EVERY DAY WITH BREAKFAST    SILDENAFIL (VIAGRA) 50 MG TABLET    Take 1 tablet by mouth as needed for Erectile Dysfunction       Allergies     He has No Known Allergies. Physical Exam     INITIAL VITALS: BP: (!) 160/95, Temp: 98.1 °F (36.7 °C), Pulse: 69, Resp: 12, SpO2: 97 %     General:  Well appearing and resting comfortably. No acute distress. Eyes:  PERRLA. EOMI. No discharge from eyes. ENT:  No discharge from nose. OP clear. No tonsillar exudates. Neck:  Supple without lymphadenopathy. Pulmonary:   Non-labored breathing. Lungs CTAB. Cardiac:  Regular rate and rhythm. No murmurs, rubs, or gallops. Abdomen:  Soft. Non-tender. Non-distended. No rigidity/rebound/guarding  Musculoskeletal:  No long bone deformity. No CVA tenderness. Vascular:  Extremities warm and perfused. Capillary refill <2seconds. Skin:  No rash. Neuro: Alert and oriented x 4. CN II-XII intact. 5/5 strength in all 4 distal extremities. Sensation grossly intact to light touch. Speech and mentation normal.   Extremities:  No peripheral edema. Gluteal: An area of well-circumscribed 4 cm x 4 cm erythema induration and fluctuance over the right lower gluteal cheek which does not extend to the anus. DiagnosticResults         RADIOLOGY:  No orders to display       LABS:   No results found for this visit on 07/13/20. RECENT VITALS:  BP: (!) 160/95, Temp: 98.1 °F (36.7 °C), Pulse: 69,Resp: 12, SpO2: 97 %     Procedures   Incision/Drainage    Date/Time: 7/13/2020 11:22 PM  Performed by: Maxine Li MD  Authorized by:  Eleni Carter MD     Consent:     Consent obtained:  Verbal    Consent given by:  Patient  Location:     Type:  Abscess    Location:  Anogenital    Anogenital location: R gluteal cheek.  Pre-procedure details:     Skin preparation:  Antiseptic wash  Procedure type:     Complexity:  Simple  Procedure details:     Incision types:  Stab incision    Incision depth:  Dermal    Scalpel blade:  11    Wound management:  Probed and deloculated    Drainage:  Purulent and bloody    Drainage amount: Moderate    Wound treatment:  Wound left open    Packing materials:  None  Post-procedure details:     Patient tolerance of procedure: Tolerated well, no immediate complications          ED Course     Nursing Notes, Past Medical Hx, Past Surgical Hx, Social Hx, Allergies, and Family Hx were reviewed. The patient was given the followingmedications:  Orders Placed This Encounter   Medications    lidocaine-EPINEPHrine 1 percent-1:562954 injection 20 mL    sulfamethoxazole-trimethoprim (BACTRIM DS) 800-160 MG per tablet     Sig: Take 1 tablet by mouth 2 times daily for 10 days     Dispense:  20 tablet     Refill:  0    cephALEXin (KEFLEX) 250 MG capsule     Sig: Take 1 capsule by mouth 4 times daily for 7 days     Dispense:  28 capsule     Refill:  0       CONSULTS:  None    MEDICAL DECISION MAKING / ASSESSMENT / Makenzie ESCALERA Jono is a 47 y.o. male who presented to the emergency department with Abscess (Pt c/o pain to abscess on right buttock)   with a history and presentation as described above in HPI. The patient was evaluated by myself and the ED Attending Physician listed above. All management and disposition plans were discussed and agreed upon. Furthermore, a thorough chart review was performed during this encounter. The patient presented with signs and symptoms of an infectious abscess. There was minimal erythema/induration, no immunocompromise, and no severe pain out of proportion to exam concerning for necrotizing soft tissue disease. The patient had no systemic signs, such as fever or vomiting.   The abscess was drained (see related procedure note) the patient was provided with appropriate return precautions. Antibiotics were  Prescribed (bactrim and keflex). The patient was advised to follow up for a wound check within one week or sooner if any signs of worsening symptoms. The patient was reassessed prior to final disposition. Summary of therapeutics provided in the emergency department:  Medications   lidocaine-EPINEPHrine 1 percent-1:581945 injection 20 mL (has no administration in time range)       Clinical Impression     1. Abscess        Disposition     PATIENT REFERRED TO:  No follow-up provider specified.     DISCHARGE MEDICATIONS:  New Prescriptions    CEPHALEXIN (KEFLEX) 250 MG CAPSULE    Take 1 capsule by mouth 4 times daily for 7 days    SULFAMETHOXAZOLE-TRIMETHOPRIM (BACTRIM DS) 800-160 MG PER TABLET    Take 1 tablet by mouth 2 times daily for 10 days       DISPOSITION Decision To Discharge 07/13/2020 11:20:17 PM       Milly Byrne MD  Resident  07/13/20 [id number]

## 2020-07-16 ENCOUNTER — TELEPHONE (OUTPATIENT)
Dept: INTERNAL MEDICINE CLINIC | Age: 50
End: 2020-07-16

## 2020-07-27 RX ORDER — POTASSIUM CHLORIDE 20 MEQ/1
TABLET, EXTENDED RELEASE ORAL
Qty: 90 TABLET | Refills: 0 | Status: SHIPPED | OUTPATIENT
Start: 2020-07-27 | End: 2020-07-28 | Stop reason: SDUPTHER

## 2020-07-27 RX ORDER — PEN NEEDLE, DIABETIC 31 G X1/4"
1 NEEDLE, DISPOSABLE MISCELLANEOUS DAILY
Qty: 100 EACH | Refills: 3 | Status: SHIPPED | OUTPATIENT
Start: 2020-07-27 | End: 2020-07-28 | Stop reason: SDUPTHER

## 2020-07-27 NOTE — TELEPHONE ENCOUNTER
Please call and ask the patient to get labs done. With the potassium he is taking we need to check the levels and kidney function on occasion. He has some lab work ordered from his last visit he should go and get done.

## 2020-07-28 RX ORDER — POTASSIUM CHLORIDE 20 MEQ/1
TABLET, EXTENDED RELEASE ORAL
Qty: 90 TABLET | Refills: 0 | Status: SHIPPED | OUTPATIENT
Start: 2020-07-28 | End: 2020-11-03

## 2020-07-28 RX ORDER — PEN NEEDLE, DIABETIC 31 G X1/4"
1 NEEDLE, DISPOSABLE MISCELLANEOUS DAILY
Qty: 100 EACH | Refills: 3 | Status: SHIPPED | OUTPATIENT
Start: 2020-07-28 | End: 2022-05-11 | Stop reason: SDUPTHER

## 2020-08-06 RX ORDER — DILTIAZEM HYDROCHLORIDE 120 MG/1
120 CAPSULE, COATED, EXTENDED RELEASE ORAL DAILY
Qty: 90 CAPSULE | Refills: 2 | OUTPATIENT
Start: 2020-08-06

## 2020-08-10 RX ORDER — DILTIAZEM HYDROCHLORIDE 120 MG/1
CAPSULE, COATED, EXTENDED RELEASE ORAL
Qty: 90 CAPSULE | Refills: 0 | Status: SHIPPED | OUTPATIENT
Start: 2020-08-10 | End: 2020-11-25

## 2020-08-10 NOTE — TELEPHONE ENCOUNTER
refill Diltiazem  last OV 4-14-20  next appt.  not scheduled  scheduled for ECHO and Stress Test  8-24-20

## 2020-08-14 RX ORDER — INSULIN LISPRO 100 [IU]/ML
7 INJECTION, SOLUTION INTRAVENOUS; SUBCUTANEOUS
Qty: 2 PEN | Refills: 2 | Status: CANCELLED | OUTPATIENT
Start: 2020-08-14

## 2020-08-18 RX ORDER — INSULIN LISPRO 100 [IU]/ML
7 INJECTION, SOLUTION INTRAVENOUS; SUBCUTANEOUS
Qty: 2 PEN | Refills: 1 | Status: SHIPPED | OUTPATIENT
Start: 2020-08-18 | End: 2020-11-20 | Stop reason: SDUPTHER

## 2020-09-24 ENCOUNTER — APPOINTMENT (OUTPATIENT)
Dept: GENERAL RADIOLOGY | Age: 50
End: 2020-09-24
Payer: MEDICAID

## 2020-09-24 ENCOUNTER — HOSPITAL ENCOUNTER (EMERGENCY)
Age: 50
Discharge: HOME | End: 2020-09-25
Attending: EMERGENCY MEDICINE
Payer: MEDICAID

## 2020-09-24 LAB
ANION GAP SERPL CALCULATED.3IONS-SCNC: 13 MMOL/L (ref 3–16)
BASOPHILS ABSOLUTE: 0.1 K/UL (ref 0–0.2)
BASOPHILS RELATIVE PERCENT: 1.1 %
BUN BLDV-MCNC: 15 MG/DL (ref 7–20)
CALCIUM SERPL-MCNC: 9.4 MG/DL (ref 8.3–10.6)
CHLORIDE BLD-SCNC: 99 MMOL/L (ref 99–110)
CO2: 26 MMOL/L (ref 21–32)
CREAT SERPL-MCNC: 0.9 MG/DL (ref 0.9–1.3)
EOSINOPHILS ABSOLUTE: 0.1 K/UL (ref 0–0.6)
EOSINOPHILS RELATIVE PERCENT: 0.7 %
GFR AFRICAN AMERICAN: >60
GFR NON-AFRICAN AMERICAN: >60
GLUCOSE BLD-MCNC: 214 MG/DL (ref 70–99)
HCT VFR BLD CALC: 47.3 % (ref 40.5–52.5)
HEMOGLOBIN: 16.1 G/DL (ref 13.5–17.5)
LYMPHOCYTES ABSOLUTE: 2.2 K/UL (ref 1–5.1)
LYMPHOCYTES RELATIVE PERCENT: 30.8 %
MAGNESIUM: 1.7 MG/DL (ref 1.8–2.4)
MCH RBC QN AUTO: 32.2 PG (ref 26–34)
MCHC RBC AUTO-ENTMCNC: 34 G/DL (ref 31–36)
MCV RBC AUTO: 94.8 FL (ref 80–100)
MONOCYTES ABSOLUTE: 0.5 K/UL (ref 0–1.3)
MONOCYTES RELATIVE PERCENT: 7.4 %
NEUTROPHILS ABSOLUTE: 4.2 K/UL (ref 1.7–7.7)
NEUTROPHILS RELATIVE PERCENT: 60 %
PDW BLD-RTO: 13.6 % (ref 12.4–15.4)
PLATELET # BLD: 219 K/UL (ref 135–450)
PMV BLD AUTO: 9.7 FL (ref 5–10.5)
POTASSIUM REFLEX MAGNESIUM: 3.5 MMOL/L (ref 3.5–5.1)
PRO-BNP: 23 PG/ML (ref 0–124)
RBC # BLD: 4.99 M/UL (ref 4.2–5.9)
SODIUM BLD-SCNC: 138 MMOL/L (ref 136–145)
TROPONIN: <0.01 NG/ML
WBC # BLD: 7.1 K/UL (ref 4–11)

## 2020-09-24 PROCEDURE — 93005 ELECTROCARDIOGRAM TRACING: CPT | Performed by: EMERGENCY MEDICINE

## 2020-09-24 PROCEDURE — 83880 ASSAY OF NATRIURETIC PEPTIDE: CPT

## 2020-09-24 PROCEDURE — 99285 EMERGENCY DEPT VISIT HI MDM: CPT

## 2020-09-24 PROCEDURE — 80048 BASIC METABOLIC PNL TOTAL CA: CPT

## 2020-09-24 PROCEDURE — 85025 COMPLETE CBC W/AUTO DIFF WBC: CPT

## 2020-09-24 PROCEDURE — 71046 X-RAY EXAM CHEST 2 VIEWS: CPT

## 2020-09-24 PROCEDURE — 6370000000 HC RX 637 (ALT 250 FOR IP): Performed by: PHYSICIAN ASSISTANT

## 2020-09-24 PROCEDURE — 84484 ASSAY OF TROPONIN QUANT: CPT

## 2020-09-24 PROCEDURE — 83735 ASSAY OF MAGNESIUM: CPT

## 2020-09-24 RX ORDER — POTASSIUM CHLORIDE 20 MEQ/1
20 TABLET, EXTENDED RELEASE ORAL
Status: DISCONTINUED | OUTPATIENT
Start: 2020-09-25 | End: 2020-09-25 | Stop reason: HOSPADM

## 2020-09-24 RX ORDER — INSULIN LISPRO 100 [IU]/ML
0-6 INJECTION, SOLUTION INTRAVENOUS; SUBCUTANEOUS
Status: DISCONTINUED | OUTPATIENT
Start: 2020-09-25 | End: 2020-09-25 | Stop reason: HOSPADM

## 2020-09-24 RX ORDER — DEXTROSE MONOHYDRATE 25 G/50ML
12.5 INJECTION, SOLUTION INTRAVENOUS PRN
Status: DISCONTINUED | OUTPATIENT
Start: 2020-09-24 | End: 2020-09-25 | Stop reason: HOSPADM

## 2020-09-24 RX ORDER — DEXTROSE MONOHYDRATE 50 MG/ML
100 INJECTION, SOLUTION INTRAVENOUS PRN
Status: DISCONTINUED | OUTPATIENT
Start: 2020-09-24 | End: 2020-09-25 | Stop reason: HOSPADM

## 2020-09-24 RX ORDER — DOBUTAMINE HYDROCHLORIDE 200 MG/100ML
10 INJECTION INTRAVENOUS CONTINUOUS
Status: DISCONTINUED | OUTPATIENT
Start: 2020-09-24 | End: 2020-09-25 | Stop reason: HOSPADM

## 2020-09-24 RX ORDER — INSULIN LISPRO 100 [IU]/ML
0-3 INJECTION, SOLUTION INTRAVENOUS; SUBCUTANEOUS NIGHTLY
Status: DISCONTINUED | OUTPATIENT
Start: 2020-09-24 | End: 2020-09-25 | Stop reason: HOSPADM

## 2020-09-24 RX ORDER — LISINOPRIL 20 MG/1
20 TABLET ORAL DAILY
Status: DISCONTINUED | OUTPATIENT
Start: 2020-09-25 | End: 2020-09-25 | Stop reason: HOSPADM

## 2020-09-24 RX ORDER — ALLOPURINOL 300 MG/1
300 TABLET ORAL DAILY
Status: DISCONTINUED | OUTPATIENT
Start: 2020-09-25 | End: 2020-09-25 | Stop reason: HOSPADM

## 2020-09-24 RX ORDER — SODIUM CHLORIDE 0.9 % (FLUSH) 0.9 %
10 SYRINGE (ML) INJECTION PRN
Status: DISCONTINUED | OUTPATIENT
Start: 2020-09-24 | End: 2020-09-25 | Stop reason: HOSPADM

## 2020-09-24 RX ORDER — ASPIRIN 81 MG/1
324 TABLET, CHEWABLE ORAL ONCE
Status: COMPLETED | OUTPATIENT
Start: 2020-09-24 | End: 2020-09-24

## 2020-09-24 RX ORDER — ASPIRIN 81 MG/1
81 TABLET, CHEWABLE ORAL DAILY
Status: DISCONTINUED | OUTPATIENT
Start: 2020-09-25 | End: 2020-09-25 | Stop reason: HOSPADM

## 2020-09-24 RX ORDER — NICOTINE POLACRILEX 4 MG
15 LOZENGE BUCCAL PRN
Status: DISCONTINUED | OUTPATIENT
Start: 2020-09-24 | End: 2020-09-25 | Stop reason: HOSPADM

## 2020-09-24 RX ORDER — SODIUM CHLORIDE 0.9 % (FLUSH) 0.9 %
10 SYRINGE (ML) INJECTION EVERY 12 HOURS SCHEDULED
Status: DISCONTINUED | OUTPATIENT
Start: 2020-09-24 | End: 2020-09-25 | Stop reason: HOSPADM

## 2020-09-24 RX ORDER — FUROSEMIDE 40 MG/1
80 TABLET ORAL 2 TIMES DAILY
Status: DISCONTINUED | OUTPATIENT
Start: 2020-09-25 | End: 2020-09-25 | Stop reason: HOSPADM

## 2020-09-24 RX ADMIN — ASPIRIN 324 MG: 81 TABLET, CHEWABLE ORAL at 21:24

## 2020-09-24 ASSESSMENT — PAIN DESCRIPTION - ORIENTATION: ORIENTATION: LEFT

## 2020-09-24 ASSESSMENT — PAIN DESCRIPTION - PAIN TYPE: TYPE: ACUTE PAIN

## 2020-09-24 ASSESSMENT — PAIN DESCRIPTION - ONSET: ONSET: ON-GOING

## 2020-09-24 ASSESSMENT — PAIN DESCRIPTION - DESCRIPTORS: DESCRIPTORS: PRESSURE

## 2020-09-24 ASSESSMENT — ENCOUNTER SYMPTOMS
EYE REDNESS: 0
SHORTNESS OF BREATH: 1
NAUSEA: 0
SHORTNESS OF BREATH: 0
GASTROINTESTINAL NEGATIVE: 1
DIARRHEA: 0
VOMITING: 0
EYES NEGATIVE: 1
ABDOMINAL PAIN: 0

## 2020-09-24 ASSESSMENT — PAIN SCALES - GENERAL: PAINLEVEL_OUTOF10: 6

## 2020-09-24 ASSESSMENT — PAIN DESCRIPTION - PROGRESSION: CLINICAL_PROGRESSION: GRADUALLY WORSENING

## 2020-09-24 ASSESSMENT — PAIN DESCRIPTION - LOCATION: LOCATION: CHEST

## 2020-09-24 ASSESSMENT — PAIN DESCRIPTION - FREQUENCY: FREQUENCY: INTERMITTENT

## 2020-09-25 VITALS
DIASTOLIC BLOOD PRESSURE: 91 MMHG | SYSTOLIC BLOOD PRESSURE: 159 MMHG | OXYGEN SATURATION: 98 % | BODY MASS INDEX: 42.66 KG/M2 | TEMPERATURE: 99 F | HEART RATE: 57 BPM | WEIGHT: 315 LBS | HEIGHT: 72 IN | RESPIRATION RATE: 15 BRPM

## 2020-09-25 LAB
EKG ATRIAL RATE: 53 BPM
EKG ATRIAL RATE: 61 BPM
EKG ATRIAL RATE: 67 BPM
EKG DIAGNOSIS: NORMAL
EKG P AXIS: 18 DEGREES
EKG P AXIS: 37 DEGREES
EKG P AXIS: 59 DEGREES
EKG P-R INTERVAL: 142 MS
EKG P-R INTERVAL: 146 MS
EKG P-R INTERVAL: 148 MS
EKG Q-T INTERVAL: 408 MS
EKG Q-T INTERVAL: 414 MS
EKG Q-T INTERVAL: 550 MS
EKG QRS DURATION: 92 MS
EKG QRS DURATION: 92 MS
EKG QRS DURATION: 96 MS
EKG QTC CALCULATION (BAZETT): 416 MS
EKG QTC CALCULATION (BAZETT): 431 MS
EKG QTC CALCULATION (BAZETT): 516 MS
EKG R AXIS: -37 DEGREES
EKG R AXIS: -42 DEGREES
EKG R AXIS: -49 DEGREES
EKG T AXIS: -16 DEGREES
EKG T AXIS: -27 DEGREES
EKG T AXIS: 17 DEGREES
EKG VENTRICULAR RATE: 53 BPM
EKG VENTRICULAR RATE: 61 BPM
EKG VENTRICULAR RATE: 67 BPM
GLUCOSE BLD-MCNC: 124 MG/DL (ref 70–99)
GLUCOSE BLD-MCNC: 133 MG/DL (ref 70–99)
GLUCOSE BLD-MCNC: 198 MG/DL (ref 70–99)
LV EF: 58 %
LVEF MODALITY: NORMAL
PERFORMED ON: ABNORMAL
TROPONIN: <0.01 NG/ML
TROPONIN: <0.01 NG/ML

## 2020-09-25 PROCEDURE — 3430000000 HC RX DIAGNOSTIC RADIOPHARMACEUTICAL: Performed by: STUDENT IN AN ORGANIZED HEALTH CARE EDUCATION/TRAINING PROGRAM

## 2020-09-25 PROCEDURE — 93005 ELECTROCARDIOGRAM TRACING: CPT | Performed by: PHYSICIAN ASSISTANT

## 2020-09-25 PROCEDURE — 94660 CPAP INITIATION&MGMT: CPT

## 2020-09-25 PROCEDURE — 93017 CV STRESS TEST TRACING ONLY: CPT

## 2020-09-25 PROCEDURE — A9502 TC99M TETROFOSMIN: HCPCS | Performed by: PHYSICIAN ASSISTANT

## 2020-09-25 PROCEDURE — 96374 THER/PROPH/DIAG INJ IV PUSH: CPT

## 2020-09-25 PROCEDURE — 6360000002 HC RX W HCPCS: Performed by: PHYSICIAN ASSISTANT

## 2020-09-25 PROCEDURE — 3430000000 HC RX DIAGNOSTIC RADIOPHARMACEUTICAL: Performed by: PHYSICIAN ASSISTANT

## 2020-09-25 PROCEDURE — A9502 TC99M TETROFOSMIN: HCPCS | Performed by: STUDENT IN AN ORGANIZED HEALTH CARE EDUCATION/TRAINING PROGRAM

## 2020-09-25 PROCEDURE — 2580000003 HC RX 258: Performed by: PHYSICIAN ASSISTANT

## 2020-09-25 PROCEDURE — 6370000000 HC RX 637 (ALT 250 FOR IP): Performed by: PHYSICIAN ASSISTANT

## 2020-09-25 PROCEDURE — 78452 HT MUSCLE IMAGE SPECT MULT: CPT

## 2020-09-25 PROCEDURE — 84484 ASSAY OF TROPONIN QUANT: CPT

## 2020-09-25 RX ORDER — LANOLIN ALCOHOL/MO/W.PET/CERES
400 CREAM (GRAM) TOPICAL ONCE
Status: DISCONTINUED | OUTPATIENT
Start: 2020-09-25 | End: 2020-09-25 | Stop reason: HOSPADM

## 2020-09-25 RX ADMIN — ASPIRIN 81 MG: 81 TABLET, CHEWABLE ORAL at 13:46

## 2020-09-25 RX ADMIN — REGADENOSON 0.4 MG: 0.08 INJECTION, SOLUTION INTRAVENOUS at 09:00

## 2020-09-25 RX ADMIN — INSULIN GLARGINE 10 UNITS: 100 INJECTION, SOLUTION SUBCUTANEOUS at 02:43

## 2020-09-25 RX ADMIN — APIXABAN 5 MG: 5 TABLET, FILM COATED ORAL at 13:47

## 2020-09-25 RX ADMIN — Medication 10 ML: at 09:01

## 2020-09-25 RX ADMIN — Medication 10 ML: at 09:30

## 2020-09-25 RX ADMIN — TETROFOSMIN 30 MILLICURIE: 1.38 INJECTION, POWDER, LYOPHILIZED, FOR SOLUTION INTRAVENOUS at 08:50

## 2020-09-25 RX ADMIN — FUROSEMIDE 80 MG: 40 TABLET ORAL at 13:46

## 2020-09-25 RX ADMIN — LISINOPRIL 20 MG: 20 TABLET ORAL at 13:46

## 2020-09-25 ASSESSMENT — PAIN DESCRIPTION - PROGRESSION: CLINICAL_PROGRESSION: RAPIDLY IMPROVING

## 2020-09-25 NOTE — ED PROVIDER NOTES
ED Attending Attestation Note     Date of evaluation: 9/24/2020    This patient was seen by the advance practice provider. I have seen and examined the patient, agree with the workup, evaluation, management and diagnosis. The care plan has been discussed. I have reviewed the ECG and concur with the CATARINO's interpretation. My assessment reveals a morbidly obese gentleman, generally well-appearing, in no acute distress. This is a gentleman with a number of cardiovascular risk factors, who is most recent risk stratification was a stress echo performed in 2014, who presents with a somewhat subacute history of intermittent chest pain, finally persuaded by his family this evening to present to the emergency department for evaluation. It does appear that he has seen a cardiologist this past summer, who ordered an outpatient stress test, although he missed that appointment and missed that risk stratification. Cardiac work-up is being initiated, but even if this is negative I anticipate that the patient should be kept at least in observation overnight for stress testing tomorrow.          Silvino Saha MD  09/24/20 [id number]

## 2020-09-25 NOTE — ED NOTES
[facility] called and told me that Dr Cazares Citizen cardiologist want the pt to get second day stress test. Dr Turner Severe advised.      Maribell Kennedy, RN  09/25/20 [id number]

## 2020-09-25 NOTE — ED TRIAGE NOTES
Hx of A-fib, on blood thinners Eliquis. States he has a pressure in his chest that feels like someone is pushing on him and a numbness down his left arm. All this has been going on for about 4 months and gradually getting worse. States that stress makes these symptoms worse. Denies N,V,D. SOB. States he was CoVid POSITIVE months ago.

## 2020-09-25 NOTE — ED NOTES
Patient sitting on the side of the bed, talking on his cell phone.      Lisa Higgins RN  09/25/20 [id number]

## 2020-09-25 NOTE — ED PROVIDER NOTES
[facility] Emergency Department  EDObservation Admission Note   Emergency Physicians       Time Placed in ED Observation: DISPOSITION Ed Observation 09/24/2020 10:21:40 PM    Impression and Plan      In summary, Bailey De La Cruz is admitted by to the [facility] for chest pain. Dr. Yosef Lord is the CDU admission attending. This patient has been risk-stratified based on the available history, physical exam, and related study findings. Admission toobservation status for further diagnosis/treatment/monitoring of chest pain is warranted clinically. This extendedperiod of observation is specifically required to determine the need for hospitalization. We will observe the patient for the following endpoints:    -Serial troponins  -Stress test in a.m. When met, appropriate disposition will be arranged. Diagnostic Evaluation      Diagnostic studies and ED interventions germane to this period of clinical observation will include:    - Serial troponins  - Stress test in a.m. Consultant(s)      None       Patient History      Bailey De La Cruz is a 47 y.o. male who presented to the Emergency Department for evaluation of chest pain. The acute evaluation included EKG that showed no acute ischemic abnormalities. Negative troponin. Renal panel significant for glucose of 214 but no anion gap. Chest x-ray no acute airspace disease. Upon admission to the Clinical Decision Unit, Bailey De La Cruz denies any current chest pain.       Past Medical History  Past Medical History:   Diagnosis Date    Arthritis     Atrial fibrillation ([address]     Congestive heart failure (HCC)     Depression     Diabetes mellitus (HCC)     GERD (gastroesophageal reflux disease)     Hyperlipidemia     Hypertension     Morbid obesity ([address]     Sleep apnea     uses cpap at home q hs     Type II or unspecified type diabetes mellitus without mention of complication, not stated as uncontrolled History reviewed. No pertinent surgical history. Family History   Problem Relation Age of Onset    Diabetes Mother     High Blood Pressure Mother     Diabetes Maternal Aunt     High Blood Pressure Maternal Grandmother      Social History     Tobacco Use    Smoking status: Never Smoker    Smokeless tobacco: Never Used   Substance Use Topics    Alcohol use: No     Alcohol/week: 0.0 standard drinks    Drug use: Yes     Types: Marijuana, Cocaine     Comment: no cocaine for over 6 years, marijuana occ        Medications      Previous Medications    ACCU-CHEK FASTCLIX LANCETS MISC    20 Sticks by Does not apply route 2 times daily    ALCOHOL SWABS PADS    1 Container by Does not apply route three times daily    ALLOPURINOL (ZYLOPRIM) 300 MG TABLET    TAKE 1 TABLET BY MOUTH EVERY DAY    APIXABAN (ELIQUIS) 5 MG TABS TABLET    TAKE 1 TABLET BY MOUTH TWICE A DAY    ATORVASTATIN (LIPITOR) 20 MG TABLET    TAKE 1 TABLET BY MOUTH EVERY DAY AT NIGHT    BD INSULIN SYRINGE U/F 31G X 5/16\" 1 ML MISC    USE AS DIRECTED 4 TIMES A DAY    BLOOD GLUCOSE MONITOR STRIPS    by Other route 4 times daily (after meals and at bedtime)    BLOOD GLUCOSE MONITORING SUPPL (ACCU-CHEK VIK PLUS) W/DEVICE KIT    1 kit by Does not apply route 4 times daily (before meals and nightly).     BLOOD GLUCOSE MONITORING SUPPL (BLOOD GLUCOSE MONITOR SYSTEM) W/DEVICE KIT    1 Units by Does not apply route daily    BLOOD PRESSURE KIT    1 Units by Does not apply route daily    BLOOD PRESSURE MONITORING (WRIST BLOOD PRESSURE MONITOR) MISC    1 Units by Does not apply route once for 1 dose    [facility] VITAMIN C 250 MG TABLET    TAKE 1 TABLET BY MOUTH EVERY DAY    DILTIAZEM (CARDIZEM CD) 120 MG EXTENDED RELEASE CAPSULE    TAKE 1 CAPSULE BY MOUTH EVERY DAY    FLUTICASONE (FLONASE) 50 MCG/ACT NASAL SPRAY    1 spray by Nasal route daily    FUROSEMIDE (LASIX) 80 MG TABLET    TAKE 1 TABLET BY MOUTH TWICE A DAY    HANDICAP PLACARD MISC    by Does not apply route Valid for 1 year. Mobility limitations due to severe Osteoarthritis of L Knee    IBUPROFEN (ADVIL;MOTRIN) 800 MG TABLET    Take 1 tablet by mouth every 8 hours as needed for Pain (Take with food)    INSULIN GLARGINE (BASAGLAR KWIKPEN) 100 UNIT/ML INJECTION PEN    patient using vials per [facility] inject 20 units at hs    INSULIN LISPRO, 1 UNIT DIAL, 100 UNIT/ML SOPN    Inject 7 Units into the skin 3 times daily (before meals)    INSULIN PEN NEEDLE ([employer] PEN NEEDLES) 31G X 6 MM MISC    1 each by Does not apply route daily    LISINOPRIL (PRINIVIL;ZESTRIL) 10 MG TABLET    take one tablet by mouth every day    METFORMIN (GLUCOPHAGE) 500 MG TABLET    TAKE 1 TABLET BY MOUTH TWICE A DAY WITH MEALS    MULTIPLE VITAMINS-MINERALS (THERAPEUTIC MULTIVITAMIN-MINERALS) TABLET    Take 1 tablet by mouth daily    PANTOPRAZOLE (PROTONIX) 40 MG TABLET    TAKE 1 TABLET BY MOUTH EVERY DAY BEFORE BREAKFAST    POTASSIUM CHLORIDE (KLOR-CON M20) 20 MEQ EXTENDED RELEASE TABLET    TAKE 1 TABLET BY MOUTH EVERY DAY WITH BREAKFAST    SILDENAFIL (VIAGRA) 50 MG TABLET    Take 1 tablet by mouth as needed for Erectile Dysfunction          Review of Systems      Review of Systems   Constitutional: Negative. HENT: Negative. Eyes: Negative. Respiratory: Positive for shortness of breath. Cardiovascular: Positive for chest pain. Gastrointestinal: Negative. Genitourinary: Negative. Musculoskeletal: Negative. Neurological: Negative. All other systems reviewed and are negative. Physical Examination      Physical Exam  Vitals signs and nursing note reviewed. Constitutional:       General: He is not in acute distress. Appearance: He is obese. HENT:      Head: Normocephalic and atraumatic. Mouth/Throat:      Mouth: Mucous membranes are moist.      Pharynx: No oropharyngeal exudate. Eyes:      General: No scleral icterus. Extraocular Movements: Extraocular movements intact.       Conjunctiva/sclera:

## 2020-09-25 NOTE — ED PROVIDER NOTES
[address] ENCOUNTER          PHYSICIAN ASSISTANT NOTE       Date of evaluation: 9/24/2020    Chief Complaint     Chest Pain (states it feels like someone is pushing on his chest, a pressure. )      History of Present Illness     Iván De La Cruz is a 47 y.o. male with PMH of Diabetes, HTN, HLD, Morbid Obesity, Afib s/p ablation on Eliquis who presents with Chest pain. Patient states that he has been experiencing intermittent left-sided chest pressure and numbness and tingling in the left arm periodically for the past 4 months. He states that the symptoms typically occur when he feels upset. He states that he has not associated the symptoms with exertion. His symptoms are not pleuritic. He denies any associated dizziness, diaphoresis, shortness of breath, nausea or vomiting. He reports he has had a recent cold but otherwise denies any fevers, abdominal pain, change in bowel or urinary habits, leg swelling or pain. He last had a stress echo in 2016. Of note, patient did see a cardiologist in July 2020 and a stress test was ordered. However, patient no showed to the appointment. Review of Systems     Review of Systems   Constitutional: Negative for chills and fever. HENT: Negative for congestion. Eyes: Negative for redness. Respiratory: Negative for shortness of breath. Cardiovascular: Positive for chest pain. Negative for leg swelling. Gastrointestinal: Negative for abdominal pain, diarrhea, nausea and vomiting. Genitourinary: Negative for difficulty urinating. Musculoskeletal: Negative for gait problem. Skin: Negative for wound. Neurological: Positive for numbness. Negative for dizziness and weakness. Psychiatric/Behavioral: The patient is not nervous/anxious.         Past Medical, Surgical, Family, and Social History     He has a past medical history of Arthritis, Atrial fibrillation ([address] Congestive heart failure ([address] Depression, Diabetes mellitus ([facility] GERD (gastroesophageal reflux disease), Hyperlipidemia, Hypertension, Morbid obesity ([facility] Sleep apnea, and Type II or unspecified type diabetes mellitus without mention of complication, not stated as uncontrolled. He has no past surgical history on file. His family history includes Diabetes in his maternal aunt and mother; High Blood Pressure in his maternal grandmother and mother. He reports that he has never smoked. He has never used smokeless tobacco. He reports current drug use. Drugs: Marijuana and Cocaine. He reports that he does not drink alcohol. Medications     Previous Medications    ACCU-CHEK FASTCLIX LANCETS MISC    20 Sticks by Does not apply route 2 times daily    ALCOHOL SWABS PADS    1 Container by Does not apply route three times daily    ALLOPURINOL (ZYLOPRIM) 300 MG TABLET    TAKE 1 TABLET BY MOUTH EVERY DAY    APIXABAN (ELIQUIS) 5 MG TABS TABLET    TAKE 1 TABLET BY MOUTH TWICE A DAY    ATORVASTATIN (LIPITOR) 20 MG TABLET    TAKE 1 TABLET BY MOUTH EVERY DAY AT NIGHT    BD INSULIN SYRINGE U/F 31G X 5/16\" 1 ML MISC    USE AS DIRECTED 4 TIMES A DAY    BLOOD GLUCOSE MONITOR STRIPS    by Other route 4 times daily (after meals and at bedtime)    BLOOD GLUCOSE MONITORING SUPPL (ACCU-CHEK VIK PLUS) W/DEVICE KIT    1 kit by Does not apply route 4 times daily (before meals and nightly).     BLOOD GLUCOSE MONITORING SUPPL (BLOOD GLUCOSE MONITOR SYSTEM) W/DEVICE KIT    1 Units by Does not apply route daily    BLOOD PRESSURE KIT    1 Units by Does not apply route daily    BLOOD PRESSURE MONITORING (WRIST BLOOD PRESSURE MONITOR) MISC    1 Units by Does not apply route once for 1 dose    [facility] VITAMIN C 250 MG TABLET    TAKE 1 TABLET BY MOUTH EVERY DAY    DILTIAZEM (CARDIZEM CD) 120 MG EXTENDED RELEASE CAPSULE    TAKE 1 CAPSULE BY MOUTH EVERY DAY    FLUTICASONE (FLONASE) 50 MCG/ACT NASAL SPRAY    1 spray by Nasal route daily    FUROSEMIDE (LASIX) 80 MG TABLET    TAKE 1 TABLET BY MOUTH TWICE A DAY    HANDICAP PLACARD MISC    by Does not apply route Valid for 1 year. Mobility limitations due to severe Osteoarthritis of L Knee    IBUPROFEN (ADVIL;MOTRIN) 800 MG TABLET    Take 1 tablet by mouth every 8 hours as needed for Pain (Take with food)    INSULIN GLARGINE (BASAGLAR KWIKPEN) 100 UNIT/ML INJECTION PEN    patient using vials per [facility]. inject 20 units at hs    INSULIN LISPRO, 1 UNIT DIAL, 100 UNIT/ML SOPN    Inject 7 Units into the skin 3 times daily (before meals)    INSULIN PEN NEEDLE ([employer] PEN NEEDLES) 31G X 6 MM MISC    1 each by Does not apply route daily    LISINOPRIL (PRINIVIL;ZESTRIL) 10 MG TABLET    take one tablet by mouth every day    METFORMIN (GLUCOPHAGE) 500 MG TABLET    TAKE 1 TABLET BY MOUTH TWICE A DAY WITH MEALS    MULTIPLE VITAMINS-MINERALS (THERAPEUTIC MULTIVITAMIN-MINERALS) TABLET    Take 1 tablet by mouth daily    PANTOPRAZOLE (PROTONIX) 40 MG TABLET    TAKE 1 TABLET BY MOUTH EVERY DAY BEFORE BREAKFAST    POTASSIUM CHLORIDE (KLOR-CON M20) 20 MEQ EXTENDED RELEASE TABLET    TAKE 1 TABLET BY MOUTH EVERY DAY WITH BREAKFAST    SILDENAFIL (VIAGRA) 50 MG TABLET    Take 1 tablet by mouth as needed for Erectile Dysfunction       Allergies     He has No Known Allergies. Physical Exam     INITIAL VITALS: BP: (!) 145/80,Temp: 99 °F (37.2 °C), Pulse: 80, Resp: 22, SpO2: 96 %   Physical Exam  Vitals signs and nursing note reviewed. Constitutional:       General: He is not in acute distress. HENT:      Head: Normocephalic and atraumatic. Nose: Nose normal.      Mouth/Throat:      Mouth: Mucous membranes are moist.      Pharynx: Oropharynx is clear. Eyes:      Extraocular Movements: Extraocular movements intact. Conjunctiva/sclera: Conjunctivae normal.   Neck:      Musculoskeletal: Neck supple. Cardiovascular:      Rate and Rhythm: Normal rate and regular rhythm. Pulmonary:      Effort: Pulmonary effort is normal. No respiratory distress.       Breath sounds: Normal breath sounds. No wheezing or rales. Abdominal:      General: Bowel sounds are normal. There is no distension. Palpations: Abdomen is soft. Tenderness: There is no abdominal tenderness. There is no guarding or rebound. Musculoskeletal:         General: No tenderness (no calf tenderness). Right lower leg: No edema. Left lower leg: No edema. Skin:     General: Skin is warm and dry. Neurological:      Mental Status: He is alert and oriented to person, place, and time. Psychiatric:         Mood and Affect: Mood normal.         Behavior: Behavior normal.         Diagnostic Results     EKG   Interpreted in conjunction with emergencydepartment physician Erlinda Dent MD  Rhythm: normal sinus   Rate: normal  Axis: left  Ectopy: premature atrial contraction  Conduction: right bundle branch block (incomplete)  ST Segments: no acute change  T Waves:no acute change  Q Waves: none  Clinical Impression: no acute changes  Comparison:  07/08/2020    RADIOLOGY:  XR CHEST (2 VW)   Final Result      No lobar consolidation.       NM Cardiac Stress Test Nuclear Imaging    (Results Pending)       LABS:   Results for orders placed or performed during the hospital encounter of 09/24/20   CBC Auto Differential   Result Value Ref Range    WBC 7.1 4.0 - 11.0 K/uL    RBC 4.99 4.20 - 5.90 M/uL    Hemoglobin 16.1 13.5 - 17.5 g/dL    Hematocrit 47.3 40.5 - 52.5 %    MCV 94.8 80.0 - 100.0 fL    MCH 32.2 26.0 - 34.0 pg    MCHC 34.0 31.0 - 36.0 g/dL    RDW 13.6 12.4 - 15.4 %    Platelets 989 397 - 406 K/uL    MPV 9.7 5.0 - 10.5 fL    Neutrophils % 60.0 %    Lymphocytes % 30.8 %    Monocytes % 7.4 %    Eosinophils % 0.7 %    Basophils % 1.1 %    Neutrophils Absolute 4.2 1.7 - 7.7 K/uL    Lymphocytes Absolute 2.2 1.0 - 5.1 K/uL    Monocytes Absolute 0.5 0.0 - 1.3 K/uL    Eosinophils Absolute 0.1 0.0 - 0.6 K/uL    Basophils Absolute 0.1 0.0 - 0.2 K/uL   Basic Metabolic Panel w/ Reflex to MG   Result Value Ref Range intermittent left-sided chest pressure with numbness and tingling of the left arm for the past 4 months. Patient states that symptoms are brought on when he feels upset. He denies exertional component. Symptoms are not pleuritic. He denies any shortness of breath. Physical exam reveals a morbidly obese 24-year-old male in no acute distress. He is mildly hypertensive with a blood pressure of 159/96. Heart and lung sounds normal.  Abdomen is soft and nontender. No significant peripheral edema or calf tenderness. EKG obtained revealed NSR, left axis deviation, incomplete RBBB, no acute ischemic changes. Labs including CBC, BMP, troponin and BNP were obtained and were unremarkable. Chest x-ray was negative. Patient does have a heart score of 3. Given his story with significant underlying risk factors for CAD and history of not following up, plan to place patient in ED obs protocol for ACS r/o and provocative stress testing in the morning. Patient is agreeable to this plan. This patient was also evaluated by the attending physician. All care plans were discussed and agreed upon. Clinical Impression     1.  Chest pain, unspecified type        Disposition      DISPOSITION Ed Observation 09/24/2020 10:21:40 PM       Gem Villeda PA-C  09/24/20 0312

## 2020-09-25 NOTE — ED PROVIDER NOTES
attending. The patient will follow-up with:    - Dr. Joel Miranda    As appropriate, please see the AVS for comprehensive discharge instructions. Patient advised to return the emergency department immediately if he experiences return of chest pain, shortness of breath, lightheadedness, or any other concerning changes in his symptoms. Patient agrees with plan, all questions answered. Yuko De La Cruz hasundergone comprehensive diagnostic evaluation and therapeutic management in accordance with the CDU guidelines for chest pain. Based on his clinical response and diagnostic information obtained during this period of observation, the disposition is Discharge to home. Physical Examination      Physical Exam  Constitutional:       General: He is not in acute distress. Appearance: Normal appearance. He is obese. He is not ill-appearing, toxic-appearing or diaphoretic. HENT:      Head: Normocephalic and atraumatic. Eyes:      General: No scleral icterus. Right eye: No discharge. Left eye: No discharge. Extraocular Movements: Extraocular movements intact. Conjunctiva/sclera: Conjunctivae normal.   Neck:      Musculoskeletal: Normal range of motion. No neck rigidity. Cardiovascular:      Rate and Rhythm: Normal rate and regular rhythm. Pulses: Normal pulses. Pulmonary:      Effort: Pulmonary effort is normal. No respiratory distress. Chest:      Chest wall: No tenderness. Abdominal:      General: There is no distension. Palpations: Abdomen is soft. Tenderness: There is no abdominal tenderness. Musculoskeletal: Normal range of motion. General: No swelling. Right lower leg: No edema. Left lower leg: No edema. Skin:     General: Skin is warm and dry. Capillary Refill: Capillary refill takes less than 2 seconds. Neurological:      General: No focal deficit present.       Mental Status: He is alert and oriented to person,

## 2020-09-25 NOTE — ED NOTES
Pt DC from ED ambulatory. He verbalized understanding og DC instructions.  [employer] tech told him to arrive tomorrow at [phone] for his second pics for his stress exam. Cristino Cui RN  09/25/20 2187

## 2020-09-26 RX ADMIN — TETROFOSMIN 35.7 MILLICURIE: 1.38 INJECTION, POWDER, LYOPHILIZED, FOR SOLUTION INTRAVENOUS at 10:08

## 2020-09-28 ENCOUNTER — TELEPHONE (OUTPATIENT)
Dept: INTERNAL MEDICINE CLINIC | Age: 50
End: 2020-09-28

## 2020-09-29 ENCOUNTER — TELEPHONE (OUTPATIENT)
Dept: INTERNAL MEDICINE CLINIC | Age: 50
End: 2020-09-29

## 2020-09-29 RX ORDER — ALLOPURINOL 300 MG/1
TABLET ORAL
Qty: 90 TABLET | Refills: 1 | Status: SHIPPED | OUTPATIENT
Start: 2020-09-29 | End: 2021-02-24

## 2020-10-05 NOTE — TELEPHONE ENCOUNTER
LAST OV 04/14/2020 ANTIC  NEXT OV NONE  LAST RF 06/18/2020 JOSÉ LUIS    lisinopril (PRINIVIL;ZESTRIL) 10 MG tablet    pantoprazole (PROTONIX) 40 MG tablet

## 2020-10-06 RX ORDER — PANTOPRAZOLE SODIUM 40 MG/1
TABLET, DELAYED RELEASE ORAL
Qty: 90 TABLET | Refills: 0 | Status: SHIPPED | OUTPATIENT
Start: 2020-10-06 | End: 2021-01-11

## 2020-10-06 RX ORDER — LISINOPRIL 10 MG/1
TABLET ORAL
Qty: 90 TABLET | Refills: 0 | Status: SHIPPED | OUTPATIENT
Start: 2020-10-06 | End: 2021-01-11

## 2020-10-29 RX ORDER — FUROSEMIDE 80 MG
TABLET ORAL
Qty: 180 TABLET | Refills: 0 | Status: SHIPPED | OUTPATIENT
Start: 2020-10-29 | End: 2021-02-10

## 2020-11-03 RX ORDER — POTASSIUM CHLORIDE 20 MEQ/1
TABLET, EXTENDED RELEASE ORAL
Qty: 90 TABLET | Refills: 0 | Status: SHIPPED | OUTPATIENT
Start: 2020-11-03 | End: 2021-02-24 | Stop reason: SDUPTHER

## 2020-11-10 ENCOUNTER — TELEPHONE (OUTPATIENT)
Dept: INTERNAL MEDICINE CLINIC | Age: 50
End: 2020-11-10

## 2020-11-12 NOTE — TELEPHONE ENCOUNTER
LAST OV 04/14/2020 ANTIC  NEXT OV NONE  LAST RF 08/17/2020 CIBA    ELIQUIS 5 MG TABLET  QTY 60    TAKE 1 TABLET BY MOUTH TWICE A DAY

## 2020-11-20 RX ORDER — INSULIN LISPRO 100 [IU]/ML
7 INJECTION, SOLUTION INTRAVENOUS; SUBCUTANEOUS
Qty: 2 PEN | Refills: 1 | Status: SHIPPED | OUTPATIENT
Start: 2020-11-20 | End: 2021-06-08 | Stop reason: SDUPTHER

## 2020-11-25 RX ORDER — DILTIAZEM HYDROCHLORIDE 120 MG/1
CAPSULE, COATED, EXTENDED RELEASE ORAL
Qty: 90 CAPSULE | Refills: 0 | Status: SHIPPED | OUTPATIENT
Start: 2020-11-25 | End: 2021-02-18

## 2020-11-30 NOTE — TELEPHONE ENCOUNTER
[address]  time_____12/4/2020 1200_______        Surgery time_1300___________    Take the following medications with a sip of water:    Do not eat or drink anything after 12:00 midnight prior to your surgery. This includes water chewing gum, mints and ice chips. You may brush your teeth and gargle the morning of your surgery, but do not swallow the water     Please see your family doctor/pediatrician for a history and physical and/or concerning medications. Bring any test results/reports from your physicians office. If you are under the care of a heart doctor or specialist doctor, please be aware that you may be asked to them for clearance    You may be asked to stop blood thinners such as Coumadin, Plavix, Fragmin, Lovenox, etc., or any anti-inflammatories such as:  Aspirin, Ibuprofen, Advil, Naproxen prior to your surgery. We also ask that you stop any OTC medications such as fish oil, vitamin E, glucosamine, garlic, Multivitamins, COQ 10, etc.    We ask that you do not smoke 24 hours prior to surgery  We ask that you do not  drink any alcoholic beverages 24 hours prior to surgery     You must make arrangements for a responsible adult to take you home after your surgery. For your safety you will not be allowed to leave alone or drive yourself home. Your surgery will be cancelled if you do not have a ride home. Also for your safety, it is strongly suggested that someone stay with you the first 24 hours after your surgery. A parent or legal guardian must accompany a child scheduled for surgery and plan to stay at the hospital until the child is discharged. Please do not bring other children with you. For your comfort, please wear simple loose fitting clothing to the hospital.  Please do not bring valuables.     Do not wear any make-up or nail polish on your fingers or toes      For your safety, please do not wear any jewelry or body Last OV4/14/20  There is no scheduled appt  Last refill 4/27/20 piercing's on the day of surgery. All jewelry must be removed. If you have dentures, they will be removed before going to operating room. For your convenience, we will provide you with a container. If you wear contact lenses or glasses, they will be removed, please bring a case for them. If you have a living will and a durable power of  for healthcare, please bring in a copy. As part of our patient safety program to minimize surgical site infections, we ask you to do the following:    · Please notify your surgeon if you develop any illness between         now and the  day of your surgery. · This includes a cough, cold, fever, sore throat, nausea,         or vomiting, and diarrhea, etc.  ·  Please notify your surgeon if you experience dizziness, shortness         of breath or blurred vision between now and the time of your surgery. Do not shave your operative site 96 hours prior to surgery. For face and neck surgery, men may use an electric razor 48 hours   prior to surgery. You may shower the night before surgery or the morning of   your surgery with an antibacterial soap. You will need to bring a photo ID and insurance card    [facility] has an onsite pharmacy, would you like to utilize our pharmacy     If you will be staying overnight and use a C-pap machine, please bring   your C-pap to hospital     Our goal is to provide you with excellent care, therefore, visitors will be limited to two(2) in the room at a time so that we may focus on providing this care for you. Please contact pre-admission testing if you have any further questions. [facility] phone number:  [phone]  Please note these are generalized instructions for all surgical cases, you may be provided with more specific instructions according to your surgery.

## 2021-01-10 DIAGNOSIS — M19.90 ARTHRITIS: ICD-10-CM

## 2021-01-11 DIAGNOSIS — I10 ESSENTIAL HYPERTENSION: Chronic | ICD-10-CM

## 2021-01-11 RX ORDER — PANTOPRAZOLE SODIUM 40 MG/1
TABLET, DELAYED RELEASE ORAL
Qty: 90 TABLET | Refills: 0 | Status: ON HOLD | OUTPATIENT
Start: 2021-01-11 | End: 2021-04-05

## 2021-01-11 RX ORDER — LISINOPRIL 10 MG/1
TABLET ORAL
Qty: 90 TABLET | Refills: 0 | Status: SHIPPED | OUTPATIENT
Start: 2021-01-11 | End: 2021-02-12

## 2021-01-22 DIAGNOSIS — I48.0 PAROXYSMAL A-FIB (HCC): ICD-10-CM

## 2021-01-30 ENCOUNTER — OFFICE VISIT (OUTPATIENT)
Dept: PRIMARY CARE CLINIC | Age: 51
End: 2021-01-30
Payer: MEDICAID

## 2021-01-30 DIAGNOSIS — Z01.818 PRE-OP EXAMINATION: Primary | ICD-10-CM

## 2021-01-30 PROCEDURE — 99421 OL DIG E/M SVC 5-10 MIN: CPT | Performed by: NURSE PRACTITIONER

## 2021-02-01 LAB — SARS-COV-2, NAA: NOT DETECTED

## 2021-02-05 ENCOUNTER — OFFICE VISIT (OUTPATIENT)
Dept: INTERNAL MEDICINE CLINIC | Age: 51
End: 2021-02-05
Payer: MEDICAID

## 2021-02-05 VITALS
RESPIRATION RATE: 16 BRPM | BODY MASS INDEX: 50.99 KG/M2 | TEMPERATURE: 96.4 F | DIASTOLIC BLOOD PRESSURE: 84 MMHG | HEART RATE: 69 BPM | OXYGEN SATURATION: 95 % | WEIGHT: 315 LBS | SYSTOLIC BLOOD PRESSURE: 132 MMHG

## 2021-02-05 DIAGNOSIS — I48.0 PAROXYSMAL A-FIB (HCC): ICD-10-CM

## 2021-02-05 DIAGNOSIS — I10 ESSENTIAL HYPERTENSION: ICD-10-CM

## 2021-02-05 DIAGNOSIS — E66.01 CLASS 3 SEVERE OBESITY DUE TO EXCESS CALORIES WITH SERIOUS COMORBIDITY AND BODY MASS INDEX (BMI) OF 50.0 TO 59.9 IN ADULT (HCC): ICD-10-CM

## 2021-02-05 DIAGNOSIS — E11.21 TYPE 2 DIABETES MELLITUS WITH DIABETIC NEPHROPATHY, WITH LONG-TERM CURRENT USE OF INSULIN (HCC): Primary | ICD-10-CM

## 2021-02-05 DIAGNOSIS — L91.8 SKIN TAG: ICD-10-CM

## 2021-02-05 DIAGNOSIS — Z79.4 TYPE 2 DIABETES MELLITUS WITH DIABETIC NEPHROPATHY, WITH LONG-TERM CURRENT USE OF INSULIN (HCC): Primary | ICD-10-CM

## 2021-02-05 DIAGNOSIS — M19.90 ARTHRITIS: ICD-10-CM

## 2021-02-05 DIAGNOSIS — I48.19 PERSISTENT ATRIAL FIBRILLATION (HCC): ICD-10-CM

## 2021-02-05 DIAGNOSIS — Z00.00 HEALTHCARE MAINTENANCE: ICD-10-CM

## 2021-02-05 LAB — HBA1C MFR BLD: 6.3 %

## 2021-02-05 PROCEDURE — 99213 OFFICE O/P EST LOW 20 MIN: CPT | Performed by: STUDENT IN AN ORGANIZED HEALTH CARE EDUCATION/TRAINING PROGRAM

## 2021-02-05 RX ORDER — ATORVASTATIN CALCIUM 20 MG/1
TABLET, FILM COATED ORAL
Qty: 30 TABLET | Refills: 2 | Status: SHIPPED | OUTPATIENT
Start: 2021-02-05 | End: 2021-02-24

## 2021-02-05 ASSESSMENT — ENCOUNTER SYMPTOMS
BLOOD IN STOOL: 1
EYE REDNESS: 0
CONSTIPATION: 0
BACK PAIN: 0
RECTAL PAIN: 0
NAUSEA: 0
VOMITING: 0
DIARRHEA: 0
COUGH: 0
WHEEZING: 0
COLOR CHANGE: 0
ABDOMINAL DISTENTION: 0
EYE ITCHING: 0
EYE PAIN: 0
ABDOMINAL PAIN: 0
RHINORRHEA: 0
SHORTNESS OF BREATH: 0

## 2021-02-05 NOTE — PATIENT INSTRUCTIONS
Please make appointment with General Surgery for removal of skin tag     Please see Dr. Nathalia Romero For management of weight and possibility of bariatric surgery     Download my fitness pal or another Yuniel that will help you with calorie counting. Limit Daily Calories to 1600 calories.

## 2021-02-05 NOTE — PROGRESS NOTES
Outpatient Clinic Visit Note    Patient: Jazlyn De La Cruz  : 1970 (45 y.o.)  Date: 2021    CC: Gluteal Abcess    HPI    Patient is here for evaluation of weight loss. He is having significant stress and emotional distress due to his weight. Including not being able to play with grand children. He has significant co morbidities that are associated with his weight including Atrial fibrillation, Diabetes and Hypertension. He has significant arthritis pain in his knees which is no doubt influenced by his weight. Otherwise no chest pain, abdominal pain, no nausea or vomiting. No new shortness of breath. ROS:  Review of Systems   Constitutional: Negative for activity change, chills, fatigue and fever. HENT: Negative for congestion, postnasal drip and rhinorrhea. Eyes: Negative for pain, redness and itching. Respiratory: Negative for cough, shortness of breath and wheezing. Cardiovascular: Negative for chest pain, palpitations and leg swelling. Gastrointestinal: Positive for blood in stool. Negative for abdominal distention, abdominal pain, constipation, diarrhea, nausea, rectal pain and vomiting. Endocrine: Negative. Genitourinary: Negative for difficulty urinating and frequency. Musculoskeletal: Negative for arthralgias, back pain and joint swelling. Skin: Positive for rash and wound. Negative for color change and pallor. Allergic/Immunologic: Positive for environmental allergies. Neurological: Negative for dizziness, tremors, light-headedness, numbness and headaches.        Past Medical History:    Past Medical History:   Diagnosis Date    Arthritis     Atrial fibrillation ([address]     Congestive heart failure (HCC)     Depression     Diabetes mellitus (HCC)     GERD (gastroesophageal reflux disease)     Hyperlipidemia     Hypertension     Morbid obesity ([address]     Sleep apnea     uses cpap at home q hs  Type II or unspecified type diabetes mellitus without mention of complication, not stated as uncontrolled        Past Surgical History:  No past surgical history on file. Home Medications:  Prior to Visit Medications    Medication Sig Taking? Authorizing Provider   apixaban (ELIQUIS) 5 MG TABS tablet TAKE 1 TABLET BY MOUTH TWICE A DAY Yes Latina Kocher, MD   pantoprazole (PROTONIX) 40 MG tablet TAKE 1 TABLET BY MOUTH EVERY MORNING BEFORE BREAKFAST Yes Mariam Us MD   lisinopril (PRINIVIL;ZESTRIL) 10 MG tablet TAKE 1 TABLET BY MOUTH EVERY DAY Yes Loni Quick MD   metFORMIN (GLUCOPHAGE) 500 MG tablet TAKE 1 TABLET BY MOUTH TWICE A DAY WITH MEALS Yes Sol Gaffney MD   dilTIAZem (CARDIZEM CD) 120 MG extended release capsule TAKE 1 CAPSULE BY MOUTH EVERY DAY Yes Gideon Gutierrez MD   insulin lispro, 1 Unit Dial, 100 UNIT/ML SOPN Inject 7 Units into the skin 3 times daily (before meals) Yes Iwona Dean MD   potassium chloride (KLOR-CON M20) 20 MEQ extended release tablet TAKE 1 TABLET BY MOUTH EVERY DAY WITH BREAKFAST Yes Lynette Hinojosa MD   furosemide (LASIX) 80 MG tablet TAKE 1 TABLET BY MOUTH TWICE A DAY Yes Ambreen Garrison MD   allopurinol (ZYLOPRIM) 300 MG tablet TAKE 1 TABLET BY MOUTH EVERY DAY Yes Sol Gaffney MD   Multiple Vitamins-Minerals (THERAPEUTIC MULTIVITAMIN-MINERALS) tablet Take 1 tablet by mouth daily Yes Ambreen Garrison MD   ibuprofen (ADVIL;MOTRIN) 800 MG tablet Take 1 tablet by mouth every 8 hours as needed for Pain (Take with food) Yes Sol Gaffney MD   sildenafil (VIAGRA) 50 MG tablet Take 1 tablet by mouth as needed for Erectile Dysfunction Yes Steven Tse MD   fluticasone (FLONASE) 50 MCG/ACT nasal spray 1 spray by Nasal route daily Yes Dayday Kaur MD   insulin glargine (BASAGLAR KWIKPEN) 100 UNIT/ML injection pen patient using vials per [facility].  inject 20 units at hs Yes Lois Kennedy MD Alcohol Swabs PADS 1 Container by Does not apply route three times daily Yes Steven Weiner MD   ACCU-CHEK FASTCLIX LANCETS MISC 20 Sticks by Does not apply route 2 times daily Yes Steven Weiner MD   Insulin Pen Needle ([employer] PEN NEEDLES) 31G X 6 MM MISC 1 each by Does not apply route daily  Gibran Whitaker MD   BD INSULIN SYRINGE U/F 31G X 5/16\" 1 ML MISC USE AS DIRECTED 4 TIMES A DAY  Ainsley Linn MD   atorvastatin (LIPITOR) 20 MG tablet TAKE 1 TABLET BY MOUTH EVERY DAY AT NIGHT  Patient not taking: Reported on 6/2/3381  Roma Owens MD   blood glucose monitor strips by Other route 4 times daily (after meals and at bedtime)  Roma Owens MD   [facility]S VITAMIN C 250 MG tablet TAKE 1 TABLET BY MOUTH EVERY DAY  Patient not taking: Reported on 8/2/6191  Roma Owens MD   Blood Pressure Monitoring (WRIST BLOOD PRESSURE MONITOR) MISC 1 Units by Does not apply route once for 1 dose  Chaz Menard MD   Blood Pressure KIT 1 Units by Does not apply route daily  Stan Yost MD   Blood Glucose Monitoring Suppl (BLOOD GLUCOSE MONITOR SYSTEM) W/DEVICE KIT 1 Units by Does not apply route daily  Stan Yost MD   Handicap Placard MISC by Does not apply route Valid for 1 year. Mobility limitations due to severe Osteoarthritis of L Knee  Lorin Pastrana MD   Blood Glucose Monitoring Suppl (ACCU-CHEK VIK PLUS) W/DEVICE KIT 1 kit by Does not apply route 4 times daily (before meals and nightly). Clabe Mins        Allergies:    Patient has no known allergies.     Family History:       Problem Relation Age of Onset    Diabetes Mother     High Blood Pressure Mother     Diabetes Maternal Aunt     High Blood Pressure Maternal Grandmother        Social History:  Social History     Tobacco Use    Smoking status: Never Smoker    Smokeless tobacco: Never Used   Substance Use Topics    Alcohol use: No     Alcohol/week: 0.0 standard drinks    Drug use: Yes     Types: Marijuana, Cocaine Comment: no cocaine for over 6 years, marijuana occ       Data: Old records have been reviewed electronically. PHYSICAL EXAM:  /84 (Site: Right Upper Arm, Position: Sitting, Cuff Size: Medium Adult)   Pulse 69   Temp 96.4 °F (35.8 °C) (Oral)   Resp 16   Wt (!) 376 lb (170.6 kg)   SpO2 95%   BMI 50.99 kg/m²   Physical Exam  Constitutional:       General: He is not in acute distress. Appearance: Normal appearance. He is well-developed. He is morbidly obese. He is not diaphoretic. HENT:      Head: Atraumatic. Mouth/Throat:      Mouth: Mucous membranes are moist.      Pharynx: Oropharynx is clear. No oropharyngeal exudate. Cardiovascular:      Rate and Rhythm: Normal rate and regular rhythm. Pulses: Normal pulses. Heart sounds: No murmur. No gallop. Pulmonary:      Effort: Pulmonary effort is normal. No respiratory distress. Breath sounds: No wheezing. Abdominal:      General: Bowel sounds are normal. There is no distension. Palpations: Abdomen is soft. Genitourinary:     Comments: Skin tag on scrotum  Skin:     Comments: Scarring of lower abdomen and around gluteal region. Multiple old scarred lesions covering buttocks. Neurological:      Mental Status: He is alert.        Health Maintanence:  Health Maintenance   Topic Date Due    Hepatitis C screen  1970    Diabetic retinal exam  09/28/1979    Hepatitis B vaccine (1 of 3 - Risk 3-dose series) 09/28/1988    Colon Cancer Screen FIT/FOBT  09/28/2019    Shingles Vaccine (1 of 2) 09/28/2019    Flu vaccine (1) 09/01/2020    Diabetic foot exam  02/25/2021    Lipid screen  02/25/2021    Potassium monitoring  09/24/2021    Creatinine monitoring  09/24/2021    A1C test (Diabetic or Prediabetic)  02/05/2022    DTaP/Tdap/Td vaccine (2 - Td) 08/04/2028    Pneumococcal 0-64 years Vaccine  Completed    HIV screen  Completed    Hepatitis A vaccine  Aged Out    Hib vaccine  Aged Out  Meningococcal (ACWY) vaccine  Aged Out       Assessment & Plan:      Obesity   - Referral to Dr. Ade Pleitez for bariatric surgery   - Needs significant weight loss for improved quality of life     Type II diabetes  - Order A1c which is 6.4   - No hypoglycemia, blood sugars well controlled   - leave regimin as is   - again weight loss is prioritized     Paroxysmal A-fib ([facility])  - Continue apixaban (ELIQUIS) 5 MG TABS tablet; Arthritis  - Weight loss.      Health Maintenance   - Colonoscopy      Skin Tag   - Referral to surgery clinic     Dispo: Pt has been staffed with Dr. Kathleen Main  _______________  Anamaria Chavez MD  Internal Medicine, PGY 3-2/5/2021 11:28 AM

## 2021-02-08 ENCOUNTER — TELEPHONE (OUTPATIENT)
Dept: CARDIOLOGY CLINIC | Age: 51
End: 2021-02-08

## 2021-02-08 NOTE — TELEPHONE ENCOUNTER
Received a fax from [facility] for CC for colonoscopy/endoscopy on 04/13/2021. They are requesting to hold Eliquis 5 days prior. Pt last seen Dr. Rahman Husbands on 05/20/2020. PMH significant for obesity, LEONA non-compliant with CPAP, DM II, HTN and PAF who was admitted to [facility] 8/2018 s/p syncopal episode. s/p atrial fibrillation ablation 11/13/18.  -Today EKG sinus bradycardia with frequent PAC s  -30 day event monitor was worn 3 months after atrial fibrillation ablation from3/21/19-4/19/19 and  showed SR with PAC.   -[id number] Score 3 (CHF, HTN, DM)     Form scanned into chart

## 2021-02-08 NOTE — TELEPHONE ENCOUNTER
Dr. Carol Licona you saw pt 7/2020. Please make recommendations regarding holding Eliquis for ? 5 days. See your attached note below. Thanks    1) Paroxysmal atrial fibrillation  S/p ablation on 11/13/18. EKG confirms regular rhythm today. Asymptomatic.    Continue Eliquis

## 2021-02-10 NOTE — TELEPHONE ENCOUNTER
Last OV - 02/05/2021 - Dr. Amina Khan  Next OV - 02/15/2021    Furosemide last filled 10/29/2020 - 0 rf

## 2021-02-11 DIAGNOSIS — I10 ESSENTIAL HYPERTENSION: Chronic | ICD-10-CM

## 2021-02-12 RX ORDER — FUROSEMIDE 80 MG
TABLET ORAL
Qty: 180 TABLET | Refills: 1 | Status: SHIPPED | OUTPATIENT
Start: 2021-02-12 | End: 2021-09-28 | Stop reason: SDUPTHER

## 2021-02-12 RX ORDER — LISINOPRIL 10 MG/1
TABLET ORAL
Qty: 90 TABLET | Refills: 1 | Status: SHIPPED | OUTPATIENT
Start: 2021-02-12 | End: 2021-09-29 | Stop reason: SDUPTHER

## 2021-02-18 NOTE — TELEPHONE ENCOUNTER
Last OV - 02/05/2021 - Dr. Car Cain  Next Ov - 08/05/2021 -     Diltiazem last filled 11/25/2020 - 90 capsule

## 2021-02-22 ENCOUNTER — OFFICE VISIT (OUTPATIENT)
Dept: INTERNAL MEDICINE CLINIC | Age: 51
End: 2021-02-22
Payer: MEDICAID

## 2021-02-22 VITALS
HEIGHT: 72 IN | TEMPERATURE: 97.3 F | SYSTOLIC BLOOD PRESSURE: 118 MMHG | WEIGHT: 315 LBS | RESPIRATION RATE: 20 BRPM | OXYGEN SATURATION: 94 % | BODY MASS INDEX: 42.66 KG/M2 | HEART RATE: 65 BPM | DIASTOLIC BLOOD PRESSURE: 73 MMHG

## 2021-02-22 DIAGNOSIS — L91.8 SKIN TAG: Primary | ICD-10-CM

## 2021-02-22 DIAGNOSIS — M19.90 ARTHRITIS: ICD-10-CM

## 2021-02-22 PROCEDURE — 99213 OFFICE O/P EST LOW 20 MIN: CPT | Performed by: STUDENT IN AN ORGANIZED HEALTH CARE EDUCATION/TRAINING PROGRAM

## 2021-02-22 RX ORDER — IBUPROFEN 800 MG/1
800 TABLET ORAL EVERY 8 HOURS PRN
Qty: 30 TABLET | Refills: 0 | Status: CANCELLED | OUTPATIENT
Start: 2021-02-22

## 2021-02-22 RX ORDER — IBUPROFEN 800 MG/1
800 TABLET ORAL EVERY 8 HOURS PRN
Qty: 30 TABLET | Refills: 0 | Status: SHIPPED | OUTPATIENT
Start: 2021-02-22 | End: 2022-04-28 | Stop reason: SDUPTHER

## 2021-02-22 NOTE — PATIENT INSTRUCTIONS
Shower daily. After shower,apply antibiotic ointment for one week.   Call for followup appt with surgeons in clinic if any problems, such as severe pain, foul drainage, swelling, fever

## 2021-02-22 NOTE — PROGRESS NOTES
Department of General Surgery - Adult  General Surgery Service  Resident Clinic Note      CC:  Skin tag    History Obtained From:  patient    HISTORY OF PRESENT ILLNESS:  Monique De La Cruz is a 45 y.o. male with history of DM, Afib s/p ablation (Nov 2018), and hypertension who presents to resident clinic for evaluation of a scrotal skin tag. He reports first noticing the tag about one year ago. It has gradually grown since he first noticed it, now about 1/3cm in diameter. His largest complaint is that it rubs up against his clothing, causing constant irritation. Other than this pain and discomfort, he denies any other symptoms associated with the skin growth, including fever, chills, urinary issues, sexual dysfunction, or bleeding from the site. He notes multiple other skin tags across his body, but this one is the only one that causes his significant irritation. Past Medical History:   Diagnosis Date    Arthritis     Atrial fibrillation ([facility]     Congestive heart failure (HCC)     Depression     Diabetes mellitus (HCC)     GERD (gastroesophageal reflux disease)     Hyperlipidemia     Hypertension     Morbid obesity ([facility] [facility]     Sleep apnea     uses cpap at home q hs     Type II or unspecified type diabetes mellitus without mention of complication, not stated as uncontrolled      No past surgical history on file.   Current Outpatient Medications   Medication Sig Dispense Refill    furosemide (LASIX) 80 MG tablet TAKE 1 TABLET BY MOUTH TWICE A  tablet 1    lisinopril (PRINIVIL;ZESTRIL) 10 MG tablet TAKE 1 TABLET BY MOUTH EVERY DAY 90 tablet 1    atorvastatin (LIPITOR) 20 MG tablet TAKE 1 TABLET BY MOUTH EVERY DAY AT NIGHT 30 tablet 2    apixaban (ELIQUIS) 5 MG TABS tablet TAKE 1 TABLET BY MOUTH TWICE A DAY 60 tablet 1    pantoprazole (PROTONIX) 40 MG tablet TAKE 1 TABLET BY MOUTH EVERY MORNING BEFORE BREAKFAST 90 tablet 0  metFORMIN (GLUCOPHAGE) 500 MG tablet TAKE 1 TABLET BY MOUTH TWICE A DAY WITH MEALS 180 tablet 1    dilTIAZem (CARDIZEM CD) 120 MG extended release capsule TAKE 1 CAPSULE BY MOUTH EVERY DAY 90 capsule 0    insulin lispro, 1 Unit Dial, 100 UNIT/ML SOPN Inject 7 Units into the skin 3 times daily (before meals) 2 pen 1    potassium chloride (KLOR-CON M20) 20 MEQ extended release tablet TAKE 1 TABLET BY MOUTH EVERY DAY WITH BREAKFAST 90 tablet 0    allopurinol (ZYLOPRIM) 300 MG tablet TAKE 1 TABLET BY MOUTH EVERY DAY 90 tablet 1    Insulin Pen Needle ([employer] PEN NEEDLES) 31G X 6 MM MISC 1 each by Does not apply route daily 100 each 3    Multiple Vitamins-Minerals (THERAPEUTIC MULTIVITAMIN-MINERALS) tablet Take 1 tablet by mouth daily 30 tablet 3    BD INSULIN SYRINGE U/F 31G X 5/16\" 1 ML MISC USE AS DIRECTED 4 TIMES A  each 5    ibuprofen (ADVIL;MOTRIN) 800 MG tablet Take 1 tablet by mouth every 8 hours as needed for Pain (Take with food) 30 tablet 0    sildenafil (VIAGRA) 50 MG tablet Take 1 tablet by mouth as needed for Erectile Dysfunction 30 tablet 3    fluticasone (FLONASE) 50 MCG/ACT nasal spray 1 spray by Nasal route daily 1 Bottle 2    blood glucose monitor strips by Other route 4 times daily (after meals and at bedtime) 60 strip 2    insulin glargine (BASAGLAR KWIKPEN) 100 UNIT/ML injection pen patient using vials per [facility]  inject 20 units at hs 5 mL 2    Alcohol Swabs PADS 1 Container by Does not apply route three times daily 100 each 5    ACCU-CHEK FASTCLIX LANCETS MISC 20 Sticks by Does not apply route 2 times daily 20 each 3    [employer] VITAMIN C 250 MG tablet TAKE 1 TABLET BY MOUTH EVERY DAY (Patient not taking: Reported on 2/5/2021) 30 tablet 3    Blood Pressure Monitoring (WRIST BLOOD PRESSURE MONITOR) MISC 1 Units by Does not apply route once for 1 dose 1 each 0    Blood Pressure KIT 1 Units by Does not apply route daily 1 kit 0  Blood Glucose Monitoring Suppl (BLOOD GLUCOSE MONITOR SYSTEM) W/DEVICE KIT 1 Units by Does not apply route daily 1 kit 0    Handicap Placard MISC by Does not apply route Valid for 1 year. Mobility limitations due to severe Osteoarthritis of L Knee 1 each 0    Blood Glucose Monitoring Suppl (ACCU-CHEK VIK PLUS) W/DEVICE KIT 1 kit by Does not apply route 4 times daily (before meals and nightly). 2 kit 0     No current facility-administered medications for this visit. No Known Allergies  Social History     Tobacco Use    Smoking status: Never Smoker    Smokeless tobacco: Never Used   Substance Use Topics    Alcohol use: No     Alcohol/week: 0.0 standard drinks    Drug use: Yes     Types: Marijuana, Cocaine     Comment: no cocaine for over 6 years, marijuana occ     Family History   Problem Relation Age of Onset    Diabetes Mother     High Blood Pressure Mother     Diabetes Maternal Aunt     High Blood Pressure Maternal Grandmother        REVIEW OF SYSTEMS:    A 14 point review of systems was conducted, significant findings as noted in HPI. All other systems negative. PHYSICAL EXAM:    There were no vitals filed for this visit.     General appearance: Well-developed, well-nourished; obese; no acute distress  HEENT: NC/AT, EOMI, no scleral icterus  Neck: trachea midline, no appreciated JVD  Chest/Lungs: Normal effort, symmetric chest rise  Cardiovascular: Regular rate and rhythm; no murmurs  Abdomen: soft, obese, non-tender throughout, no guarding/rigidity  Skin: warm and dry, no rashes; multiple skin tags including ones across bilateral axilla, R neck, bilateral lower abdomen; skin tag present to the right scrotum, approx 1/3cm in size with mild tenderness to palpation  Extremities: no edema, no cyanosis  Neuro: A&Ox3, no focal deficits, sensation intact    ASSESSMENT AND PLAN: Rachell De La Cruz is a 45 y.o. male with history of DM, Afib s/p ablation (Nov 2018), and hypertension who presents to surgery clinic for evaluation of a skin tag, present on his right scrotum. - Will remove at bedside today, procedure note below:    Informed consent was obtained prior to the procedure. The right scrotum was prepped with Betadine, and 1% Lidocaine with epinephrine was injected to the area for anesthetic. Using sterile technique, a shave excision was performed, and the skin lesion was removed and placed in formalin. Silver nitrate was used to ensure hemostasis, and triple-antibiotic ointment and an adhesive bandage was applied. The procedure was well tolerated without complications. Wound care instructions were provided. - Recommend BID triple antibiotic ointment application to the area for the next week  - Follow up in surgery clinic as needed in the future    Leonard Mon MD  PGY1, General Surgery  02/22/21  9:14 AM  [phone]

## 2021-02-24 RX ORDER — POTASSIUM CHLORIDE 20 MEQ/1
20 TABLET, EXTENDED RELEASE ORAL
COMMUNITY
Start: 2020-12-24 | End: 2021-02-24

## 2021-02-26 RX ORDER — DILTIAZEM HYDROCHLORIDE 120 MG/1
CAPSULE, COATED, EXTENDED RELEASE ORAL
Qty: 90 CAPSULE | Refills: 2 | Status: SHIPPED | OUTPATIENT
Start: 2021-02-26 | End: 2021-12-02 | Stop reason: SDUPTHER

## 2021-03-02 DIAGNOSIS — Z79.4 TYPE 2 DIABETES MELLITUS WITH DIABETIC NEPHROPATHY, WITH LONG-TERM CURRENT USE OF INSULIN (HCC): ICD-10-CM

## 2021-03-02 DIAGNOSIS — E11.21 TYPE 2 DIABETES MELLITUS WITH DIABETIC NEPHROPATHY, WITH LONG-TERM CURRENT USE OF INSULIN (HCC): ICD-10-CM

## 2021-03-02 LAB
A/G RATIO: 1.4 (ref 1.1–2.2)
ALBUMIN SERPL-MCNC: 4.2 G/DL (ref 3.4–5)
ALP BLD-CCNC: 82 U/L (ref 40–129)
ALT SERPL-CCNC: 18 U/L (ref 10–40)
ANION GAP SERPL CALCULATED.3IONS-SCNC: 14 MMOL/L (ref 3–16)
AST SERPL-CCNC: 14 U/L (ref 15–37)
BILIRUB SERPL-MCNC: 0.5 MG/DL (ref 0–1)
BUN BLDV-MCNC: 13 MG/DL (ref 7–20)
CALCIUM SERPL-MCNC: 9.7 MG/DL (ref 8.3–10.6)
CHLORIDE BLD-SCNC: 101 MMOL/L (ref 99–110)
CHOLESTEROL, FASTING: 165 MG/DL (ref 0–199)
CO2: 28 MMOL/L (ref 21–32)
CREAT SERPL-MCNC: 0.8 MG/DL (ref 0.9–1.3)
GFR AFRICAN AMERICAN: >60
GFR NON-AFRICAN AMERICAN: >60
GLOBULIN: 3.1 G/DL
GLUCOSE BLD-MCNC: 153 MG/DL (ref 70–99)
GLUCOSE FASTING: 153 MG/DL (ref 70–99)
HDLC SERPL-MCNC: 37 MG/DL (ref 40–60)
LDL CHOLESTEROL CALCULATED: 95 MG/DL
PHOSPHORUS: 3.3 MG/DL (ref 2.5–4.9)
POTASSIUM SERPL-SCNC: 3.9 MMOL/L (ref 3.5–5.1)
SODIUM BLD-SCNC: 143 MMOL/L (ref 136–145)
TOTAL PROTEIN: 7.3 G/DL (ref 6.4–8.2)
TRIGLYCERIDE, FASTING: 163 MG/DL (ref 0–150)
VLDLC SERPL CALC-MCNC: 33 MG/DL

## 2021-03-15 ENCOUNTER — IMMUNIZATION (OUTPATIENT)
Dept: PRIMARY CARE CLINIC | Age: 51
End: 2021-03-15
Payer: MEDICAID

## 2021-03-15 PROCEDURE — 91301 COVID-19, MODERNA VACCINE 100MCG/0.5ML DOSE: CPT | Performed by: FAMILY MEDICINE

## 2021-03-15 PROCEDURE — 0011A COVID-19, MODERNA VACCINE 100MCG/0.5ML DOSE: CPT | Performed by: FAMILY MEDICINE

## 2021-03-20 DIAGNOSIS — I48.0 PAROXYSMAL A-FIB (HCC): ICD-10-CM

## 2021-04-05 DIAGNOSIS — M19.90 ARTHRITIS: ICD-10-CM

## 2021-04-05 NOTE — TELEPHONE ENCOUNTER
Last OV - 02/05/2021 - Dr. Mercedez Lockhart  Next OV - 08/05/2021 -    Pantoprazole was last filled 01/11/2021 (90) day supply

## 2021-04-06 ENCOUNTER — HOSPITAL ENCOUNTER (EMERGENCY)
Age: 51
Discharge: HOME | End: 2021-04-06
Payer: MEDICAID

## 2021-04-06 VITALS
HEIGHT: 72 IN | TEMPERATURE: 98.2 F | DIASTOLIC BLOOD PRESSURE: 85 MMHG | WEIGHT: 315 LBS | HEART RATE: 67 BPM | OXYGEN SATURATION: 95 % | RESPIRATION RATE: 16 BRPM | BODY MASS INDEX: 42.66 KG/M2 | SYSTOLIC BLOOD PRESSURE: 140 MMHG

## 2021-04-06 DIAGNOSIS — L02.91 ABSCESS: Primary | ICD-10-CM

## 2021-04-06 PROCEDURE — 10061 I&D ABSCESS COMP/MULTIPLE: CPT

## 2021-04-06 PROCEDURE — 99284 EMERGENCY DEPT VISIT MOD MDM: CPT

## 2021-04-06 PROCEDURE — 6370000000 HC RX 637 (ALT 250 FOR IP): Performed by: PHYSICIAN ASSISTANT

## 2021-04-06 PROCEDURE — 2500000003 HC RX 250 WO HCPCS: Performed by: PHYSICIAN ASSISTANT

## 2021-04-06 RX ORDER — SULFAMETHOXAZOLE AND TRIMETHOPRIM 800; 160 MG/1; MG/1
2 TABLET ORAL 2 TIMES DAILY
Qty: 28 TABLET | Refills: 0 | Status: SHIPPED | OUTPATIENT
Start: 2021-04-06 | End: 2021-04-13

## 2021-04-06 RX ORDER — LIDOCAINE HYDROCHLORIDE AND EPINEPHRINE 10; 10 MG/ML; UG/ML
20 INJECTION, SOLUTION INFILTRATION; PERINEURAL ONCE
Status: COMPLETED | OUTPATIENT
Start: 2021-04-06 | End: 2021-04-06

## 2021-04-06 RX ORDER — OXYCODONE HYDROCHLORIDE AND ACETAMINOPHEN 5; 325 MG/1; MG/1
1 TABLET ORAL ONCE
Status: COMPLETED | OUTPATIENT
Start: 2021-04-06 | End: 2021-04-06

## 2021-04-06 RX ORDER — CEPHALEXIN 500 MG/1
500 CAPSULE ORAL 4 TIMES DAILY
Qty: 28 CAPSULE | Refills: 0 | Status: SHIPPED | OUTPATIENT
Start: 2021-04-06 | End: 2021-04-13

## 2021-04-06 RX ORDER — HYDROCODONE BITARTRATE AND ACETAMINOPHEN 5; 325 MG/1; MG/1
1 TABLET ORAL EVERY 6 HOURS PRN
Qty: 10 TABLET | Refills: 0 | Status: SHIPPED | OUTPATIENT
Start: 2021-04-06 | End: 2021-04-09

## 2021-04-06 RX ORDER — CEPHALEXIN 500 MG/1
500 CAPSULE ORAL ONCE
Status: COMPLETED | OUTPATIENT
Start: 2021-04-06 | End: 2021-04-06

## 2021-04-06 RX ORDER — SULFAMETHOXAZOLE AND TRIMETHOPRIM 800; 160 MG/1; MG/1
2 TABLET ORAL ONCE
Status: COMPLETED | OUTPATIENT
Start: 2021-04-06 | End: 2021-04-06

## 2021-04-06 RX ADMIN — CEPHALEXIN 500 MG: 500 CAPSULE ORAL at 21:20

## 2021-04-06 RX ADMIN — OXYCODONE HYDROCHLORIDE AND ACETAMINOPHEN 1 TABLET: 5; 325 TABLET ORAL at 20:33

## 2021-04-06 RX ADMIN — LIDOCAINE HYDROCHLORIDE,EPINEPHRINE BITARTRATE 20 ML: 10; .01 INJECTION, SOLUTION INFILTRATION; PERINEURAL at 20:33

## 2021-04-06 RX ADMIN — SULFAMETHOXAZOLE AND TRIMETHOPRIM 2 TABLET: 800; 160 TABLET ORAL at 21:20

## 2021-04-06 ASSESSMENT — PAIN DESCRIPTION - LOCATION: LOCATION: RECTUM

## 2021-04-06 ASSESSMENT — ENCOUNTER SYMPTOMS
NAUSEA: 0
VOMITING: 0
COLOR CHANGE: 0
ABDOMINAL PAIN: 0

## 2021-04-06 ASSESSMENT — PAIN DESCRIPTION - PAIN TYPE: TYPE: ACUTE PAIN

## 2021-04-07 NOTE — ED TRIAGE NOTES
Pt here for rectal abscess. Pt states that he is on prophylactic antibiotics but still gets these abscesses routinely.

## 2021-04-07 NOTE — ED PROVIDER NOTES
1 [facility]  EMERGENCY DEPARTMENT ENCOUNTER          PHYSICIAN ASSISTANT NOTE       Date of evaluation: 4/6/2021    Chief Complaint     Abscess      History of Present Illness     Meagan De La Cruz is a 45 y.o. male who presents with complaint of an abscess to the left buttock. Patient states he has had these in the past in similar areas. He has tried multiple remedies to try and alleviate his frequent abscesses with no success. He states in the past he has been able to sometimes use a hot shower to allow them to open on their own and drain but this time he was unable to. He admits to pain in his left buttock at the site that is worse when pressure is applied including when he is sitting. Pain is constant and nonradiating. Rates it as an 8 out of 10 and describes it as pressure. denies alleviating factors. Denies fevers or chills. He is a diabetic but states that his blood sugars have been normal.    Review of Systems     Review of Systems   Constitutional: Negative for chills and fever. Gastrointestinal: Negative for abdominal pain, nausea and vomiting. Musculoskeletal: Positive for myalgias (left buttock). Skin: Negative for color change and wound. Hematological: Bruises/bleeds easily (Eliquis (afib)). All other systems reviewed and are negative. Past Medical, Surgical, Family, and Social History     He has a past medical history of Arthritis, Atrial fibrillation ([address] Congestive heart failure ([address] Depression, Diabetes mellitus ([address] GERD (gastroesophageal reflux disease), Hyperlipidemia, Hypertension, Morbid obesity ([address] Sleep apnea, and Type II or unspecified type diabetes mellitus without mention of complication, not stated as uncontrolled. He has no past surgical history on file. His family history includes Diabetes in his maternal aunt and mother; High Blood Pressure in his maternal grandmother and mother. He reports that he has never smoked.  He has never used smokeless tobacco. He reports current drug use. Drugs: Marijuana and Cocaine. He reports that he does not drink alcohol. Medications     Previous Medications    ACCU-CHEK FASTCLIX LANCETS MISC    20 Sticks by Does not apply route 2 times daily    ALCOHOL SWABS PADS    1 Container by Does not apply route three times daily    ALLOPURINOL (ZYLOPRIM) 300 MG TABLET    TAKE 1 TABLET BY MOUTH EVERY DAY    APIXABAN (ELIQUIS) 5 MG TABS TABLET    TAKE 1 TABLET BY MOUTH TWICE A DAY    ATORVASTATIN (LIPITOR) 20 MG TABLET    TAKE 1 TABLET BY MOUTH EVERY DAY AT NIGHT    BD INSULIN SYRINGE U/F 31G X 5/16\" 1 ML MISC    USE AS DIRECTED 4 TIMES A DAY    BLOOD GLUCOSE MONITOR STRIPS    by Other route 4 times daily (after meals and at bedtime)    BLOOD GLUCOSE MONITORING SUPPL (ACCU-CHEK VIK PLUS) W/DEVICE KIT    1 kit by Does not apply route 4 times daily (before meals and nightly). BLOOD GLUCOSE MONITORING SUPPL (BLOOD GLUCOSE MONITOR SYSTEM) W/DEVICE KIT    1 Units by Does not apply route daily    BLOOD PRESSURE KIT    1 Units by Does not apply route daily    BLOOD PRESSURE MONITORING (WRIST BLOOD PRESSURE MONITOR) MISC    1 Units by Does not apply route once for 1 dose    DILTIAZEM (CARDIZEM CD) 120 MG EXTENDED RELEASE CAPSULE    TAKE 1 CAPSULE BY MOUTH EVERY DAY    FLUTICASONE (FLONASE) 50 MCG/ACT NASAL SPRAY    1 spray by Nasal route daily    FUROSEMIDE (LASIX) 80 MG TABLET    TAKE 1 TABLET BY MOUTH TWICE A DAY    HANDICAP PLACARD MISC    by Does not apply route Valid for 1 year. Mobility limitations due to severe Osteoarthritis of L Knee    IBUPROFEN (ADVIL;MOTRIN) 800 MG TABLET    Take 1 tablet by mouth every 8 hours as needed for Pain (Take with food)    INSULIN GLARGINE (BASAGLAR KWIKPEN) 100 UNIT/ML INJECTION PEN    patient using vials per [facility]  inject 20 units at hs    INSULIN LISPRO, 1 UNIT DIAL, 100 UNIT/ML SOPN    Inject 7 Units into the skin 3 times daily (before meals)    INSULIN PEN NEEDLE ([employer] PEN NEEDLES) 31G X 6 MM MISC    1 each by Does not apply route daily    LISINOPRIL (PRINIVIL;ZESTRIL) 10 MG TABLET    TAKE 1 TABLET BY MOUTH EVERY DAY    METFORMIN (GLUCOPHAGE) 500 MG TABLET    TAKE 1 TABLET BY MOUTH TWICE A DAY WITH MEALS    MULTIPLE VITAMINS-MINERALS (THERAPEUTIC MULTIVITAMIN-MINERALS) TABLET    Take 1 tablet by mouth daily    PANTOPRAZOLE (PROTONIX) 40 MG TABLET    TAKE 1 TABLET BY MOUTH EVERY MORNING BEFORE BREAKFAST    POTASSIUM CHLORIDE (KLOR-CON M20) 20 MEQ EXTENDED RELEASE TABLET    TAKE 1 TABLET BY MOUTH EVERY DAY WITH BREAKFAST    SILDENAFIL (VIAGRA) 50 MG TABLET    Take 1 tablet by mouth as needed for Erectile Dysfunction       Allergies     He has No Known Allergies. Physical Exam     INITIAL VITALS: BP: (!) 140/85, Temp: 98.2 °F (36.8 °C), Pulse: 67, Resp: 16, SpO2: 95 %  Physical Exam  Vitals signs and nursing note reviewed. Constitutional:       General: He is not in acute distress. Appearance: Normal appearance. He is well-developed. He is not diaphoretic. HENT:      Head: Normocephalic and atraumatic. Eyes:      Conjunctiva/sclera: Conjunctivae normal.   Neck:      Musculoskeletal: Normal range of motion. Cardiovascular:      Rate and Rhythm: Normal rate and regular rhythm. Heart sounds: Normal heart sounds. Pulmonary:      Effort: Pulmonary effort is normal. No respiratory distress. Breath sounds: Normal breath sounds. Abdominal:      Palpations: Abdomen is soft. Tenderness: There is no abdominal tenderness. Musculoskeletal: Normal range of motion. Left hip: He exhibits tenderness (left buttock with half dollar sized area of induration with central fluctuance). He exhibits normal range of motion, normal strength, no bony tenderness, no swelling, no crepitus and no deformity. Legs:       Comments: No perianal or perirectal extension or involvement   Skin:     General: Skin is warm and dry. Neurological:      General: No focal deficit present. Mental Status: He is alert and oriented to person, place, and time. Psychiatric:         Behavior: Behavior normal.         Thought Content: Thought content normal.         Judgment: Judgment normal.         Diagnostic Results         RADIOLOGY:  No orders to display       LABS:   No results found for this visit on 04/06/21. ED BEDSIDE ULTRASOUND:      RECENT VITALS:  BP: (!) 140/85, Temp: 98.2 °F (36.8 °C), Pulse: 67, Resp: 16, SpO2: 95 %     Procedures     Incision and Drainage Procedure Note  Indication:  Abscess to left buttock    Procedure: After verbal informed consent was obtained, the patient was positioned appropriately and the skin over the incision site was prepped with betadine prior to the procedure. Approximately 2 cc's of 1% lidocaine with epinephrine was used to locally anesthetize the area. A #11 blade was used to make a 1cm incision in the area of greatest fluctuance. A curved hemostat was used to explore the cavity and break up any loculations. A sterile dressing was placed. The attending physician was present for the key portions of the procedure. The patient tolerated the procedure well. Complications: None        ED Course     Nursing Notes, Past Medical Hx,Past Surgical Hx, Social Hx, Allergies, and Family Hx were reviewed.     The patient was given the following medications:  Orders Placed This Encounter   Medications    lidocaine-EPINEPHrine 1 %-1:[id number] injection 20 mL    oxyCODONE-acetaminophen (PERCOCET) 5-325 MG per tablet 1 tablet    cephALEXin (KEFLEX) capsule 500 mg     Order Specific Question:   Antimicrobial Indications     Answer:   Skin and Soft Tissue Infection    sulfamethoxazole-trimethoprim (BACTRIM DS;SEPTRA DS) 800-160 MG per tablet 2 tablet     Order Specific Question:   Antimicrobial Indications     Answer:   Skin and Soft Tissue Infection    HYDROcodone-acetaminophen (NORCO) 5-325 MG per tablet     Sig: Take 1 tablet by mouth every 6 hours as needed for Pain for up to 3 days. Dispense:  10 tablet     Refill:  0    cephALEXin (KEFLEX) 500 MG capsule     Sig: Take 1 capsule by mouth 4 times daily for 7 days     Dispense:  28 capsule     Refill:  0    sulfamethoxazole-trimethoprim (BACTRIM DS) 800-160 MG per tablet     Sig: Take 2 tablets by mouth 2 times daily for 7 days     Dispense:  28 tablet     Refill:  0       CONSULTS:  None    MEDICAL DECISION MAKING / ASSESSMENT / Tena Rodriguez LALI De La Cruz is a 45 y.o. male who presents emergency department complaint of an abscess to the left buttock. Vital signs are normal.  Patient has an area of induration and fluctuance on the left buttock. He reports a history of multiple abscesses in the past.  I do not feel labs are needed. There is no extension to his perineum, perianal/perirectal region concerning for Alice's gangrene or deep space abscess. Patient had I&D and tolerated procedure well. Please see procedure note above for details. Patient is on Eliquis, but hemostasis was maintained after procedure. Reviewing patient's chart, he had normal Cr in March 2021. He will be prescribed Keflex and Bactrim and was given his first doses in the emergency department. Patient will be placed on antibiotics and given instructions for I&D aftercare. Should follow-up with his primary care provider for a wound check in 2 to 3 days. If he should develop worsening pain, fevers, any other concerns he should return the emergency department for reevaluation. He is in agreement the plan and stable for discharge. This patient was also evaluated by the attending physician. All care plans were discussed and agreed upon. Clinical Impression     1.  Abscess        Disposition     PATIENT REFERRED TO:  Neisha William MD  [address] [place] [address] [address] [address]  [phone]    In 3 days  wound check      DISCHARGE MEDICATIONS:  New Prescriptions    CEPHALEXIN (KEFLEX) 500 MG CAPSULE    Take 1 capsule by mouth 4 times daily for 7 days    HYDROCODONE-ACETAMINOPHEN (NORCO) 5-325 MG PER TABLET    Take 1 tablet by mouth every 6 hours as needed for Pain for up to 3 days.     SULFAMETHOXAZOLE-TRIMETHOPRIM (BACTRIM DS) 800-160 MG PER TABLET    Take 2 tablets by mouth 2 times daily for 7 days       DISPOSITION Decision To Discharge 04/06/2021 08:55:07 PM        Roshan Prasad  04/06/21 [id number]

## 2021-04-09 ENCOUNTER — NURSE ONLY (OUTPATIENT)
Dept: PRIMARY CARE CLINIC | Age: 51
End: 2021-04-09
Payer: MEDICAID

## 2021-04-09 DIAGNOSIS — Z01.818 PREOP EXAMINATION: Primary | ICD-10-CM

## 2021-04-09 PROCEDURE — 99211 OFF/OP EST MAY X REQ PHY/QHP: CPT | Performed by: NURSE PRACTITIONER

## 2021-04-09 NOTE — PROGRESS NOTES
ENDOSCOPY PREOP PHONE INTERVIEW  INSTRUCTIONS:   Covid test was 4/9 [facility]. Please continue to quarantine until your procedure    All patients having a procedure with sedation / anesthesia are required to be Covid tested. You will need to quarantine from the time you are tested until your procedure. Where:   Date tested:     Follow all instructions / preps given to you from your doctor's office. If you have not received these instructions yet, please call the office immediately.  Enter the MAIN entrance located on [address] and report to the desk on left side of the lobby. Arrival Time: 0730 for your procedure scheduled at: 0900   Bring your insurance & photo ID with you.  Dress comfortably. No lotion, powders or jewelry   Bring an accurate list of your medications with doses/ frequency with you day of the procedure, including over the counter medications. If you are taking blood thinners, ASA or diabetic medication, make sure to call Dr. Ramy Chris  or your PCP for instructions prior to your procedure.  Arrange for someone to be with you and sign you out & drive you home after your procedure.  We are allowing 1 visitor with you in the hospital.    [address] [place] AGE: Please make sure to be able to give a urine sample on arrival      If you have further questions, you may contact your Endoscopist's office or Pre Admission Testing staff at [phone]  Pepito Montez. 4/9/2021 .3:24 PM

## 2021-04-10 LAB — SARS-COV-2: NOT DETECTED

## 2021-04-12 ENCOUNTER — ANESTHESIA EVENT (OUTPATIENT)
Dept: ENDOSCOPY | Age: 51
End: 2021-04-12
Payer: MEDICAID

## 2021-04-12 ENCOUNTER — IMMUNIZATION (OUTPATIENT)
Dept: PRIMARY CARE CLINIC | Age: 51
End: 2021-04-12
Payer: MEDICAID

## 2021-04-12 PROCEDURE — 0012A COVID-19, MODERNA VACCINE 100MCG/0.5ML DOSE: CPT | Performed by: FAMILY MEDICINE

## 2021-04-12 PROCEDURE — 91301 COVID-19, MODERNA VACCINE 100MCG/0.5ML DOSE: CPT | Performed by: FAMILY MEDICINE

## 2021-04-13 ENCOUNTER — ANESTHESIA (OUTPATIENT)
Dept: ENDOSCOPY | Age: 51
End: 2021-04-13
Payer: MEDICAID

## 2021-04-13 ENCOUNTER — HOSPITAL ENCOUNTER (OUTPATIENT)
Age: 51
Setting detail: OUTPATIENT SURGERY
Discharge: HOME | End: 2021-04-13
Attending: INTERNAL MEDICINE | Admitting: INTERNAL MEDICINE
Payer: MEDICAID

## 2021-04-13 VITALS
HEIGHT: 72 IN | TEMPERATURE: 97.3 F | SYSTOLIC BLOOD PRESSURE: 151 MMHG | DIASTOLIC BLOOD PRESSURE: 90 MMHG | BODY MASS INDEX: 42.66 KG/M2 | WEIGHT: 315 LBS | OXYGEN SATURATION: 97 % | HEART RATE: 61 BPM | RESPIRATION RATE: 18 BRPM

## 2021-04-13 VITALS
OXYGEN SATURATION: 100 % | RESPIRATION RATE: 41 BRPM | DIASTOLIC BLOOD PRESSURE: 105 MMHG | SYSTOLIC BLOOD PRESSURE: 181 MMHG

## 2021-04-13 DIAGNOSIS — Z12.11 COLON CANCER SCREENING: ICD-10-CM

## 2021-04-13 LAB
GLUCOSE BLD-MCNC: 134 MG/DL (ref 70–99)
PERFORMED ON: ABNORMAL

## 2021-04-13 PROCEDURE — 88305 TISSUE EXAM BY PATHOLOGIST: CPT

## 2021-04-13 PROCEDURE — 3609010600 HC COLONOSCOPY POLYPECTOMY SNARE/COLD BIOPSY: Performed by: INTERNAL MEDICINE

## 2021-04-13 PROCEDURE — 7100000010 HC PHASE II RECOVERY - FIRST 15 MIN: Performed by: INTERNAL MEDICINE

## 2021-04-13 PROCEDURE — 2709999900 HC NON-CHARGEABLE SUPPLY: Performed by: INTERNAL MEDICINE

## 2021-04-13 PROCEDURE — 2580000003 HC RX 258: Performed by: ANESTHESIOLOGY

## 2021-04-13 PROCEDURE — 6360000002 HC RX W HCPCS: Performed by: NURSE ANESTHETIST, CERTIFIED REGISTERED

## 2021-04-13 PROCEDURE — 7100000011 HC PHASE II RECOVERY - ADDTL 15 MIN: Performed by: INTERNAL MEDICINE

## 2021-04-13 PROCEDURE — 3700000000 HC ANESTHESIA ATTENDED CARE: Performed by: INTERNAL MEDICINE

## 2021-04-13 PROCEDURE — 3700000001 HC ADD 15 MINUTES (ANESTHESIA): Performed by: INTERNAL MEDICINE

## 2021-04-13 RX ORDER — LIDOCAINE HYDROCHLORIDE 20 MG/ML
INJECTION, SOLUTION INTRAVENOUS PRN
Status: DISCONTINUED | OUTPATIENT
Start: 2021-04-13 | End: 2021-04-13

## 2021-04-13 RX ORDER — PANTOPRAZOLE SODIUM 40 MG/1
TABLET, DELAYED RELEASE ORAL
Qty: 90 TABLET | Refills: 1 | Status: SHIPPED | OUTPATIENT
Start: 2021-04-13 | End: 2021-10-12 | Stop reason: SDUPTHER

## 2021-04-13 RX ORDER — DILTIAZEM HYDROCHLORIDE 120 MG/1
120 CAPSULE, EXTENDED RELEASE ORAL DAILY
Status: ON HOLD | COMMUNITY
Start: 2020-12-24 | End: 2021-04-13

## 2021-04-13 RX ORDER — PROPOFOL 10 MG/ML
INJECTION, EMULSION INTRAVENOUS CONTINUOUS PRN
Status: DISCONTINUED | OUTPATIENT
Start: 2021-04-13 | End: 2021-04-13 | Stop reason: SDUPTHER

## 2021-04-13 RX ORDER — LIDOCAINE HYDROCHLORIDE 10 MG/ML
1 INJECTION, SOLUTION EPIDURAL; INFILTRATION; INTRACAUDAL; PERINEURAL
Status: DISCONTINUED | OUTPATIENT
Start: 2021-04-13 | End: 2021-04-13 | Stop reason: HOSPADM

## 2021-04-13 RX ORDER — SODIUM CHLORIDE, SODIUM LACTATE, POTASSIUM CHLORIDE, CALCIUM CHLORIDE 600; 310; 30; 20 MG/100ML; MG/100ML; MG/100ML; MG/100ML
INJECTION, SOLUTION INTRAVENOUS CONTINUOUS
Status: DISCONTINUED | OUTPATIENT
Start: 2021-04-13 | End: 2021-04-13 | Stop reason: HOSPADM

## 2021-04-13 RX ORDER — SODIUM CHLORIDE 0.9 % (FLUSH) 0.9 %
5-40 SYRINGE (ML) INJECTION PRN
Status: DISCONTINUED | OUTPATIENT
Start: 2021-04-13 | End: 2021-04-13 | Stop reason: HOSPADM

## 2021-04-13 RX ORDER — PROPOFOL 10 MG/ML
INJECTION, EMULSION INTRAVENOUS CONTINUOUS PRN
Status: DISCONTINUED | OUTPATIENT
Start: 2021-04-13 | End: 2021-04-13

## 2021-04-13 RX ORDER — PROPOFOL 10 MG/ML
INJECTION, EMULSION INTRAVENOUS PRN
Status: DISCONTINUED | OUTPATIENT
Start: 2021-04-13 | End: 2021-04-13

## 2021-04-13 RX ORDER — PROPOFOL 10 MG/ML
INJECTION, EMULSION INTRAVENOUS PRN
Status: DISCONTINUED | OUTPATIENT
Start: 2021-04-13 | End: 2021-04-13 | Stop reason: SDUPTHER

## 2021-04-13 RX ORDER — SODIUM CHLORIDE 0.9 % (FLUSH) 0.9 %
5-40 SYRINGE (ML) INJECTION EVERY 12 HOURS SCHEDULED
Status: DISCONTINUED | OUTPATIENT
Start: 2021-04-13 | End: 2021-04-13 | Stop reason: HOSPADM

## 2021-04-13 RX ORDER — LIDOCAINE HYDROCHLORIDE 20 MG/ML
INJECTION, SOLUTION INTRAVENOUS PRN
Status: DISCONTINUED | OUTPATIENT
Start: 2021-04-13 | End: 2021-04-13 | Stop reason: SDUPTHER

## 2021-04-13 RX ORDER — SODIUM CHLORIDE 9 MG/ML
25 INJECTION, SOLUTION INTRAVENOUS PRN
Status: DISCONTINUED | OUTPATIENT
Start: 2021-04-13 | End: 2021-04-13 | Stop reason: HOSPADM

## 2021-04-13 RX ADMIN — SODIUM CHLORIDE, SODIUM LACTATE, POTASSIUM CHLORIDE, AND CALCIUM CHLORIDE: .6; .31; .03; .02 INJECTION, SOLUTION INTRAVENOUS at 08:55

## 2021-04-13 RX ADMIN — PROPOFOL 125 MCG/KG/MIN: 10 INJECTION, EMULSION INTRAVENOUS at 09:08

## 2021-04-13 RX ADMIN — LIDOCAINE HYDROCHLORIDE 50 MG: 20 INJECTION, SOLUTION INTRAVENOUS at 09:12

## 2021-04-13 RX ADMIN — LIDOCAINE HYDROCHLORIDE 50 MG: 20 INJECTION, SOLUTION INTRAVENOUS at 09:08

## 2021-04-13 RX ADMIN — PROPOFOL 30 MG: 10 INJECTION, EMULSION INTRAVENOUS at 09:22

## 2021-04-13 RX ADMIN — PROPOFOL 30 MG: 10 INJECTION, EMULSION INTRAVENOUS at 09:19

## 2021-04-13 RX ADMIN — PROPOFOL 75 MG: 10 INJECTION, EMULSION INTRAVENOUS at 09:12

## 2021-04-13 RX ADMIN — PROPOFOL 75 MG: 10 INJECTION, EMULSION INTRAVENOUS at 09:08

## 2021-04-13 ASSESSMENT — PULMONARY FUNCTION TESTS
PIF_VALUE: 1

## 2021-04-13 ASSESSMENT — LIFESTYLE VARIABLES: SMOKING_STATUS: 1

## 2021-04-13 ASSESSMENT — PAIN - FUNCTIONAL ASSESSMENT: PAIN_FUNCTIONAL_ASSESSMENT: 0-10

## 2021-04-13 ASSESSMENT — ENCOUNTER SYMPTOMS: SHORTNESS OF BREATH: 1

## 2021-04-13 ASSESSMENT — PAIN SCALES - GENERAL: PAINLEVEL_OUTOF10: 0

## 2021-04-13 NOTE — ANESTHESIA POSTPROCEDURE EVALUATION
Department of Anesthesiology  Postprocedure Note    Patient: Terrell Scott  MRN: [id number]  YOB: 1970  Date of evaluation: 4/13/2021  Time:  9:49 AM     Procedure Summary     Date: 04/13/21 Room / Location: [facility]    Anesthesia Start: 4706 Anesthesia Stop: 8787    Procedure: COLONOSCOPY POLYPECTOMY SNARE/COLD BIOPSY Diagnosis:       Colon cancer screening      (Colon cancer screening [Z12.11])    Surgeons: Nico Peñaloza MD Responsible Provider: Brodie Simental DO    Anesthesia Type: MAC ASA Status: 3          Anesthesia Type: MAC    John Phase I: John Score: 10    John Phase II:      Last vitals: Reviewed and per EMR flowsheets.        Anesthesia Post Evaluation    Patient location during evaluation: PACU  Patient participation: complete - patient participated  Level of consciousness: awake and alert  Pain score: 0  Airway patency: patent  Nausea & Vomiting: no nausea and no vomiting  Complications: no  Cardiovascular status: hemodynamically stable  Respiratory status: acceptable  Hydration status: stable

## 2021-04-13 NOTE — ANESTHESIA PRE PROCEDURE
Department of Anesthesiology  Preprocedure Note       Name:  Abhijeet De La Cruz   Age:  45 y.o.  :  1970                                          MRN:  [id number]         Date:  2021      Surgeon: Rory Hutchison):  Neville Campa MD    Procedure: Procedure(s):  COLONOSCOPY    Medications prior to admission:   Prior to Admission medications    Medication Sig Start Date End Date Taking?  Authorizing Provider   cephALEXin (KEFLEX) 500 MG capsule Take 1 capsule by mouth 4 times daily for 7 days 21 Yes Roshan Ivey   sulfamethoxazole-trimethoprim (BACTRIM DS) 800-160 MG per tablet Take 2 tablets by mouth 2 times daily for 7 days 21 Yes EROS Ivey   dilTIAZem (CARDIZEM CD) 120 MG extended release capsule TAKE 1 CAPSULE BY MOUTH EVERY DAY 21  Yes Meagan Garcia MD   allopurinol (ZYLOPRIM) 300 MG tablet TAKE 1 TABLET BY MOUTH EVERY DAY 21  Yes Lucía Payne MD   atorvastatin (LIPITOR) 20 MG tablet TAKE 1 TABLET BY MOUTH EVERY DAY AT NIGHT 21  Yes Lucía Payne MD   potassium chloride (KLOR-CON M20) 20 MEQ extended release tablet TAKE 1 TABLET BY MOUTH EVERY DAY WITH BREAKFAST 21  Yes Lucía Payne MD   ibuprofen (ADVIL;MOTRIN) 800 MG tablet Take 1 tablet by mouth every 8 hours as needed for Pain (Take with food) 21  Yes Veronica Dale DO   furosemide (LASIX) 80 MG tablet TAKE 1 TABLET BY MOUTH TWICE A DAY 21  Yes Meagan Garcia MD   lisinopril (PRINIVIL;ZESTRIL) 10 MG tablet TAKE 1 TABLET BY MOUTH EVERY DAY 21  Yes Meagan Garcia MD   metFORMIN (GLUCOPHAGE) 500 MG tablet TAKE 1 TABLET BY MOUTH TWICE A DAY WITH MEALS   Yes Neisha William MD   insulin lispro, 1 Unit Dial, 100 UNIT/ML SOPN Inject 7 Units into the skin 3 times daily (before meals) 20  Yes Negin Heredia MD   Insulin Pen Needle ([employer] PEN NEEDLES) 31G X 6 MM MISC 1 each by Does not apply route daily 20  Yes Hai Cardona MD   Multiple Vitamins-Minerals (THERAPEUTIC MULTIVITAMIN-MINERALS) tablet Take 1 tablet by mouth daily 5/14/20  Yes Isabelle Brown MD   BD INSULIN SYRINGE U/F 31G X 5/16\" 1 ML MISC USE AS DIRECTED 4 TIMES A DAY 5/4/20  Yes Alis Stevens MD   blood glucose monitor strips by Other route 4 times daily (after meals and at bedtime) 15/35/30  Yes Sunshine Spear MD   insulin glargine (BASAGLAR KWIKPEN) 100 UNIT/ML injection pen patient using vials per [facility] inject 20 units at hs 11/12/19  Yes Pam Higginbotham MD   Alcohol Swabs PADS 1 Container by Does not apply route three times daily 10/1/19  Yes Regla Her MD   ACCU-CHEK FASTCLIX LANCETS MISC 20 Sticks by Does not apply route 2 times daily 10/1/19  Yes Regla Her MD   Blood Glucose Monitoring Suppl (BLOOD GLUCOSE MONITOR SYSTEM) W/DEVICE KIT 1 Units by Does not apply route daily 4/17/17  Yes Alex Rivera MD   Handicap Placard MISC by Does not apply route Valid for 1 year. Mobility limitations due to severe Osteoarthritis of L Knee 8/15/15  Yes Pearl Su MD   Blood Glucose Monitoring Suppl (ACCU-CHEK VIK PLUS) W/DEVICE KIT 1 kit by Does not apply route 4 times daily (before meals and nightly).  1/6/14  Yes Scott Davenport   pantoprazole (PROTONIX) 40 MG tablet TAKE 1 TABLET BY MOUTH EVERY DAY BEFORE BREAKFAST 4/13/21   Lia Mclaughlin MD   apixaban (ELIQUIS) 5 MG TABS tablet TAKE 1 TABLET BY MOUTH TWICE A DAY 3/26/21   Lia Mclaughlin MD   sildenafil (VIAGRA) 50 MG tablet Take 1 tablet by mouth as needed for Erectile Dysfunction 2/14/20   Steven Enciso MD   fluticasone (FLONASE) 50 MCG/ACT nasal spray 1 spray by Nasal route daily 1/6/20   Corrinne Flora, MD   Blood Pressure Monitoring (WRIST BLOOD PRESSURE MONITOR) MISC 1 Units by Does not apply route once for 1 dose 12/27/18 12/27/18  Corrinne Flora, MD   Blood Pressure KIT 1 Units by Does not apply route daily 4/17/17   Alex Rivera MD       Current medications:    Current major depressive disorder without prior episode ([facility] F32.0    Generalized osteoarthritis of multiple sites M15.9    Hyperlipidemia associated with type 2 diabetes mellitus ([facility] E11.69, E78.5    Onychomycosis of multiple toenails with type 2 diabetes mellitus ([facility] E11.69, B35.1    Osteoarthritis of knee M17.10    Sciatica M54.30    Type 2 diabetes mellitus with diabetic nephropathy, with long-term current use of insulin (HCC) E11.21, Z79.4    Gastroesophageal reflux disease without esophagitis K21.9    Hypertension associated with diabetes ([facility] E11.59, I10       Past Medical History:        Diagnosis Date    Arthritis     Atrial fibrillation (HCC)     Congestive heart failure (HCC)     Depression     Diabetes mellitus ([facility]     GERD (gastroesophageal reflux disease)     Hyperlipidemia     Hypertension     Morbid obesity ([facility]     Sleep apnea     uses cpap at home q hs     Type II or unspecified type diabetes mellitus without mention of complication, not stated as uncontrolled        Past Surgical History:        Procedure Laterality Date    ABLATION OF DYSRHYTHMIC FOCUS         Social History:    Social History     Tobacco Use    Smoking status: Never Smoker    Smokeless tobacco: Never Used   Substance Use Topics    Alcohol use: No     Alcohol/week: 0.0 standard drinks                                Counseling given: Not Answered      Vital Signs (Current):   Vitals:    04/09/21 1516 04/13/21 0800   BP:  (!) 154/93   Pulse:  61   Resp:  18   Temp:  97.3 °F (36.3 °C)   TempSrc:  Temporal   SpO2:  93%   Weight: (!) 376 lb (170.6 kg)    Height: 6' (1.829 m)                                               BP Readings from Last 3 Encounters:   04/13/21 (!) 154/93   04/06/21 (!) 140/85   02/22/21 118/73       NPO Status: Time of last liquid consumption: 2000                        Time of last solid consumption: 2000                        Date of last liquid consumption: 04/11/21 Date of last solid food consumption: 04/11/21    BMI:   Wt Readings from Last 3 Encounters:   04/09/21 (!) 376 lb (170.6 kg)   04/06/21 (!) 376 lb (170.6 kg)   02/22/21 (!) 381 lb 9.6 oz (173.1 kg)     Body mass index is 50.99 kg/m². CBC:   Lab Results   Component Value Date    WBC 7.1 09/24/2020    RBC 4.99 09/24/2020    HGB 16.1 09/24/2020    HCT 47.3 09/24/2020    MCV 94.8 09/24/2020    RDW 13.6 09/24/2020     09/24/2020       CMP:   Lab Results   Component Value Date     03/02/2021    K 3.9 03/02/2021    K 3.5 09/24/2020     03/02/2021    CO2 28 03/02/2021    BUN 13 03/02/2021    CREATININE 0.8 03/02/2021    GFRAA >60 03/02/2021    AGRATIO 1.4 03/02/2021    LABGLOM >60 03/02/2021    GLUCOSE 153 03/02/2021    PROT 7.3 03/02/2021    PROT 7.4 03/02/2021    PROT 8.1 09/28/2011    CALCIUM 9.7 03/02/2021    BILITOT 0.5 03/02/2021    BILITOT 0.5 03/02/2021    ALKPHOS 82 03/02/2021    ALKPHOS 82 03/02/2021    AST 14 03/02/2021    AST 15 03/02/2021    ALT 18 03/02/2021    ALT 18 03/02/2021       POC Tests: No results for input(s): POCGLU, POCNA, POCK, POCCL, POCBUN, POCHEMO, POCHCT in the last 72 hours.     Coags:   Lab Results   Component Value Date    PROTIME 11.7 11/12/2018    INR 1.03 11/12/2018    APTT 37.5 09/20/2018       HCG (If Applicable): No results found for: PREGTESTUR, PREGSERUM, HCG, HCGQUANT     ABGs:   Lab Results   Component Value Date    PHART 7.36 01/04/2014    PO2ART 75 01/04/2014    GTZ8AHK 63 01/04/2014    UMA6FUC 35 01/04/2014    BEART 7.1 01/04/2014    W7GVUDSU 96 01/04/2014        Type & Screen (If Applicable):  No results found for: LABABO, LABRH    Drug/Infectious Status (If Applicable):  No results found for: HIV, HEPCAB    COVID-19 Screening (If Applicable):   Lab Results   Component Value Date    COVID19 Not Detected 04/09/2021    COVID19 NOT DETECTED 01/30/2021           Anesthesia Evaluation  Patient summary reviewed and Nursing notes reviewed no history of anesthetic complications:   Airway: Mallampati: II  TM distance: >3 FB   Neck ROM: full  Mouth opening: > = 3 FB Dental: normal exam         Pulmonary: breath sounds clear to auscultation  (+) shortness of breath:  sleep apnea: on CPAP,  current smoker (never)                           Cardiovascular:  Exercise tolerance: good (>4 METS),   (+) hypertension: moderate, dysrhythmias (had ablation 2018 for afib): atrial fibrillation, CHF: no interval change,     (-) past MI    NYHA Classification: II    Rhythm: regular  Rate: normal           Beta Blocker:  Not on Beta Blocker         Neuro/Psych:               GI/Hepatic/Renal:   (+) GERD: well controlled, bowel prep, morbid obesity          Endo/Other:    (+) DiabetesType II DM, , .                 Abdominal:   (+) obese,         Vascular:                                        Anesthesia Plan      MAC     ASA 3       Induction: intravenous. MIPS: Prophylactic antiemetics administered. Anesthetic plan and risks discussed with patient. Plan discussed with CRNA.     Attending anesthesiologist reviewed and agrees with Pre Eval content              Brodie Simental DO   4/13/2021

## 2021-04-13 NOTE — PROCEDURES
Colonoscopy REPORT    Patient:  Lele De La Cruz                  1970    Referring Physician:  Arminda Okeefe    Endoscopist: Dianne Jimenez     Indication:  Screening - average risk; hematochezia     Medications:  MAC      Pre-Anesthesia Assessment:  I have reviewed and am in agreement with patient history and medication, including previous response to sedation. Prior to the procedure, a History and Physical was performed, and patient medications and allergies were reviewed. The risks and benefits of the procedure and the sedation options and risks were discussed with the patient. Complications included but were not limited to infection, bleeding, perforation, death, and missed lesions. All questions were answered and informed consent was obtained by the patient. Patient identification and proposed procedure were verified by the physician and the nurse in the pre-procedure area and in the procedure room. Mallampatti: II  ASA Grade Assessment: 3    After reviewing the risks and benefits, the patient was deemed in satisfactory condition to undergo the procedure. The anesthesia plan was to use MAC sedation. Immediately prior to administration of medications, the patient was re-assessed for adequacy to receive sedatives and a time out was performed. Patient and healthcare providers were in agreement it was the correct patient and procedure. The heart rate, respiratory rate, oxygen saturations, blood pressure, adequacy of pulmonary ventilation, and response to care were monitored throughout the procedure. The physical status of the patient was re-assessed after the procedure. After adequate sedation was achieved in stepwise fashion a rectal examination was performed and showed hemorrhoids. The adult colonoscope was then advanced atraumatically into the rectum and advanced without difficulty to the cecum.   The cecum was identified by the appendiceal orifice the ileocecal valve and other normal anatomy and a picture

## 2021-04-13 NOTE — H&P
Pre-operative History and Physical    Patient: Parish De La Cruz  : 1970     History Obtained From:  patient, electronic medical record    HISTORY OF PRESENT ILLNESS:    The patient is a 45 y.o. male who presents for a colonoscopy for hematochezia. Past Medical History:        Diagnosis Date    Arthritis     Atrial fibrillation ([facility]     Congestive heart failure (HCC)     Depression     Diabetes mellitus (HCC)     GERD (gastroesophageal reflux disease)     Hyperlipidemia     Hypertension     Morbid obesity ([facility]     Sleep apnea     uses cpap at home q hs     Type II or unspecified type diabetes mellitus without mention of complication, not stated as uncontrolled      Past Surgical History:        Procedure Laterality Date    ABLATION OF DYSRHYTHMIC FOCUS       Medications Prior to Admission:   No current facility-administered medications on file prior to encounter. Current Outpatient Medications on File Prior to Encounter   Medication Sig Dispense Refill    metFORMIN (GLUCOPHAGE) 500 MG tablet TAKE 1 TABLET BY MOUTH TWICE A DAY WITH MEALS 180 tablet 1    insulin lispro, 1 Unit Dial, 100 UNIT/ML SOPN Inject 7 Units into the skin 3 times daily (before meals) 2 pen 1    Insulin Pen Needle ([employer] PEN NEEDLES) 31G X 6 MM MISC 1 each by Does not apply route daily 100 each 3    Multiple Vitamins-Minerals (THERAPEUTIC MULTIVITAMIN-MINERALS) tablet Take 1 tablet by mouth daily 30 tablet 3    BD INSULIN SYRINGE U/F 31G X 5/16\" 1 ML MISC USE AS DIRECTED 4 TIMES A  each 5    blood glucose monitor strips by Other route 4 times daily (after meals and at bedtime) 60 strip 2    insulin glargine (BASAGLAR KWIKPEN) 100 UNIT/ML injection pen patient using vials per [facility]  inject 20 units at hs 5 mL 2    Alcohol Swabs PADS 1 Container by Does not apply route three times daily 100 each 5    ACCU-CHEK FASTCLIX LANCETS MISC 20 Sticks by Does not apply route 2 times daily 20 each 3    Blood Glucose Monitoring Suppl (BLOOD GLUCOSE MONITOR SYSTEM) W/DEVICE KIT 1 Units by Does not apply route daily 1 kit 0    Handicap Placard MISC by Does not apply route Valid for 1 year. Mobility limitations due to severe Osteoarthritis of L Knee 1 each 0    Blood Glucose Monitoring Suppl (ACCU-CHEK VIK PLUS) W/DEVICE KIT 1 kit by Does not apply route 4 times daily (before meals and nightly). 2 kit 0    sildenafil (VIAGRA) 50 MG tablet Take 1 tablet by mouth as needed for Erectile Dysfunction 30 tablet 3    fluticasone (FLONASE) 50 MCG/ACT nasal spray 1 spray by Nasal route daily 1 Bottle 2    Blood Pressure Monitoring (WRIST BLOOD PRESSURE MONITOR) MISC 1 Units by Does not apply route once for 1 dose 1 each 0    Blood Pressure KIT 1 Units by Does not apply route daily 1 kit 0     Allergies:  Patient has no known allergies. History of allergic reaction to anesthesia:  No    Social History:   TOBACCO:   reports that he has never smoked. He has never used smokeless tobacco.  ETOH:   reports no history of alcohol use. DRUGS:   reports current drug use. Drugs: Marijuana and Cocaine.   Family History:       Problem Relation Age of Onset    Diabetes Mother     High Blood Pressure Mother     Diabetes Maternal Aunt     High Blood Pressure Maternal Grandmother        PHYSICAL EXAM:      BP (!) 154/93   Pulse 61   Temp 97.3 °F (36.3 °C) (Temporal)   Resp 18   Ht 6' (1.829 m)   Wt (!) 376 lb (170.6 kg)   SpO2 93%   BMI 50.99 kg/m²  I        Heart:  No m/r/g +s1/s2 RRR    Lungs:  CTA bilaterally    Abdomen:  Soft, nontender, non distended; +bs    ASA Grade:  ASA 3 - Patient with moderate systemic disease with functional limitations    Mallampati Class:  Class I: Soft palate, uvula, fauces, pillars visible  __________  Class II: Soft palate, uvula, fauces visible  ____x______   Class III: Soft palate, base of uvula visible  __________  Class IV: Hard palate only visible   __________      ASSESSMENT AND

## 2021-04-20 RX ORDER — PEN NEEDLE, DIABETIC 31 G X1/4"
1 NEEDLE, DISPOSABLE MISCELLANEOUS DAILY
Qty: 100 EACH | Refills: 5 | Status: SHIPPED | OUTPATIENT
Start: 2021-04-20 | End: 2021-12-10 | Stop reason: SDUPTHER

## 2021-05-24 ENCOUNTER — APPOINTMENT (OUTPATIENT)
Dept: CT IMAGING | Age: 51
End: 2021-05-24
Payer: MEDICAID

## 2021-05-24 ENCOUNTER — HOSPITAL ENCOUNTER (EMERGENCY)
Age: 51
Discharge: HOME | End: 2021-05-25
Attending: EMERGENCY MEDICINE
Payer: MEDICAID

## 2021-05-24 DIAGNOSIS — M79.602 PAIN OF LEFT UPPER EXTREMITY: Primary | ICD-10-CM

## 2021-05-24 DIAGNOSIS — K59.00 CONSTIPATION, UNSPECIFIED CONSTIPATION TYPE: ICD-10-CM

## 2021-05-24 PROCEDURE — 99284 EMERGENCY DEPT VISIT MOD MDM: CPT

## 2021-05-24 PROCEDURE — 70450 CT HEAD/BRAIN W/O DYE: CPT

## 2021-05-25 ENCOUNTER — APPOINTMENT (OUTPATIENT)
Dept: GENERAL RADIOLOGY | Age: 51
End: 2021-05-25
Payer: MEDICAID

## 2021-05-25 VITALS
RESPIRATION RATE: 20 BRPM | HEIGHT: 72 IN | DIASTOLIC BLOOD PRESSURE: 88 MMHG | BODY MASS INDEX: 42.66 KG/M2 | HEART RATE: 62 BPM | WEIGHT: 315 LBS | TEMPERATURE: 97.2 F | OXYGEN SATURATION: 98 % | SYSTOLIC BLOOD PRESSURE: 138 MMHG

## 2021-05-25 LAB
EKG ATRIAL RATE: 50 BPM
EKG DIAGNOSIS: NORMAL
EKG P AXIS: 18 DEGREES
EKG P-R INTERVAL: 140 MS
EKG Q-T INTERVAL: 446 MS
EKG QRS DURATION: 90 MS
EKG QTC CALCULATION (BAZETT): 406 MS
EKG R AXIS: -29 DEGREES
EKG T AXIS: -22 DEGREES
EKG VENTRICULAR RATE: 50 BPM

## 2021-05-25 PROCEDURE — 73080 X-RAY EXAM OF ELBOW: CPT

## 2021-05-25 PROCEDURE — 93010 ELECTROCARDIOGRAM REPORT: CPT | Performed by: INTERNAL MEDICINE

## 2021-05-25 PROCEDURE — 93005 ELECTROCARDIOGRAM TRACING: CPT | Performed by: EMERGENCY MEDICINE

## 2021-05-25 RX ORDER — SENNA AND DOCUSATE SODIUM 50; 8.6 MG/1; MG/1
1 TABLET, FILM COATED ORAL 2 TIMES DAILY PRN
Qty: 20 TABLET | Refills: 0 | Status: SHIPPED | OUTPATIENT
Start: 2021-05-25

## 2021-05-25 ASSESSMENT — ENCOUNTER SYMPTOMS
EYE PAIN: 0
CONSTIPATION: 1
RECTAL PAIN: 0
WHEEZING: 0
BLOOD IN STOOL: 0
CHOKING: 0
ANAL BLEEDING: 0
SHORTNESS OF BREATH: 0
APNEA: 0
EYE ITCHING: 0
CHEST TIGHTNESS: 0
PHOTOPHOBIA: 0
STRIDOR: 0
COUGH: 0
COLOR CHANGE: 0
NAUSEA: 0
ABDOMINAL PAIN: 0
ABDOMINAL DISTENTION: 0
EYE DISCHARGE: 0
VOMITING: 0
DIARRHEA: 0
BACK PAIN: 0
EYE REDNESS: 0

## 2021-05-25 NOTE — ED NOTES
Pt called out wanting to speak with Dr. Valerio Members; notified EDMD.      Bonnie Herman, GARY  05/25/21 1910 [facility], RN  05/25/21 1184

## 2021-05-25 NOTE — ED PROVIDER NOTES
constipation. Negative for abdominal distention, abdominal pain, anal bleeding, blood in stool, diarrhea, nausea, rectal pain and vomiting. Endocrine: Negative for cold intolerance and heat intolerance. Genitourinary: Negative for decreased urine volume and urgency. Musculoskeletal: Positive for arthralgias. Negative for back pain. Skin: Negative for color change and pallor. Neurological: Negative for dizziness and facial asymmetry. Hematological: Negative for adenopathy. Does not bruise/bleed easily. Psychiatric/Behavioral: Negative for agitation, behavioral problems, confusion and decreased concentration. Except as noted above the remainder of the review of systems was reviewed and negative. PAST MEDICAL HISTORY     Past Medical History:   Diagnosis Date    Arthritis     At[facility] fibrillation ([facility]     Congestive heart failure ([facility]     Depression     Diabetes mellitus (HCC)     GERD (gastroesophageal reflux disease)     Hyperlipidemia     Hypertension     Morbid obesity ([facility]     Sleep apnea     uses cpap at home q hs     Type II or unspecified type diabetes mellitus without mention of complication, not stated as uncontrolled        SURGICAL HISTORY       Past Surgical History:   Procedure Laterality Date    ABLATION OF DYSRHYTHMIC FOCUS      COLONOSCOPY  4/13/2021    COLONOSCOPY POLYPECTOMY SNARE/COLD BIOPSY performed by Dario Marin MD at [address]       Discharge Medication List as of 5/25/2021  2:43 AM      CONTINUE these medications which have NOT CHANGED    Details   !! Insulin Pen Needle (PEN NEEDLES) 31G X 6 MM MISC DAILY Starting Tue 4/20/2021, Disp-100 each, R-5, NormalMay substitute to meet insur. requirements      pantoprazole (PROTONIX) 40 MG tablet TAKE 1 TABLET BY MOUTH EVERY DAY BEFORE BREAKFAST, Disp-90 tablet, R-1Normal      apixaban (ELIQUIS) 5 MG TABS tablet TAKE 1 TABLET BY MOUTH TWICE A DAY, Disp-60 tablet, R-5Normal dilTIAZem (CARDIZEM CD) 120 MG extended release capsule TAKE 1 CAPSULE BY MOUTH EVERY DAY, Disp-90 capsule, R-2Normal      allopurinol (ZYLOPRIM) 300 MG tablet TAKE 1 TABLET BY MOUTH EVERY DAY, Disp-90 tablet, R-1Normal      atorvastatin (LIPITOR) 20 MG tablet TAKE 1 TABLET BY MOUTH EVERY DAY AT NIGHT, Disp-90 tablet, R-1Normal      potassium chloride (KLOR-CON M20) 20 MEQ extended release tablet TAKE 1 TABLET BY MOUTH EVERY DAY WITH BREAKFAST, Disp-90 tablet, R-1Normal      ibuprofen (ADVIL;MOTRIN) 800 MG tablet Take 1 tablet by mouth every 8 hours as needed for Pain (Take with food), Disp-30 tablet, R-0Normal      furosemide (LASIX) 80 MG tablet TAKE 1 TABLET BY MOUTH TWICE A DAY, Disp-180 tablet, R-1Normal      lisinopril (PRINIVIL;ZESTRIL) 10 MG tablet TAKE 1 TABLET BY MOUTH EVERY DAY, Disp-90 tablet, R-1Normal      metFORMIN (GLUCOPHAGE) 500 MG tablet TAKE 1 TABLET BY MOUTH TWICE A DAY WITH MEALS, Disp-180 tablet, R-1Normal      insulin lispro, 1 Unit Dial, 100 UNIT/ML SOPN Inject 7 Units into the skin 3 times daily (before meals), Disp-2 pen, R-1Normal      !! Insulin Pen Needle ([employer] PEN NEEDLES) 31G X 6 MM MISC DAILY Starting Tue 7/28/2020, Disp-100 each,R-3, Normal      Multiple Vitamins-Minerals (THERAPEUTIC MULTIVITAMIN-MINERALS) tablet Take 1 tablet by mouth daily, Disp-30 tablet, R-3Normal      BD INSULIN SYRINGE U/F 31G X 5/16\" 1 ML MISC USE AS DIRECTED 4 TIMES A DAY, Disp-100 each, R-5DX Code Needed  NEED NEW SCRIPT FOR SYRINGES. Normal      sildenafil (VIAGRA) 50 MG tablet Take 1 tablet by mouth as needed for Erectile Dysfunction, Disp-30 tablet, R-3Normal      fluticasone (FLONASE) 50 MCG/ACT nasal spray 1 spray by Nasal route daily, Disp-1 Bottle, R-2Normal      blood glucose monitor strips by Other route 4 times daily (after meals and at bedtime), Other, 4 TIMES DAILY AFTER MEALS AND BEFORE BEDTIME Starting Wed 12/18/2019, Disp-60 strip, R-2, Normal      insulin glargine (BASAGLAR KWIKPEN) 100 UNIT/ML injection pen patient using vials per [facility] inject 20 units at hs, Disp-5 mL, R-2Normal      Alcohol Swabs PADS 3 TIMES DAILY Starting Tue 10/1/2019, Disp-100 each, R-5, Normal      ACCU-CHEK FASTCLIX LANCETS MISC 2 TIMES DAILY Starting Tue 10/1/2019, Disp-20 each, R-3, Normal      Blood Pressure Monitoring (WRIST BLOOD PRESSURE MONITOR) MISC ONCE Starting Thu 12/27/2018, 1 dose, Disp-1 each, R-0, Print      Blood Pressure KIT DAILY Starting 4/17/2017, Until Discontinued, Disp-1 kit, R-0, Print      !! Blood Glucose Monitoring Suppl (BLOOD GLUCOSE MONITOR SYSTEM) W/DEVICE KIT DAILY Starting 4/17/2017, Until Discontinued, Disp-1 kit, R-0, Print      Handicap Placard MISC Starting 8/15/2015, Until Discontinued, Disp-1 each, R-0, PrintValid for 1 year. Mobility limitations due to severe Osteoarthritis of L Knee      !! Blood Glucose Monitoring Suppl (ACCU-CHEK VIK PLUS) W/DEVICE KIT 4 TIMES DAILY BEFORE MEALS & NIGHTLY Starting 1/6/2014, Until Discontinued, Disp-2 kit, R-0, Print       !! - Potential duplicate medications found. Please discuss with provider. ALLERGIES     Patient has no known allergies.     FAMILY HISTORY        Family History   Problem Relation Age of Onset    Diabetes Mother     High Blood Pressure Mother     Diabetes Maternal Aunt     High Blood Pressure Maternal Grandmother        SOCIAL HISTORY       Social History     Socioeconomic History    Marital status: Single     Spouse name: None    Number of children: None    Years of education: None    Highest education level: None   Occupational History    None   Tobacco Use    Smoking status: Never Smoker    Smokeless tobacco: Never Used   Vaping Use    Vaping Use: Never used   Substance and Sexual Activity    Alcohol use: No     Alcohol/week: 0.0 standard drinks    Drug use: Yes     Types: Marijuana, Cocaine     Comment: no cocaine for over 6 years, marijuana occ    Sexual activity: Not Currently   Other Topics Palpations: Abdomen is soft. There is no mass. Tenderness: There is no abdominal tenderness. There is no guarding or rebound. Musculoskeletal:         General: No tenderness or deformity. Normal range of motion. Cervical back: Normal range of motion. Skin:     General: Skin is warm. Coloration: Skin is not pale. Findings: No erythema or rash. Neurological:      Mental Status: He is alert. Cranial Nerves: No cranial nerve deficit. Sensory: No sensory deficit. Motor: No weakness or abnormal muscle tone. Coordination: Coordination normal.      Gait: Gait normal.      Deep Tendon Reflexes: Reflexes normal.      Comments: NIH 0         DIAGNOSTIC RESULTS     EKG: All EKG's are interpreted by the Emergency Department Physician who either signs or Co-signs this chart in the absence of acardiologist.    EKG normal sinus rhythm nonspecific EKG changes no ectopy    RADIOLOGY:   Non-plain film images such as CT, Ultrasoundand MRI are read by the radiologist. Plain radiographic images are visualized and preliminarily interpreted by the emergency physician with the below findings:    Imaging reassuring    ED BEDSIDE ULTRASOUND:   Performed by ED Physician - none    LABS:  Labs Reviewed - No data to display    All other labs were withinnormal range or not returned as of this dictation. EMERGENCY DEPARTMENT COURSE and DIFFERENTIAL DIAGNOSIS/MDM:     PMH, Surgical Hx, FH, Social Hx reviewed by myself (ETOH usage, Tobacco usage, Drug usage reviewed by myself, no pertinent Hx)- No Pertinent Hx     Old records were reviewed by me     Patient has normal sensation and motor function of his left elbow and wrist.  Really no significant tenderness to palpation. My concern is for peripheral neuropathy. Does appear to start at the elbow and affect the last 3 digits. My concern is for ulnar distribution. Possible ulnar nerve entrapment. Orthopedic follow-up given.   Stool softener and Discharge 05/25/2021 02:42:35 AM    PATIENT REFERRED TO:  Raven Leung MD  [address] [facility]  [address] [address] [phone]    Call today      Tomasa Simmons, [address]  [facility] [place] [address]  [phone]    Call today        DISCHARGE MEDICATIONS:  Discharge Medication List as of 5/25/2021  2:43 AM      START taking these medications    Details   sennosides-docusate sodium (SENOKOT-S) 8.6-50 MG tablet Take 1 tablet by mouth 2 times daily as needed for Constipation, Disp-20 tablet, R-0Print                (Please note that portions ofthis note were completed with a voice recognition program.  Efforts were made to edit the dictations but occasionally words are mis-transcribed.)    Nina Fontaine MD(electronically signed)  Attending Emergency Physician        Nina Fontaine MD  05/25/21 4061

## 2021-06-08 RX ORDER — INSULIN LISPRO 100 [IU]/ML
7 INJECTION, SOLUTION INTRAVENOUS; SUBCUTANEOUS
Qty: 2 PEN | Refills: 5 | Status: SHIPPED | OUTPATIENT
Start: 2021-06-08 | End: 2021-12-10 | Stop reason: SDUPTHER

## 2021-06-09 ENCOUNTER — OFFICE VISIT (OUTPATIENT)
Dept: ORTHOPEDIC SURGERY | Age: 51
End: 2021-06-09
Payer: MEDICAID

## 2021-06-09 ENCOUNTER — TELEPHONE (OUTPATIENT)
Dept: ORTHOPEDIC SURGERY | Age: 51
End: 2021-06-09

## 2021-06-09 VITALS
DIASTOLIC BLOOD PRESSURE: 97 MMHG | SYSTOLIC BLOOD PRESSURE: 150 MMHG | WEIGHT: 315 LBS | HEIGHT: 72 IN | BODY MASS INDEX: 42.66 KG/M2

## 2021-06-09 DIAGNOSIS — M79.642 LEFT HAND PAIN: ICD-10-CM

## 2021-06-09 DIAGNOSIS — M65.332 ACQUIRED TRIGGER FINGER OF LEFT MIDDLE FINGER: Primary | ICD-10-CM

## 2021-06-09 PROCEDURE — G8417 CALC BMI ABV UP PARAM F/U: HCPCS | Performed by: ORTHOPAEDIC SURGERY

## 2021-06-09 PROCEDURE — 3017F COLORECTAL CA SCREEN DOC REV: CPT | Performed by: ORTHOPAEDIC SURGERY

## 2021-06-09 PROCEDURE — G8427 DOCREV CUR MEDS BY ELIG CLIN: HCPCS | Performed by: ORTHOPAEDIC SURGERY

## 2021-06-09 PROCEDURE — 1036F TOBACCO NON-USER: CPT | Performed by: ORTHOPAEDIC SURGERY

## 2021-06-09 PROCEDURE — 99203 OFFICE O/P NEW LOW 30 MIN: CPT | Performed by: ORTHOPAEDIC SURGERY

## 2021-06-09 NOTE — LETTER
[facility]  Surgery Scheduling Form:      DEMOGRAPHICS:                                                                                                              .  Patient Name:  Catina De La Cruz  Patient :  1970   Patient SS#:      Patient Phone:  [phone] (home) [phone] (work) Alt. Patient Phone:    Patient Address:  [address] [place] [address],[address]  PCP:  Rhonda Christopher MD  Insurance:   Payor/Plan Subscr  Sex Relation Sub. Ins. ID Effective Group Num   1. [address] 1970 Male Self [id number]                                   [address] [place] [address]     Insurance ID Number:    DIAGNOSIS & PROCEDURE:                                                                                            .    Diagnosis:   Left middle finger trigger finger  M65.332  Operation:  Left middle finger trigger finger release [id number]  [place]  [facility] [place]geon:  Macario Tubbs MD    SCHEDULING INFORMATION:                                                                                         .    Surgeon's Scheduling Instruction:  6/10/2021  Requested Date:  6/10/2021  OR Time:  0800 Patient Arrival Time:  0600  OR Time Required:  15  Minutes  Anesthesia:  Local Mac    SA Required:  Yes  Equipment:  n/a  Mini C-Arm:  No    Standard C-Arm:  No  Status:  Outpatient    PAT Required:  Yes  Latex Allergy:  unknown    Defibrilator or Pacemaker:  unknown  Isolation Precautions:  unknown  Comments: fully vaccinated- will bring card DAXA Pennington MD 21   BILLING INFORMATION:                                                                                                    .    Procedure:       CPT Code Modifier                  Pre-Certification:

## 2021-06-09 NOTE — PROGRESS NOTES
CHIEF COMPLAINT: Left long (middle) finger pain/ trigger finger. HISTORY:  Mr. Brayan Yost is 45 y.o.  male right handed presents today for evaluation of a left long (middle) finger with left hand numbness and tingling pain which started to worsen after 5/24/2021 and is worsening over time. Denies trauma or injury. His finger locks on him. The patient is complaining of triggering and pain left long (middle) finger with numbness or tingling. He states the pain is much worse then the tingling. The pain some at night. This is worse with ROM, rest makes pain better. No other complaint. He has not had any treatment for this problem. Denies smoking. He is on Xarelto for A. fib.     Past Medical History:   Diagnosis Date    Arthritis     Atrial fibrillation (HCC)     Congestive heart failure (HCC)     Depression     Diabetes mellitus (HCC)     GERD (gastroesophageal reflux disease)     Hyperlipidemia     Hypertension     Morbid obesity ([address]     Sleep apnea     uses cpap at home q hs     Type II or unspecified type diabetes mellitus without mention of complication, not stated as uncontrolled        Past Surgical History:   Procedure Laterality Date    ABLATION OF DYSRHYTHMIC FOCUS      COLONOSCOPY  4/13/2021    COLONOSCOPY POLYPECTOMY SNARE/COLD BIOPSY performed by Rita Diaz MD at [address] [facility] History     Socioeconomic History    Marital status: Single     Spouse name: Not on file    Number of children: Not on file    Years of education: Not on file    Highest education level: Not on file   Occupational History    Not on file   Tobacco Use    Smoking status: Never Smoker    Smokeless tobacco: Never Used   Vaping Use    Vaping Use: Never used   Substance and Sexual Activity    Alcohol use: No     Alcohol/week: 0.0 standard drinks    Drug use: Yes     Types: Marijuana, Cocaine     Comment: no cocaine for over 6 years, marijuana occ    Sexual activity: Not Currently   Other Topics Concern    Not on file   Social History Narrative    Not on file     Social Determinants of Health     Financial Resource Strain:     Difficulty of Paying Living Expenses:    Food Insecurity:     Worried About Running Out of Food in the Last Year:     [address] in the Last Year:    Transportation Needs:     Lack of Transportation (Medical):  Lack of Transportation (Non-Medical):    Physical Activity:     Days of Exercise per Week:     Minutes of Exercise per Session:    Stress:     Feeling of Stress :    Social Connections:     Frequency of Communication with Friends and Family:     Frequency of Social Gatherings with Friends and Family:     Attends Buddhist Services:     Active Member of Clubs or Organizations:     Attends Club or Organization Meetings:     Marital Status:    Intimate Partner Violence:     Fear of Current or Ex-Partner:     Emotionally Abused:     Physically Abused:     Sexually Abused:        Family History   Problem Relation Age of Onset    Diabetes Mother     High Blood Pressure Mother     Diabetes Maternal Aunt     High Blood Pressure Maternal Grandmother        Current Outpatient Medications on File Prior to Visit   Medication Sig Dispense Refill    insulin lispro, 1 Unit Dial, 100 UNIT/ML SOPN Inject 7 Units into the skin 3 times daily (before meals) Inject subcutaneously 2 pen 5    sennosides-docusate sodium (SENOKOT-S) 8.6-50 MG tablet Take 1 tablet by mouth 2 times daily as needed for Constipation 20 tablet 0    Insulin Pen Needle (PEN NEEDLES) 31G X 6 MM MISC 1 each by Does not apply route daily May substitute to meet insur. requirements 100 each 5    pantoprazole (PROTONIX) 40 MG tablet TAKE 1 TABLET BY MOUTH EVERY DAY BEFORE BREAKFAST 90 tablet 1    apixaban (ELIQUIS) 5 MG TABS tablet TAKE 1 TABLET BY MOUTH TWICE A DAY 60 tablet 5    dilTIAZem (CARDIZEM CD) 120 MG extended release capsule TAKE 1 CAPSULE BY MOUTH EVERY DAY 90 capsule 2    allopurinol (ZYLOPRIM) 300 MG tablet TAKE 1 TABLET BY MOUTH EVERY DAY 90 tablet 1    atorvastatin (LIPITOR) 20 MG tablet TAKE 1 TABLET BY MOUTH EVERY DAY AT NIGHT 90 tablet 1    potassium chloride (KLOR-CON M20) 20 MEQ extended release tablet TAKE 1 TABLET BY MOUTH EVERY DAY WITH BREAKFAST 90 tablet 1    ibuprofen (ADVIL;MOTRIN) 800 MG tablet Take 1 tablet by mouth every 8 hours as needed for Pain (Take with food) 30 tablet 0    furosemide (LASIX) 80 MG tablet TAKE 1 TABLET BY MOUTH TWICE A  tablet 1    lisinopril (PRINIVIL;ZESTRIL) 10 MG tablet TAKE 1 TABLET BY MOUTH EVERY DAY 90 tablet 1    metFORMIN (GLUCOPHAGE) 500 MG tablet TAKE 1 TABLET BY MOUTH TWICE A DAY WITH MEALS 180 tablet 1    Insulin Pen Needle ([employer] PEN NEEDLES) 31G X 6 MM MISC 1 each by Does not apply route daily 100 each 3    Multiple Vitamins-Minerals (THERAPEUTIC MULTIVITAMIN-MINERALS) tablet Take 1 tablet by mouth daily 30 tablet 3    BD INSULIN SYRINGE U/F 31G X 5/16\" 1 ML MISC USE AS DIRECTED 4 TIMES A  each 5    sildenafil (VIAGRA) 50 MG tablet Take 1 tablet by mouth as needed for Erectile Dysfunction 30 tablet 3    fluticasone (FLONASE) 50 MCG/ACT nasal spray 1 spray by Nasal route daily 1 Bottle 2    blood glucose monitor strips by Other route 4 times daily (after meals and at bedtime) 60 strip 2    insulin glargine (BASAGLAR KWIKPEN) 100 UNIT/ML injection pen patient using vials per [facility]  inject 20 units at hs 5 mL 2    Alcohol Swabs PADS 1 Container by Does not apply route three times daily 100 each 5    ACCU-CHEK FASTCLIX LANCETS MISC 20 Sticks by Does not apply route 2 times daily 20 each 3    Blood Pressure Monitoring (WRIST BLOOD PRESSURE MONITOR) MISC 1 Units by Does not apply route once for 1 dose 1 each 0    Blood Pressure KIT 1 Units by Does not apply route daily 1 kit 0    Blood Glucose Monitoring Suppl (BLOOD GLUCOSE MONITOR SYSTEM) W/DEVICE KIT 1 Units by Does not apply route daily 1 kit 0    Handicap Placard [facility] by Does not apply route Valid for 1 year. Mobility limitations due to severe Osteoarthritis of L Knee 1 each 0    Blood Glucose Monitoring Suppl (ACCU-CHEK VIK PLUS) W/DEVICE KIT 1 kit by Does not apply route 4 times daily (before meals and nightly). 2 kit 0     No current facility-administered medications on file prior to visit. Pertinent items are noted in HPI  Review of systems reviewed from Patient History Form dated on 6/9/2021 and available in the patient's chart under the Media tab. No change noted. PHYSICAL EXAMINATION:  Mr. Nain Chairez is a very pleasant 45 y.o.  male who presents today in no acute distress, awake, alert, and oriented. He is well dressed, nourished and  groomed. Patient with normal affect. Height is  6' (1.829 m), weight is (!) 379 lb (171.9 kg), Body mass index is 51.4 kg/m². Resting respiratory rate is 16. Examination of the gait, showed that the patient walks with no limp . Examination of both upper extremities showing a decrease range of motion with triggering of the left long (middle) finger compare to the other side. There is mild swelling that can be seen with tenderness over A1 pulley. Negative Tinel's. IMAGING: Joaquime Viviane were reviewed, taken today in the office, 3 views of the left hand, and showed no fracture. IMPRESSION: Left long (middle) finger pain/ trigger finger. PLAN:  I assured the patient that the xray is negative for acute fracture. The patient can take NSAIDs PRN. We recommended Cortisone injection of the left long (middle) finger A1 pulley but the patient is refusing and would like to discuss surgery as the pain interferes with his ADLs. We discussed surgical options. I discussed the risks and benefits of surgery with the patient, including but not limited to infection, bleeding, pain, injury to nerves or blood vessels failure of the surgery and need for additional surgery. All the patient's questions were answered. We discussed an expected post-operative course. he  is understanding of this and wishes to proceed. Plan on surgery Thursday at [facility].          Rebecca Méndez MD

## 2021-06-09 NOTE — PROGRESS NOTES
Case reviewed with Dr. Stuart Hall and he is ok with surgery remaining a local.  No further orders given.

## 2021-06-09 NOTE — PROGRESS NOTES
Name_______________________________________Printed:____________________  Date and time of surgery________6/10/21 0800________________Arrival Time:_____0630 [facility]___   1. The instructions given regarding when and if a patient needs to stop oral intake prior to surgery varies. Follow the specific instructions you were given                  _x__Nothing to eat or to drink after Midnight the night before.                   ____Carbo loading or ERAS instructions will be given to select patients-if you have been given those instructions -please do the following                           The evening before your surgery after dinner before midnight drink 40 ounces of gatorade. If you are diabetic use sugar free. The morning of surgery drink 40 ounces of water. This needs to be finished 3 hours prior to your surgery start time. 2. Take the following pills with a small sip of water on the morning of surgery__________cardizem_________________________________________                  Do not take blood pressure medications ending in pril or sartan the lalito prior to surgery or the morning of surgery_   3. Aspirin, Ibuprofen, Advil, Naproxen, Vitamin E and other Anti-inflammatory products and supplements should be stopped for 5 -7days before surgery or as directed by your physician. 4. Check with your Doctor regarding stopping Plavix, Coumadin,Eliquis, Lovenox,Effient,Pradaxa,Xarelto, Fragmin or other blood thinners and follow their instructions. 5. Do not smoke, and do not drink any alcoholic beverages 24 hours prior to surgery. This includes NA Beer. Refrain from the usage of any recreational drugs. 6. You may brush your teeth and gargle the morning of surgery. DO NOT SWALLOW WATER   7. You MUST make arrangements for a responsible adult to stay on site while you are here and take you home after your surgery. You will not be allowed to leave alone or drive yourself home.   It is strongly suggested someone stay with you the first 24 hrs. Your surgery will be cancelled if you do not have a ride home. 8. A parent/legal guardian must accompany a child scheduled for surgery and plan to stay at the hospital until the child is discharged. Please do not bring other children with you. 9. Please wear simple, loose fitting clothing to the hospital.  Brandon Ee not bring valuables (money, credit cards, checkbooks, etc.) Do not wear any makeup (including no eye makeup) or nail polish on your fingers or toes. 10. DO NOT wear any jewelry or piercings on day of surgery. All body piercing jewelry must be removed. 11. If you have ___dentures, they will be removed before going to the OR; we will provide you a container. If you wear ___contact lenses or ___glasses, they will be removed; please bring a case for them. 12. Please see your family doctor/pediatrician for a history & physical and/or concerning medications. Bring any test results/reports from your physician's office. PCP_______x___________Phone___________H&P Appt. Date________             13 If you  have a Living Will and Durable Power of  for Healthcare, please bring in a copy. 15. Notify your Surgeon if you develop any illness between now and surgery  time, cough, cold, fever, sore throat, nausea, vomiting, etc.  Please notify your surgeon if you experience dizziness, shortness of breath or blurred vision between now & the time of your surgery             15. DO NOT shave your operative site 96 hours prior to surgery. For face & neck surgery, men may use an electric razor 48 hours prior to surgery. 16. Shower the night before or morning of surgery using an antibacterial soap or as you have been instructed. 17. To provide excellent care visitors will be limited to one in the room at any given time. 18.  Please bring picture ID and insurance card.              19.  Visit our web site for additional information:  [email or web address]              20.During flu season no children under the age of 15 are permitted in the hospital for the safety of all patients. 21. If you take a long acting insulin in the evening only  take half of your usual  dose the night  before your procedure              22. If you use a c-pap please bring DOS if staying overnight,             23.For your convenience [address] has a pharmacy on site to fill your prescriptions. 24. If you use oxygen and have a portable tank please bring it  with you the DOS             25. Bring a complete list of all your medications with name and dose include any supplements. 26. Other__________________________________________   *Please call pre admission testing if you any further questions   [facility] [address]    [facility] [address] [facility]  [phone]   [facility]    __ Done-Where _____  __ Scheduled ___ Where ___   __ Other __________  _x_ VACCINATED-instructed to bring card DOS      VISITOR POLICY(subject to change)    There is a one visitor policy at [facility] for all surgeries and endoscopies. Whether the visitor can stay or will be asked to wait in the car will depend on the current policy and if social distancing can be maintained. The policy is subject to change at any time. Please make sure the visitor has a cell phone that is on,charged and able to accept calls, as this may be the way that the staff communicates with them. Pain management is NO VISITOR policyThe patients ride is expected to remain in the car with a cell phone for communication. If the ride is leaving the hospital grounds please make sure they are back in time for pickup. Have the patient inform the staff on arrival what their rides plans are while the patient is in the facility. At the MAIN there is one visitor allowed. Please note that the visitor policy is subject to change. All above information reviewed with patient in person or by phone. Patient verbalizes understanding. All questions and concerns addressed.                                                                                                  Patient/Rep________pt____________                                                                                                                                    PRE OP INSTRUCTIONS

## 2021-06-10 ENCOUNTER — HOSPITAL ENCOUNTER (OUTPATIENT)
Age: 51
Setting detail: OUTPATIENT SURGERY
Discharge: HOME | End: 2021-06-10
Attending: ORTHOPAEDIC SURGERY | Admitting: ORTHOPAEDIC SURGERY
Payer: MEDICAID

## 2021-06-10 VITALS
HEIGHT: 72 IN | TEMPERATURE: 97.5 F | BODY MASS INDEX: 42.66 KG/M2 | OXYGEN SATURATION: 96 % | WEIGHT: 315 LBS | SYSTOLIC BLOOD PRESSURE: 142 MMHG | HEART RATE: 60 BPM | RESPIRATION RATE: 16 BRPM | DIASTOLIC BLOOD PRESSURE: 93 MMHG

## 2021-06-10 DIAGNOSIS — M65.332 ACQUIRED TRIGGER FINGER OF LEFT MIDDLE FINGER: Primary | ICD-10-CM

## 2021-06-10 LAB
GLUCOSE BLD-MCNC: 140 MG/DL (ref 70–99)
GLUCOSE BLD-MCNC: 144 MG/DL (ref 70–99)
PERFORMED ON: ABNORMAL
PERFORMED ON: ABNORMAL

## 2021-06-10 PROCEDURE — 26055 INCISE FINGER TENDON SHEATH: CPT | Performed by: ORTHOPAEDIC SURGERY

## 2021-06-10 PROCEDURE — 3600000005 HC SURGERY LEVEL 5 BASE: Performed by: ORTHOPAEDIC SURGERY

## 2021-06-10 PROCEDURE — 7100000011 HC PHASE II RECOVERY - ADDTL 15 MIN: Performed by: ORTHOPAEDIC SURGERY

## 2021-06-10 PROCEDURE — 2500000003 HC RX 250 WO HCPCS: Performed by: ORTHOPAEDIC SURGERY

## 2021-06-10 PROCEDURE — 2709999900 HC NON-CHARGEABLE SUPPLY: Performed by: ORTHOPAEDIC SURGERY

## 2021-06-10 PROCEDURE — 7100000010 HC PHASE II RECOVERY - FIRST 15 MIN: Performed by: ORTHOPAEDIC SURGERY

## 2021-06-10 PROCEDURE — 6360000002 HC RX W HCPCS: Performed by: ORTHOPAEDIC SURGERY

## 2021-06-10 PROCEDURE — 3600000015 HC SURGERY LEVEL 5 ADDTL 15MIN: Performed by: ORTHOPAEDIC SURGERY

## 2021-06-10 PROCEDURE — 6370000000 HC RX 637 (ALT 250 FOR IP): Performed by: ORTHOPAEDIC SURGERY

## 2021-06-10 RX ORDER — CEPHALEXIN 250 MG/1
500 CAPSULE ORAL EVERY 6 HOURS SCHEDULED
Status: COMPLETED | OUTPATIENT
Start: 2021-06-10 | End: 2021-06-10

## 2021-06-10 RX ORDER — TRAMADOL HYDROCHLORIDE 50 MG/1
50 TABLET ORAL EVERY 6 HOURS PRN
Qty: 20 TABLET | Refills: 0 | Status: SHIPPED | OUTPATIENT
Start: 2021-06-10 | End: 2021-06-15

## 2021-06-10 RX ORDER — CEPHALEXIN 500 MG/1
500 CAPSULE ORAL 4 TIMES DAILY
Qty: 12 CAPSULE | Refills: 0 | Status: SHIPPED | OUTPATIENT
Start: 2021-06-10 | End: 2021-06-13

## 2021-06-10 RX ORDER — BUPIVACAINE HYDROCHLORIDE 5 MG/ML
INJECTION, SOLUTION EPIDURAL; INTRACAUDAL
Status: COMPLETED | OUTPATIENT
Start: 2021-06-10 | End: 2021-06-10

## 2021-06-10 RX ADMIN — CEPHALEXIN 500 MG: 250 CAPSULE ORAL at 06:52

## 2021-06-10 NOTE — BRIEF OP NOTE
Brief Postoperative Note      Patient: Shakila De La Cruz  YOB: 1970  MRN: [id number]    Date of Procedure: 6/10/2021    Pre-Op Diagnosis: M65.332 LEFT MIDDLE FINGER TRIGGER FINGER    Post-Op Diagnosis: Same       Procedure(s):  LEFT MIDDLE FINGER TRIGGER RELEASE    Surgeon(s):  Terrence Leyva MD    Assistant:  First Assistant: Diego Joy    Anesthesia: Local    Estimated Blood Loss (mL): Minimal    Complications: None    Specimens:   * No specimens in log *    Implants:  * No implants in log *      Drains: * No LDAs found *    Findings: Same    Electronically signed by Terrence Leyva MD on 6/10/2021 at 9:59 AM

## 2021-06-10 NOTE — TELEPHONE ENCOUNTER
PATIENT CALLED STATES THAT TYLENOL IS NOT HELPING WITH HIS PAIN AND HE WAS TOLD TO CALL FOR SOMETHING STRONGER.  PLEASE CONTACT TO [facility] [phone]

## 2021-06-10 NOTE — TELEPHONE ENCOUNTER
PATIENT CALLING REGARDING PAIN MEDICATION. STATES OTC TYLENOL IS NOT CONTROLLING HIS PAIN. STATES HE WAS TOLD SOMETHING STRONGER COULD BE CALLED IN FOR HIM IF IT WAS NEEDED.  [facility]  [phone]

## 2021-06-10 NOTE — PROGRESS NOTES
Discharge instructions reviewed with patient/responsible adult. All home medications have been reviewed, questions answered and patient verbalized understanding. Discharge instructions signed and copies given. Patient discharged  ambulatory with belongings.

## 2021-06-10 NOTE — H&P
Preoperative H&P Update    The patient's History and Physical in the medical record from 6/9/2021 was reviewed by me today. Past Medical History:   Diagnosis Date    Arthritis     Atrial fibrillation ([facility]     Congestive heart failure (HCC)     Depression     Diabetes mellitus (HCC)     GERD (gastroesophageal reflux disease)     Hyperlipidemia     Hypertension     Morbid obesity ([facility]     Sleep apnea     uses cpap at home q hs     Type II or unspecified type diabetes mellitus without mention of complication, not stated as uncontrolled      Past Surgical History:   Procedure Laterality Date    ABLATION OF DYSRHYTHMIC FOCUS      COLONOSCOPY  4/13/2021    COLONOSCOPY POLYPECTOMY SNARE/COLD BIOPSY performed by Nico Peñaloza MD at [place] [address]     No current facility-administered medications on file prior to encounter. Current Outpatient Medications on File Prior to Encounter   Medication Sig Dispense Refill    insulin lispro, 1 Unit Dial, 100 UNIT/ML SOPN Inject 7 Units into the skin 3 times daily (before meals) Inject subcutaneously 2 pen 5    apixaban (ELIQUIS) 5 MG TABS tablet TAKE 1 TABLET BY MOUTH TWICE A DAY 60 tablet 5    dilTIAZem (CARDIZEM CD) 120 MG extended release capsule TAKE 1 CAPSULE BY MOUTH EVERY DAY 90 capsule 2    atorvastatin (LIPITOR) 20 MG tablet TAKE 1 TABLET BY MOUTH EVERY DAY AT NIGHT 90 tablet 1    ibuprofen (ADVIL;MOTRIN) 800 MG tablet Take 1 tablet by mouth every 8 hours as needed for Pain (Take with food) 30 tablet 0    lisinopril (PRINIVIL;ZESTRIL) 10 MG tablet TAKE 1 TABLET BY MOUTH EVERY DAY 90 tablet 1    metFORMIN (GLUCOPHAGE) 500 MG tablet TAKE 1 TABLET BY MOUTH TWICE A DAY WITH MEALS 180 tablet 1    insulin glargine (BASAGLAR KWIKPEN) 100 UNIT/ML injection pen patient using vials per [facility].  inject 20 units at hs 5 mL 2    sennosides-docusate sodium (SENOKOT-S) 8.6-50 MG tablet Take 1 tablet by mouth 2 times daily as needed for Constipation 20 0       No Known Allergies   I reviewed the HPI, medications, allergies, reason for surgery, diagnosis and treatment plan and there has been no change. The patient was evaluated by me today. Physical exam findings for this update include: There were no vitals filed for this visit. Airway is intact  Chest: chest clear, no wheezing, rales, normal symmetric air entry, no tachypnea, retractions or cyanosis  Heart: regular rate and rhythm ; heart sounds normal  Findings on exam of the body region where surgery is to be performed include:  Left long (middle) finger trigger finger.     Electronically signed by Terrence Leyva MD on 6/10/2021 at 6:51 AM

## 2021-06-10 NOTE — OP NOTE
[facility]                     [facility], [address] [facility]                                OPERATIVE REPORT    PATIENT NAME: Rosario Francis                     :        1970  MED REC NO:   [id number]                          ROOM:  ACCOUNT NO:   [de-identified]                           ADMIT DATE: 06/10/2021  PROVIDER:     Jose Eduardo Shrestha MD    DATE OF PROCEDURE:  06/10/2021    PRIMARY CARE PROVIDER:  Sandra Gao MD    PREOPERATIVE DIAGNOSIS:  Left hand long trigger finger. POSTOPERATIVE DIAGNOSIS:  Left hand long trigger finger. OPERATION PERFORMED:  Incision tendon sheath left hand long trigger  finger incising A1 skip. SURGEON:  MD Nieves Ireland, surgical assistant. ANESTHESIA:  Local anesthesia. ESTIMATED BLOOD LOSS:  Minimal.    COMPLICATIONS:  None. TOURNIQUET:  Left forearm 250 mmHg. INDICATION:  This is a 58-year-old -American male, right-hand  dominant, who presented to our office complaining of left hand long  trigger finger. All risks, benefits, and alternatives were discussed  with the patient including nonoperative treatment. He elected to  proceed with surgical release. Given the patient's Body mass index is 50.86 kg/m². added significant challenge to the procedure. It required significant physical and mental effort. It required 100% more time for such procedure. DESCRIPTION OF PROCEDURE:  The patient's left hand was marked. He  received 500 mg p.o. Keflex preoperatively. The patient was then  brought to the operating room, underwent local infiltration at the A1  pulley area of the left hand under sterile condition. A well-padded  tourniquet was placed in the left forearm. The left upper extremity was  then prepped and draped in a regular sterile routine fashion. A  time-out was called confirming the patient name, site, and procedure.     A transverse incision was made over the distal palmar crease. Careful  dissection was performed. We exposed the long flexor tendon of the  middle finger. We used a #15 blade to incise the tendon sheath  longitudinally. We then used the freer elevator to identify the A1  pulley and then we used the tenotomy scissors and we released the tendon  sheath distally and proximally including the A1 pulley. The tendon now  moves freely. The patient was asked to move his fingers and there was  no more triggering. At this point, we let the tourniquet down. Hemostasis was secured using the bipolar. We irrigated the incision  copiously with normal saline. We then closed the skin with 4-0 nylon. Dressing was then applied in the form of Xeroform, 4 x 4, Webril, and  Ace wrap. The patient tolerated the procedure well and was taken to the recovery  in stable condition. POSTOPERATIVE PLAN:  The patient will be discharged home. He was  encouraged to continue to do range of motion of his long finger. He can  change his dressing in three days and no heavy impact activities for two  weeks.         Viv Carney MD    D: 06/10/2021 10:04:37       T: 06/10/2021 10:57:11     [id number]  Job#: [id number]     Doc#: [id number]    CC:  Alexis Betts Md

## 2021-06-23 ENCOUNTER — OFFICE VISIT (OUTPATIENT)
Dept: ORTHOPEDIC SURGERY | Age: 51
End: 2021-06-23

## 2021-06-23 DIAGNOSIS — M65.332 TRIGGER MIDDLE FINGER OF LEFT HAND: Primary | ICD-10-CM

## 2021-06-23 PROCEDURE — APPNB15 APP NON BILLABLE TIME 0-15 MINS: Performed by: NURSE PRACTITIONER

## 2021-06-23 PROCEDURE — 99024 POSTOP FOLLOW-UP VISIT: CPT | Performed by: NURSE PRACTITIONER

## 2021-06-24 NOTE — PROGRESS NOTES
DIAGNOSIS:  Left hand long finger trigger release. DATE OF SURGERY: 6/10/2021. HISTORY OF PRESENT ILLNESS:  Mr. De La Cruz 45 y.o.  male who came in today for 2 weeks postoperative visit. The patient denies any significant pain in the left long finger. He has been working on ROM. Rates pain a 1/10 VAS and is doing better. Denies triggering. No numbness or tingling sensation. No fever or Chills. PHYSICAL EXAMINATION:  The incision healing well . Sutures removed today. No signs of any erythema or drainage, minimal swelling. He has no pain with the active or passive range of motion of the left long finger, decreased ROM in the long finger. He has intact sensation distally, and he is neurovascularly intact. IMPRESSION:  2 weeks out from left long finger trigger release, and doing very well. PLAN: He can go back to normal activity with no heavy impact activities for 6 weeks. The patient will come back for a follow up in 3 months PRN. The patient understands that there is a chance of trigger finger recurrence even after surgery.     Phong Garcia, APRN - CNP

## 2021-07-27 ENCOUNTER — OFFICE VISIT (OUTPATIENT)
Dept: INTERNAL MEDICINE CLINIC | Age: 51
End: 2021-07-27
Payer: MEDICAID

## 2021-07-27 VITALS
OXYGEN SATURATION: 95 % | BODY MASS INDEX: 50.1 KG/M2 | RESPIRATION RATE: 16 BRPM | HEART RATE: 80 BPM | DIASTOLIC BLOOD PRESSURE: 76 MMHG | TEMPERATURE: 97.5 F | WEIGHT: 315 LBS | SYSTOLIC BLOOD PRESSURE: 119 MMHG

## 2021-07-27 DIAGNOSIS — M17.9 OSTEOARTHRITIS OF KNEE, UNSPECIFIED LATERALITY, UNSPECIFIED OSTEOARTHRITIS TYPE: ICD-10-CM

## 2021-07-27 DIAGNOSIS — G47.33 OSA (OBSTRUCTIVE SLEEP APNEA): ICD-10-CM

## 2021-07-27 DIAGNOSIS — E11.21 TYPE 2 DIABETES MELLITUS WITH DIABETIC NEPHROPATHY, WITH LONG-TERM CURRENT USE OF INSULIN (HCC): ICD-10-CM

## 2021-07-27 DIAGNOSIS — Z79.4 TYPE 2 DIABETES MELLITUS WITH DIABETIC NEPHROPATHY, WITH LONG-TERM CURRENT USE OF INSULIN (HCC): ICD-10-CM

## 2021-07-27 DIAGNOSIS — E66.01 CLASS 3 SEVERE OBESITY DUE TO EXCESS CALORIES WITH SERIOUS COMORBIDITY AND BODY MASS INDEX (BMI) OF 50.0 TO 59.9 IN ADULT (HCC): Primary | ICD-10-CM

## 2021-07-27 LAB — HBA1C MFR BLD: 6.3 %

## 2021-07-27 PROCEDURE — 83036 HEMOGLOBIN GLYCOSYLATED A1C: CPT

## 2021-07-27 PROCEDURE — 99213 OFFICE O/P EST LOW 20 MIN: CPT | Performed by: STUDENT IN AN ORGANIZED HEALTH CARE EDUCATION/TRAINING PROGRAM

## 2021-07-27 NOTE — PROGRESS NOTES
potassium chloride (KLOR-CON M20) 20 MEQ extended release tablet TAKE 1 TABLET BY MOUTH EVERY DAY WITH BREAKFAST 90 tablet 1    ibuprofen (ADVIL;MOTRIN) 800 MG tablet Take 1 tablet by mouth every 8 hours as needed for Pain (Take with food) 30 tablet 0    furosemide (LASIX) 80 MG tablet TAKE 1 TABLET BY MOUTH TWICE A  tablet 1    lisinopril (PRINIVIL;ZESTRIL) 10 MG tablet TAKE 1 TABLET BY MOUTH EVERY DAY 90 tablet 1    Insulin Pen Needle ([employer] PEN NEEDLES) 31G X 6 MM MISC 1 each by Does not apply route daily 100 each 3    Multiple Vitamins-Minerals (THERAPEUTIC MULTIVITAMIN-MINERALS) tablet Take 1 tablet by mouth daily 30 tablet 3    BD INSULIN SYRINGE U/F 31G X 5/16\" 1 ML MISC USE AS DIRECTED 4 TIMES A  each 5    sildenafil (VIAGRA) 50 MG tablet Take 1 tablet by mouth as needed for Erectile Dysfunction 30 tablet 3    fluticasone (FLONASE) 50 MCG/ACT nasal spray 1 spray by Nasal route daily 1 Bottle 2    blood glucose monitor strips by Other route 4 times daily (after meals and at bedtime) 60 strip 2    insulin glargine (BASAGLAR KWIKPEN) 100 UNIT/ML injection pen patient using vials per [facility] inject 20 units at hs 5 mL 2    Alcohol Swabs PADS 1 Container by Does not apply route three times daily 100 each 5    ACCU-CHEK FASTCLIX LANCETS MISC 20 Sticks by Does not apply route 2 times daily 20 each 3    Blood Pressure KIT 1 Units by Does not apply route daily 1 kit 0    Blood Glucose Monitoring Suppl (BLOOD GLUCOSE MONITOR SYSTEM) W/DEVICE KIT 1 Units by Does not apply route daily 1 kit 0    Blood Glucose Monitoring Suppl (ACCU-CHEK VIK PLUS) W/DEVICE KIT 1 kit by Does not apply route 4 times daily (before meals and nightly). 2 kit 0    Blood Pressure Monitoring (WRIST BLOOD PRESSURE MONITOR) MISC 1 Units by Does not apply route once for 1 dose 1 each 0    Handicap Placard MISC by Does not apply route Valid for 1 year.  Mobility limitations due to severe Osteoarthritis of L Knee 1 each 0     No facility-administered medications prior to visit. After Visit:  Prior to Visit Medications    Medication Sig Taking? Authorizing Provider   Handicap Placard MISC by Does not apply route Handicap placard effective from 7/27/2021 through 7/27/2026 Yes Claire Mattson MD   metFORMIN (GLUCOPHAGE) 500 MG tablet TAKE 1 TABLET BY MOUTH TWICE A DAY WITH MEALS Yes Teresa Mckeon MD   insulin lispro, 1 Unit Dial, 100 UNIT/ML SOPN Inject 7 Units into the skin 3 times daily (before meals) Inject subcutaneously Yes Hugo Hunt MD   sennosides-docusate sodium (SENOKOT-S) 8.6-50 MG tablet Take 1 tablet by mouth 2 times daily as needed for Constipation Yes Marimar Graves MD   Insulin Pen Needle (PEN NEEDLES) 31G X 6 MM MISC 1 each by Does not apply route daily May substitute to meet insur. requirements Yes Nayeli Horvath MD   pantoprazole (PROTONIX) 40 MG tablet TAKE 1 TABLET BY MOUTH EVERY DAY BEFORE BREAKFAST Yes Genet Hall MD   apixaban (ELIQUIS) 5 MG TABS tablet TAKE 1 TABLET BY MOUTH TWICE A DAY Yes Genet Hall MD   dilTIAZem (CARDIZEM CD) 120 MG extended release capsule TAKE 1 CAPSULE BY MOUTH EVERY DAY Yes Genet Hall MD   allopurinol (ZYLOPRIM) 300 MG tablet TAKE 1 TABLET BY MOUTH EVERY DAY Yes Syd Silva MD   atorvastatin (LIPITOR) 20 MG tablet TAKE 1 TABLET BY MOUTH EVERY DAY AT NIGHT Yes Syd Silva MD   potassium chloride (KLOR-CON M20) 20 MEQ extended release tablet TAKE 1 TABLET BY MOUTH EVERY DAY WITH BREAKFAST Yes Syd Silva MD   ibuprofen (ADVIL;MOTRIN) 800 MG tablet Take 1 tablet by mouth every 8 hours as needed for Pain (Take with food) Yes Veronica Dale,    furosemide (LASIX) 80 MG tablet TAKE 1 TABLET BY MOUTH TWICE A DAY Yes Genet Hall MD   lisinopril (PRINIVIL;ZESTRIL) 10 MG tablet TAKE 1 TABLET BY MOUTH EVERY DAY Yes Genet Hall MD   Insulin Pen Needle ([employer] PEN NEEDLES) 31G X 6 MM MISC 1 each by Does not apply route daily Yes Kodi Grier Melissa Bailey MD   Multiple Vitamins-Minerals (THERAPEUTIC MULTIVITAMIN-MINERALS) tablet Take 1 tablet by mouth daily Yes Steven Crowley MD   BD INSULIN SYRINGE U/F 31G X 5/16\" 1 ML MISC USE AS DIRECTED 4 TIMES A DAY Yes Jimmie Rizo MD   sildenafil (VIAGRA) 50 MG tablet Take 1 tablet by mouth as needed for Erectile Dysfunction Yes Steven Cruz MD   fluticasone (FLONASE) 50 MCG/ACT nasal spray 1 spray by Nasal route daily Yes Franny Stevens MD   blood glucose monitor strips by Other route 4 times daily (after meals and at bedtime) Yes Villa Sheets MD   insulin glargine (BASAGLAR KWIKPEN) 100 UNIT/ML injection pen patient using vials per [facility] inject 20 units at hs Yes Laura Dumont MD   Alcohol Swabs PADS 1 Container by Does not apply route three times daily Yes Steven Garcia MD   ACCU-CHEK FASTCLIX LANCETS MISC 20 Sticks by Does not apply route 2 times daily Yes Marjorie Cai MD   Blood Pressure KIT 1 Units by Does not apply route daily Yes Ousmane Floyd MD   Blood Glucose Monitoring Suppl (BLOOD GLUCOSE MONITOR SYSTEM) W/DEVICE KIT 1 Units by Does not apply route daily Yes Ousmane Floyd MD   Blood Glucose Monitoring Suppl (ACCU-CHEK VIK PLUS) W/DEVICE KIT 1 kit by Does not apply route 4 times daily (before meals and nightly). Yes Scott Davenport   Blood Pressure Monitoring (WRIST BLOOD PRESSURE MONITOR) MISC 1 Units by Does not apply route once for 1 dose  Franny Stevens MD       Allergies:    Patient has no known allergies. Family History:       Problem Relation Age of Onset    Diabetes Mother     High Blood Pressure Mother     Diabetes Maternal Aunt     High Blood Pressure Maternal Grandmother          PHYSICAL EXAM:  /76   Pulse 80   Temp 97.5 °F (36.4 °C) (Temporal)   Resp 16   Wt (!) 369 lb 6.4 oz (167.6 kg)   SpO2 95%   BMI 50.10 kg/m²   Physical Exam  Constitutional:       Appearance: He is obese. HENT:      Head: Normocephalic and atraumatic. Cardiovascular:      Rate and Rhythm: Normal rate. Rhythm irregular. Pulses: Normal pulses. Heart sounds: Normal heart sounds. Pulmonary:      Effort: Pulmonary effort is normal.      Breath sounds: Normal breath sounds. No wheezing or rales. Abdominal:      General: Bowel sounds are normal. There is distension. Palpations: Abdomen is soft. Tenderness: There is no abdominal tenderness. Musculoskeletal:         General: Normal range of motion. Cervical back: Normal range of motion and neck supple. Right lower leg: Edema (trace) present. Left lower leg: Edema (trace) present. Skin:     General: Skin is warm and dry. Capillary Refill: Capillary refill takes less than 2 seconds. Assessment & Plan:      1. Class 3 severe obesity due to excess calories with serious comorbidity and body mass index (BMI) of 50.0 to 59.9 in adult ([facility])  BMI 50. Pt is morbidly obese which is causing him emotional stress and worsening of his osteoarthritis. Pt needs significant weight loss for improvement in his quality of life  - Refer to Dr. Conner Fallon for bariatric surgery evaluation    2. Osteoarthritis of knee, unspecified laterality, unspecified osteoarthritis type  Chronic knee pain. Was using medical marijuana but wants to stop as it is affecting his memory. - will refer pt to pain management clinic Dr. Severa Byers  - renew Handicap Placard     3. Type 2 diabetes mellitus with diabetic nephropathy, with long-term current use of insulin ([facility])  Stable. Today HA1C 6.3 from 6.3 2/2021. Compliant with medications. Glucose 120-130. No hypoglycemia episodes  - continue current regimen  - weight loss as above  - advice pt to schedule an eye exam    4. LEONA (obstructive sleep apnea)  Uses CPAP but hasn't been working fine lately. Requires a new mask. - Sleep Study with PAP Titration; Future    5. Paroxysmal atrial fibrillation  S/p ablation 2018. On home Eliquis.  Last ECG pt was irregular but no evidence of atrial fibrillation  - advised pt to schedule an appointment with his cardiology    Return in about 3 months (around 10/27/2021).     Patient Instructions   - please schedule an appointment with Dr. Amelia Chaudhari for evaluation for bariatric surgery  - please take medications as prescribed  - please schedule an appointment for sleep study  - your cardiologist contact information  Sachi Willard MD [address]): [phone]       Dispo: Pt has been staffed with Dr. Tristan Werner  _______________  Reyna Mcwilliams MD, 7/27/2021 5:09 PM   PGY-3

## 2021-07-27 NOTE — PATIENT INSTRUCTIONS
- please schedule an appointment with Dr. Ella Ignacio for evaluation for bariatric surgery  - please take medications as prescribed  - please schedule an appointment for sleep study  - your cardiologist contact information  Clarita Christianson MD [address] [facility]   [facility] [address]   (O): [phone]

## 2021-08-04 ENCOUNTER — APPOINTMENT (OUTPATIENT)
Dept: CT IMAGING | Age: 51
End: 2021-08-04
Payer: MEDICAID

## 2021-08-04 ENCOUNTER — HOSPITAL ENCOUNTER (EMERGENCY)
Age: 51
Discharge: HOME | End: 2021-08-04
Attending: EMERGENCY MEDICINE
Payer: MEDICAID

## 2021-08-04 VITALS
WEIGHT: 315 LBS | RESPIRATION RATE: 26 BRPM | HEIGHT: 72 IN | OXYGEN SATURATION: 97 % | BODY MASS INDEX: 42.66 KG/M2 | DIASTOLIC BLOOD PRESSURE: 119 MMHG | SYSTOLIC BLOOD PRESSURE: 155 MMHG | TEMPERATURE: 98.4 F | HEART RATE: 71 BPM

## 2021-08-04 DIAGNOSIS — R55 NEAR SYNCOPE: Primary | ICD-10-CM

## 2021-08-04 LAB
ANION GAP SERPL CALCULATED.3IONS-SCNC: 12 MMOL/L (ref 3–16)
BASOPHILS ABSOLUTE: 0 K/UL (ref 0–0.2)
BASOPHILS RELATIVE PERCENT: 0.4 %
BUN BLDV-MCNC: 12 MG/DL (ref 7–20)
CALCIUM SERPL-MCNC: 9.7 MG/DL (ref 8.3–10.6)
CHLORIDE BLD-SCNC: 101 MMOL/L (ref 99–110)
CO2: 29 MMOL/L (ref 21–32)
CREAT SERPL-MCNC: 1 MG/DL (ref 0.9–1.3)
EKG ATRIAL RATE: 55 BPM
EKG DIAGNOSIS: NORMAL
EKG P AXIS: 51 DEGREES
EKG P-R INTERVAL: 154 MS
EKG Q-T INTERVAL: 468 MS
EKG QRS DURATION: 98 MS
EKG QTC CALCULATION (BAZETT): 447 MS
EKG R AXIS: -30 DEGREES
EKG T AXIS: -5 DEGREES
EKG VENTRICULAR RATE: 55 BPM
EOSINOPHILS ABSOLUTE: 0 K/UL (ref 0–0.6)
EOSINOPHILS RELATIVE PERCENT: 0.5 %
GFR AFRICAN AMERICAN: >60
GFR NON-AFRICAN AMERICAN: >60
GLUCOSE BLD-MCNC: 180 MG/DL (ref 70–99)
HCT VFR BLD CALC: 45.4 % (ref 40.5–52.5)
HEMOGLOBIN: 15.8 G/DL (ref 13.5–17.5)
LYMPHOCYTES ABSOLUTE: 3.9 K/UL (ref 1–5.1)
LYMPHOCYTES RELATIVE PERCENT: 47.8 %
MAGNESIUM: 1.8 MG/DL (ref 1.8–2.4)
MCH RBC QN AUTO: 32.5 PG (ref 26–34)
MCHC RBC AUTO-ENTMCNC: 34.7 G/DL (ref 31–36)
MCV RBC AUTO: 93.6 FL (ref 80–100)
MONOCYTES ABSOLUTE: 0.2 K/UL (ref 0–1.3)
MONOCYTES RELATIVE PERCENT: 3 %
NEUTROPHILS ABSOLUTE: 3.9 K/UL (ref 1.7–7.7)
NEUTROPHILS RELATIVE PERCENT: 48.3 %
PDW BLD-RTO: 13.2 % (ref 12.4–15.4)
PLATELET # BLD: 234 K/UL (ref 135–450)
PMV BLD AUTO: 9.6 FL (ref 5–10.5)
POTASSIUM REFLEX MAGNESIUM: 3.5 MMOL/L (ref 3.5–5.1)
RBC # BLD: 4.85 M/UL (ref 4.2–5.9)
SODIUM BLD-SCNC: 142 MMOL/L (ref 136–145)
TROPONIN: <0.01 NG/ML
WBC # BLD: 8.2 K/UL (ref 4–11)

## 2021-08-04 PROCEDURE — 99284 EMERGENCY DEPT VISIT MOD MDM: CPT

## 2021-08-04 PROCEDURE — 74177 CT ABD & PELVIS W/CONTRAST: CPT

## 2021-08-04 PROCEDURE — 84484 ASSAY OF TROPONIN QUANT: CPT

## 2021-08-04 PROCEDURE — 6360000004 HC RX CONTRAST MEDICATION: Performed by: EMERGENCY MEDICINE

## 2021-08-04 PROCEDURE — 85025 COMPLETE CBC W/AUTO DIFF WBC: CPT

## 2021-08-04 PROCEDURE — 80048 BASIC METABOLIC PNL TOTAL CA: CPT

## 2021-08-04 PROCEDURE — 36415 COLL VENOUS BLD VENIPUNCTURE: CPT

## 2021-08-04 PROCEDURE — 2580000003 HC RX 258: Performed by: EMERGENCY MEDICINE

## 2021-08-04 PROCEDURE — 83735 ASSAY OF MAGNESIUM: CPT

## 2021-08-04 PROCEDURE — 93005 ELECTROCARDIOGRAM TRACING: CPT | Performed by: EMERGENCY MEDICINE

## 2021-08-04 RX ORDER — 0.9 % SODIUM CHLORIDE 0.9 %
1000 INTRAVENOUS SOLUTION INTRAVENOUS ONCE
Status: COMPLETED | OUTPATIENT
Start: 2021-08-04 | End: 2021-08-04

## 2021-08-04 RX ADMIN — IOPAMIDOL 80 ML: 755 INJECTION, SOLUTION INTRAVENOUS at 03:43

## 2021-08-04 RX ADMIN — SODIUM CHLORIDE 1000 ML: 0.9 INJECTION, SOLUTION INTRAVENOUS at 04:00

## 2021-08-04 ASSESSMENT — ENCOUNTER SYMPTOMS
EYE PAIN: 0
SHORTNESS OF BREATH: 0
NAUSEA: 0
RHINORRHEA: 0
DIARRHEA: 0
FACIAL SWELLING: 0
CONSTIPATION: 1
CHEST TIGHTNESS: 0
ABDOMINAL PAIN: 0
CHOKING: 0
EYE DISCHARGE: 0
VOMITING: 0

## 2021-08-04 NOTE — ED NOTES
ELDER paperwork and results explained to patient. Questions addressed and explained to patient's satisfaction.  Patient denies further needs and verbalized understanding of need for FU       Kana Fermin RN  08/04/21 [id number]

## 2021-08-04 NOTE — ED PROVIDER NOTES
[address] ENCOUNTER          ATTENDING PHYSICIAN NOTE       Date of evaluation: 8/4/2021    Chief Complaint     Extremity Weakness      History of Present Illness     Tara De La Cruz is a 45 y.o. male who presents with a chief complaint of extremity weakness. Patient is here actually for near syncope and generalized weakness and not focal extremity weakness. Patient states he woke up and felt some gas pains and went to go sit on the toilet and when he was having a bowel movement he felt very weak and sweaty and felt like he was going to pass out and so he called 911. Patient states for the last several years he has had issues with constipation, and last month he was started on a laxative which he thinks is senna. Since that time he goes 2 days without having a bowel movement and then when he has 1 he feels like he often feels like he is going to pass out, and states that his bowel movement is usually very large and soft and not formed. Patient did have a recent colonoscopy in April 2021 with some polyps, hemorrhoids, diverticulosis. Patient denies any blood in his stools. States he no longer feels weak or lightheaded. Denies any actual constipation at this time, did have a bowel movement earlier. Denies nausea or vomiting. Denies chest pain or shortness of breath. Review of Systems     Review of Systems   Constitutional: Negative for activity change, appetite change, fatigue and fever. HENT: Negative for ear pain, facial swelling, nosebleeds and rhinorrhea. Eyes: Negative for pain, discharge and visual disturbance. Respiratory: Negative for choking, chest tightness and shortness of breath. Cardiovascular: Negative for chest pain and palpitations. Gastrointestinal: Positive for constipation. Negative for abdominal pain, diarrhea, nausea and vomiting. Endocrine: Negative for cold intolerance, heat intolerance and polyuria.    Genitourinary: Negative for dysuria, flank pain and hematuria. Musculoskeletal: Negative for arthralgias, joint swelling and myalgias. Skin: Negative for rash and wound. Allergic/Immunologic: Negative for environmental allergies. Neurological: Positive for syncope (Not actual syncope but presyncope) and light-headedness. Negative for dizziness, seizures and weakness. Hematological: Does not bruise/bleed easily. Psychiatric/Behavioral: Negative for confusion and hallucinations. Past Medical, Surgical, Family, and Social History     He has a past medical history of Arthritis, Atrial fibrillation ([address] Congestive heart failure ([address] Depression, Diabetes mellitus ([address] GERD (gastroesophageal reflux disease), Hyperlipidemia, Hypertension, Morbid obesity ([address] Sleep apnea, and Type II or unspecified type diabetes mellitus without mention of complication, not stated as uncontrolled. He has a past surgical history that includes ablation of dysrhythmic focus; Colonoscopy (4/13/2021); and Finger trigger release (Left, 6/10/2021). His family history includes Diabetes in his maternal aunt and mother; High Blood Pressure in his maternal grandmother and mother. He reports that he has never smoked. He has never used smokeless tobacco. He reports current drug use. Drugs: Marijuana and Cocaine. He reports that he does not drink alcohol.     Medications     Previous Medications    ACCU-CHEK FASTCLIX LANCETS MISC    20 Sticks by Does not apply route 2 times daily    ALCOHOL SWABS PADS    1 Container by Does not apply route three times daily    ALLOPURINOL (ZYLOPRIM) 300 MG TABLET    TAKE 1 TABLET BY MOUTH EVERY DAY    APIXABAN (ELIQUIS) 5 MG TABS TABLET    TAKE 1 TABLET BY MOUTH TWICE A DAY    ATORVASTATIN (LIPITOR) 20 MG TABLET    TAKE 1 TABLET BY MOUTH EVERY DAY AT NIGHT    BD INSULIN SYRINGE U/F 31G X 5/16\" 1 ML MISC    USE AS DIRECTED 4 TIMES A DAY    BLOOD GLUCOSE MONITOR STRIPS    by Other route 4 times daily (after meals and at bedtime)    BLOOD GLUCOSE MONITORING SUPPL (ACCU-CHEK VIK PLUS) W/DEVICE KIT    1 kit by Does not apply route 4 times daily (before meals and nightly). BLOOD GLUCOSE MONITORING SUPPL (BLOOD GLUCOSE MONITOR SYSTEM) W/DEVICE KIT    1 Units by Does not apply route daily    BLOOD PRESSURE KIT    1 Units by Does not apply route daily    BLOOD PRESSURE MONITORING (WRIST BLOOD PRESSURE MONITOR) MISC    1 Units by Does not apply route once for 1 dose    DILTIAZEM (CARDIZEM CD) 120 MG EXTENDED RELEASE CAPSULE    TAKE 1 CAPSULE BY MOUTH EVERY DAY    FLUTICASONE (FLONASE) 50 MCG/ACT NASAL SPRAY    1 spray by Nasal route daily    FUROSEMIDE (LASIX) 80 MG TABLET    TAKE 1 TABLET BY MOUTH TWICE A DAY    HANDICAP PLACARD MISC    by Does not apply route Handicap placard effective from 7/27/2021 through 7/27/2026    IBUPROFEN (ADVIL;MOTRIN) 800 MG TABLET    Take 1 tablet by mouth every 8 hours as needed for Pain (Take with food)    INSULIN GLARGINE (BASAGLAR KWIKPEN) 100 UNIT/ML INJECTION PEN    patient using vials per [facility] inject 20 units at hs    INSULIN LISPRO, 1 UNIT DIAL, 100 UNIT/ML SOPN    Inject 7 Units into the skin 3 times daily (before meals) Inject subcutaneously    INSULIN PEN NEEDLE ([employer] PEN NEEDLES) 31G X 6 MM MISC    1 each by Does not apply route daily    INSULIN PEN NEEDLE (PEN NEEDLES) 31G X 6 MM MISC    1 each by Does not apply route daily May substitute to meet insur. requirements    LISINOPRIL (PRINIVIL;ZESTRIL) 10 MG TABLET    TAKE 1 TABLET BY MOUTH EVERY DAY    METFORMIN (GLUCOPHAGE) 500 MG TABLET    TAKE 1 TABLET BY MOUTH TWICE A DAY WITH MEALS    MULTIPLE VITAMINS-MINERALS (THERAPEUTIC MULTIVITAMIN-MINERALS) TABLET    Take 1 tablet by mouth daily    PANTOPRAZOLE (PROTONIX) 40 MG TABLET    TAKE 1 TABLET BY MOUTH EVERY DAY BEFORE BREAKFAST    POTASSIUM CHLORIDE (KLOR-CON M20) 20 MEQ EXTENDED RELEASE TABLET    TAKE 1 TABLET BY MOUTH EVERY DAY WITH BREAKFAST    SENNOSIDES-DOCUSATE SODIUM (SENOKOT-S) 8.6-50 MG TABLET    Take 1 tablet by mouth 2 times daily as needed for Constipation    SILDENAFIL (VIAGRA) 50 MG TABLET    Take 1 tablet by mouth as needed for Erectile Dysfunction       Allergies     He has No Known Allergies. Physical Exam     INITIAL VITALS: BP: (!) 140/79, Temp: 98.4 °F (36.9 °C), Pulse: 110, Resp: 18, SpO2: 97 %   Physical Exam  Constitutional:       General: He is not in acute distress. Appearance: He is well-developed. He is not diaphoretic. HENT:      Head: Normocephalic and atraumatic. Mouth/Throat:      Pharynx: No oropharyngeal exudate. Eyes:      General:         Right eye: No discharge. Left eye: No discharge. Conjunctiva/sclera: Conjunctivae normal.      Pupils: Pupils are equal, round, and reactive to light. Neck:      Thyroid: No thyromegaly. Vascular: No JVD. Trachea: No tracheal deviation. Cardiovascular:      Rate and Rhythm: Regular rhythm. Tachycardia present. Heart sounds: Normal heart sounds. No murmur heard. No friction rub. No gallop. Pulmonary:      Effort: Pulmonary effort is normal. No respiratory distress. Breath sounds: Normal breath sounds. No stridor. No wheezing or rales. Chest:      Chest wall: No tenderness. Abdominal:      General: Bowel sounds are normal. There is no distension. Palpations: Abdomen is soft. Tenderness: There is no abdominal tenderness. There is no guarding or rebound. Musculoskeletal:         General: No tenderness or deformity. Normal range of motion. Cervical back: Normal range of motion and neck supple. Skin:     General: Skin is warm and dry. Findings: No erythema or rash. Neurological:      Mental Status: He is alert and oriented to person, place, and time. Cranial Nerves: No cranial nerve deficit. Motor: No abnormal muscle tone.       Coordination: Coordination normal.   Psychiatric:         Behavior: Behavior normal. Diagnostic Results     EKG   Indication: Near syncope. Heart rate 55, appears to be sinus rhythm but with marked sinus arrhythmia, but P waves noted with normal DE intervals. Left axis deviation. No signs of ST elevation or depression, no T wave inversions. Comparison to prior EKG with no changes. Impression: Sinus bradycardia with no active ischemia. RADIOLOGY:  CT ABDOMEN PELVIS W IV CONTRAST Additional Contrast? None   Final Result     No acute findings in the abdomen or pelvis.               LABS:   Results for orders placed or performed during the hospital encounter of 08/04/21   CBC Auto Differential   Result Value Ref Range    WBC 8.2 4.0 - 11.0 K/uL    RBC 4.85 4.20 - 5.90 M/uL    Hemoglobin 15.8 13.5 - 17.5 g/dL    Hematocrit 45.4 40.5 - 52.5 %    MCV 93.6 80.0 - 100.0 fL    MCH 32.5 26.0 - 34.0 pg    MCHC 34.7 31.0 - 36.0 g/dL    RDW 13.2 12.4 - 15.4 %    Platelets 883 668 - 510 K/uL    MPV 9.6 5.0 - 10.5 fL    Neutrophils % 48.3 %    Lymphocytes % 47.8 %    Monocytes % 3.0 %    Eosinophils % 0.5 %    Basophils % 0.4 %    Neutrophils Absolute 3.9 1.7 - 7.7 K/uL    Lymphocytes Absolute 3.9 1.0 - 5.1 K/uL    Monocytes Absolute 0.2 0.0 - 1.3 K/uL    Eosinophils Absolute 0.0 0.0 - 0.6 K/uL    Basophils Absolute 0.0 0.0 - 0.2 K/uL   Basic Metabolic Panel w/ Reflex to MG   Result Value Ref Range    Sodium 142 136 - 145 mmol/L    Potassium reflex Magnesium 3.5 3.5 - 5.1 mmol/L    Chloride 101 99 - 110 mmol/L    CO2 29 21 - 32 mmol/L    Anion Gap 12 3 - 16    Glucose 180 (H) 70 - 99 mg/dL    BUN 12 7 - 20 mg/dL    CREATININE 1.0 0.9 - 1.3 mg/dL    GFR Non-African American >60 >60    GFR African American >60 >60    Calcium 9.7 8.3 - 10.6 mg/dL   Troponin   Result Value Ref Range    Troponin <0.01 <0.01 ng/mL   Magnesium   Result Value Ref Range    Magnesium 1.80 1.80 - 2.40 mg/dL       ED BEDSIDE ULTRASOUND:  none    RECENT VITALS:  BP: (!) 155/119,Temp: 98.4 °F (36.9 °C), Pulse: 71, Resp: 26, SpO2: 97 %     Procedures     none    ED Course     Nursing Notes, Past Medical Hx, Past Surgical Hx, Social Hx,Allergies, and Family Hx were reviewed. patient was given the following medications:  Orders Placed This Encounter   Medications    0.9 % sodium chloride bolus    iopamidol (ISOVUE-370) 76 % injection 80 mL       CONSULTS:  None    MEDICAL DECISIONMAKING / ASSESSMENT / PLAN     Tyson De La Cruz is a 45 y.o. male who presents with a chief complaint of extremity weakness although actual complaint is near syncope. Initial exam reveals a well-appearing male in no acute distress with mild tachycardia but otherwise normal vitals, afebrile. Physical exam remarkable for benign abdomen. Patient had a near syncopal event which sounds vasovagal from him trying to have a bowel movement and succeeding. It sounds like this has been going on for some time and unclear why the patient presented today as opposed to other times that he has had this problem. I tried to ask this question in several different ways but patient was ultimately not able to really answer why he was here today but has not come previously. EKG with no obvious cause of his syncope. Orthostatic vitals normal without findings of hypotension. Labs obtained and CT obtained to look for cause of patient's constipation, CT revealed no acute abnormalities and no findings of constipation. Patient told to reduce his amount of laxatives, and try taking fiber for a more natural way to help regulate his bowel movements. No findings to suggest cardiac cause of syncope. Most likely vasovagal.  Appropriate for discharge home. On reassessment patient is sitting up, states he feels better and wants to go home. .        Clinical Impression     1.  Near syncope        Disposition     PATIENT REFERRED TO:  Narendra Banks MD  [address] [place] [address] [address] [id number]  [phone]    In 1 week  for ED follow up visit      DISCHARGE MEDICATIONS:  New Prescriptions    No medications on file       DISPOSITION Decision To Discharge 08/04/2021 05:06:40 AM       Arlen Guzman MD  08/04/21 0138

## 2021-08-16 ENCOUNTER — OFFICE VISIT (OUTPATIENT)
Dept: SLEEP MEDICINE | Age: 51
End: 2021-08-16
Payer: MEDICAID

## 2021-08-16 VITALS
OXYGEN SATURATION: 97 % | BODY MASS INDEX: 42.66 KG/M2 | TEMPERATURE: 97.8 F | SYSTOLIC BLOOD PRESSURE: 118 MMHG | DIASTOLIC BLOOD PRESSURE: 80 MMHG | HEIGHT: 72 IN | HEART RATE: 86 BPM | RESPIRATION RATE: 16 BRPM | WEIGHT: 315 LBS

## 2021-08-16 DIAGNOSIS — Z86.79 H/O CHF: ICD-10-CM

## 2021-08-16 DIAGNOSIS — Z99.89 DEPENDENCE ON OTHER ENABLING MACHINES AND DEVICES: ICD-10-CM

## 2021-08-16 DIAGNOSIS — E66.01 CLASS 3 SEVERE OBESITY DUE TO EXCESS CALORIES WITH SERIOUS COMORBIDITY AND BODY MASS INDEX (BMI) OF 50.0 TO 59.9 IN ADULT (HCC): ICD-10-CM

## 2021-08-16 DIAGNOSIS — G47.33 OSA TREATED WITH BIPAP: Primary | ICD-10-CM

## 2021-08-16 DIAGNOSIS — I48.0 PAF (PAROXYSMAL ATRIAL FIBRILLATION) (HCC): ICD-10-CM

## 2021-08-16 PROCEDURE — 3017F COLORECTAL CA SCREEN DOC REV: CPT | Performed by: PSYCHIATRY & NEUROLOGY

## 2021-08-16 PROCEDURE — 1036F TOBACCO NON-USER: CPT | Performed by: PSYCHIATRY & NEUROLOGY

## 2021-08-16 PROCEDURE — 99204 OFFICE O/P NEW MOD 45 MIN: CPT | Performed by: PSYCHIATRY & NEUROLOGY

## 2021-08-16 PROCEDURE — G8417 CALC BMI ABV UP PARAM F/U: HCPCS | Performed by: PSYCHIATRY & NEUROLOGY

## 2021-08-16 PROCEDURE — G8427 DOCREV CUR MEDS BY ELIG CLIN: HCPCS | Performed by: PSYCHIATRY & NEUROLOGY

## 2021-08-16 ASSESSMENT — SLEEP AND FATIGUE QUESTIONNAIRES
HOW LIKELY ARE YOU TO NOD OFF OR FALL ASLEEP WHILE SITTING QUIETLY AFTER LUNCH WITHOUT ALCOHOL: 1
HOW LIKELY ARE YOU TO NOD OFF OR FALL ASLEEP WHILE LYING DOWN TO REST IN THE AFTERNOON WHEN CIRCUMSTANCES PERMIT: 2
HOW LIKELY ARE YOU TO NOD OFF OR FALL ASLEEP WHEN YOU ARE A PASSENGER IN A CAR FOR AN HOUR WITHOUT A BREAK: 3
HOW LIKELY ARE YOU TO NOD OFF OR FALL ASLEEP WHILE SITTING AND READING: 3
ESS TOTAL SCORE: 18
HOW LIKELY ARE YOU TO NOD OFF OR FALL ASLEEP WHILE SITTING AND TALKING TO SOMEONE: 2
HOW LIKELY ARE YOU TO NOD OFF OR FALL ASLEEP WHILE SITTING INACTIVE IN A PUBLIC PLACE: 3
HOW LIKELY ARE YOU TO NOD OFF OR FALL ASLEEP IN A CAR, WHILE STOPPED FOR A FEW MINUTES IN TRAFFIC: 1
HOW LIKELY ARE YOU TO NOD OFF OR FALL ASLEEP WHILE WATCHING TV: 3

## 2021-08-16 NOTE — PATIENT INSTRUCTIONS
CPAP.  · If your nose is dry, try a humidifier. · If your nose is runny or stuffy, try decongestant medicine or a steroid nasal spray. Be safe with medicines. Read and follow all instructions on the label. Do not use the medicine longer than the label says. If these things don't help, you might try a different type of machine. Some machines have air pressure that adjusts on its own. Others have air pressures that are different when you breathe in than when you breathe out. This may reduce discomfort caused by too much pressure in your nose. Where can you learn more? Go to [email or web address] org and sign in to your [employer] account. Enter G663 in the [employer] box to learn more about \"Learning About CPAP for Sleep Apnea. \"     If you do not have an account, please click on the \"Sign Up Now\" link. Current as of: October 26, 2020               Content Version: 12.9  © 2006-2021 [employer]. Care instructions adapted under license by [facility] ([facility]). If you have questions about a medical condition or this instruction, always ask your healthcare professional. [place] [address] any warranty or liability for your use of this information.

## 2021-08-16 NOTE — PROGRESS NOTES
Lou De La Cruz         : 1970        PHONE: [phone] (home) [phone] (work)  [x] [address]     [] [address]      [] [facility]     [][employer]    [] [employer]  [] [employer]     [] Other:    Diagnosis: [x] LEONA (G47.33) [] CSA (G47.31) [] Apnea (G47.30)   Length of Need: [] 12 Months [x] 99 Months [] Other:    Machine (WILFRED!): [] Respironics Dream Station      Auto [] ResMed AirSense     Auto [] Other:     []  CPAP () [] Bilevel ()   Mode: [] Auto [] Spontaneous    Mode: [] Auto [] Spontaneous                            Comfort Settings:   - Ramp Pressure: 5 cmH2O                                        - Ramp time: 15 min                                     -  Flex/EPR - 3 full time                                    - For ResMed Bilevel (TiMax-4 sec   TiMin- 0.2 sec)     Humidifier: [] Heated ()        [x] Water chamber replacement ()/ 1 per 6 months        Mask:   [] Nasal () /1 per 3 months [x] Full Face () /1 per 3 months   [] Patient choice -Size and fit mask [x] Patient Choice - Size and fit mask   [] Dispense:  [] Dispense:    [] Headgear () / 1 per 3 months [x] Headgear () / 1 per 3 months   [] Replacement Nasal Cushion ()/2 per month [x] Interface Replacement ()/1 per month   [] Replacement Nasal Pillows ()/2 per month         Tubing: [x] Heated ()/1 per 3 months    [] Standard ()/1 per 3 months [] Other:           Filters: [x] Non-disposable ()/1 per 6 months     [x] Ultra-Fine, Disposable ()/2 per month        Miscellaneous: [] Chin Strap ()/ 1 per 6 months [] O2 bleed-in:       LPM   [] Oximetry on CPAP/Bilevel []  Other:          Start Order Date: 21    MEDICAL JUSTIFICATION:  I, the undersigned, certify that the above prescribed supplies are medically necessary for this patients wellbeing.   In my opinion, the supplies are both reasonable and necessary in reference to accepted standards of medicalpractice in treatment of this patients condition.     Cherise Ferreira MD      NPI: [id number]       Order Signed Date: 08/16/21    Electronically signed by Cherise Ferreira MD on 8/16/2021 at 10:51 AM

## 2021-08-16 NOTE — PROGRESS NOTES
and time below 90% of 50.6min. This is consistent with severe LEONA (327.23), BiPAP at 14/10 cm      DOT/CDL - N/A  FAA/'simie - N/A  The patient HTN, A-Fib , and CHF are stable. Previous Report(s) Reviewed: historical medical records       Social History     Socioeconomic History    Marital status: Single     Spouse name: Not on file    Number of children: Not on file    Years of education: Not on file    Highest education level: Not on file   Occupational History    Not on file   Tobacco Use    Smoking status: Never Smoker    Smokeless tobacco: Never Used   Vaping Use    Vaping Use: Never used   Substance and Sexual Activity    Alcohol use: No     Alcohol/week: 0.0 standard drinks    Drug use: Yes     Types: Marijuana, Cocaine     Comment: no cocaine for over 6 years, marijuana occ    Sexual activity: Not Currently   Other Topics Concern    Not on file   Social History Narrative    Not on file     Social Determinants of Health     Financial Resource Strain:     Difficulty of Paying Living Expenses:    Food Insecurity:     Worried About Running Out of Food in the Last Year:     Ran Out of Food in the Last Year:    Transportation Needs:     Lack of Transportation (Medical):  Lack of Transportation (Non-Medical):    Physical Activity:     Days of Exercise per Week:     Minutes of Exercise per Session:    Stress:     Feeling of Stress :    Social Connections:     Frequency of Communication with Friends and Family:     Frequency of Social Gatherings with Friends and Family:     Attends Faith Services:     Active Member of Clubs or Organizations:     Attends Club or Organization Meetings:     Marital Status:    Intimate Partner Violence:     Fear of Current or Ex-Partner:     Emotionally Abused:     Physically Abused:     Sexually Abused:        Prior to Admission medications    Medication Sig Start Date End Date Taking?  Authorizing Provider   Handicap Placard MISC by Does not apply route Handicap placard effective from 7/27/2021 through 7/27/2026 7/27/21  Yes Clementine Wolfe MD   metFORMIN (GLUCOPHAGE) 500 MG tablet TAKE 1 TABLET BY MOUTH TWICE A DAY WITH MEALS 2/70/30  Yes Jazz Wilson MD   insulin lispro, 1 Unit Dial, 100 UNIT/ML SOPN Inject 7 Units into the skin 3 times daily (before meals) Inject subcutaneously 6/8/21  Yes Oseas Gar MD   sennosides-docusate sodium (SENOKOT-S) 8.6-50 MG tablet Take 1 tablet by mouth 2 times daily as needed for Constipation 5/25/21  Yes Aj Pena MD   Insulin Pen Needle (PEN NEEDLES) 31G X 6 MM MISC 1 each by Does not apply route daily May substitute to meet insur. requirements 4/20/21  Yes Nohemy Bunch MD   pantoprazole (PROTONIX) 40 MG tablet TAKE 1 TABLET BY MOUTH EVERY DAY BEFORE BREAKFAST 4/13/21  Yes Franko Schroeder MD   apixaban (ELIQUIS) 5 MG TABS tablet TAKE 1 TABLET BY MOUTH TWICE A DAY 3/26/21  Yes Franko Schroeder MD   dilTIAZem (CARDIZEM CD) 120 MG extended release capsule TAKE 1 CAPSULE BY MOUTH EVERY DAY 2/26/21  Yes Franko Schroeder MD   allopurinol (ZYLOPRIM) 300 MG tablet TAKE 1 TABLET BY MOUTH EVERY DAY 2/24/21  Yes Parish Martin MD   potassium chloride (KLOR-CON M20) 20 MEQ extended release tablet TAKE 1 TABLET BY MOUTH EVERY DAY WITH BREAKFAST 2/24/21  Yes Parish Martin MD   ibuprofen (ADVIL;MOTRIN) 800 MG tablet Take 1 tablet by mouth every 8 hours as needed for Pain (Take with food) 2/22/21  Yes Veronica Dale DO   furosemide (LASIX) 80 MG tablet TAKE 1 TABLET BY MOUTH TWICE A DAY 2/12/21  Yes Franko Schroeder MD   lisinopril (PRINIVIL;ZESTRIL) 10 MG tablet TAKE 1 TABLET BY MOUTH EVERY DAY 2/12/21  Yes Franko Schroeder MD   Insulin Pen Needle ([employer] PEN NEEDLES) 31G X 6 MM MISC 1 each by Does not apply route daily 7/28/20  Yes Preet Ricketts MD   Multiple Vitamins-Minerals (THERAPEUTIC MULTIVITAMIN-MINERALS) tablet Take 1 tablet by mouth daily 5/14/20  Yes Jeremy Masters MD   BD INSULIN SYRINGE U/F 31G X \" 1 ML MISC USE AS DIRECTED 4 TIMES A DAY 20  Yes Scout Worrell MD   sildenafil (VIAGRA) 50 MG tablet Take 1 tablet by mouth as needed for Erectile Dysfunction 20  Yes Sofía Emmanuel MD   fluticasone (FLONASE) 50 MCG/ACT nasal spray 1 spray by Nasal route daily 20  Yes Carline Woodward MD   blood glucose monitor strips by Other route 4 times daily (after meals and at bedtime) 64/74/05  Yes Eriberto Reynolds MD   insulin glargine (BASAGLAR KWIKPEN) 100 UNIT/ML injection pen patient using vials per [facility] inject 20 units at hs 19  Yes Sylvia Fallon MD   Alcohol Swabs PADS 1 Container by Does not apply route three times daily 10/1/19  Yes Sofía Emmanuel MD   ACCU-CHEK FASTCLIX LANCETS MISC 20 Sticks by Does not apply route 2 times daily 10/1/19  Yes Sofía Emmanuel MD   Blood Pressure KIT 1 Units by Does not apply route daily 17  Yes Wardell Apgar, MD   Blood Glucose Monitoring Suppl (BLOOD GLUCOSE MONITOR SYSTEM) W/DEVICE KIT 1 Units by Does not apply route daily 17  Yes Wardell Apgar, MD   Blood Glucose Monitoring Suppl (ACCU-CHEK VIK PLUS) W/DEVICE KIT 1 kit by Does not apply route 4 times daily (before meals and nightly).  14  Yes Scott Davenport   Blood Pressure Monitoring (WRIST BLOOD PRESSURE MONITOR) MISC 1 Units by Does not apply route once for 1 dose 18  Carline Woodward MD       Allergies as of 2021    (No Known Allergies)       Patient Active Problem List   Diagnosis    DM2 (diabetes mellitus, type 2) ([facility]    Morbid obesity ([facility]    LEONA (obstructive sleep apnea)    Atrial fibrillation    Non compliance w medication regimen    Dyspnea    Bradycardia    Class 3 severe obesity due to excess calories with serious comorbidity and body mass index (BMI) of 50.0 to 59.9 in adult ([facility]    Hypertension    Hypertensive heart disease with chronic diastolic congestive heart failure (HCC)    GERD (gastroesophageal reflux disease)    Hx of atrial fibrillation without current medication    PAF (paroxysmal atrial fibrillation) (HCC)    S/P ablation of atrial fibrillation    Persistent atrial fibrillation    Abscess of gluteal region    Arthritis    Chronic gout of multiple sites    Current mild episode of major depressive disorder without prior episode (HCC)    Generalized osteoarthritis of multiple sites    Hyperlipidemia associated with type 2 diabetes mellitus ([address]    Onychomycosis of multiple toenails with type 2 diabetes mellitus ([address]    Osteoarthritis of knee    Sciatica    Type 2 diabetes mellitus with diabetic nephropathy, with long-term current use of insulin (HCC)    Gastroesophageal reflux disease without esophagitis    Hypertension associated with diabetes ([address]    Trigger middle finger of left hand       Past Medical History:   Diagnosis Date    Arthritis     Atrial fibrillation (HCC)     Congestive heart failure (HCC)     Depression     Diabetes mellitus (HCC)     GERD (gastroesophageal reflux disease)     Hyperlipidemia     Hypertension     Morbid obesity ([address]     Sleep apnea     uses cpap at home q hs     Type II or unspecified type diabetes mellitus without mention of complication, not stated as uncontrolled        Past Surgical History:   Procedure Laterality Date    ABLATION OF DYSRHYTHMIC FOCUS      COLONOSCOPY  4/13/2021    COLONOSCOPY POLYPECTOMY SNARE/COLD BIOPSY performed by Chula Samuel MD at [place] [address] Left 6/10/2021    LEFT MIDDLE FINGER TRIGGER RELEASE performed by Ramona Flaherty MD at [facility] OR       Family History   Problem Relation Age of Onset    Diabetes Mother     High Blood Pressure Mother     Diabetes Maternal Aunt     High Blood Pressure Maternal Grandmother        Review of Systems    Objective:     Vitals:  Weight BMI Neck circumference    Wt Readings from Last 3 Encounters:   08/16/21 (!) 371 lb (168.3 kg)   08/04/21 (!) 370 lb (167.8 kg)   07/27/21 (!) 369 lb 6.4 oz (167.6 kg)    Body mass index is 50.32 kg/m². BP HR SaO2   BP Readings from Last 3 Encounters:   08/16/21 118/80   08/04/21 (!) 155/119   07/27/21 119/76    Pulse Readings from Last 3 Encounters:   08/16/21 86   08/04/21 71   07/27/21 80    SpO2 Readings from Last 3 Encounters:   08/16/21 97%   08/04/21 97%   07/27/21 95%        The mandibular molar Class :   [x]1 []2 []3      Mallampati I Airway Classification:   []1 []2 []3 [x]4        Physical Exam  Vitals and nursing note reviewed. Constitutional:       Appearance: He is obese. HENT:      Head: Atraumatic. Nose: Nose normal.      Mouth/Throat:      Comments: Mallampati class 4, no retrognathia or hypognathia , normal airflow in bilateral nostrils, no septum deviation , crowded oropharynx with low soft palate, high arched hard palate,no tonsils enlargement. Eyes:      Extraocular Movements: Extraocular movements intact. Cardiovascular:      Rate and Rhythm: Normal rate and regular rhythm. Heart sounds: Normal heart sounds. Pulmonary:      Effort: Pulmonary effort is normal.      Breath sounds: Normal breath sounds. Musculoskeletal:      Cervical back: Normal range of motion and neck supple. Right lower leg: Edema present. Left lower leg: Edema present. Skin:     General: Skin is warm. Neurological:      General: No focal deficit present. Psychiatric:         Mood and Affect: Mood normal.         Assessment:   Severe Obstructive Sleep Apnea/Hypopnea Syndrome, under good control on BiPAP at 14/10 cm     Diagnosis Orders   1. LEONA treated with BiPAP     2. Dependence on other enabling machines and devices     3. PAF (paroxysmal atrial fibrillation) (HCC)     4. Class 3 severe obesity due to excess calories with serious comorbidity and body mass index (BMI) of 50.0 to 59.9 in adult [facility])  Ambulatory referral to Bariatrics   5.  H/O CHF       Plan:     continue the BiPAP at 14/10 cm, FFM  Most likely treating the LEONA will have positive impact on HTN, CHF,and  A-fib, control. We discussed the proportionality between weight and AHI. With 10% weight change, the AHI has a 27% proportionate change. With 20% weight change, the AHI has a 45-50% proportionate change. Orders Placed This Encounter   Procedures    Ambulatory referral to Bariatrics       Return in about 1 year (around 8/16/2022) for Reveiwing BiPAP usage and compliance report and tro.     Yanci Bui MD  Medical Director [address] [place] [address]

## 2021-09-08 RX ORDER — ALLOPURINOL 300 MG/1
TABLET ORAL
Qty: 90 TABLET | Refills: 1 | Status: SHIPPED | OUTPATIENT
Start: 2021-09-08 | End: 2022-04-20

## 2021-09-28 RX ORDER — FUROSEMIDE 80 MG
TABLET ORAL
Qty: 180 TABLET | Refills: 1 | Status: SHIPPED | OUTPATIENT
Start: 2021-09-28 | End: 2022-04-12

## 2021-09-29 DIAGNOSIS — I10 ESSENTIAL HYPERTENSION: Chronic | ICD-10-CM

## 2021-09-29 RX ORDER — LISINOPRIL 10 MG/1
TABLET ORAL
Qty: 90 TABLET | Refills: 1 | Status: SHIPPED | OUTPATIENT
Start: 2021-09-29 | End: 2022-03-22

## 2021-10-04 DIAGNOSIS — I48.0 PAROXYSMAL A-FIB (HCC): ICD-10-CM

## 2021-10-12 DIAGNOSIS — M19.90 ARTHRITIS: ICD-10-CM

## 2021-10-12 RX ORDER — PANTOPRAZOLE SODIUM 40 MG/1
TABLET, DELAYED RELEASE ORAL
Qty: 90 TABLET | Refills: 1 | Status: SHIPPED | OUTPATIENT
Start: 2021-10-12 | End: 2022-05-11

## 2021-10-12 NOTE — TELEPHONE ENCOUNTER
Last ov - 07/27/2021 - Dr. Sudheer Merrill  Next ov - 10/27/2021 - Dr. Ramy Herrera      Pantoprazole last filled  04/13/2021 - 1 rf  ( 90 day supply )

## 2021-10-13 DIAGNOSIS — I48.0 PAROXYSMAL A-FIB (HCC): ICD-10-CM

## 2021-10-13 NOTE — TELEPHONE ENCOUNTER
Refused Atorvastatin refill request. Last filled 7-19-21 and patient reported then that he was not taking it anymore.

## 2021-10-27 ENCOUNTER — OFFICE VISIT (OUTPATIENT)
Dept: INTERNAL MEDICINE CLINIC | Age: 51
End: 2021-10-27
Payer: MEDICAID

## 2021-10-27 VITALS
HEIGHT: 72 IN | HEART RATE: 84 BPM | OXYGEN SATURATION: 97 % | DIASTOLIC BLOOD PRESSURE: 83 MMHG | BODY MASS INDEX: 42.66 KG/M2 | TEMPERATURE: 98 F | SYSTOLIC BLOOD PRESSURE: 137 MMHG | RESPIRATION RATE: 28 BRPM | WEIGHT: 315 LBS

## 2021-10-27 DIAGNOSIS — J06.9 UPPER RESPIRATORY TRACT INFECTION, UNSPECIFIED TYPE: ICD-10-CM

## 2021-10-27 DIAGNOSIS — E11.9 TYPE 2 DIABETES MELLITUS WITHOUT COMPLICATION, UNSPECIFIED WHETHER LONG TERM INSULIN USE (HCC): Primary | ICD-10-CM

## 2021-10-27 LAB — HBA1C MFR BLD: 5.9 %

## 2021-10-27 PROCEDURE — 99213 OFFICE O/P EST LOW 20 MIN: CPT | Performed by: STUDENT IN AN ORGANIZED HEALTH CARE EDUCATION/TRAINING PROGRAM

## 2021-10-27 PROCEDURE — 83036 HEMOGLOBIN GLYCOSYLATED A1C: CPT

## 2021-10-27 RX ORDER — METHYLPREDNISOLONE 4 MG/1
TABLET ORAL
Qty: 1 KIT | Refills: 0 | Status: SHIPPED | OUTPATIENT
Start: 2021-10-27 | End: 2022-05-11

## 2021-10-27 RX ORDER — BENZONATATE 100 MG/1
100 CAPSULE ORAL EVERY 6 HOURS PRN
Qty: 28 CAPSULE | Refills: 0 | Status: SHIPPED | OUTPATIENT
Start: 2021-10-27 | End: 2021-11-03

## 2021-10-27 RX ORDER — AZITHROMYCIN 250 MG/1
250 TABLET, FILM COATED ORAL SEE ADMIN INSTRUCTIONS
Qty: 6 TABLET | Refills: 0 | Status: SHIPPED | OUTPATIENT
Start: 2021-10-27 | End: 2021-11-01

## 2021-10-27 ASSESSMENT — ENCOUNTER SYMPTOMS
ABDOMINAL PAIN: 0
SHORTNESS OF BREATH: 1
WHEEZING: 0
DIARRHEA: 0
NAUSEA: 0
COUGH: 1
ABDOMINAL DISTENTION: 0
VOMITING: 0

## 2021-10-27 NOTE — PATIENT INSTRUCTIONS
Please take Z-Mina and medrol dose pack as prescribed. Use tessalon perels as needed for cough  If you do not improve in the next 7 days, then call back for further instructions.     Please make appointment with cardiologist. Anjana Raza have a complex cardiac history that should be followed along with a cardiologist.

## 2021-10-27 NOTE — PROGRESS NOTES
10/27/2021    Amrik De La Cruz (:  1970) is a 45 y.o. male, here for a preventive medicine evaluation. Patient is here for follow up. He has not been feeling well for last few days. He recently hung out with friends who were smoking cigars and since then he says his shortness of breath has gotten worse. He also complains of cough with increase sputum production and slight change in color, and chills. Otherwise patient has been doing well. Patient Active Problem List   Diagnosis    DM2 (diabetes mellitus, type 2) ([address]    Morbid obesity ([address]    LEONA (obstructive sleep apnea)    Atrial fibrillation    Non compliance w medication regimen    Dyspnea    Bradycardia    Class 3 severe obesity due to excess calories with serious comorbidity and body mass index (BMI) of 50.0 to 59.9 in adult ([address]    Hypertension    Hypertensive heart disease with chronic diastolic congestive heart failure (HCC)    GERD (gastroesophageal reflux disease)    Hx of atrial fibrillation without current medication    PAF [facility]oxysmal [facility] fibrillation) ([facility]    S/P ablation of atrial fibrillation    Persistent atrial fibrillation    Abscess of gluteal region    Arthritis    Chronic gout of multiple sites    Current mild episode of major depressive disorder without prior episode (HCC)    Generalized osteoarthritis of multiple sites    Hyperlipidemia associated with type 2 diabetes mellitus ([address]    Onychomycosis of multiple toenails with type 2 diabetes mellitus ([address]    Osteoarthritis of knee    Sciatica    Type 2 diabetes mellitus with diabetic nephropathy, with long-term current use of insulin (HCC)    Gastroesophageal reflux disease without esophagitis    Hypertension associated with diabetes ([address]    Trigger middle finger of left hand       Review of Systems   Constitutional: Positive for chills. Negative for fatigue and fever. HENT: Positive for congestion.     Respiratory: Positive for cough and shortness of breath. Negative for wheezing. Cardiovascular: Negative for chest pain and palpitations. Gastrointestinal: Negative for abdominal distention, abdominal pain, diarrhea, nausea and vomiting. Genitourinary: Negative for dysuria. Neurological: Negative for dizziness, syncope, weakness, light-headedness and numbness. Prior to Visit Medications    Medication Sig Taking? Authorizing Provider   azithromycin (ZITHROMAX) 250 MG tablet Take 1 tablet by mouth See Admin Instructions for 5 days 500mg on day 1 followed by 250mg on days 2 - 5 Yes Aurelio Pride MD   methylPREDNISolone (MEDROL, PARAS,) 4 MG tablet Take by mouth. 6 tablets on day 1, 5 tablets on day 2, 4 tablets on day 3, 3 tablets on day 4, 2 tablets on day 5, 1 tablet on day 6.  Yes Aurelio Pride MD   benzonatate (TESSALON PERLES) 100 MG capsule Take 1 capsule by mouth every 6 hours as needed for Cough Yes Aurelio Pride MD   pantoprazole (PROTONIX) 40 MG tablet TAKE 1 TABLET BY MOUTH EVERY DAY BEFORE BREAKFAST Yes Misty Hernadez MD   apixaban (ELIQUIS) 5 MG TABS tablet TAKE 1 TABLET BY MOUTH TWICE A DAY Yes Misty Hernadez MD   lisinopril (PRINIVIL;ZESTRIL) 10 MG tablet Take 1 tablet by mouth every day Yes Misty Hernadez MD   furosemide (LASIX) 80 MG tablet take one tablet by mouth twice per day Yes Misty Hernadez MD   allopurinol (ZYLOPRIM) 300 MG tablet Take 1 tablet by mouth daily Yes Taye Burnett MD   Handicap Placard MISC by Does not apply route Handicap placard effective from 7/27/2021 through 7/27/2026 Yes Misty Hernadez MD   metFORMIN (GLUCOPHAGE) 500 MG tablet TAKE 1 TABLET BY MOUTH TWICE A DAY WITH MEALS Yes Walter Riojas MD   insulin lispro, 1 Unit Dial, 100 UNIT/ML SOPN Inject 7 Units into the skin 3 times daily (before meals) Inject subcutaneously Yes China Leigh MD   sennosides-docusate sodium (SENOKOT-S) 8.6-50 MG tablet Take 1 tablet by mouth 2 times daily as needed for Constipation Yes Antionette Quiñones MD   Insulin Pen Needle (PEN NEEDLES) 31G X 6 MM MISC 1 each by Does not apply route daily May substitute to meet insur. requirements Yes Aylin Roa MD   dilTIAZem (CARDIZEM CD) 120 MG extended release capsule TAKE 1 CAPSULE BY MOUTH EVERY DAY Yes Melissa Brenner MD   potassium chloride (KLOR-CON M20) 20 MEQ extended release tablet TAKE 1 TABLET BY MOUTH EVERY DAY WITH BREAKFAST Yes Tory Roberts MD   ibuprofen (ADVIL;MOTRIN) 800 MG tablet Take 1 tablet by mouth every 8 hours as needed for Pain (Take with food) Yes Veronica Dale DO   Insulin Pen Needle ([employer] PEN NEEDLES) 31G X 6 MM MISC 1 each by Does not apply route daily Yes Cindy Amanda MD   Multiple Vitamins-Minerals (THERAPEUTIC MULTIVITAMIN-MINERALS) tablet Take 1 tablet by mouth daily Yes Nicolette Guerra MD   BD INSULIN SYRINGE U/F 31G X \" 1 ML MISC USE AS DIRECTED 4 TIMES A DAY Yes Marco Mendoza MD   sildenafil (VIAGRA) 50 MG tablet Take 1 tablet by mouth as needed for Erectile Dysfunction Yes Leatha Armenta MD   blood glucose monitor strips by Other route 4 times daily (after meals and at bedtime) Yes Gus Ch MD   insulin glargine (BASAGLAR KWIKPEN) 100 UNIT/ML injection pen patient using vials per [facility] inject 20 units at hs Yes Harley Mcginnis MD   Alcohol Swabs PADS 1 Container by Does not apply route three times daily Yes Steven Castellanos MD   ACCU-CHEK FASTCLIX LANCETS MISC 20 Sticks by Does not apply route 2 times daily Yes Leatha Armenta MD   Blood Pressure KIT 1 Units by Does not apply route daily Yes Antonio Apgar, MD   Blood Glucose Monitoring Suppl (BLOOD GLUCOSE MONITOR SYSTEM) W/DEVICE KIT 1 Units by Does not apply route daily Yes Antonio Apgar, MD   Blood Glucose Monitoring Suppl (ACCU-CHEK VIK PLUS) W/DEVICE KIT 1 kit by Does not apply route 4 times daily (before meals and nightly).  Yes Scott Davenport   fluticasone (FLONASE) 50 MCG/ACT nasal spray 1 spray by Nasal route daily  Patient not taking: Reported on 10/27/2021 Heather Medrano MD   Blood Pressure Monitoring (WRIST BLOOD PRESSURE MONITOR) MISC 1 Units by Does not apply route once for 1 dose  Heather Medrano MD        No Known Allergies    Past Medical History:   Diagnosis Date    Arthritis     [facility]     Congestive heart failure [facility]     Depression     Diabetes mellitus (HCC)     GERD (gastroesophageal reflux disease)     Hyperlipidemia     Hypertension     Morbid obesity ([facility]     Sleep apnea     uses cpap at home q hs     Type II or unspecified type diabetes mellitus without mention of complication, not stated as uncontrolled        Past Surgical History:   Procedure Laterality Date    ABLATION OF DYSRHYTHMIC FOCUS      COLONOSCOPY  4/13/2021    COLONOSCOPY POLYPECTOMY SNARE/COLD BIOPSY performed by Sharon Townsend MD at [place] [address] Left 6/10/2021    LEFT MIDDLE FINGER TRIGGER RELEASE performed by [place], MD at [address] [place] [address] History     Socioeconomic History    Marital status: Single     Spouse name: Not on file    Number of children: Not on file    Years of education: Not on file    Highest education level: Not on file   Occupational History    Not on file   Tobacco Use    Smoking status: Never Smoker    Smokeless tobacco: Never Used   Vaping Use    Vaping Use: Never used   Substance and Sexual Activity    Alcohol use: No     Alcohol/week: 0.0 standard drinks    Drug use: Yes     Types: Marijuana, Cocaine     Comment: no cocaine for over 6 years, marijuana occ    Sexual activity: Not Currently   Other Topics Concern    Not on file   Social History Narrative    Not on file     Social Determinants of Health     Financial Resource Strain:     Difficulty of Paying Living Expenses:    Food Insecurity:     Worried About Running Out of Food in the Last Year:     Ran Out of Food in the Last Year:    Transportation Needs:     Lack of Transportation (Medical):      Lack of Transportation (Non-Medical):    Physical Activity:     Days of Exercise per Week:     Minutes of Exercise per Session:    Stress:     Feeling of Stress :    Social Connections:     Frequency of Communication with Friends and Family:     Frequency of Social Gatherings with Friends and Family:     Attends Jainism Services:     Active Member of Clubs or Organizations:     Attends Club or Organization Meetings:     Marital Status:    Intimate Partner Violence:     Fear of Current or Ex-Partner:     Emotionally Abused:     Physically Abused:     Sexually Abused:         Family History   Problem Relation Age of Onset    Diabetes Mother     High Blood Pressure Mother     Diabetes Maternal Aunt     High Blood Pressure Maternal Grandmother        ADVANCE DIRECTIVE: N, <no information>    Vitals:    10/27/21 1537   BP: 137/83   Site: Right Upper Arm   Position: Sitting   Cuff Size: Large Adult   Pulse: 84   Resp: 28   Temp: 98 °F (36.7 °C)   TempSrc: Temporal   SpO2: 97%   Weight: (!) 371 lb (168.3 kg)   Height: 6' (1.829 m)     Estimated body mass index is 50.32 kg/m² as calculated from the following:    Height as of this encounter: 6' (1.829 m). Weight as of this encounter: 371 lb (168.3 kg). Physical Exam  Constitutional:       General: He is not in acute distress. Appearance: He is well-developed. He is not diaphoretic. HENT:      Head: Normocephalic and atraumatic. Cardiovascular:      Rate and Rhythm: Normal rate and regular rhythm. Heart sounds: Normal heart sounds. No murmur heard. Pulmonary:      Effort: Pulmonary effort is normal. No respiratory distress. Breath sounds: Normal breath sounds. No wheezing. Abdominal:      General: Bowel sounds are normal. There is no distension. Palpations: Abdomen is soft. Tenderness: There is no abdominal tenderness. Skin:     General: Skin is warm and dry.       Capillary Refill: Capillary refill takes less than 2 seconds. Findings: No erythema. Neurological:      Mental Status: He is alert and oriented to person, place, and time. Psychiatric:         Behavior: Behavior normal.         No flowsheet data found.     Lab Results   Component Value Date    CHOL 103 02/25/2020    CHOL 103 06/09/2016    CHOL 122 07/16/2015    CHOLFAST 165 03/02/2021    TRIG 61 02/25/2020    TRIG 109 06/09/2016    TRIG 82 07/16/2015    TRIGLYCFAST 163 03/02/2021    HDL 37 03/02/2021    HDL 48 02/25/2020    HDL 33 06/09/2016    LDLCALC 95 03/02/2021    LDLCALC 43 02/25/2020    LDLCALC 48 06/09/2016    GLUF 153 03/02/2021    GLUCOSE 180 08/04/2021    LABA1C 5.9 10/27/2021    LABA1C 6.3 07/27/2021    LABA1C 6.3 02/05/2021       The 10-year ASCVD risk score (Etta Cabrales et al., 2013) is: 20.3%    Values used to calculate the score:      Age: 45 years      Sex: Male      Is Non- : Yes      Diabetic: Yes      Tobacco smoker: No      Systolic Blood Pressure: 694 mmHg      Is BP treated: Yes      HDL Cholesterol: 37 mg/dL      Total Cholesterol: 165 mg/dL    Immunization History   Administered Date(s) Administered    COVID-19, Moderna, Primary or Immunocompromised, PF, 100mcg/0.5mL 03/15/2021, 04/12/2021    Influenza Virus Vaccine 02/27/2014, 12/03/2015, 11/15/2017    Influenza, Quadv, 6 mo and older, IM, PF (Flulaval, Fluarix) 11/14/2018    Influenza, Quadv, IM, (6 mo and older Fluzone, Flulaval, Fluarix and 3 yrs and older Afluria) 11/03/2017, 11/03/2017, 11/15/2017    Influenza, Quadv, IM, PF (6 mo and older Fluzone, Flulaval, Fluarix, and 3 yrs and older Afluria) 01/08/2020    Influenza, Triv, 3 Years and older, IM, PF (Afluria 5yrs and older) 09/12/2016    Pneumococcal Polysaccharide ([id number]) 09/15/2017    Pneumococcal Vaccine 09/15/2017    Tdap (Boostrix, Adacel) 08/04/2018       Health Maintenance   Topic Date Due    Hepatitis C screen  Never done    Diabetic retinal exam  Never done    Hepatitis B vaccine (1 of 3 - Risk 3-dose series) Never done    Shingles Vaccine (1 of 2) Never done    Diabetic foot exam  02/25/2021    Flu vaccine (1) 09/01/2021    Lipid screen  03/02/2022    Potassium monitoring  08/04/2022    Creatinine monitoring  08/04/2022    A1C test (Diabetic or Prediabetic)  10/27/2022    DTaP/Tdap/Td vaccine (2 - Td or Tdap) 08/04/2028    Colon cancer screen colonoscopy  04/13/2031    Pneumococcal 0-64 years Vaccine (2 of 2 - PPSV23) 09/28/2034    COVID-19 Vaccine  Completed    HIV screen  Completed    Hepatitis A vaccine  Aged Out    Hib vaccine  Aged Out    Meningococcal (ACWY) vaccine  Aged Out          ASSESSMENT/PLAN:  1. Type 2 diabetes mellitus without complication, unspecified whether long term insulin use (HCC)  - A1c 5.9 today, improved from 6.8. Continue current regimen and weight loss. -     POCT glycosylated hemoglobin (Hb A1C)  2. Upper respiratory tract infection, unspecified type  - Has recent exposure to smoke, now with fever/chills, cough, shortness of breath, increased sputum production. - Will give z-paras, medrol dose pack, and tessalon perls  - If no improvement in 7 days will call back to clinic for further evaluation. -     azithromycin (ZITHROMAX) 250 MG tablet; Take 1 tablet by mouth See Admin Instructions for 5 days 500mg on day 1 followed by 250mg on days 2 - 5, Disp-6 tablet, R-0Normal  -     methylPREDNISolone (MEDROL, PARAS,) 4 MG tablet; Take by mouth. 6 tablets on day 1, 5 tablets on day 2, 4 tablets on day 3, 3 tablets on day 4, 2 tablets on day 5, 1 tablet on day 6., Disp-1 kit, R-0Normal  -     benzonatate (TESSALON PERLES) 100 MG capsule; Take 1 capsule by mouth every 6 hours as needed for Cough, Disp-28 capsule, R-0Normal      Return in about 3 months (around 1/27/2022), or if symptoms worsen or fail to improve, for Office follow-up. An electronic signature was used to authenticate this note.     --Francisco Javier Caldwell MD on 10/27/2021 at 3:49 PM

## 2021-11-01 RX ORDER — ATORVASTATIN CALCIUM 20 MG/1
20 TABLET, FILM COATED ORAL DAILY
Qty: 90 TABLET | Refills: 1 | Status: SHIPPED | OUTPATIENT
Start: 2021-11-01 | End: 2022-04-28

## 2021-11-04 ENCOUNTER — TELEPHONE (OUTPATIENT)
Dept: INTERNAL MEDICINE CLINIC | Age: 51
End: 2021-11-04

## 2021-11-09 ENCOUNTER — TELEPHONE (OUTPATIENT)
Dept: INTERNAL MEDICINE CLINIC | Age: 51
End: 2021-11-09

## 2021-11-14 NOTE — ED NOTES
Pt states he tested negative for COVID-19 \"2 weeks ago\"     [facility]  07/13/20 7164
no

## 2021-12-02 DIAGNOSIS — I10 ESSENTIAL HYPERTENSION: Chronic | ICD-10-CM

## 2021-12-02 DIAGNOSIS — E87.6 POTASSIUM DEFICIENCY: ICD-10-CM

## 2021-12-02 RX ORDER — POTASSIUM CHLORIDE 20 MEQ/1
TABLET, EXTENDED RELEASE ORAL
Qty: 90 TABLET | Refills: 1 | Status: SHIPPED | OUTPATIENT
Start: 2021-12-02 | End: 2022-06-17

## 2021-12-02 RX ORDER — DILTIAZEM HYDROCHLORIDE 120 MG/1
CAPSULE, COATED, EXTENDED RELEASE ORAL
Qty: 90 CAPSULE | Refills: 1 | Status: SHIPPED | OUTPATIENT
Start: 2021-12-02 | End: 2022-06-17

## 2021-12-10 DIAGNOSIS — Z79.4 DIABETES MELLITUS TYPE 2, INSULIN DEPENDENT (HCC): ICD-10-CM

## 2021-12-10 DIAGNOSIS — E11.9 DIABETES MELLITUS TYPE 2, INSULIN DEPENDENT (HCC): ICD-10-CM

## 2021-12-10 RX ORDER — PEN NEEDLE, DIABETIC 31 G X1/4"
1 NEEDLE, DISPOSABLE MISCELLANEOUS DAILY
Qty: 100 EACH | Refills: 5 | Status: SHIPPED | OUTPATIENT
Start: 2021-12-10 | End: 2022-05-16 | Stop reason: SDUPTHER

## 2021-12-10 RX ORDER — INSULIN LISPRO 100 [IU]/ML
7 INJECTION, SOLUTION INTRAVENOUS; SUBCUTANEOUS
Qty: 2 PEN | Refills: 5 | Status: SHIPPED | OUTPATIENT
Start: 2021-12-10 | End: 2022-04-12

## 2021-12-10 RX ORDER — BLOOD SUGAR DIAGNOSTIC
STRIP MISCELLANEOUS
Qty: 100 EACH | Refills: 5 | Status: SHIPPED | OUTPATIENT
Start: 2021-12-10

## 2021-12-27 ENCOUNTER — HOSPITAL ENCOUNTER (EMERGENCY)
Age: 51
Discharge: HOME | End: 2021-12-27
Attending: EMERGENCY MEDICINE
Payer: MEDICAID

## 2021-12-27 ENCOUNTER — APPOINTMENT (OUTPATIENT)
Dept: GENERAL RADIOLOGY | Age: 51
End: 2021-12-27
Payer: MEDICAID

## 2021-12-27 VITALS
BODY MASS INDEX: 42.66 KG/M2 | HEIGHT: 72 IN | RESPIRATION RATE: 18 BRPM | DIASTOLIC BLOOD PRESSURE: 73 MMHG | OXYGEN SATURATION: 98 % | WEIGHT: 315 LBS | SYSTOLIC BLOOD PRESSURE: 128 MMHG | HEART RATE: 63 BPM

## 2021-12-27 DIAGNOSIS — S39.012A STRAIN OF LUMBAR REGION, INITIAL ENCOUNTER: ICD-10-CM

## 2021-12-27 DIAGNOSIS — M25.562 ACUTE PAIN OF LEFT KNEE: ICD-10-CM
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
## 2021-12-27 DIAGNOSIS — S16.1XXA STRAIN OF NECK MUSCLE, INITIAL ENCOUNTER: ICD-10-CM

## 2021-12-27 PROCEDURE — 99283 EMERGENCY DEPT VISIT LOW MDM: CPT

## 2021-12-27 PROCEDURE — 73562 X-RAY EXAM OF KNEE 3: CPT

## 2021-12-27 RX ORDER — METHOCARBAMOL 750 MG/1
750 TABLET, FILM COATED ORAL 4 TIMES DAILY
Qty: 40 TABLET | Refills: 0 | Status: SHIPPED | OUTPATIENT
Start: 2021-12-27 | End: 2021-12-27 | Stop reason: SDUPTHER

## 2021-12-27 RX ORDER — METHOCARBAMOL 750 MG/1
750 TABLET, FILM COATED ORAL 4 TIMES DAILY
Qty: 40 TABLET | Refills: 0 | Status: SHIPPED | OUTPATIENT
Start: 2021-12-27 | End: 2022-01-06

## 2021-12-27 ASSESSMENT — ENCOUNTER SYMPTOMS
GASTROINTESTINAL NEGATIVE: 1
BACK PAIN: 1
COUGH: 0
EYES NEGATIVE: 1
RESPIRATORY NEGATIVE: 1
FACIAL SWELLING: 0
ABDOMINAL PAIN: 0
NAUSEA: 0
VOICE CHANGE: 0
VOMITING: 0
TROUBLE SWALLOWING: 0
SORE THROAT: 0
SHORTNESS OF BREATH: 0

## 2021-12-27 ASSESSMENT — PAIN DESCRIPTION - LOCATION: LOCATION: FOOT;NECK;BACK

## 2021-12-27 ASSESSMENT — PAIN SCALES - GENERAL: PAINLEVEL_OUTOF10: 8

## 2021-12-27 ASSESSMENT — PAIN DESCRIPTION - PAIN TYPE: TYPE: ACUTE PAIN

## 2021-12-27 ASSESSMENT — PAIN DESCRIPTION - DESCRIPTORS: DESCRIPTORS: ACHING
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
## 2021-12-30 ENCOUNTER — TELEPHONE (OUTPATIENT)
Dept: INTERNAL MEDICINE CLINIC | Age: 51
End: 2021-12-30

## 2021-12-30 NOTE — TELEPHONE ENCOUNTER
Patient calling stating his bronchitis is very bad at night with coughing and chest pain. Last seen 10/27/21 was given meds which has not helped him at all. Please advise.

## 2022-01-27 ENCOUNTER — OFFICE VISIT (OUTPATIENT)
Dept: INTERNAL MEDICINE CLINIC | Age: 52
End: 2022-01-27
Payer: MEDICAID

## 2022-01-27 VITALS
TEMPERATURE: 97 F | HEIGHT: 72 IN | DIASTOLIC BLOOD PRESSURE: 95 MMHG | HEART RATE: 62 BPM | SYSTOLIC BLOOD PRESSURE: 149 MMHG | BODY MASS INDEX: 42.66 KG/M2 | OXYGEN SATURATION: 96 % | WEIGHT: 315 LBS

## 2022-01-27 DIAGNOSIS — G47.33 OSA (OBSTRUCTIVE SLEEP APNEA): Primary | ICD-10-CM

## 2022-01-27 PROCEDURE — 99213 OFFICE O/P EST LOW 20 MIN: CPT

## 2022-01-27 ASSESSMENT — ENCOUNTER SYMPTOMS
APNEA: 0
SINUS PAIN: 0
BACK PAIN: 0
DIARRHEA: 0
CONSTIPATION: 0
COUGH: 1
SORE THROAT: 0
CHEST TIGHTNESS: 0
EYE PAIN: 0
SHORTNESS OF BREATH: 0
NAUSEA: 0
VOMITING: 0
ABDOMINAL PAIN: 0

## 2022-01-27 ASSESSMENT — VISUAL ACUITY: OU: 1

## 2022-01-27 NOTE — PATIENT INSTRUCTIONS
Please stop taking your Lisinopril for the next week until we see you again in the office    Please call and set up an appointment with Dr. Herberth Lassiter (pulmonoligist) to see him soon.

## 2022-01-27 NOTE — PROGRESS NOTES
1/27/2022    Jess De La Cruz  YOB: 1970    Chief Complaint: Cough, trouble sleeping     History of Present Illness:  Patient presents today for a another visit with the same chief complaint. Patient states that he has had increased his cough as well as difficulty sleeping. He attributes a lot of his difficulty sleeping to the cough. He does have sleep apnea and is compliant with his machine he is compliant with all of his other medications. Patient does have a specific trouble laying flat or falling asleep, but his coughing fits and spells that wake him up. He also is having these at increased frequency during the day. He is trying DayQuil, and NyQuil without relief. He did complete a course of antibiotics as well as a Medrol Dosepak which did not give him his significant relief. Patient has not seen a pulmonologist regarding his sleep apnea breathing. He is obtained his machine with new mask and hardware. Interested in seeing a specialist at this time. Specifically one of his specialist in sleep medicine. Review of Systems:  Review of Systems   Constitutional: Positive for fatigue. Negative for activity change, appetite change, chills, diaphoresis, fever and unexpected weight change. HENT: Negative for congestion and sore throat. Eyes: Negative for visual disturbance. Respiratory: Positive for cough. Negative for apnea, chest tightness and shortness of breath. Cardiovascular: Negative for chest pain, palpitations and leg swelling. Gastrointestinal: Negative for abdominal pain, constipation, diarrhea, nausea and vomiting. Endocrine: Negative for cold intolerance, heat intolerance, polydipsia, polyphagia and polyuria. Genitourinary: Negative for difficulty urinating. Musculoskeletal: Negative for arthralgias and myalgias. Neurological: Negative for weakness and headaches. Psychiatric/Behavioral: Negative for confusion and sleep disturbance.    All other systems reviewed and are negative. Past Medical History:   Diagnosis Date    Arthritis     [address] ([facility]     Congestive heart failure (HCC)     Depression     Diabetes mellitus (HCC)     GERD (gastroesophageal reflux disease)     Hyperlipidemia     Hypertension     Morbid obesity ([facility]     Sleep apnea     uses cpap at home q hs     Type II or unspecified type diabetes mellitus without mention of complication, not stated as uncontrolled      Past Surgical History:   Procedure Laterality Date    ABLATION OF DYSRHYTHMIC FOCUS      COLONOSCOPY  4/13/2021    COLONOSCOPY POLYPECTOMY SNARE/COLD BIOPSY performed by Oscar Lynch MD at [place] [id number] Left 6/10/2021    LEFT MIDDLE FINGER TRIGGER RELEASE performed by María Craig MD at [facility] [address] History     Tobacco Use    Smoking status: Never Smoker    Smokeless tobacco: Never Used   Vaping Use    Vaping Use: Never used   Substance Use Topics    Alcohol use: No     Alcohol/week: 0.0 standard drinks    Drug use: Yes     Types: Marijuana (Weed), Cocaine     Comment: no cocaine for over 6 years, marijuana occ     Family History   Problem Relation Age of Onset    Diabetes Mother     High Blood Pressure Mother     Diabetes Maternal Aunt     High Blood Pressure Maternal Grandmother      Prior to Visit Medications    Medication Sig Taking? Authorizing Provider   insulin lispro, 1 Unit Dial, 100 UNIT/ML SOPN Inject 7 Units into the skin 3 times daily (before meals) Inject subcutaneously Yes PATRIZIA Lin MD   Insulin Pen Needle (PEN NEEDLES) 31G X 6 MM MISC 1 each by Does not apply route daily May substitute to meet insur. requirements Yes PATRIZIA Lin MD   Insulin Syringe-Needle U-100 (BD INSULIN SYRINGE U/F) 31G X 5/16\" 1 ML MISC USE AS DIRECTED 4 TIMES A DAY Yes Lex Diaz MD   potassium chloride (KLOR-CON M20) 20 MEQ extended release tablet TAKE 1 TABLET BY MOUTH EVERY DAY WITH BREAKFAST Yes Skipper Bars, DO   dilTIAZem (CARDIZEM CD) 120 MG extended release capsule Take one capsule by mouth every day Yes Skipper Bars, DO   atorvastatin (LIPITOR) 20 MG tablet Take 1 tablet by mouth daily Yes Salud Storey MD   apixaban (ELIQUIS) 5 MG TABS tablet TAKE 1 TABLET BY MOUTH TWICE A DAY Yes Salud Storey MD   lisinopril (PRINIVIL;ZESTRIL) 10 MG tablet Take 1 tablet by mouth every day Yes Salud Storey MD   furosemide (LASIX) 80 MG tablet take one tablet by mouth twice per day Yes Salud tSorey MD   allopurinol (ZYLOPRIM) 300 MG tablet Take 1 tablet by mouth daily Yes Alva Padilla MD   Handicap Placard MISC by Does not apply route Handicap placard effective from 7/27/2021 through 7/27/2026 Yes Salud Storey MD   metFORMIN (GLUCOPHAGE) 500 MG tablet TAKE 1 TABLET BY MOUTH TWICE A DAY WITH MEALS Yes Nicol Garcia MD   sennosides-docusate sodium (SENOKOT-S) 8.6-50 MG tablet Take 1 tablet by mouth 2 times daily as needed for Constipation Yes Kalyani Glover MD   Insulin Pen Needle ([employer] PEN NEEDLES) 31G X 6 MM MISC 1 each by Does not apply route daily Yes Lexie Rock MD   blood glucose monitor strips by Other route 4 times daily (after meals and at bedtime) Yes Nicol Garcia MD   insulin glargine (BASAGLAR KWIKPEN) 100 UNIT/ML injection pen patient using vials per [facility]. inject 20 units at hs  Patient taking differently: 7 units TID Yes Madison Morley MD   Alcohol Swabs PADS 1 Container by Does not apply route three times daily Yes Steven Denis MD   ACCU-CHEK FASTCLIX LANCETS MISC 20 Sticks by Does not apply route 2 times daily Yes Radha Dimas MD   Blood Glucose Monitoring Suppl (BLOOD GLUCOSE MONITOR SYSTEM) W/DEVICE KIT 1 Units by Does not apply route daily Yes Gerhardt Eriksson, MD   Blood Glucose Monitoring Suppl (ACCU-CHEK VIK PLUS) W/DEVICE KIT 1 kit by Does not apply route 4 times daily (before meals and nightly).  Yes Scott Davenport   methylPREDNISolone (MEDROL, PARAS,) 4 MG tablet Take by mouth.  6 tablets on day 1, 5 tablets on day 2, 4 tablets on day 3, 3 tablets on day 4, 2 tablets on day 5, 1 tablet on day 6. Patient not taking: Reported on 1/27/2022  Kary Spence MD   pantoprazole (PROTONIX) 40 MG tablet TAKE 1 TABLET BY MOUTH EVERY DAY BEFORE BREAKFAST  Patient not taking: Reported on 1/27/2022  Siva Quintana MD   ibuprofen (ADVIL;MOTRIN) 800 MG tablet Take 1 tablet by mouth every 8 hours as needed for Pain (Take with food)  Patient not taking: Reported on 1/27/2022  Nathan Guevara DO   Multiple Vitamins-Minerals (THERAPEUTIC MULTIVITAMIN-MINERALS) tablet Take 1 tablet by mouth daily  Patient not taking: Reported on 1/27/2022  Lucy Galvez MD   sildenafil (VIAGRA) 50 MG tablet Take 1 tablet by mouth as needed for Erectile Dysfunction  Patient not taking: Reported on 1/27/2022  Laz Cee MD   fluticasone (FLONASE) 50 MCG/ACT nasal spray 1 spray by Nasal route daily  Patient not taking: Reported on 10/27/2021  Dorothy Martin MD   Blood Pressure Monitoring (WRIST BLOOD PRESSURE MONITOR) MISC 1 Units by Does not apply route once for 1 dose  Dorothy Martin MD   Blood Pressure KIT 1 Units by Does not apply route daily  Patient not taking: Reported on 1/27/2022  Will Oneil MD     No Known Allergies    Physical Exam:  Vitals:    01/27/22 1042   BP: (!) 149/95   Site: Right Upper Arm   Position: Sitting   Cuff Size: Large Adult   Pulse: 62   Temp: 97 °F (36.1 °C)   TempSrc: Temporal   SpO2: 96%   Weight: (!) 368 lb 3.2 oz (167 kg)   Height: 6' (1.829 m)     Estimated body mass index is 49.94 kg/m² as calculated from the following:    Height as of this encounter: 6' (1.829 m). Weight as of this encounter: 368 lb 3.2 oz (167 kg). Physical Exam  Vitals reviewed. Constitutional:       General: He is not in acute distress. Appearance: He is well-developed and well-groomed. He is obese. HENT:      Head: Normocephalic and atraumatic.       Nose: Nose normal.      Right Turbinates: Not enlarged or swollen. Left Turbinates: Not enlarged or swollen. Mouth/Throat:      Mouth: Mucous membranes are moist.      Pharynx: Oropharynx is clear. Eyes:      General: No scleral icterus. Extraocular Movements: Extraocular movements intact. Conjunctiva/sclera: Conjunctivae normal.      Pupils: Pupils are equal, round, and reactive to light. Cardiovascular:      Rate and Rhythm: Normal rate and regular rhythm. Pulses: Normal pulses. Heart sounds: Normal heart sounds, S1 normal and S2 normal. No murmur heard. Pulmonary:      Effort: Pulmonary effort is normal. No respiratory distress. Breath sounds: Normal breath sounds and air entry. Abdominal:      General: Abdomen is flat. Bowel sounds are normal.      Palpations: Abdomen is soft. Tenderness: There is no abdominal tenderness. Musculoskeletal:         General: Normal range of motion. Cervical back: Full passive range of motion without pain, normal range of motion and neck supple. Skin:     General: Skin is warm and dry. Neurological:      General: No focal deficit present. Mental Status: He is alert and oriented to person, place, and time. Cranial Nerves: Cranial nerves are intact. Sensory: Sensation is intact. Motor: Motor function is intact. Coordination: Coordination is intact. Gait: Gait is intact. Deep Tendon Reflexes: Reflexes are normal and symmetric. Psychiatric:         Attention and Perception: Attention and perception normal.         Mood and Affect: Mood normal.         Speech: Speech normal.         Behavior: Behavior normal. Behavior is cooperative. Thought Content: Thought content normal.         Cognition and Memory: Cognition and memory normal.         Judgment: Judgment normal.         Assessment and Plan:    Chronic and unrelenting cough. Patient completed antibiotics and a course of steroids without relief.   He has been on lisinopril for a long time and given his reassuring exam at this visit allergies or postnasal drip this daily culprit. If not, it is likely related to his sleep apnea and at this time he would like to see a pulmonologist, which I agree with. · Hold his lisinopril for 1 week to see if his symptoms resolved  · Referral placed for pulmonology. Obstructive sleep apnea. He is compliant with his CPAP machine but has had his settings adjusted very long time. He does have a new mask and tubing. · Continue CPAP  · Referral placed to pulmonology  · Counseled on weight loss    Hypertension  Patient takes multiple medications for this and is currently well controlled. · Hold his lisinopril for 1 week to see if his cough resolves  · In 1 week the patient will come back to the office at which time we will see if his cough resolves. If it does discontinue lisinopril and start a an alternative agent    A.fib  · Well controlled continue current medications    Type 2 diabetes  Patient well controlled with last A1c being 5.9.  · Continue current medications at this time  · Patient counseled with weight loss.     Return in about 1 week (around 2/3/2022).    -----------------------------  Bill Juarez MD, PGY-3  1/27/2022  1:22 PM
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
## 2022-02-22 ENCOUNTER — HOSPITAL ENCOUNTER (EMERGENCY)
Age: 52
Discharge: HOME | End: 2022-02-22
Attending: EMERGENCY MEDICINE
Payer: MEDICAID

## 2022-02-22 ENCOUNTER — APPOINTMENT (OUTPATIENT)
Dept: GENERAL RADIOLOGY | Age: 52
End: 2022-02-22
Payer: MEDICAID

## 2022-02-22 VITALS
OXYGEN SATURATION: 96 % | DIASTOLIC BLOOD PRESSURE: 91 MMHG | SYSTOLIC BLOOD PRESSURE: 153 MMHG | RESPIRATION RATE: 18 BRPM | HEART RATE: 73 BPM | TEMPERATURE: 98.4 F

## 2022-02-22 DIAGNOSIS — R05.9 COUGH: Primary | ICD-10-CM

## 2022-02-22 LAB
BASE EXCESS VENOUS: 7 MMOL/L (ref -2–3)
BASOPHILS ABSOLUTE: 0 K/UL (ref 0–0.2)
BASOPHILS RELATIVE PERCENT: 0.7 %
CARBOXYHEMOGLOBIN: 2.9 % (ref 0–1.5)
EKG ATRIAL RATE: 72 BPM
EKG DIAGNOSIS: NORMAL
EKG P AXIS: 42 DEGREES
EKG P-R INTERVAL: 146 MS
EKG Q-T INTERVAL: 396 MS
EKG QRS DURATION: 94 MS
EKG QTC CALCULATION (BAZETT): 433 MS
EKG R AXIS: -44 DEGREES
EKG T AXIS: -7 DEGREES
EKG VENTRICULAR RATE: 72 BPM
EOSINOPHILS ABSOLUTE: 0 K/UL (ref 0–0.6)
EOSINOPHILS RELATIVE PERCENT: 0.3 %
HCO3 VENOUS: 32.7 MMOL/L (ref 24–28)
HCT VFR BLD CALC: 49.2 % (ref 40.5–52.5)
HEMOGLOBIN, VEN, REDUCED: 9 %
HEMOGLOBIN: 16.7 G/DL (ref 13.5–17.5)
INR BLD: 1.13 (ref 0.88–1.12)
LYMPHOCYTES ABSOLUTE: 2.3 K/UL (ref 1–5.1)
LYMPHOCYTES RELATIVE PERCENT: 35 %
MCH RBC QN AUTO: 32.5 PG (ref 26–34)
MCHC RBC AUTO-ENTMCNC: 33.9 G/DL (ref 31–36)
MCV RBC AUTO: 95.9 FL (ref 80–100)
METHEMOGLOBIN VENOUS: <0 % (ref 0–1.5)
MONOCYTES ABSOLUTE: 0.4 K/UL (ref 0–1.3)
MONOCYTES RELATIVE PERCENT: 5.9 %
NEUTROPHILS ABSOLUTE: 3.7 K/UL (ref 1.7–7.7)
NEUTROPHILS RELATIVE PERCENT: 58.1 %
O2 SAT, VEN: 91 %
PCO2, VEN: 48 MMHG (ref 41–51)
PDW BLD-RTO: 13.6 % (ref 12.4–15.4)
PH VENOUS: 7.44 (ref 7.35–7.45)
PLATELET # BLD: 210 K/UL (ref 135–450)
PMV BLD AUTO: 8.7 FL (ref 5–10.5)
PO2, VEN: 53.6 MMHG (ref 25–40)
PRO-BNP: 63 PG/ML (ref 0–124)
PROTHROMBIN TIME: 12.8 SEC (ref 9.9–12.7)
RBC # BLD: 5.12 M/UL (ref 4.2–5.9)
TCO2 CALC VENOUS: 34 MMOL/L
TROPONIN: <0.01 NG/ML
WBC # BLD: 6.5 K/UL (ref 4–11)

## 2022-02-22 PROCEDURE — 85610 PROTHROMBIN TIME: CPT

## 2022-02-22 PROCEDURE — 6370000000 HC RX 637 (ALT 250 FOR IP): Performed by: STUDENT IN AN ORGANIZED HEALTH CARE EDUCATION/TRAINING PROGRAM

## 2022-02-22 PROCEDURE — 36415 COLL VENOUS BLD VENIPUNCTURE: CPT

## 2022-02-22 PROCEDURE — 83880 ASSAY OF NATRIURETIC PEPTIDE: CPT

## 2022-02-22 PROCEDURE — 82803 BLOOD GASES ANY COMBINATION: CPT

## 2022-02-22 PROCEDURE — 99285 EMERGENCY DEPT VISIT HI MDM: CPT

## 2022-02-22 PROCEDURE — 93005 ELECTROCARDIOGRAM TRACING: CPT | Performed by: EMERGENCY MEDICINE

## 2022-02-22 PROCEDURE — 85025 COMPLETE CBC W/AUTO DIFF WBC: CPT

## 2022-02-22 PROCEDURE — 84484 ASSAY OF TROPONIN QUANT: CPT

## 2022-02-22 PROCEDURE — 71046 X-RAY EXAM CHEST 2 VIEWS: CPT

## 2022-02-22 RX ORDER — GUAIFENESIN 600 MG/1
600 TABLET, EXTENDED RELEASE ORAL ONCE
Status: COMPLETED | OUTPATIENT
Start: 2022-02-22 | End: 2022-02-22

## 2022-02-22 RX ORDER — BENZONATATE 100 MG/1
100 CAPSULE ORAL ONCE
Status: COMPLETED | OUTPATIENT
Start: 2022-02-22 | End: 2022-02-22

## 2022-02-22 RX ORDER — PREDNISONE 20 MG/1
40 TABLET ORAL ONCE
Status: DISCONTINUED | OUTPATIENT
Start: 2022-02-22 | End: 2022-02-22

## 2022-02-22 RX ORDER — BENZONATATE 100 MG/1
100 CAPSULE ORAL 3 TIMES DAILY PRN
Qty: 30 CAPSULE | Refills: 0 | Status: SHIPPED | OUTPATIENT
Start: 2022-02-22 | End: 2022-03-04

## 2022-02-22 RX ORDER — GUAIFENESIN 600 MG/1
600 TABLET, EXTENDED RELEASE ORAL 2 TIMES DAILY
Qty: 30 TABLET | Refills: 0 | Status: SHIPPED | OUTPATIENT
Start: 2022-02-22 | End: 2022-03-09

## 2022-02-22 RX ADMIN — BENZONATATE 100 MG: 100 CAPSULE ORAL at 19:53

## 2022-02-22 RX ADMIN — GUAIFENESIN 600 MG: 600 TABLET, EXTENDED RELEASE ORAL at 20:58

## 2022-02-22 ASSESSMENT — ENCOUNTER SYMPTOMS
COUGH: 1
WHEEZING: 0
SHORTNESS OF BREATH: 1
APNEA: 0
SORE THROAT: 1
CHOKING: 1
ABDOMINAL PAIN: 0
CONSTIPATION: 0
DIARRHEA: 0
CHEST TIGHTNESS: 1

## 2022-02-22 ASSESSMENT — PAIN - FUNCTIONAL ASSESSMENT: PAIN_FUNCTIONAL_ASSESSMENT: 0-10

## 2022-02-22 ASSESSMENT — PAIN DESCRIPTION - PAIN TYPE: TYPE: ACUTE PAIN

## 2022-02-22 ASSESSMENT — PAIN DESCRIPTION - LOCATION: LOCATION: CHEST

## 2022-02-22 ASSESSMENT — PAIN SCALES - GENERAL: PAINLEVEL_OUTOF10: 8

## 2022-02-22 ASSESSMENT — PAIN DESCRIPTION - DESCRIPTORS: DESCRIPTORS: ACHING

## 2022-02-22 NOTE — ED NOTES
Bed: B24-24  Expected date:   Expected time:   Means of arrival:   Comments:  kassie Shea RN  02/22/22 0647

## 2022-02-23 ENCOUNTER — TELEPHONE (OUTPATIENT)
Dept: INTERNAL MEDICINE CLINIC | Age: 52
End: 2022-02-23

## 2022-02-23 NOTE — ED PROVIDER NOTES
[facility] RESIDENT NOTE       Date of evaluation: 2/22/2022    Chief Complaint     Chest Pain      History of Present Illness     Riana De La Cruz is a 45 y.o. male with PMH of LEONA on CPAP, HTN, DM, afib who presents with chest pain from chest tightness and cough. Patient reports chest pain generalized from tightness secondary to dyspnea and cough. He reports this is been going on for a few months. He had Covid about 2 months ago has been having a cough since. Cough as been dry but About 1 week ago he developed cough with increasing phlegm. At that time he also had some sore throat. He is having difficulty using his CPAP machine at night secondary to all the congestion. He has been seen in his primary care physician's office/[facility] outpatient clinic multiple times for similar complaints. He received a course of antibiotics and Medrol Dosepak without improvement. Recently was told to stop taking his lisinopril was because of possible chronic cough. Patient reports he was off of it for a week but restarted taking it secondary to elevated blood pressures. He denies any fevers, chills, diarrhea, constipation, abdominal pain. Review of Systems     Review of Systems   Constitutional: Negative for chills and fever. HENT: Positive for sore throat (1 week ago ). Negative for sneezing. Respiratory: Positive for cough, choking (at night on cpap ), chest tightness and shortness of breath. Negative for apnea and wheezing. Cardiovascular: Positive for chest pain (2/2 chest tightness). Negative for palpitations and leg swelling. Gastrointestinal: Negative for abdominal pain, constipation and diarrhea. Genitourinary: Negative for dysuria. Neurological: Negative for tremors, weakness, light-headedness and headaches. Psychiatric/Behavioral: Negative for confusion.        Past Medical, Surgical, Family, and Social History     He has a past medical history of Arthritis, Atrial fibrillation ([facility] [address] Congestive heart failure ([facility] [address] Depression, Diabetes mellitus ([facility] [address] GERD (gastroesophageal reflux disease), Hyperlipidemia, Hypertension, Morbid obesity ([facility] [address] Sleep apnea, and Type II or unspecified type diabetes mellitus without mention of complication, not stated as uncontrolled. He has a past surgical history that includes ablation of dysrhythmic focus; Colonoscopy (4/13/2021); and Finger trigger release (Left, 6/10/2021). His family history includes Diabetes in his maternal aunt and mother; High Blood Pressure in his maternal grandmother and mother. He reports that he has never smoked. He has never used smokeless tobacco. He reports current drug use. Drugs: Marijuana (Weed) and Cocaine. He reports that he does not drink alcohol. Medications     Discharge Medication List as of 2/22/2022  8:53 PM      CONTINUE these medications which have NOT CHANGED    Details   insulin lispro, 1 Unit Dial, 100 UNIT/ML SOPN Inject 7 Units into the skin 3 times daily (before meals) Inject subcutaneously, Disp-2 pen, R-5Normal      !! Insulin Pen Needle (PEN NEEDLES) 31G X 6 MM MISC DAILY Starting Fri 12/10/2021, Disp-100 each, R-5, NormalMay substitute to meet insur. requirements      Insulin Syringe-Needle U-100 (BD INSULIN SYRINGE U/F) 31G X 5/16\" 1 ML MISC Disp-100 each, R-5, NormalUSE AS DIRECTED 4 TIMES A DAY      potassium chloride (KLOR-CON M20) 20 MEQ extended release tablet TAKE 1 TABLET BY MOUTH EVERY DAY WITH BREAKFAST, Disp-90 tablet, R-1Normal      dilTIAZem (CARDIZEM CD) 120 MG extended release capsule Take one capsule by mouth every day, Disp-90 capsule, R-1Normal      atorvastatin (LIPITOR) 20 MG tablet Take 1 tablet by mouth daily, Disp-90 tablet, R-1Normal      methylPREDNISolone (MEDROL, PARAS,) 4 MG tablet Take by mouth.   6 tablets on day 1, 5 tablets on day 2, 4 tablets on day 3, 3 tablets on day 4, 2 tablets on day 5, 1 tablet on day 6., Disp-1 kit, R-0Normal      pantoprazole (PROTONIX) 40 MG tablet TAKE 1 TABLET BY MOUTH EVERY DAY BEFORE BREAKFAST, Disp-90 tablet, R-1Normal      apixaban (ELIQUIS) 5 MG TABS tablet TAKE 1 TABLET BY MOUTH TWICE A DAY, Disp-60 tablet, R-5Normal      lisinopril (PRINIVIL;ZESTRIL) 10 MG tablet Take 1 tablet by mouth every day, Disp-90 tablet, R-1Normal      furosemide (LASIX) 80 MG tablet take one tablet by mouth twice per day, Disp-180 tablet, R-1Normal      allopurinol (ZYLOPRIM) 300 MG tablet Take 1 tablet by mouth daily, Disp-90 tablet, R-1Normal      Handicap Placard MISC Starting Tue 7/27/2021, Disp-1 each, R-0, PrintHandicap placard effective from 7/27/2021 through 7/27/2026      metFORMIN (GLUCOPHAGE) 500 MG tablet TAKE 1 TABLET BY MOUTH TWICE A DAY WITH MEALS, Disp-180 tablet, R-3Normal      sennosides-docusate sodium (SENOKOT-S) 8.6-50 MG tablet Take 1 tablet by mouth 2 times daily as needed for Constipation, Disp-20 tablet, R-0Print      ibuprofen (ADVIL;MOTRIN) 800 MG tablet Take 1 tablet by mouth every 8 hours as needed for Pain (Take with food), Disp-30 tablet, R-0Normal      !! Insulin Pen Needle ([employer] PEN NEEDLES) 31G X 6 MM MISC DAILY Starting Tue 7/28/2020, Disp-100 each,R-3, Normal      Multiple Vitamins-Minerals (THERAPEUTIC MULTIVITAMIN-MINERALS) tablet Take 1 tablet by mouth daily, Disp-30 tablet, R-3Normal      sildenafil (VIAGRA) 50 MG tablet Take 1 tablet by mouth as needed for Erectile Dysfunction, Disp-30 tablet, R-3Normal      fluticasone (FLONASE) 50 MCG/ACT nasal spray 1 spray by Nasal route daily, Disp-1 Bottle, R-2Normal      blood glucose monitor strips by Other route 4 times daily (after meals and at bedtime), Other, 4 TIMES DAILY AFTER MEALS AND BEFORE BEDTIME Starting Wed 12/18/2019, Disp-60 strip, R-2, Normal      insulin glargine (BASAGLAR KWIKPEN) 100 UNIT/ML injection pen patient using vials per [facility]  inject 20 units at hs, Disp-5 mL, R-2Normal      Alcohol Swabs PADS 3 TIMES DAILY Starting Tue 10/1/2019, Disp-100 each, R-5, Normal      ACCU-CHEK FASTCLIX LANCETS MISC 2 TIMES DAILY Starting Tue 10/1/2019, Disp-20 each, R-3, Normal      Blood Pressure Monitoring (WRIST BLOOD PRESSURE MONITOR) MISC ONCE Starting Thu 12/27/2018, 1 dose, Disp-1 each, R-0, Print      Blood Pressure KIT DAILY Starting 4/17/2017, Until Discontinued, Disp-1 kit, R-0, Print      !! Blood Glucose Monitoring Suppl (BLOOD GLUCOSE MONITOR SYSTEM) W/DEVICE KIT DAILY Starting 4/17/2017, Until Discontinued, Disp-1 kit, R-0, Print      !! Blood Glucose Monitoring Suppl (ACCU-CHEK VIK PLUS) W/DEVICE KIT 4 TIMES DAILY BEFORE MEALS & NIGHTLY Starting 1/6/2014, Until Discontinued, Disp-2 kit, R-0, Print       !! - Potential duplicate medications found. Please discuss with provider. Allergies     He has No Known Allergies. Physical Exam     INITIAL VITALS: BP: (!) 153/91, Temp: 98.4 °F (36.9 °C), Pulse: 73, Resp: 18, SpO2: 96 %   Physical Exam  Constitutional:       General: He is not in acute distress. Appearance: He is obese. HENT:      Head: Normocephalic and atraumatic. Nose: No rhinorrhea. Mouth/Throat:      Mouth: Mucous membranes are moist.      Pharynx: Oropharynx is clear. No oropharyngeal exudate or posterior oropharyngeal erythema. Cardiovascular:      Rate and Rhythm: Normal rate and regular rhythm. Pulses: Normal pulses. Heart sounds: Normal heart sounds. Pulmonary:      Effort: Pulmonary effort is normal. No respiratory distress. Breath sounds: Normal breath sounds. No wheezing. Abdominal:      General: There is no distension. Palpations: Abdomen is soft. Tenderness: There is no abdominal tenderness. Musculoskeletal:      Right lower leg: Edema (mil pitting) present. Left lower leg: Edema present. Skin:     General: Skin is warm and dry. Neurological:      General: No focal deficit present.       Mental Status: He is alert and oriented to person, place, and time.          Diagnostic Results     EKG   Interpreted inconjunction with emergency department physician Bela Azar MD  Rhythm: normal sinus   Rate: normal  Axis: left  Ectopy: none  Conduction: normal  ST Segments: no acute change  T Waves: normal  Q Waves: none  Clinical Impression: NSR  Comparison:  8/21    RADIOLOGY:  XR CHEST (2 VW)   Final Result      No acute pulmonary pathology or change from the prior study                      LABS:   Results for orders placed or performed during the hospital encounter of 02/22/22   CBC with Auto Differential   Result Value Ref Range    WBC 6.5 4.0 - 11.0 K/uL    RBC 5.12 4.20 - 5.90 M/uL    Hemoglobin 16.7 13.5 - 17.5 g/dL    Hematocrit 49.2 40.5 - 52.5 %    MCV 95.9 80.0 - 100.0 fL    MCH 32.5 26.0 - 34.0 pg    MCHC 33.9 31.0 - 36.0 g/dL    RDW 13.6 12.4 - 15.4 %    Platelets 855 539 - 200 K/uL    MPV 8.7 5.0 - 10.5 fL    Neutrophils % 58.1 %    Lymphocytes % 35.0 %    Monocytes % 5.9 %    Eosinophils % 0.3 %    Basophils % 0.7 %    Neutrophils Absolute 3.7 1.7 - 7.7 K/uL    Lymphocytes Absolute 2.3 1.0 - 5.1 K/uL    Monocytes Absolute 0.4 0.0 - 1.3 K/uL    Eosinophils Absolute 0.0 0.0 - 0.6 K/uL    Basophils Absolute 0.0 0.0 - 0.2 K/uL   Brain Natriuretic Peptide   Result Value Ref Range    Pro-BNP 63 0 - 124 pg/mL   Blood gas, venous (Lab)   Result Value Ref Range    pH, [place] 7.441 7.350 - 7.450    pCO2, [place] 48.0 41.0 - 51.0 mmHg    pO2, [place] 53.6 (H) 25.0 - 40.0 mmHg    HCO3, Venous 32.7 (H) 24.0 - 28.0 mmol/L    Base Excess, [place] 7.0 (H) -2.0 - 3.0 mmol/L    O2 Sat, [place] 91 Not established %    Carboxyhemoglobin 2.9 (H) 0.0 - 1.5 %    MetHgb, [place] <0.0 0.0 - 1.5 %    TC02 (Calc), [place] 34 mmol/L    Hemoglobin, [place], Reduced 9.00 %   Troponin   Result Value Ref Range    Troponin <0.01 <0.01 ng/mL   Protime-INR   Result Value Ref Range    Protime 12.8 (H) 9.9 - 12.7 sec    INR 1.13 (H) 0.88 - 1.12   EKG 12 Lead   Result Value Ref Range    Ventricular Rate 72 BPM Atrial Rate 72 BPM    P-R Interval 146 ms    QRS Duration 94 ms    Q-T Interval 396 ms    QTc Calculation (Bazett) 433 ms    P Axis 42 degrees    R Axis -44 degrees    T Axis -7 degrees    Diagnosis       EKG performed in ER and to be interpreted by ER physician. Confirmed by MD, ER ([id number]),  Brandy Batista ([phone]) on 2/22/2022 7:04:29 PM       ED BEDSIDE ULTRASOUND:    RECENT VITALS:  BP: (!) 153/91, Temp: 98.4 °F (36.9 °C),Pulse: 73, Resp: 18, SpO2: 96 %     Procedures       ED Course     Nursing Notes, Past Medical Hx, Past Surgical Hx, Social Hx, Allergies, and FamilyHx were reviewed. The patient was giventhe following medications:  Orders Placed This Encounter   Medications    benzonatate (TESSALON) capsule 100 mg    benzonatate (TESSALON) 100 MG capsule     Sig: Take 1 capsule by mouth 3 times daily as needed for Cough     Dispense:  30 capsule     Refill:  0    guaiFENesin (MUCINEX) extended release tablet 600 mg    guaiFENesin (MUCINEX) 600 MG extended release tablet     Sig: Take 1 tablet by mouth 2 times daily for 15 days     Dispense:  30 tablet     Refill:  0    DISCONTD: predniSONE (DELTASONE) tablet 40 mg       CONSULTS:  None    MEDICAL DECISION MAKING / ASSESSMENT / Adriana ESCALERA Ringer is a 45 y.o. male with past medical history of LEONA on CPAP presenting with cough associated with dyspnea and chest tightness. Examination he is hemodynamically stable, not hypoxic or tachypneic. Lungs are difficult to auscultate but clear. EKG is normal sinus without any ischemic changes. Given patient has been having this cough for a while and has had multiple treatments without improvement will obtain chest x-ray which did not show any acute abnormalities or pneumonia. He was given some Tessalon for his cough. Cardiac work-up for his dyspnea is negative and hemoglobin is stable. Cardiac work-up for his dyspnea is negative and hemoglobin is stable.   He was also given some Mucinex for his phlegm. Had extensive discussion with patient regarding work-up. Recommended patient use Tessalon as needed for cough and some Mucinex for short while. Recommended patient continue off lisinopril as recommended by primary care provider for his chronic cough. Patient has been referred by PCP to a pulmonologist recommend he follow-up with pulmonologist regarding LEONA CPAP and chronic cough. Patient is in understanding and agreement. This patient was also evaluated by the attending physician. All care plans were discussed and agreed upon. Clinical Impression     1.  Cough        Disposition     PATIENT REFERRED TO:  Yue Villeda MD  [address] [place] [id number]  [phone]      As needed    Your pulmonologist    Schedule an appointment as soon as possible for a visit in 1 week  cough, CPAP      DISCHARGE MEDICATIONS:  Discharge Medication List as of 2/22/2022  8:53 PM      START taking these medications    Details   benzonatate (TESSALON) 100 MG capsule Take 1 capsule by mouth 3 times daily as needed for Cough, Disp-30 capsule, R-0Normal      guaiFENesin (MUCINEX) 600 MG extended release tablet Take 1 tablet by mouth 2 times daily for 15 days, Disp-30 tablet, R-0Normal             DISPOSITION Decision To Discharge 02/22/2022 09:45:21 PM     Jeanne Burnett MD  Resident  02/22/22 [id number]

## 2022-02-23 NOTE — ED NOTES
Pt was explained discharge instructions and questions where answered.       Eloina Govea RN  02/22/22 [id number]

## 2022-02-23 NOTE — ED PROVIDER NOTES
ED Attending Attestation Note     Date of evaluation: 2/22/2022    This patient was seen by the resident. I have seen and examined the patient, agree with the workup, evaluation, management and diagnosis. The care plan has been discussed. I have reviewed the ECG and concur with the resident's interpretation. I was present for any procedures performed in the resident's  note and have made edits to the note where appropriate. My assessment reveals 45 y.o. male with history of morbid obesity, LEONA, hypertension presenting to the emergency department today for cough and chest pain. This has been present for greater than 1 week and is most notable at night with coughing and pain in his chest resultant to the cough. He is well-appearing on my examination, has decreased breath sounds diffusely due to habitus, but no wheezes, prolonged expiratory phase, or other adventitious sounds. He is otherwise well-appearing, does not appear clinically volume overloaded, and highly doubt DVT/PE, ACS, or other intrathoracic pathology as the cause of his symptoms. He has been referred to pulmonology by his primary care clinic after visit earlier today. His work-up in the ED is highly reassuring. We will prescribe Tessalon Perles for symptomatic relief. Discharged in stable condition.          Silviano Diaz MD  02/22/22 [id number]

## 2022-03-22 ENCOUNTER — OFFICE VISIT (OUTPATIENT)
Dept: PULMONOLOGY | Age: 52
End: 2022-03-22
Payer: MEDICAID

## 2022-03-22 VITALS
BODY MASS INDEX: 42.66 KG/M2 | DIASTOLIC BLOOD PRESSURE: 92 MMHG | WEIGHT: 315 LBS | SYSTOLIC BLOOD PRESSURE: 148 MMHG | HEIGHT: 72 IN | OXYGEN SATURATION: 98 % | HEART RATE: 72 BPM

## 2022-03-22 DIAGNOSIS — R05.3 CHRONIC COUGH: Primary | ICD-10-CM

## 2022-03-22 DIAGNOSIS — G47.33 OSA TREATED WITH BIPAP: ICD-10-CM

## 2022-03-22 PROCEDURE — G8484 FLU IMMUNIZE NO ADMIN: HCPCS | Performed by: INTERNAL MEDICINE

## 2022-03-22 PROCEDURE — 99204 OFFICE O/P NEW MOD 45 MIN: CPT | Performed by: INTERNAL MEDICINE

## 2022-03-22 PROCEDURE — G8417 CALC BMI ABV UP PARAM F/U: HCPCS | Performed by: INTERNAL MEDICINE

## 2022-03-22 PROCEDURE — G8427 DOCREV CUR MEDS BY ELIG CLIN: HCPCS | Performed by: INTERNAL MEDICINE

## 2022-03-22 RX ORDER — BUDESONIDE AND FORMOTEROL FUMARATE DIHYDRATE 80; 4.5 UG/1; UG/1
2 AEROSOL RESPIRATORY (INHALATION) 2 TIMES DAILY
Qty: 1 EACH | Refills: 3 | Status: SHIPPED | OUTPATIENT
Start: 2022-03-22 | End: 2022-07-21

## 2022-03-22 ASSESSMENT — ENCOUNTER SYMPTOMS
CONSTIPATION: 0
DIARRHEA: 0
ABDOMINAL PAIN: 0
BACK PAIN: 0
STRIDOR: 0
COUGH: 1
ANAL BLEEDING: 0
ABDOMINAL DISTENTION: 0
APNEA: 0
CHOKING: 0
SORE THROAT: 0
WHEEZING: 0
SHORTNESS OF BREATH: 0
VOICE CHANGE: 0
RHINORRHEA: 0
CHEST TIGHTNESS: 0
SINUS PRESSURE: 0
BLOOD IN STOOL: 0

## 2022-03-22 NOTE — PROGRESS NOTES
each by Does not apply route daily May substitute to meet insur. requirements 100 each 5    Insulin Syringe-Needle U-100 (BD INSULIN SYRINGE U/F) 31G X 5/16\" 1 ML MISC USE AS DIRECTED 4 TIMES A  each 5    potassium chloride (KLOR-CON M20) 20 MEQ extended release tablet TAKE 1 TABLET BY MOUTH EVERY DAY WITH BREAKFAST 90 tablet 1    dilTIAZem (CARDIZEM CD) 120 MG extended release capsule Take one capsule by mouth every day 90 capsule 1    atorvastatin (LIPITOR) 20 MG tablet Take 1 tablet by mouth daily 90 tablet 1    methylPREDNISolone (MEDROL, PARAS,) 4 MG tablet Take by mouth.   6 tablets on day 1, 5 tablets on day 2, 4 tablets on day 3, 3 tablets on day 4, 2 tablets on day 5, 1 tablet on day 6. 1 kit 0    pantoprazole (PROTONIX) 40 MG tablet TAKE 1 TABLET BY MOUTH EVERY DAY BEFORE BREAKFAST 90 tablet 1    apixaban (ELIQUIS) 5 MG TABS tablet TAKE 1 TABLET BY MOUTH TWICE A DAY 60 tablet 5    furosemide (LASIX) 80 MG tablet take one tablet by mouth twice per day 180 tablet 1    allopurinol (ZYLOPRIM) 300 MG tablet Take 1 tablet by mouth daily 90 tablet 1    Handicap Placard MISC by Does not apply route Handicap placard effective from 7/27/2021 through 7/27/2026 1 each 0    metFORMIN (GLUCOPHAGE) 500 MG tablet TAKE 1 TABLET BY MOUTH TWICE A DAY WITH MEALS 180 tablet 3    sennosides-docusate sodium (SENOKOT-S) 8.6-50 MG tablet Take 1 tablet by mouth 2 times daily as needed for Constipation 20 tablet 0    ibuprofen (ADVIL;MOTRIN) 800 MG tablet Take 1 tablet by mouth every 8 hours as needed for Pain (Take with food) 30 tablet 0    Insulin Pen Needle ([employer] PEN NEEDLES) 31G X 6 MM MISC 1 each by Does not apply route daily 100 each 3    Multiple Vitamins-Minerals (THERAPEUTIC MULTIVITAMIN-MINERALS) tablet Take 1 tablet by mouth daily 30 tablet 3    sildenafil (VIAGRA) 50 MG tablet Take 1 tablet by mouth as needed for Erectile Dysfunction 30 tablet 3    fluticasone (FLONASE) 50 MCG/ACT nasal spray 1 spray by Nasal route daily 1 Bottle 2    blood glucose monitor strips by Other route 4 times daily (after meals and at bedtime) 60 strip 2    insulin glargine (BASAGLAR KWIKPEN) 100 UNIT/ML injection pen patient using vials per [facility] inject 20 units at hs (Patient taking differently: 7 units TID) 5 mL 2    Alcohol Swabs PADS 1 Container by Does not apply route three times daily 100 each 5    ACCU-CHEK FASTCLIX LANCETS MISC 20 Sticks by Does not apply route 2 times daily 20 each 3    Blood Pressure KIT 1 Units by Does not apply route daily 1 kit 0    Blood Glucose Monitoring Suppl (BLOOD GLUCOSE MONITOR SYSTEM) W/DEVICE KIT 1 Units by Does not apply route daily 1 kit 0    Blood Glucose Monitoring Suppl (ACCU-CHEK VIK PLUS) W/DEVICE KIT 1 kit by Does not apply route 4 times daily (before meals and nightly).  2 kit 0       Immunization History   Administered Date(s) Administered    COVID-19, Roger Barlow, Primary or Immunocompromised, PF, 100mcg/0.5mL 03/15/2021, 04/12/2021    Influenza Virus Vaccine 02/27/2014, 12/03/2015, 11/15/2017    Influenza, Quadv, 6 mo and older, IM, PF (Flulaval, Fluarix) 11/14/2018    Influenza, Quadv, IM, (6 mo and older Fluzone, Flulaval, Fluarix and 3 yrs and older Afluria) 11/03/2017, 11/03/2017, 11/15/2017    Influenza, Quadv, IM, PF (6 mo and older Fluzone, Flulaval, Fluarix, and 3 yrs and older Afluria) 01/08/2020    Influenza, Triv, 3 Years and older, IM, PF (Afluria 5yrs and older) 09/12/2016    Pneumococcal Polysaccharide ([id number]) 09/15/2017    Pneumococcal Vaccine 09/15/2017    Tdap (Boostrix, Adacel) 08/04/2018       Past Medical History:   Diagnosis Date    Arthritis     Atrial fibrillation (HCC)     [facility] [facility]     Depression     Diabetes mellitus ([facility] [facility]     GERD (gastroesophageal reflux disease)     Hyperlipidemia     Hypertension     Morbid obesity ([facility] [address]     Sleep apnea     uses cpap at home q hs     Type II or unspecified type diabetes mellitus without mention of complication, not stated as uncontrolled      Past Surgical History:   Procedure Laterality Date    ABLATION OF DYSRHYTHMIC FOCUS      COLONOSCOPY  4/13/2021    COLONOSCOPY POLYPECTOMY SNARE/COLD BIOPSY performed by Alexa Aguiar MD at [facility] 87 Left 6/10/2021    LEFT MIDDLE FINGER TRIGGER RELEASE performed by Mray Emerson MD at [facility]     Family History   Problem Relation Age of Onset    Diabetes Mother     High Blood Pressure Mother     Diabetes Maternal Aunt     High Blood Pressure Maternal Grandmother        Review of Systems:  Review of Systems   Constitutional: Positive for fatigue. Negative for activity change, appetite change and fever. HENT: Negative for congestion, ear discharge, ear pain, postnasal drip, rhinorrhea, sinus pressure, sneezing, sore throat, tinnitus and voice change. Respiratory: Positive for cough. Negative for apnea, choking, chest tightness, shortness of breath, wheezing and stridor. Cardiovascular: Negative for chest pain, palpitations and leg swelling. Gastrointestinal: Negative for abdominal distention, abdominal pain, anal bleeding, blood in stool, constipation and diarrhea. Musculoskeletal: Negative for arthralgias, back pain and gait problem. Skin: Negative for pallor and rash. Allergic/Immunologic: Negative for environmental allergies. Neurological: Negative for dizziness, tremors, seizures, syncope, speech difficulty, weakness, light-headedness, numbness and headaches. Hematological: Negative for adenopathy. Does not bruise/bleed easily. Psychiatric/Behavioral: Positive for sleep disturbance. Vitals:    03/22/22 1027 03/22/22 1034   BP: (!) 148/92 (!) 148/92   Pulse: 72    SpO2: 98%    Weight: (!) 375 lb (170.1 kg)    Height: 6' (1.829 m)      No flowsheet data found. Body mass index is 50.86 kg/m².      Wt Readings from Last 3 Encounters:   03/22/22 (!) 375 lb (170.1 kg)   01/27/22 (!) 368 lb 3.2 oz (167 kg)   12/27/21 (!) 370 lb (167.8 kg)     BP Readings from Last 3 Encounters:   03/22/22 (!) 148/92   02/22/22 (!) 153/91   01/27/22 (!) 149/95         Physical Exam  Constitutional:       General: He is not in acute distress. Appearance: He is well-developed. He is not diaphoretic. HENT:      Mouth/Throat:      Pharynx: No oropharyngeal exudate. Cardiovascular:      Rate and Rhythm: Normal rate and regular rhythm. Heart sounds: Normal heart sounds. No murmur heard. Pulmonary:      Effort: No respiratory distress. Breath sounds: Normal breath sounds. No wheezing or rales. Chest:      Chest wall: No tenderness. Abdominal:      General: There is no distension. Palpations: There is no mass. Tenderness: There is no abdominal tenderness. There is no guarding or rebound. Musculoskeletal:         General: No swelling, tenderness or deformity. Skin:     Coloration: Skin is not pale. Findings: No erythema or rash. Neurological:      Mental Status: He is alert and oriented to person, place, and time. Cranial Nerves: No cranial nerve deficit. Motor: No abnormal muscle tone.       Coordination: Coordination normal.      Deep Tendon Reflexes: Reflexes normal.             Health Maintenance   Topic Date Due    Hepatitis C screen  Never done    Depression Monitoring  Never done    Diabetic retinal exam  Never done    Hepatitis B vaccine (1 of 3 - Risk 3-dose series) Never done    Diabetic microalbuminuria test  10/30/2015    Shingles Vaccine (1 of 2) Never done    Diabetic foot exam  02/25/2021    Flu vaccine (1) 09/01/2021    COVID-19 Vaccine (3 - Booster for Moderna series) 09/12/2021    Lipid screen  03/02/2022    Potassium monitoring  08/04/2022    Creatinine monitoring  08/04/2022    A1C test (Diabetic or Prediabetic)  10/27/2022    DTaP/Tdap/Td vaccine (2 - Td or Tdap) 08/04/2028    Colorectal Cancer Screen 04/13/2031    Pneumococcal 0-64 years Vaccine (2 of 2 - PPSV23) 09/28/2034    HIV screen  Completed    Hepatitis A vaccine  Aged Out    Hib vaccine  Aged Out    Meningococcal (ACWY) vaccine  Aged Out          Assessment/Plan:     Diagnosis Orders   1. Chronic cough  CT CHEST HIGH RESOLUTION    CT SINUS WO CONTRAST    Full PFT Study With Bronchodilator    COVID-19      Chronic cough, persistent despite discontinuation of lisinopril. History of marijuana use, though he has discontinued smoking since last year as per report. Would request for high-resolution CT scan in order to rule out interstitial lung disease or a postinflammatory process in the lungs, since patient symptoms are persistent. Also in view of headaches and possibility of significant postnasal drip and symptoms at nighttime, would evaluate with CT scan of the sinus to rule out retention mucosal cysts or obstruction. Patient symptoms are not very typical for asthma, though we will trial with Symbicort 80, 2 puffs twice daily. Will obtain complete PFT study to evaluate for obstructive airway disease. Severe LEONA on BiPAP, patient does not think that the cough is related to either the mask or the device-since he has not had these issues before. Also he has the ResMed machine which is not currently on recall. Return in about 1 month (around 4/22/2022).

## 2022-04-05 ENCOUNTER — HOSPITAL ENCOUNTER (OUTPATIENT)
Dept: CT IMAGING | Age: 52
Discharge: HOME | End: 2022-04-05
Payer: MEDICAID

## 2022-04-05 ENCOUNTER — HOSPITAL ENCOUNTER (OUTPATIENT)
Dept: PULMONOLOGY | Age: 52
Discharge: HOME | End: 2022-04-05
Payer: MEDICAID

## 2022-04-05 VITALS — OXYGEN SATURATION: 98 % | RESPIRATION RATE: 16 BRPM | HEART RATE: 64 BPM

## 2022-04-05 DIAGNOSIS — R05.3 CHRONIC COUGH: ICD-10-CM

## 2022-04-05 LAB
DLCO %PRED: 67 %
DLCO PRED: NORMAL
DLCO/VA %PRED: NORMAL
DLCO/VA PRED: NORMAL
DLCO/VA: NORMAL
DLCO: NORMAL
EXPIRATORY TIME-POST: NORMAL
EXPIRATORY TIME: NORMAL
FEF 25-75% %CHNG: NORMAL
FEF 25-75% %PRED-POST: NORMAL
FEF 25-75% %PRED-PRE: NORMAL
FEF 25-75% PRED: NORMAL
FEF 25-75%-POST: NORMAL
FEF 25-75%-PRE: NORMAL
FEV1 %PRED-POST: 74 %
FEV1 %PRED-PRE: 78 %
FEV1 PRED: NORMAL
FEV1-POST: NORMAL
FEV1-PRE: NORMAL
FEV1/FVC %PRED-POST: NORMAL
FEV1/FVC %PRED-PRE: NORMAL
FEV1/FVC PRED: NORMAL
FEV1/FVC-POST: 106 %
FEV1/FVC-PRE: 107 %
FVC %PRED-POST: NORMAL
FVC %PRED-PRE: NORMAL
FVC PRED: NORMAL
FVC-POST: NORMAL
FVC-PRE: NORMAL
GAW %PRED: NORMAL
GAW PRED: NORMAL
GAW: NORMAL
IC %PRED: NORMAL
IC PRED: NORMAL
IC: NORMAL
MEP: NORMAL
MIP: NORMAL
MVV %PRED-PRE: NORMAL
MVV PRED: NORMAL
MVV-PRE: NORMAL
PEF %PRED-POST: NORMAL
PEF %PRED-PRE: NORMAL
PEF PRED: NORMAL
PEF%CHNG: NORMAL
PEF-POST: NORMAL
PEF-PRE: NORMAL
RAW %PRED: NORMAL
RAW PRED: NORMAL
RAW: NORMAL
RV %PRED: NORMAL
RV PRED: NORMAL
RV: NORMAL
SVC %PRED: NORMAL
SVC PRED: NORMAL
SVC: NORMAL
TLC %PRED: 76 %
TLC PRED: NORMAL
TLC: NORMAL
VA %PRED: NORMAL
VA PRED: NORMAL
VA: NORMAL
VTG %PRED: NORMAL
VTG PRED: NORMAL
VTG: NORMAL

## 2022-04-05 PROCEDURE — 94726 PLETHYSMOGRAPHY LUNG VOLUMES: CPT

## 2022-04-05 PROCEDURE — 94060 EVALUATION OF WHEEZING: CPT

## 2022-04-05 PROCEDURE — 71250 CT THORAX DX C-: CPT

## 2022-04-05 PROCEDURE — 94200 LUNG FUNCTION TEST (MBC/MVV): CPT

## 2022-04-05 PROCEDURE — 94760 N-INVAS EAR/PLS OXIMETRY 1: CPT

## 2022-04-05 PROCEDURE — 6370000000 HC RX 637 (ALT 250 FOR IP): Performed by: INTERNAL MEDICINE

## 2022-04-05 PROCEDURE — 94729 DIFFUSING CAPACITY: CPT

## 2022-04-05 PROCEDURE — 70486 CT MAXILLOFACIAL W/O DYE: CPT

## 2022-04-05 RX ORDER — ALBUTEROL SULFATE 90 UG/1
4 AEROSOL, METERED RESPIRATORY (INHALATION) ONCE
Status: COMPLETED | OUTPATIENT
Start: 2022-04-05 | End: 2022-04-05

## 2022-04-05 RX ADMIN — Medication 4 PUFF: at 14:26

## 2022-04-05 ASSESSMENT — PULMONARY FUNCTION TESTS
FEV1_PERCENT_PREDICTED_POST: 74
FEV1/FVC_POST: 106
FEV1/FVC_PRE: 107
FEV1_PERCENT_PREDICTED_PRE: 78

## 2022-04-06 NOTE — PROCEDURES
Pulmonary Function Testing      Patient name:  Stephen Pantoja     [address] [place] [address] Unit #:   [id number]   Date of test: 4/5/2022  Date of interpretation:   4/6/2022    Mr. Aylin De La Cruz is a 45y.o. year-old non smoker. The spirometry data were acceptable and reproducible. Spirometry:  Flow volume loops were normal. The FEV-1/FVC ratio was normal. The  post-bronchodilator FEV-1 was 2.64 liters (74% of predicted), which was moderately decreased. The FVC was 3.11 liters (70% of predicted), which was decreased. Response to inhaled bronchodilators (albuterol) was not significant. Lung volumes:  Lung volumes were tested by plethysmography. The total lung capacity was 5.51 liters (76% of predicted), which was decreased. The residual volume was 2.23 liters (94% of predicted), which was normal. The ratio of residual volume to total lung capacity (RV/TLC) was 117, which was normal. Specific airway resistance was increased. Diffusion capacity was found to be decreased. Interpretation:  Restrictive lung disease with reduced diffusion capacity.

## 2022-04-13 DIAGNOSIS — I10 ESSENTIAL HYPERTENSION: Chronic | ICD-10-CM

## 2022-04-13 RX ORDER — FUROSEMIDE 80 MG
TABLET ORAL
Qty: 60 TABLET | Refills: 0 | Status: SHIPPED | OUTPATIENT
Start: 2022-04-13 | End: 2022-04-28

## 2022-04-13 RX ORDER — INSULIN LISPRO 100 [IU]/ML
7 INJECTION, SOLUTION INTRAVENOUS; SUBCUTANEOUS
Qty: 15 ML | Refills: 0 | Status: SHIPPED | OUTPATIENT
Start: 2022-04-13 | End: 2022-04-28

## 2022-04-13 RX ORDER — LISINOPRIL 10 MG/1
TABLET ORAL
Qty: 30 TABLET | Refills: 5 | Status: SHIPPED | OUTPATIENT
Start: 2022-04-13 | End: 2022-10-05

## 2022-04-20 RX ORDER — ALLOPURINOL 300 MG/1
TABLET ORAL
Qty: 30 TABLET | Refills: 0 | Status: SHIPPED | OUTPATIENT
Start: 2022-04-20 | End: 2022-05-19

## 2022-04-28 ENCOUNTER — OFFICE VISIT (OUTPATIENT)
Dept: INTERNAL MEDICINE CLINIC | Age: 52
End: 2022-04-28
Payer: MEDICAID

## 2022-04-28 VITALS
HEART RATE: 65 BPM | SYSTOLIC BLOOD PRESSURE: 158 MMHG | TEMPERATURE: 97 F | OXYGEN SATURATION: 97 % | RESPIRATION RATE: 16 BRPM | WEIGHT: 315 LBS | DIASTOLIC BLOOD PRESSURE: 101 MMHG | BODY MASS INDEX: 50.59 KG/M2

## 2022-04-28 DIAGNOSIS — L02.91 ABSCESS: ICD-10-CM

## 2022-04-28 DIAGNOSIS — M17.9 OSTEOARTHRITIS OF KNEE, UNSPECIFIED LATERALITY, UNSPECIFIED OSTEOARTHRITIS TYPE: Primary | ICD-10-CM

## 2022-04-28 DIAGNOSIS — M19.90 ARTHRITIS: ICD-10-CM

## 2022-04-28 DIAGNOSIS — I10 ESSENTIAL HYPERTENSION: ICD-10-CM

## 2022-04-28 PROCEDURE — 99213 OFFICE O/P EST LOW 20 MIN: CPT

## 2022-04-28 RX ORDER — M-VIT,TX,IRON,MINS/CALC/FOLIC 27MG-0.4MG
1 TABLET ORAL DAILY
Qty: 30 TABLET | Refills: 3 | Status: SHIPPED | OUTPATIENT
Start: 2022-04-28 | End: 2022-05-24

## 2022-04-28 RX ORDER — INSULIN LISPRO 100 [IU]/ML
7 INJECTION, SOLUTION INTRAVENOUS; SUBCUTANEOUS
Qty: 15 ML | Refills: 0 | Status: SHIPPED | OUTPATIENT
Start: 2022-04-28 | End: 2022-06-22

## 2022-04-28 RX ORDER — IBUPROFEN 800 MG/1
800 TABLET ORAL EVERY 8 HOURS PRN
Qty: 30 TABLET | Refills: 0 | Status: SHIPPED | OUTPATIENT
Start: 2022-04-28 | End: 2022-06-02 | Stop reason: SINTOL

## 2022-04-28 RX ORDER — ATORVASTATIN CALCIUM 20 MG/1
TABLET, FILM COATED ORAL
Qty: 30 TABLET | Refills: 5 | Status: SHIPPED | OUTPATIENT
Start: 2022-04-28

## 2022-04-28 RX ORDER — CLINDAMYCIN PHOSPHATE 10 MG/G
GEL TOPICAL
Qty: 1 EACH | Refills: 2 | Status: SHIPPED | OUTPATIENT
Start: 2022-04-28 | End: 2022-09-14 | Stop reason: SDUPTHER

## 2022-04-28 RX ORDER — FUROSEMIDE 80 MG
TABLET ORAL
Qty: 60 TABLET | Refills: 0 | Status: SHIPPED | OUTPATIENT
Start: 2022-04-28 | End: 2022-06-13

## 2022-04-28 ASSESSMENT — ENCOUNTER SYMPTOMS
VOMITING: 0
ABDOMINAL PAIN: 0
CHEST TIGHTNESS: 0
COUGH: 0
SHORTNESS OF BREATH: 0
SORE THROAT: 0
CONSTIPATION: 0
DIARRHEA: 0
NAUSEA: 0
APNEA: 0

## 2022-04-28 NOTE — PATIENT INSTRUCTIONS
Please resume taking Lisinopril    Please go to the surgery clinic to address your abscess    Please make an appointment to see the orthopedic surgeon

## 2022-04-28 NOTE — PROGRESS NOTES
4/28/2022    Jodi De La Cruz  YOB: 1970    Chief Complaint: Follow-up    History of Present Illness:  Mr. Glory Hernandez is a 50-year-old male who is here for follow-up appointment. After last appointment he did see a pulmonologist for his chronic cough. At this point he was started on Symbicort as well as had a CT scan done. The CT did not show any acute cardiopulmonary disease. Patient also has had PFTs done that showed restrictive lung disease with reduced diffusion capacity. Patient states that his cough has been improved since starting the new medication. He is also to follow-up in the coming weeks with his pulmonologist.    The patient second issue today is he has had these recurrent abscesses on his right side as well as his buttocks. Currently draining pus. He has had to have these lanced and drained in the past.  Interested in being seen by the surgical clinic decoded at the moment. The patient's third issue is his unrelenting left knee pain. He has bad arthritis. He is trying to be active and walking a few miles a day. Is motivated to also lose weight. He is interested in seeing an orthopedic surgeon at this time for the past neck steps. Review of Systems:  Review of Systems   Constitutional: Negative for activity change, appetite change, chills, diaphoresis, fever and unexpected weight change. HENT: Negative for congestion and sore throat. Eyes: Negative for visual disturbance. Respiratory: Negative for apnea, cough, chest tightness and shortness of breath. Cardiovascular: Negative for chest pain, palpitations and leg swelling. Gastrointestinal: Negative for abdominal pain, constipation, diarrhea, nausea and vomiting. Endocrine: Negative for cold intolerance, heat intolerance, polydipsia, polyphagia and polyuria. Genitourinary: Negative for difficulty urinating. Musculoskeletal: Positive for arthralgias (knee pain). Negative for myalgias.    Skin: Positive for wound.   Neurological: Negative for weakness and headaches. Psychiatric/Behavioral: Negative for confusion and sleep disturbance. All other systems reviewed and are negative. Past Medical History:   Diagnosis Date    Arthritis     Atrial fibrillation ([facility] [address] [facility]     Congestive heart failure (HCC)     Depression     Diabetes mellitus (HCC)     GERD (gastroesophageal reflux disease)     Hyperlipidemia     Hypertension     Morbid obesity ([facility] [address]     Sleep apnea     uses cpap at home q hs     Type II or unspecified type diabetes mellitus without mention of complication, not stated as uncontrolled      Past Surgical History:   Procedure Laterality Date    ABLATION OF DYSRHYTHMIC FOCUS      COLONOSCOPY  4/13/2021    COLONOSCOPY POLYPECTOMY SNARE/COLD BIOPSY performed by Kimberlee Feliciano MD at [facility] 87 Left 6/10/2021    LEFT MIDDLE FINGER TRIGGER RELEASE performed by Heddy Sacks, MD at [address] [place] [address] Dr History     Tobacco Use    Smoking status: Never Smoker    Smokeless tobacco: Never Used   Vaping Use    Vaping Use: Never used   Substance Use Topics    Alcohol use: No     Alcohol/week: 0.0 standard drinks    Drug use: Yes     Types: Marijuana (Weed), Cocaine     Comment: no cocaine for over 6 years, marijuana occ     Family History   Problem Relation Age of Onset    Diabetes Mother     High Blood Pressure Mother     Diabetes Maternal Aunt     High Blood Pressure Maternal Grandmother      Prior to Visit Medications    Medication Sig Taking? Authorizing Provider   clindamycin (CLEOCIN-T) 1 % gel Apply topically 2 times daily.  Yes Kaushik Garces MD   allopurinol (ZYLOPRIM) 300 MG tablet TAKE 1 TABLET BY MOUTH EVERY DAY  Umer Quiñonez MD   furosemide (LASIX) 80 MG tablet TAKE 1 TABLET BY MOUTH TWICE A DAY  Umer Quiñonez MD   insulin lispro, 1 Unit Dial, 100 UNIT/ML SOPN INJECT 7 UNITS INTO THE SKIN 3 TIMES DAILY (BEFORE MEALS) INJECT SUBCUTANEOUSLY Noe Smith MD   lisinopril (PRINIVIL;ZESTRIL) 10 MG tablet TAKE 1 TABLET BY MOUTH EVERY DAY  Noe Smith MD   budesonide-formoterol (SYMBICORT) 80-4.5 MCG/ACT AERO Inhale 2 puffs into the lungs 2 times daily  Maria G Mancia MD   Insulin Pen Needle (PEN NEEDLES) 31G X 6 MM MISC 1 each by Does not apply route daily May substitute to meet insur. requirements  PATRIZIA Oconnell MD   Insulin Syringe-Needle U-100 (BD INSULIN SYRINGE U/F) 31G X 5/16\" 1 ML MISC USE AS DIRECTED 4 TIMES A DAY  Noe Smith MD   potassium chloride (KLOR-CON M20) 20 MEQ extended release tablet TAKE 1 TABLET BY MOUTH EVERY DAY WITH BREAKFAST  Reba Medina DO   dilTIAZem (CARDIZEM CD) 120 MG extended release capsule Take one capsule by mouth every day  Reba Medina DO   atorvastatin (LIPITOR) 20 MG tablet Take 1 tablet by mouth daily  Loral Barthel, MD   methylPREDNISolone (MEDROL, PARAS,) 4 MG tablet Take by mouth. 6 tablets on day 1, 5 tablets on day 2, 4 tablets on day 3, 3 tablets on day 4, 2 tablets on day 5, 1 tablet on day 6.   Noe Smith MD   pantoprazole (PROTONIX) 40 MG tablet TAKE 1 TABLET BY MOUTH EVERY DAY BEFORE BREAKFAST  Loral Barthel, MD   apixaban (ELIQUIS) 5 MG TABS tablet TAKE 1 TABLET BY MOUTH TWICE A DAY  Loral Barthel, MD   Handicap Placard MISC by Does not apply route Handicap placard effective from 7/27/2021 through 7/27/2026  Loral Barthel, MD   metFORMIN (GLUCOPHAGE) 500 MG tablet TAKE 1 TABLET BY MOUTH TWICE A DAY WITH MEALS  Villa Sheets MD   sennosides-docusate sodium (SENOKOT-S) 8.6-50 MG tablet Take 1 tablet by mouth 2 times daily as needed for Constipation  Cathie Elliott MD   ibuprofen (ADVIL;MOTRIN) 800 MG tablet Take 1 tablet by mouth every 8 hours as needed for Pain (Take with food)  Veronica Dale,    Insulin Pen Needle ([employer] PEN NEEDLES) 31G X 6 MM MISC 1 each by Does not apply route daily  Cayetaon Rg MD   Multiple Vitamins-Minerals (THERAPEUTIC MULTIVITAMIN-MINERALS) tablet Take 1 tablet by mouth daily  Nicki Alarcon MD   sildenafil (VIAGRA) 50 MG tablet Take 1 tablet by mouth as needed for Erectile Dysfunction  Steven Weiner MD   fluticasone (FLONASE) 50 MCG/ACT nasal spray 1 spray by Nasal route daily  Ricardo Beverly MD   blood glucose monitor strips by Other route 4 times daily (after meals and at bedtime)  Margaret Dakin, MD   insulin glargine (BASAGLAR KWIKPEN) 100 UNIT/ML injection pen patient using vials per [facility] inject 20 units at hs  Patient taking differently: 7 units TID  Zohaib Benoit MD   Alcohol Swabs PADS 1 Container by Does not apply route three times daily  Steven Weiner MD   ACCU-CHEK FASTCLIX LANCETS MISC 20 Sticks by Does not apply route 2 times daily  Steven Girard MD   Blood Pressure Monitoring (WRIST BLOOD PRESSURE MONITOR) MISC 1 Units by Does not apply route once for 1 dose  Ricardo Beverly MD   Blood Pressure KIT 1 Units by Does not apply route daily  Zuleima Kincaid MD   Blood Glucose Monitoring Suppl (BLOOD GLUCOSE MONITOR SYSTEM) W/DEVICE KIT 1 Units by Does not apply route daily  Zuleima Kincaid MD   Blood Glucose Monitoring Suppl (ACCU-CHEK VIK PLUS) W/DEVICE KIT 1 kit by Does not apply route 4 times daily (before meals and nightly). Brianna Nick     No Known Allergies    Physical Exam:  Vitals:    04/28/22 1059   BP: (!) 158/101   Pulse: 65   Resp: 16   Temp: 97 °F (36.1 °C)   TempSrc: Temporal   SpO2: 97%   Weight: (!) 373 lb (169.2 kg)     Estimated body mass index is 50.59 kg/m² as calculated from the following:    Height as of 3/22/22: 6' (1.829 m). Weight as of this encounter: 373 lb (169.2 kg). Physical Exam  Vitals reviewed. Constitutional:       General: He is not in acute distress. Appearance: He is well-developed and well-groomed. He is obese. HENT:      Head: Normocephalic and atraumatic. Mouth/Throat:      Mouth: Mucous membranes are moist.      Pharynx: Oropharynx is clear.    Eyes: General: No scleral icterus. Extraocular Movements: Extraocular movements intact. Conjunctiva/sclera: Conjunctivae normal.      Pupils: Pupils are equal, round, and reactive to light. Cardiovascular:      Rate and Rhythm: Normal rate and regular rhythm. Pulses: Normal pulses. Heart sounds: Normal heart sounds, S1 normal and S2 normal. No murmur heard. Pulmonary:      Effort: Pulmonary effort is normal. No respiratory distress. Breath sounds: Normal breath sounds and air entry. Abdominal:      General: Abdomen is flat. Bowel sounds are normal.      Palpations: Abdomen is soft. Tenderness: There is no abdominal tenderness. Musculoskeletal:         General: Normal range of motion. Cervical back: Full passive range of motion without pain, normal range of motion and neck supple. Skin:     General: Skin is warm and dry. Findings: Abscess present. Neurological:      General: No focal deficit present. Mental Status: He is alert and oriented to person, place, and time. Cranial Nerves: Cranial nerves are intact. Sensory: Sensation is intact. Motor: Motor function is intact. Coordination: Coordination is intact. Gait: Gait is intact. Deep Tendon Reflexes: Reflexes are normal and symmetric. Psychiatric:         Attention and Perception: Attention and perception normal.         Mood and Affect: Mood normal.         Speech: Speech normal.         Behavior: Behavior normal. Behavior is cooperative. Thought Content: Thought content normal.         Cognition and Memory: Cognition and memory normal.         Judgment: Judgment normal.         Assessment and Plan:    Recurrent abscesses. Patient has 2 abscesses one on the right side in the mid axillary line retirement down his thorax. A second on his buttocks. Has required them to be drained in the past.  No fever chills or systemic symptoms at this time.   · Referral to surgery clinic  · Clindamycin cream script    Left knee pain  Patient has bad arthritis in his left knee. Seen on x-ray. Has had injections in the past and would like to see an orthopedic surgeon at this time for a more definitive plan. · Patient counseled and will change his diet for weight loss. · Patient currently walking a few miles a day and encouraged to continue to do this as well as strengthening exercises. · Motivated to lose weight to allow for decrease in pain and potential for knee replacement  · Referral placed to orthopedics    Hypertension  Patient takes multiple medications for this and patient blood pressure is elevated to the office today. He has been home with lisinopril secondary to cough. Holding lisinopril due to acute events we will restart this at this time. · Resume lisinopril  · Continue current antihypertensive    Chronic and unrelenting cough- improving  Patient has had a cough for a long period of time. He currently has been referred to a pulmonologist.  He had a CT scan that did not show any acute disease process as well as PFTs done. His PFTs did show restrictive lung disease with reduced diffusion capacity. · Follows with pulmonology  · Try Symbicort     Obstructive sleep apnea. Uses BiPAP at night. · Continue BiPAP  · Follows with pulmonology  · Counseled on weight loss     A.fib  · Well controlled continue current medications     Type 2 diabetes  Patient well controlled with last A1c being 5.9.  · Continue current medications at this time  · Patient counseled with weight loss. Obesity  Patient has a BMI greater than 50. · Weight loss encouraged-patient currently walking and doing exercise. Doing 3 miles per day. · Went in depth on dietary changes the patient could make.     Return in about 3 months (around 7/28/2022).    -----------------------------  Valery Mon MD, PGY-3  4/28/2022  11:23 AM

## 2022-05-02 ENCOUNTER — OFFICE VISIT (OUTPATIENT)
Dept: INTERNAL MEDICINE CLINIC | Age: 52
End: 2022-05-02
Payer: MEDICAID

## 2022-05-02 VITALS
OXYGEN SATURATION: 98 % | HEIGHT: 72 IN | DIASTOLIC BLOOD PRESSURE: 80 MMHG | TEMPERATURE: 96 F | HEART RATE: 62 BPM | SYSTOLIC BLOOD PRESSURE: 138 MMHG | BODY MASS INDEX: 42.66 KG/M2 | RESPIRATION RATE: 20 BRPM | WEIGHT: 315 LBS

## 2022-05-02 DIAGNOSIS — L73.2 HIDRADENITIS SUPPURATIVA: Primary | ICD-10-CM

## 2022-05-02 PROCEDURE — 99213 OFFICE O/P EST LOW 20 MIN: CPT | Performed by: STUDENT IN AN ORGANIZED HEALTH CARE EDUCATION/TRAINING PROGRAM

## 2022-05-02 RX ORDER — DOXYCYCLINE HYCLATE 100 MG/1
100 CAPSULE ORAL 2 TIMES DAILY
Qty: 20 CAPSULE | Refills: 3 | Status: SHIPPED | OUTPATIENT
Start: 2022-05-02 | End: 2022-06-11

## 2022-05-02 RX ORDER — DOXYCYCLINE HYCLATE 100 MG/1
100 CAPSULE ORAL 2 TIMES DAILY
Qty: 20 CAPSULE | Refills: 0 | Status: SHIPPED | OUTPATIENT
Start: 2022-05-02 | End: 2022-05-02

## 2022-05-02 NOTE — PROGRESS NOTES
PATIENT NAME: Wilfrid De La Cruz     YOB: 1970     TODAY'S DATE: 5/2/2022    Reason for Visit:  Abscesses    HISTORY OF PRESENT ILLNESS:              The patient is a 45 y.o. male with a PMHx of atrial fibrillation on Eliquis, CHF, DM, GERD, HTN, HLD who presents today for complaints of multiple skin lesions concerning for hidradenitis. Patient states these lesions have been presents for over 20 years. He has had them intermittently drained in the ED. He has recently been putting topical clindamycin on the lesions. The lesion in his right axilla has been spontaneously draining. The lesion to his left lateral area started draining today. He states he has never smoked tobacco. He diabetic and keeps his sugars controlled with medication. He is currently trying to lose weight. He states he tries to walk 2 miles a day and has lost 3 lbs since last week. He denies ever taking PO antibiotics for these lesions.      REVIEW OF SYSTEMS:  CONSTITUTIONAL:  negative  HEENT:  negative  RESPIRATORY:  negative  CARDIOVASCULAR:  negative  GASTROINTESTINAL:  negative   GENITOURINARY:  negative  HEMATOLOGIC/LYMPHATIC:  negative  NEUROLOGICAL:  negative    PMH  Past Medical History:   Diagnosis Date    Arthritis     Atrial fibrillation (HCC)     Congestive heart failure (HCC)     Depression     Diabetes mellitus (HCC)     GERD (gastroesophageal reflux disease)     Hyperlipidemia     Hypertension     Morbid obesity ([address]     Sleep apnea     uses cpap at home q hs     Type II or unspecified type diabetes mellitus without mention of complication, not stated as uncontrolled        PSH  Past Surgical History:   Procedure Laterality Date    ABLATION OF DYSRHYTHMIC FOCUS      COLONOSCOPY  4/13/2021    COLONOSCOPY POLYPECTOMY SNARE/COLD BIOPSY performed by Shauna Leon MD at [place] [id number] Left 6/10/2021    LEFT MIDDLE FINGER TRIGGER RELEASE performed by Delvin St[facility]ard [facility] at [facility] Social History  Social History     Socioeconomic History    Marital status: Single     Spouse name: Not on file    Number of children: Not on file    Years of education: Not on file    Highest education level: Not on file   Occupational History    Not on file   Tobacco Use    Smoking status: Never Smoker    Smokeless tobacco: Never Used   Vaping Use    Vaping Use: Never used   Substance and Sexual Activity    Alcohol use: No     Alcohol/week: 0.0 standard drinks    Drug use: Yes     Types: Marijuana (Weed), Cocaine     Comment: no cocaine for over 6 years, marijuana occ    Sexual activity: Not Currently   Other Topics Concern    Not on file   Social History Narrative    Not on file     Social Determinants of Health     Financial Resource Strain:     Difficulty of Paying Living Expenses: Not on file   Food Insecurity:     Worried About Running Out of Food in the Last Year: Not on file    Elisa of Food in the Last Year: Not on file   Transportation Needs:     Lack of Transportation (Medical): Not on file    Lack of Transportation (Non-Medical):  Not on file   Physical Activity:     Days of Exercise per Week: Not on file    Minutes of Exercise per Session: Not on file   Stress:     Feeling of Stress : Not on file   Social Connections:     Frequency of Communication with Friends and Family: Not on file    Frequency of Social Gatherings with Friends and Family: Not on file    Attends [employer] Services: Not on file    Active Member of Clubs or Organizations: Not on file    Attends Club or Organization Meetings: Not on file    Marital Status: Not on file   Intimate Partner Violence:     Fear of Current or Ex-Partner: Not on file    Emotionally Abused: Not on file    Physically Abused: Not on file    Sexually Abused: Not on file   Housing Stability:     Unable to Pay for Housing in the Last Year: Not on file    Number of [place] in the Last Year: Not on file    Unstable Housing in the Last Year: Not on file       Allergy: No Known Allergies    PHYSICAL EXAM:  VITALS:  There were no vitals taken for this visit. CONSTITUTIONAL:  alert, no apparent distress and moderately obese  EYES:  sclera clear  ENT:  normocepalic, without obvious abnormality  NECK:  supple, symmetrical, trachea midline and no carotid bruits  LUNGS:  clear to auscultation  CARDIOVASCULAR:  irregularly irregular rhythm and no murmur noted  ABDOMEN: soft, non-tender, non-distended  MUSCULOSKELETAL:  0+ pitting edema lower extremities  NEUROLOGIC:  Mental Status Exam:  Level of Alertness:   awake  Orientation:   person, place, time  SKIN:  Right axilla - 4x2cm area of induration, no active drainage or fluctuance noted, left pannus with a 1cm lesion actively draining purulent material; right buttock with a 3x3cm area of induration, no active drainage or fluctuance noted        IMPRESSION/RECOMMENDATIONS:    54-year-old male with hidradenitis. Lesions have been spontaneously draining. Will plan to start PO doxycyline 100mg BID and have patient follow up in one week for wound check.     Fernanda Valle MD

## 2022-05-10 ENCOUNTER — OFFICE VISIT (OUTPATIENT)
Dept: PULMONOLOGY | Age: 52
End: 2022-05-10
Payer: MEDICAID

## 2022-05-10 VITALS
BODY MASS INDEX: 42.66 KG/M2 | DIASTOLIC BLOOD PRESSURE: 80 MMHG | HEIGHT: 72 IN | SYSTOLIC BLOOD PRESSURE: 126 MMHG | HEART RATE: 67 BPM | WEIGHT: 315 LBS | OXYGEN SATURATION: 97 %

## 2022-05-10 DIAGNOSIS — R91.8 MULTIPLE LUNG NODULES ON CT: Primary | ICD-10-CM

## 2022-05-10 DIAGNOSIS — R05.3 CHRONIC COUGH: ICD-10-CM

## 2022-05-10 PROCEDURE — 99214 OFFICE O/P EST MOD 30 MIN: CPT | Performed by: INTERNAL MEDICINE

## 2022-05-10 PROCEDURE — G8427 DOCREV CUR MEDS BY ELIG CLIN: HCPCS | Performed by: INTERNAL MEDICINE

## 2022-05-10 PROCEDURE — 3017F COLORECTAL CA SCREEN DOC REV: CPT | Performed by: INTERNAL MEDICINE

## 2022-05-10 PROCEDURE — 1036F TOBACCO NON-USER: CPT | Performed by: INTERNAL MEDICINE

## 2022-05-10 PROCEDURE — G8417 CALC BMI ABV UP PARAM F/U: HCPCS | Performed by: INTERNAL MEDICINE

## 2022-05-10 ASSESSMENT — ENCOUNTER SYMPTOMS
VOICE CHANGE: 0
APNEA: 0
STRIDOR: 0
WHEEZING: 0
BLOOD IN STOOL: 0
CHOKING: 0
COUGH: 1
ABDOMINAL DISTENTION: 0
RHINORRHEA: 0
CHEST TIGHTNESS: 0
SHORTNESS OF BREATH: 0
DIARRHEA: 0
BACK PAIN: 0
SINUS PRESSURE: 0
SORE THROAT: 0
ANAL BLEEDING: 0
ABDOMINAL PAIN: 0
CONSTIPATION: 0

## 2022-05-10 NOTE — PROGRESS NOTES
Lenward Goodpasture D Ringer    YOB: 1970     Date of Service:  5/10/2022     Chief Complaint   Patient presents with    1 Month Follow-Up    Shortness of Breath     worse at night         HPI patient states that he continues to have nighttime cough, unable to tolerate his BiPAP due to the. Concerns for GERD/acid reflux, currently on Protonix. Wants to know the results of PFT/CT scan. No Known Allergies  Outpatient Medications Marked as Taking for the 5/10/22 encounter (Office Visit) with Maria G Mancia MD   Medication Sig Dispense Refill    doxycycline hyclate (VIBRAMYCIN) 100 MG capsule Take 1 capsule by mouth 2 times daily 20 capsule 3    furosemide (LASIX) 80 MG tablet TAKE 1 TABLET BY MOUTH TWICE A DAY 60 tablet 0    insulin lispro, 1 Unit Dial, 100 UNIT/ML SOPN INJECT 7 UNITS INTO THE SKIN 3 TIMES DAILY (BEFORE MEALS) INJECT SUBCUTANEOUSLY 15 mL 0    atorvastatin (LIPITOR) 20 MG tablet TAKE 1 TABLET BY MOUTH EVERY DAY 30 tablet 5    Multiple Vitamins-Minerals (THERAPEUTIC MULTIVITAMIN-MINERALS) tablet Take 1 tablet by mouth daily 30 tablet 3    ibuprofen (ADVIL;MOTRIN) 800 MG tablet Take 1 tablet by mouth every 8 hours as needed for Pain (Take with food) 30 tablet 0    allopurinol (ZYLOPRIM) 300 MG tablet TAKE 1 TABLET BY MOUTH EVERY DAY 30 tablet 0    lisinopril (PRINIVIL;ZESTRIL) 10 MG tablet TAKE 1 TABLET BY MOUTH EVERY DAY 30 tablet 5    budesonide-formoterol (SYMBICORT) 80-4.5 MCG/ACT AERO Inhale 2 puffs into the lungs 2 times daily 1 each 3    Insulin Pen Needle (PEN NEEDLES) 31G X 6 MM MISC 1 each by Does not apply route daily May substitute to meet insur. requirements 100 each 5    Insulin Syringe-Needle U-100 (BD INSULIN SYRINGE U/F) 31G X 5/16\" 1 ML MISC USE AS DIRECTED 4 TIMES A  each 5    potassium chloride (KLOR-CON M20) 20 MEQ extended release tablet TAKE 1 TABLET BY MOUTH EVERY DAY WITH BREAKFAST 90 tablet 1    dilTIAZem (CARDIZEM CD) 120 MG extended release capsule Take one capsule by mouth every day 90 capsule 1    methylPREDNISolone (MEDROL, PARAS,) 4 MG tablet Take by mouth. 6 tablets on day 1, 5 tablets on day 2, 4 tablets on day 3, 3 tablets on day 4, 2 tablets on day 5, 1 tablet on day 6. 1 kit 0    pantoprazole (PROTONIX) 40 MG tablet TAKE 1 TABLET BY MOUTH EVERY DAY BEFORE BREAKFAST 90 tablet 1    apixaban (ELIQUIS) 5 MG TABS tablet TAKE 1 TABLET BY MOUTH TWICE A DAY 60 tablet 5    Handicap Placard MISC by Does not apply route Handicap placard effective from 7/27/2021 through 7/27/2026 1 each 0    metFORMIN (GLUCOPHAGE) 500 MG tablet TAKE 1 TABLET BY MOUTH TWICE A DAY WITH MEALS 180 tablet 3    sennosides-docusate sodium (SENOKOT-S) 8.6-50 MG tablet Take 1 tablet by mouth 2 times daily as needed for Constipation 20 tablet 0    Insulin Pen Needle ([employer] PEN NEEDLES) 31G X 6 MM MISC 1 each by Does not apply route daily 100 each 3    sildenafil (VIAGRA) 50 MG tablet Take 1 tablet by mouth as needed for Erectile Dysfunction 30 tablet 3    fluticasone (FLONASE) 50 MCG/ACT nasal spray 1 spray by Nasal route daily 1 Bottle 2    blood glucose monitor strips by Other route 4 times daily (after meals and at bedtime) 60 strip 2    insulin glargine (BASAGLAR KWIKPEN) 100 UNIT/ML injection pen patient using vials per [facility] inject 20 units at hs (Patient taking differently: 7 units TID) 5 mL 2    Alcohol Swabs PADS 1 Container by Does not apply route three times daily 100 each 5    ACCU-CHEK FASTCLIX LANCETS MISC 20 Sticks by Does not apply route 2 times daily 20 each 3    Blood Pressure KIT 1 Units by Does not apply route daily 1 kit 0    Blood Glucose Monitoring Suppl (BLOOD GLUCOSE MONITOR SYSTEM) W/DEVICE KIT 1 Units by Does not apply route daily 1 kit 0    Blood Glucose Monitoring Suppl (ACCU-CHEK VIK PLUS) W/DEVICE KIT 1 kit by Does not apply route 4 times daily (before meals and nightly).  2 kit 0       Immunization History   Administered Date(s) Administered    COVID-19, Moderna, Primary or Immunocompromised, PF, 100mcg/0.5mL 03/15/2021, 04/12/2021    Influenza Virus Vaccine 02/27/2014, 12/03/2015, 11/15/2017    Influenza, Lavern Herb, 6 mo and older, IM, PF (Flulaval, Fluarix) 11/14/2018    Influenza, Lavern Herb, IM, (6 mo and older Fluzone, Flulaval, Fluarix and 3 yrs and older Afluria) 11/03/2017, 11/03/2017, 11/15/2017    Influenza, Lavern Herb, IM, PF (6 mo and older Fluzone, Flulaval, Fluarix, and 3 yrs and older Afluria) 01/08/2020    Influenza, Triv, 3 Years and older, IM, PF (Afluria 5yrs and older) 09/12/2016    Pneumococcal Polysaccharide ([id number]) 09/15/2017    Pneumococcal Vaccine 09/15/2017    Tdap (Boostrix, Adacel) 08/04/2018       Past Medical History:   Diagnosis Date    Arthritis     Atrial fibrillation (HCC)     Congestive heart failure (HCC)     Depression     Diabetes mellitus ([address]     GERD (gastroesophageal reflux disease)     Hyperlipidemia     Hypertension     Morbid obesity ([address]     Sleep apnea     uses cpap at home q hs     Type II or unspecified type diabetes mellitus without mention of complication, not stated as uncontrolled      Past Surgical History:   Procedure Laterality Date    ABLATION OF DYSRHYTHMIC FOCUS      COLONOSCOPY  4/13/2021    COLONOSCOPY POLYPECTOMY SNARE/COLD BIOPSY performed by Abi Mayers MD at [place] [id number] Left 6/10/2021    LEFT MIDDLE FINGER TRIGGER RELEASE performed by [facility]ksandra [facility]us [facility] at [facility]     Family History   Problem Relation Age of Onset    Diabetes Mother     High Blood Pressure Mother     Diabetes Maternal Aunt     High Blood Pressure Maternal Grandmother        Review of Systems:  Review of Systems   Constitutional: Negative for activity change, appetite change, fatigue and fever. HENT: Negative for congestion, ear discharge, ear pain, postnasal drip, rhinorrhea, sinus pressure, sneezing, sore throat, tinnitus and voice change. Respiratory: Positive for cough. Negative for apnea, choking, chest tightness, shortness of breath, wheezing and stridor. Cardiovascular: Negative for chest pain, palpitations and leg swelling. Gastrointestinal: Negative for abdominal distention, abdominal pain, anal bleeding, blood in stool, constipation and diarrhea. Musculoskeletal: Negative for arthralgias, back pain and gait problem. Skin: Negative for pallor and rash. Allergic/Immunologic: Negative for environmental allergies. Neurological: Negative for dizziness, tremors, seizures, syncope, speech difficulty, weakness, light-headedness, numbness and headaches. Hematological: Negative for adenopathy. Does not bruise/bleed easily. Psychiatric/Behavioral: Positive for sleep disturbance. Vitals:    05/10/22 0947   BP: 126/80   Pulse: 67   SpO2: 97%   Weight: (!) 370 lb (167.8 kg)   Height: 6' (1.829 m)     No flowsheet data found. Body mass index is 50.18 kg/m². Wt Readings from Last 3 Encounters:   05/10/22 (!) 370 lb (167.8 kg)   05/02/22 (!) 369 lb (167.4 kg)   04/28/22 (!) 373 lb (169.2 kg)     BP Readings from Last 3 Encounters:   05/10/22 126/80   05/02/22 138/80   04/28/22 (!) 158/101         Physical Exam  Constitutional:       General: He is not in acute distress. Appearance: He is well-developed. He is obese. He is not ill-appearing or diaphoretic. HENT:      Mouth/Throat:      Pharynx: No oropharyngeal exudate. Cardiovascular:      Rate and Rhythm: Normal rate and regular rhythm. Heart sounds: Normal heart sounds. No murmur heard. Pulmonary:      Effort: No respiratory distress. Breath sounds: Normal breath sounds. No wheezing or rales. Chest:      Chest wall: No tenderness. Abdominal:      General: There is no distension. Palpations: There is no mass. Tenderness: There is no abdominal tenderness. There is no guarding or rebound.    Musculoskeletal:         General: No swelling, tenderness or deformity. Skin:     Coloration: Skin is not pale. Findings: No erythema or rash. Neurological:      Mental Status: He is alert and oriented to person, place, and time. Cranial Nerves: No cranial nerve deficit. Motor: No abnormal muscle tone. Coordination: Coordination normal.      Deep Tendon Reflexes: Reflexes normal.             Health Maintenance   Topic Date Due    Depression Monitoring  Never done    Hepatitis C screen  Never done    Hepatitis B vaccine (1 of 3 - Risk 3-dose series) Never done    Pneumococcal 0-64 years Vaccine (2 - PCV) 09/15/2018    Diabetic retinal exam  08/28/2019    Shingles vaccine (1 of 2) Never done    Diabetic foot exam  02/25/2021    COVID-19 Vaccine (3 - Booster for Moderna series) 09/12/2021    Lipids  03/02/2022    Potassium  08/04/2022    Creatinine  08/04/2022    Flu vaccine (Season Ended) 09/01/2022    A1C test (Diabetic or Prediabetic)  10/27/2022    DTaP/Tdap/Td vaccine (2 - Td or Tdap) 08/04/2028    Colorectal Cancer Screen  04/13/2031    HIV screen  Completed    Hepatitis A vaccine  Aged Out    Hib vaccine  Aged Out    Meningococcal (ACWY) vaccine  Aged Out          Assessment/Plan:     Diagnosis Orders   1. Multiple lung nodules on CT  CT CHEST WO CONTRAST   2. Chronic cough  3. GERD    Patient's PFT study from 4/5 was personally reviewed, mild restriction only, FEV1 2.64 L [74% predicted]. No significant bronchodilator reversibility noted. Patient has not noticed any improvement in symptoms with use of Symbicort 80, requested patient to discontinue since he does not have asthma or reactive airway disease. Continued nighttime cough, also has symptoms suggestive of heartburn on occasions. Already on Protonix 40 mg daily with no significant benefit. No significant hiatal hernia noted on CT. Will refer patient to GI service for further evaluation.   Also suggested patient to stop smoking marijuana which could be contributing to the cough. Also patient might benefit from weight loss surgery, which he is considering. HR CT chest obtained on 4/5 showed small pulmonary nodules measuring up to 3 mm-scant smoking history, particularly marijuana. Will repeat CT chest without contrast in 1 year    Return in about 1 year (around 5/10/2023).

## 2022-05-11 ENCOUNTER — OFFICE VISIT (OUTPATIENT)
Dept: ORTHOPEDIC SURGERY | Age: 52
End: 2022-05-11
Payer: MEDICAID

## 2022-05-11 VITALS — HEIGHT: 72 IN | WEIGHT: 315 LBS | BODY MASS INDEX: 42.66 KG/M2

## 2022-05-11 DIAGNOSIS — M17.12 PRIMARY OSTEOARTHRITIS OF LEFT KNEE: ICD-10-CM

## 2022-05-11 DIAGNOSIS — M25.562 CHRONIC PAIN OF LEFT KNEE: Primary | ICD-10-CM

## 2022-05-11 DIAGNOSIS — G89.29 CHRONIC PAIN OF LEFT KNEE: Primary | ICD-10-CM

## 2022-05-11 PROCEDURE — 1036F TOBACCO NON-USER: CPT | Performed by: PHYSICIAN ASSISTANT

## 2022-05-11 PROCEDURE — 20610 DRAIN/INJ JOINT/BURSA W/O US: CPT | Performed by: PHYSICIAN ASSISTANT

## 2022-05-11 PROCEDURE — G8427 DOCREV CUR MEDS BY ELIG CLIN: HCPCS | Performed by: PHYSICIAN ASSISTANT

## 2022-05-11 PROCEDURE — 99204 OFFICE O/P NEW MOD 45 MIN: CPT | Performed by: PHYSICIAN ASSISTANT

## 2022-05-11 PROCEDURE — 3017F COLORECTAL CA SCREEN DOC REV: CPT | Performed by: PHYSICIAN ASSISTANT

## 2022-05-11 PROCEDURE — G8417 CALC BMI ABV UP PARAM F/U: HCPCS | Performed by: PHYSICIAN ASSISTANT

## 2022-05-11 PROCEDURE — L1810 KO ELASTIC WITH JOINTS: HCPCS | Performed by: PHYSICIAN ASSISTANT

## 2022-05-11 RX ORDER — ROPIVACAINE HYDROCHLORIDE 5 MG/ML
30 INJECTION, SOLUTION EPIDURAL; INFILTRATION; PERINEURAL ONCE
Status: COMPLETED | OUTPATIENT
Start: 2022-05-11 | End: 2022-05-11

## 2022-05-11 RX ORDER — BUPIVACAINE HYDROCHLORIDE 5 MG/ML
1 INJECTION, SOLUTION PERINEURAL ONCE
Status: COMPLETED | OUTPATIENT
Start: 2022-05-11 | End: 2022-05-11

## 2022-05-11 RX ORDER — TRIAMCINOLONE ACETONIDE 40 MG/ML
40 INJECTION, SUSPENSION INTRA-ARTICULAR; INTRAMUSCULAR ONCE
Status: COMPLETED | OUTPATIENT
Start: 2022-05-11 | End: 2022-05-11

## 2022-05-11 RX ADMIN — BUPIVACAINE HYDROCHLORIDE 5 MG: 5 INJECTION, SOLUTION PERINEURAL at 13:51

## 2022-05-11 RX ADMIN — TRIAMCINOLONE ACETONIDE 40 MG: 40 INJECTION, SUSPENSION INTRA-ARTICULAR; INTRAMUSCULAR at 13:51

## 2022-05-11 RX ADMIN — ROPIVACAINE HYDROCHLORIDE 30 ML: 5 INJECTION, SOLUTION EPIDURAL; INFILTRATION; PERINEURAL at 13:52

## 2022-05-11 NOTE — PROGRESS NOTES
Patient Name: Willie De La Cruz  : 1970  DOS: 2022        Chief Complaint:   Chief Complaint   Patient presents with    Knee Pain     LEFT Jeison Iglesias recent injury, ongoing pain       History of Present Illness:  Willie De La Cruz is a 45 y.o. male who presents with a chief complaint of continued complaints of pain in the knee with irritation with walking, stairs and with overuse. Pain in the knee regularly with swelling and discomfort. Increase in pain over the past several years time. Patient notes he has had ongoing issues with the left knee for the past several years. He notes its intermittent flaring of the arthritis he notes that this current flare has been lasting approximately 1 month's time and has created significant difficulty with his ability to walk and pursue activities of daily living. Rates current pain level as a 9 out of 10. Denies use of assistive walking devices or braces at present time. He notes that normally with rest and elevation the knee will settle down and calm down in regards to pain and he can then mobilize again but this time does not seem to be settling. Has been utilizing intermittent ibuprofen that helps but is not able to use a lot of it due to the fact that he is on Eliquis. Patient denies any fall trauma or injury or numbness burning tingling radiating pains down the leg notes he has used cortisone injections in the past and those have provided relief he has not pursued viscosupplementation. And being diabetic he is also limited in some of the medications he can use as well as having to deal with the Eliquis related issues. Wanting to know what he can do what needs to be done in regards to his knee he is hopeful to avoid total knee arthroplasty at this time. Medical History  Current Medications:   Prior to Admission medications    Medication Sig Start Date End Date Taking?  Authorizing Provider   doxycycline hyclate (VIBRAMYCIN) 100 MG capsule Take 1 capsule by mouth 2 times daily 5/2/22 6/11/22  Soco Latham MD   furosemide (LASIX) 80 MG tablet TAKE 1 TABLET BY MOUTH TWICE A DAY 4/28/22   Constantine Neil MD   insulin lispro, 1 Unit Dial, 100 UNIT/ML SOPN INJECT 7 UNITS INTO THE SKIN 3 TIMES DAILY (BEFORE MEALS) INJECT SUBCUTANEOUSLY 4/28/22   Constantine Neil MD   atorvastatin (LIPITOR) 20 MG tablet TAKE 1 TABLET BY MOUTH EVERY DAY 4/28/22   Constantine Neil MD   Multiple Vitamins-Minerals (THERAPEUTIC MULTIVITAMIN-MINERALS) tablet Take 1 tablet by mouth daily 4/28/22   Constantine Neil MD   ibuprofen (ADVIL;MOTRIN) 800 MG tablet Take 1 tablet by mouth every 8 hours as needed for Pain (Take with food) 4/28/22   Constantine Neil MD   allopurinol (ZYLOPRIM) 300 MG tablet TAKE 1 TABLET BY MOUTH EVERY DAY 4/20/22   Jerone Cushing, MD   lisinopril (PRINIVIL;ZESTRIL) 10 MG tablet TAKE 1 TABLET BY MOUTH EVERY DAY 4/13/22   Jerone Cushing, MD   budesonide-formoterol (SYMBICORT) 80-4.5 MCG/ACT AERO Inhale 2 puffs into the lungs 2 times daily 3/22/22   Biju Louis MD   Insulin Pen Needle (PEN NEEDLES) 31G X 6 MM MISC 1 each by Does not apply route daily May substitute to meet insur. requirements 12/10/21   PATRIZIA Martinez MD   Insulin Syringe-Needle U-100 (BD INSULIN SYRINGE U/F) 31G X 5/16\" 1 ML MISC USE AS DIRECTED 4 TIMES A DAY 12/10/21   Jerone Cushing, MD   potassium chloride (KLOR-CON M20) 20 MEQ extended release tablet TAKE 1 TABLET BY MOUTH EVERY DAY WITH BREAKFAST 12/2/21   Allayne Sack, DO   dilTIAZem (CARDIZEM CD) 120 MG extended release capsule Take one capsule by mouth every day 12/2/21   Allayne Sack, DO   apixaban (ELIQUIS) 5 MG TABS tablet TAKE 1 TABLET BY MOUTH TWICE A DAY 10/4/21   Bouchra Perkins MD   Handicap Placard MISC by Does not apply route Handicap placard effective from 7/27/2021 through 7/27/2026 7/27/21   Bouchra Perkins MD   metFORMIN (GLUCOPHAGE) 500 MG tablet TAKE 1 TABLET BY MOUTH TWICE A DAY WITH MEALS 6/88/69   Vick Diego MD   sennosides-docusate sodium (SENOKOT-S) 8.6-50 MG tablet Take 1 tablet by mouth 2 times daily as needed for Constipation 5/25/21   oSl Lucio MD   sildenafil (VIAGRA) 50 MG tablet Take 1 tablet by mouth as needed for Erectile Dysfunction 2/14/20   Nury Montiel MD   fluticasone (FLONASE) 50 MCG/ACT nasal spray 1 spray by Nasal route daily 1/6/20   Tanya Aldana MD   blood glucose monitor strips by Other route 4 times daily (after meals and at bedtime) 06/29/94   Vick Diego MD   insulin glargine (BASAGLAR KWIKPEN) 100 UNIT/ML injection pen patient using vials per [facility] inject 20 units at hs  Patient taking differently: 7 units TID 11/12/19   Emily Deluna MD   Alcohol Swabs PADS 1 Container by Does not apply route three times daily 10/1/19   Steven Motta MD   ACCU-CHEK FASTCLIX LANCETS MISC 20 Sticks by Does not apply route 2 times daily 10/1/19   Nury Montiel MD   Blood Pressure Monitoring (WRIST BLOOD PRESSURE MONITOR) MISC 1 Units by Does not apply route once for 1 dose 12/27/18 12/27/18  Tanya Aldana MD   Blood Pressure KIT 1 Units by Does not apply route daily 4/17/17   Scooter Chacon MD   Blood Glucose Monitoring Suppl (BLOOD GLUCOSE MONITOR SYSTEM) W/DEVICE KIT 1 Units by Does not apply route daily 4/17/17   Scooter Chacon MD   Blood Glucose Monitoring Suppl (ACCU-CHEK VIK PLUS) W/DEVICE KIT 1 kit by Does not apply route 4 times daily (before meals and nightly). 1/6/14   Audery Gloria     Allergies: No Known Allergies    Review of Systems:     Review of Systems ______  Constitutional: Negative for fever and diaphoresis. ____________  Respiratory: Negative for shortness of breath.  ________  Gastrointestinal: Negative for abdominal pain. ________  Musculoskeletal: Positive for joint pain. ____  Skin: Negative for itching. ____  Neurological: Negative for loss of consciousness.  ______________  All other systems reviewed and negative Past Medical History:   Diagnosis Date    Arthritis     Atrial fibrillation (HCC)     Congestive heart failure (HCC)     Depression     Diabetes mellitus (HCC)     GERD (gastroesophageal reflux disease)     Hyperlipidemia     Hypertension     Morbid obesity ([facility] [address]     Sleep apnea     uses cpap at home q hs     Type II or unspecified type diabetes mellitus without mention of complication, not stated as uncontrolled      Family History   Problem Relation Age of Onset    Diabetes Mother     High Blood Pressure Mother     Diabetes Maternal Aunt     High Blood Pressure Maternal Grandmother      Social History     Socioeconomic History    Marital status: Single     Spouse name: Not on file    Number of children: Not on file    Years of education: Not on file    Highest education level: Not on file   Occupational History    Not on file   Tobacco Use    Smoking status: Never Smoker    Smokeless tobacco: Never Used   Vaping Use    Vaping Use: Never used   Substance and Sexual Activity    Alcohol use: No     Alcohol/week: 0.0 standard drinks    Drug use: Yes     Types: Marijuana (Weed), Cocaine     Comment: no cocaine for over 6 years, marijuana occ    Sexual activity: Not Currently   Other Topics Concern    Not on file   Social History Narrative    Not on file     Social Determinants of Health     Financial Resource Strain:     Difficulty of Paying Living Expenses: Not on file   Food Insecurity:     Worried About Running Out of Food in the Last Year: Not on file    Elisa of Food in the Last Year: Not on file   Transportation Needs:     Lack of Transportation (Medical): Not on file    Lack of Transportation (Non-Medical):  Not on file   Physical Activity:     Days of Exercise per Week: Not on file    Minutes of Exercise per Session: Not on file   Stress:     Feeling of Stress : Not on file   Social Connections:     Frequency of Communication with Friends and Family: Not on file    Frequency of Social Gatherings with Friends and Family: Not on file    Attends Voodoo Services: Not on file    Active Member of Clubs or Organizations: Not on file    Attends Club or Organization Meetings: Not on file    Marital Status: Not on file   Intimate Partner Violence:     Fear of Current or Ex-Partner: Not on file    Emotionally Abused: Not on file    Physically Abused: Not on file    Sexually Abused: Not on file   Housing Stability:     Unable to Pay for Housing in the Last Year: Not on file    Number of [place] in the Last Year: Not on file    Unstable Housing in the Last Year: Not on file         Physical Exam __  Constitutional: She is oriented to person, place, and time and well-developed, well-nourished, and in no distress. No distress. ____  HENT:   Head: Normocephalic and atraumatic. ____  Eyes: Conjunctivae are normal. ________  Cardiovascular: Intact distal pulses. ____  Pulmonary/Chest: Effort normal. ________________________  Neurological: She is alert and oriented to person, place, and time. ____  Skin: Skin is dry. She is not diaphoretic. ____  Psychiatric: Mood, affect and judgment normal. ______          Assessment   Vital Signs: There were no vitals filed for this visit. left Knee shows evidence for DJD with valgus obvious pseudolaxity, pain with weight bearing, antalgic gait and palpable osteophytes. Inspection: Moderate anterior swelling. Swelling is present with mild effusion. The posterior aspect of the knee appears to be full with tenderness. There is no erythema, rash, or ecchymosis. Range of Motion:  Right 0-120 left 0-120     Pain with varus and valgus testing    There is valgus deformity noted    Strength:  Hamstrings rated: 4/5. Quadriceps rated: 4/5    Palpation: There is moderate tenderness along the lateral patellofemoral joint line. Special Tests: Patellar Compression test is positive. Valgus & Varus test is positive.         Skin: There are no rashes, ulcerations or lesions. Gait: Gait pattern is antalgic  Skin shows no rashes/ecchymosis to the affected area, no hyperesthesias, no discoloration, no temperature or color discrepancies. NEUROLOGICALLY: There is no evidence for sensory or motor deficits in the extremity. Coordination appears full with no spacticity or rigidity. Reflexes appear to be symmetric. Distal circulation intact. No signs of RSD. Additional Comments: Hip range of motion intact        Procedure(s):   Injection Procedure Note  Procedure Details     Verbal consent was obtained for the procedure. The left knee(s) was prepped with iodine and the skin was anesthetized. Using a 22 gauge needle the left knee(s) joint is injected with 2 ml 1% lidocaine and 2 ml of triamcinolone (KENALOG) 40mg/ml under the lateral aspect of the knee. The injection site was cleansed with topical isopropyl alcohol and a dressing was applied. Complications:  None; patient tolerated the procedure well. Diagnostic Test Findings:   Xray   Have reviewed the xrays above from 05/11/22   Three-view x-ray of the left knee AP lateral and sunrise views performed on 12/27/2021 and reviewed today and my impression is: Revealing severe lateralized patella with severe lateral patellar joint space loss and arthritic formations noted also noted valgus deformity with moderate to severe lateral joint space loss with osteophyte formation. Mild medial compartment changes. Assessment and Plan:       Diagnosis Orders   1. Chronic pain of left knee  IA ARTHROCENTESIS ASPIR&/INJ MAJOR JT/BURSA W/O US    triamcinolone acetonide (KENALOG-40) injection 40 mg    bupivacaine (MARCAINE) 0.5 % injection 5 mg    ropivacaine (NAROPIN) 0.5% injection 30 mL    DJO Reaction Brace   2.  Primary osteoarthritis of left knee  Ambulatory referral to Physical Therapy    EUFLEXXA INJECTION (For Auth/Precert)    DJO Reaction Brace              The orders below, if any, were placed during this visit:   Orders Placed This Encounter   Procedures    Ambulatory referral to Physical Therapy     Referral Priority:   Routine     Referral Type:   Eval and Treat     Requested Specialty:   Physical Therapy     Number of Visits Requested:   1    OH ARTHROCENTESIS ASPIR&/INJ MAJOR JT/BURSA W/O US    EUFLEXXA INJECTION (For Auth/Precert)     Standing Status:   Future     Standing Expiration Date:   5/11/2023    DJO Reaction Brace     Patient was prescribed a DO Reaction Knee Brace. The left knee will require stabilization / immobilization from this semi-rigid / rigid orthosis to improve their function. The orthosis will assist in protecting the affected area, provide functional support and facilitate healing. The patient was educated and fit by a healthcare professional with expert knowledge and specialization in brace application while under the direct supervision of the treating physician. Verbal and written instructions for the use of and application of this item were provided. They were instructed to contact the office immediately should the brace result in increased pain, decreased sensation, increased swelling or worsening of the condition. Treatment Plan:   -Place patient into a DJ OA reaction knee brace to help with stabilization of the lateral compartment of the knee. -Gave patient corticosteroid injection of the left knee at today's visit no complaints or concerns arisen advised patient rest ice elevate the knee for next 24 to 48 hours avoid heavy or strenuous activities for that timeframe increase activities as tolerated. -Gave patient order for physical therapy for evaluation treatment of left knee osteoarthritis  -Discussed continued management with conservative means to help provide patient relief and stabilization of the arthritis and the knee pain.  -Discussed weight loss and importance of continued work on weight loss.   -Ordered Euflexxa viscosupplementation for utilization when the cortisone injection begins to fade.   Advised patient to call and schedule once approved when he is ready.  -Spent 45 minutes in discussion with patient regarding future current treatment management options         Sujata Gunn, [id number]

## 2022-05-11 NOTE — PROGRESS NOTES
Administrations This Visit     bupivacaine (MARCAINE) 0.5 % injection 5 mg     Admin Date  05/11/2022  13:51 Action  Given Dose  5 mg Route  Intra-artICUlar Site  Knee Left Administered By  Keith Tello    Ordering Provider: EROS TaoMarlene Goetz 47: [id number]    Lot#: [id number]    : [address] [facility]    Patient Supplied?: No          ropivacaine (NAROPIN) 0.5% injection 30 mL     Admin Date  05/11/2022  13:52 Action  Given Dose  30 mL Route  Other Site  Knee Left Administered By  Keith Tello    Ordering Provider: EROS Tao    NDC: 05061-808-67    Lot#: [id number]    : [address] [facility]    Patient Supplied?: No          triamcinolone acetonide (KENALOG-40) injection 40 mg     Admin Date  05/11/2022  13:51 Action  Given Dose  40 mg Route  Intra-artICUlar Site  Knee Left Administered By  Keith Tello    Ordering Provider: EROS Tao    NDC: 8897-4226-96    Lot#: [id number]    : [facility] [employer] [facility])    Patient Supplied?: No

## 2022-05-16 ENCOUNTER — OFFICE VISIT (OUTPATIENT)
Dept: INTERNAL MEDICINE CLINIC | Age: 52
End: 2022-05-16
Payer: MEDICAID

## 2022-05-16 VITALS
SYSTOLIC BLOOD PRESSURE: 145 MMHG | BODY MASS INDEX: 50.18 KG/M2 | WEIGHT: 315 LBS | TEMPERATURE: 96.8 F | DIASTOLIC BLOOD PRESSURE: 89 MMHG | RESPIRATION RATE: 17 BRPM | OXYGEN SATURATION: 96 % | HEART RATE: 60 BPM

## 2022-05-16 DIAGNOSIS — L73.2 HIDRADENITIS: Primary | ICD-10-CM

## 2022-05-16 PROCEDURE — 99213 OFFICE O/P EST LOW 20 MIN: CPT

## 2022-05-16 RX ORDER — PEN NEEDLE, DIABETIC 31 G X1/4"
1 NEEDLE, DISPOSABLE MISCELLANEOUS DAILY
Qty: 100 EACH | Refills: 5 | Status: SHIPPED | OUTPATIENT
Start: 2022-05-16

## 2022-05-16 NOTE — PROGRESS NOTES
Department of General Surgery - Adult  General Surgery Service  Resident Office Note      CHIEF COMPLAINT:  Hidradenitis     HISTORY OF PRESENT ILLNESS:  This is a 45 y.o. male with Hx of atrial fibrillation on Eliquis, CHF, T2DM, GERD,  HTN, and HLD who presents today for follow-up regarding multiple skin lesions concerning for hidradenitis. Lesions have been present for 20+ years and has intermittently had lesions drained in ED over the years. Patient was seen in surgery clinic 2 weeks ago for 3 concerning lesions: one at R axilla region, one on R buttocks, and one under pannus on the L side. Patient was prescribed long-term doxycycline and has been taking it consistently. Patient states that overall he has seen improvement in his lesions, stating that R midaxillary lesion has decreased in size and drainage, R buttocks lesion has resolved, and L pannus lesion has resolved. Patient reports he still has some drainage from R axillary lesion but this has decreased in amount. Patient states he has never smoked and has been consistent with monitoring his glucose levels (reports daily values of 110s-130s). Patient denies any fevers or chills. Patient states he is having some loose stools and discomfort after starting antibiotics.     Past Medical History:        Diagnosis Date    Arthritis     Atrial fibrillation ([address]     Congestive heart failure (HCC)     Depression     Diabetes mellitus (HCC)     GERD (gastroesophageal reflux disease)     Hyperlipidemia     Hypertension     Morbid obesity ([address]     Sleep apnea     uses cpap at home q hs     Type II or unspecified type diabetes mellitus without mention of complication, not stated as uncontrolled        Past Surgical History:           Procedure Laterality Date    ABLATION OF DYSRHYTHMIC FOCUS      COLONOSCOPY  4/13/2021    COLONOSCOPY POLYPECTOMY SNARE/COLD BIOPSY performed by Chula Samuel MD at [place] [address] Left 6/10/2021 LEFT MIDDLE FINGER TRIGGER RELEASE performed by Yehuda Padilla MD at [facility]       Allergies:  Patient has no known allergies. Medications:   Home Meds  Current Outpatient Medications on File Prior to Visit   Medication Sig Dispense Refill    doxycycline hyclate (VIBRAMYCIN) 100 MG capsule Take 1 capsule by mouth 2 times daily 20 capsule 3    furosemide (LASIX) 80 MG tablet TAKE 1 TABLET BY MOUTH TWICE A DAY 60 tablet 0    insulin lispro, 1 Unit Dial, 100 UNIT/ML SOPN INJECT 7 UNITS INTO THE SKIN 3 TIMES DAILY (BEFORE MEALS) INJECT SUBCUTANEOUSLY 15 mL 0    atorvastatin (LIPITOR) 20 MG tablet TAKE 1 TABLET BY MOUTH EVERY DAY 30 tablet 5    Multiple Vitamins-Minerals (THERAPEUTIC MULTIVITAMIN-MINERALS) tablet Take 1 tablet by mouth daily 30 tablet 3    ibuprofen (ADVIL;MOTRIN) 800 MG tablet Take 1 tablet by mouth every 8 hours as needed for Pain (Take with food) 30 tablet 0    allopurinol (ZYLOPRIM) 300 MG tablet TAKE 1 TABLET BY MOUTH EVERY DAY 30 tablet 0    lisinopril (PRINIVIL;ZESTRIL) 10 MG tablet TAKE 1 TABLET BY MOUTH EVERY DAY 30 tablet 5    budesonide-formoterol (SYMBICORT) 80-4.5 MCG/ACT AERO Inhale 2 puffs into the lungs 2 times daily 1 each 3    Insulin Pen Needle (PEN NEEDLES) 31G X 6 MM MISC 1 each by Does not apply route daily May substitute to meet insur. requirements 100 each 5    Insulin Syringe-Needle U-100 (BD INSULIN SYRINGE U/F) 31G X 5/16\" 1 ML MISC USE AS DIRECTED 4 TIMES A  each 5    potassium chloride (KLOR-CON M20) 20 MEQ extended release tablet TAKE 1 TABLET BY MOUTH EVERY DAY WITH BREAKFAST 90 tablet 1    dilTIAZem (CARDIZEM CD) 120 MG extended release capsule Take one capsule by mouth every day 90 capsule 1    apixaban (ELIQUIS) 5 MG TABS tablet TAKE 1 TABLET BY MOUTH TWICE A DAY 60 tablet 5    Handicap Placard MISC by Does not apply route Handicap placard effective from 7/27/2021 through 7/27/2026 1 each 0    metFORMIN (GLUCOPHAGE) 500 MG tablet TAKE 1 TABLET BY MOUTH TWICE A DAY WITH MEALS 180 tablet 3    sennosides-docusate sodium (SENOKOT-S) 8.6-50 MG tablet Take 1 tablet by mouth 2 times daily as needed for Constipation 20 tablet 0    sildenafil (VIAGRA) 50 MG tablet Take 1 tablet by mouth as needed for Erectile Dysfunction 30 tablet 3    fluticasone (FLONASE) 50 MCG/ACT nasal spray 1 spray by Nasal route daily 1 Bottle 2    blood glucose monitor strips by Other route 4 times daily (after meals and at bedtime) 60 strip 2    insulin glargine (BASAGLAR KWIKPEN) 100 UNIT/ML injection pen patient using vials per [facility] inject 20 units at hs (Patient taking differently: 7 units TID) 5 mL 2    Alcohol Swabs PADS 1 Container by Does not apply route three times daily 100 each 5    ACCU-CHEK FASTCLIX LANCETS MISC 20 Sticks by Does not apply route 2 times daily 20 each 3    Blood Pressure Monitoring (WRIST BLOOD PRESSURE MONITOR) MISC 1 Units by Does not apply route once for 1 dose 1 each 0    Blood Pressure KIT 1 Units by Does not apply route daily 1 kit 0    Blood Glucose Monitoring Suppl (BLOOD GLUCOSE MONITOR SYSTEM) W/DEVICE KIT 1 Units by Does not apply route daily 1 kit 0    Blood Glucose Monitoring Suppl (ACCU-CHEK VIK PLUS) W/DEVICE KIT 1 kit by Does not apply route 4 times daily (before meals and nightly). 2 kit 0     No current facility-administered medications on file prior to visit. Current Meds  No current facility-administered medications for this visit. Family History:   Family History   Problem Relation Age of Onset    Diabetes Mother     High Blood Pressure Mother     Diabetes Maternal Aunt     High Blood Pressure Maternal Grandmother        Social History:   TOBACCO:   reports that he has never smoked. He has never used smokeless tobacco.  ETOH:   reports no history of alcohol use. DRUGS:   reports current drug use. Drugs: Marijuana (Weed) and Cocaine.     REVIEW OF SYSTEMS:    A 10 point review of systems was conducted, significant findings as noted in HPI. All other systems negative. PHYSICAL EXAM:    There were no vitals filed for this visit. General appearance: alert, resting in bed  Neuro: A&Ox3, no focal deficits  HENT: trachea midline, no JVD, no LAD  Eyes: PERRLA, no scleral icterus  Chest/Lungs: CTAB, no crackles/rales, wheezes/rhonchi   Cardiovascular: RRR, no murmurs/gallops/rubs  Abdomen: obese, soft, non-tender, non-distended, no guarding/rigidity  Skin: Right axilla - 2x2cm area of induration, no active drainage or fluctuance noted, left pannus lesion resolved, no drainage or fluctuance; right buttock lesion resolved no active drainage or fluctuance  Extremities: no edema, no cyanosis    ASSESSMENT AND PLAN:  This is a 45 y.o. male with Hx of atrial fibrillation on Eliquis, CHF, T2DM, GERD,  HTN, and HLD who presents today for follow-up regarding multiple skin lesions concerning for hidradenitis. Patient was seen in clinic 2 weeks ago, during which he was having active drainage of a L pannus lesion, as well as a lesion in R axillary region and R buttocks. Patient was prescribed long-term Doxycycline and has reported significant improvement in his lesions. R axillary lesion has decreased in size and both the R buttocks and L pannus lesions have resolved. R axillary lesion continues to be indurated but without drainage or fluctuance.     - cont PO Doxycycline; discussed with patient importance of adhering to maintenance antibiotics for treatment of hidradenitis as well as importance of glucose control  - patient reports some stomach upset and occasional loose stools with antibiotics; recommending probiotics  - no need for further follow up with surgery clinic  - patient instructed to visit ED or return to clinic if develops acute worsening in lesions, increased drainage, size or pain with lesions    Radha Law DO  05/16/22  9:44 AM  [phone]

## 2022-05-20 RX ORDER — ALLOPURINOL 300 MG/1
TABLET ORAL
Qty: 30 TABLET | Refills: 3 | Status: SHIPPED | OUTPATIENT
Start: 2022-05-20 | End: 2022-09-26

## 2022-05-23 ENCOUNTER — TELEPHONE (OUTPATIENT)
Dept: ORTHOPEDIC SURGERY | Age: 52
End: 2022-05-23

## 2022-05-23 NOTE — TELEPHONE ENCOUNTER
I tried to call the patient 2 times, someone picks up but does not answer. He is approved for the gel injection if he wants it. He would just need to schedule an appointment.

## 2022-05-24 DIAGNOSIS — I10 ESSENTIAL HYPERTENSION: ICD-10-CM

## 2022-05-24 RX ORDER — GLUCOSA SU 2KCL/CHONDROITIN SU 500-400 MG
CAPSULE ORAL
Qty: 30 TABLET | Refills: 3 | Status: SHIPPED | OUTPATIENT
Start: 2022-05-24

## 2022-06-14 RX ORDER — FUROSEMIDE 80 MG
TABLET ORAL
Qty: 60 TABLET | Refills: 3 | Status: SHIPPED | OUTPATIENT
Start: 2022-06-14 | End: 2022-09-30 | Stop reason: SDUPTHER

## 2022-06-17 DIAGNOSIS — I48.0 PAROXYSMAL A-FIB (HCC): ICD-10-CM

## 2022-06-17 DIAGNOSIS — I10 ESSENTIAL HYPERTENSION: Chronic | ICD-10-CM

## 2022-06-17 DIAGNOSIS — E87.6 POTASSIUM DEFICIENCY: ICD-10-CM

## 2022-06-17 RX ORDER — DILTIAZEM HYDROCHLORIDE 120 MG/1
CAPSULE, COATED, EXTENDED RELEASE ORAL
Qty: 90 CAPSULE | Refills: 0 | Status: SHIPPED | OUTPATIENT
Start: 2022-06-17 | End: 2022-09-29 | Stop reason: SDUPTHER

## 2022-06-17 RX ORDER — POTASSIUM CHLORIDE 20 MEQ/1
TABLET, EXTENDED RELEASE ORAL
Qty: 90 TABLET | Refills: 0 | Status: SHIPPED | OUTPATIENT
Start: 2022-06-17 | End: 2022-09-29 | Stop reason: SDUPTHER

## 2022-06-22 ENCOUNTER — APPOINTMENT (OUTPATIENT)
Dept: GENERAL RADIOLOGY | Age: 52
End: 2022-06-22
Payer: MEDICAID

## 2022-06-22 ENCOUNTER — HOSPITAL ENCOUNTER (OUTPATIENT)
Age: 52
Setting detail: OBSERVATION
Discharge: HOME | End: 2022-06-23
Attending: EMERGENCY MEDICINE | Admitting: INTERNAL MEDICINE
Payer: MEDICAID

## 2022-06-22 DIAGNOSIS — R07.9 CHEST PAIN, UNSPECIFIED TYPE: Primary | ICD-10-CM

## 2022-06-22 LAB
ANION GAP SERPL CALCULATED.3IONS-SCNC: 15 MMOL/L (ref 3–16)
BASOPHILS ABSOLUTE: 0.1 K/UL (ref 0–0.2)
BASOPHILS RELATIVE PERCENT: 1.4 %
BUN BLDV-MCNC: 21 MG/DL (ref 7–20)
CALCIUM SERPL-MCNC: 9.1 MG/DL (ref 8.3–10.6)
CHLORIDE BLD-SCNC: 100 MMOL/L (ref 99–110)
CO2: 23 MMOL/L (ref 21–32)
CREAT SERPL-MCNC: 1 MG/DL (ref 0.9–1.3)
EKG ATRIAL RATE: 72 BPM
EKG DIAGNOSIS: NORMAL
EKG P AXIS: 41 DEGREES
EKG P-R INTERVAL: 162 MS
EKG Q-T INTERVAL: 390 MS
EKG QRS DURATION: 82 MS
EKG QTC CALCULATION (BAZETT): 427 MS
EKG R AXIS: -39 DEGREES
EKG T AXIS: 6 DEGREES
EKG VENTRICULAR RATE: 72 BPM
EOSINOPHILS ABSOLUTE: 0.1 K/UL (ref 0–0.6)
EOSINOPHILS RELATIVE PERCENT: 0.8 %
GFR AFRICAN AMERICAN: >60
GFR NON-AFRICAN AMERICAN: >60
GLUCOSE BLD-MCNC: 126 MG/DL (ref 70–99)
GLUCOSE BLD-MCNC: 145 MG/DL (ref 70–99)
HCT VFR BLD CALC: 44.6 % (ref 40.5–52.5)
HEMOGLOBIN: 15.4 G/DL (ref 13.5–17.5)
INFLUENZA A: NOT DETECTED
INFLUENZA B: NOT DETECTED
LV EF: 59 %
LVEF MODALITY: NORMAL
LYMPHOCYTES ABSOLUTE: 2.9 K/UL (ref 1–5.1)
LYMPHOCYTES RELATIVE PERCENT: 36 %
MAGNESIUM: 1.9 MG/DL (ref 1.8–2.4)
MCH RBC QN AUTO: 32.6 PG (ref 26–34)
MCHC RBC AUTO-ENTMCNC: 34.5 G/DL (ref 31–36)
MCV RBC AUTO: 94.6 FL (ref 80–100)
MONOCYTES ABSOLUTE: 0.5 K/UL (ref 0–1.3)
MONOCYTES RELATIVE PERCENT: 5.9 %
NEUTROPHILS ABSOLUTE: 4.5 K/UL (ref 1.7–7.7)
NEUTROPHILS RELATIVE PERCENT: 55.9 %
PDW BLD-RTO: 13.8 % (ref 12.4–15.4)
PERFORMED ON: ABNORMAL
PLATELET # BLD: 222 K/UL (ref 135–450)
PMV BLD AUTO: 10 FL (ref 5–10.5)
POTASSIUM REFLEX MAGNESIUM: 3.4 MMOL/L (ref 3.5–5.1)
PRO-BNP: 77 PG/ML (ref 0–124)
RBC # BLD: 4.72 M/UL (ref 4.2–5.9)
SARS-COV-2 RNA, RT PCR: NOT DETECTED
SODIUM BLD-SCNC: 138 MMOL/L (ref 136–145)
TROPONIN: <0.01 NG/ML
WBC # BLD: 8.1 K/UL (ref 4–11)

## 2022-06-22 PROCEDURE — 6360000002 HC RX W HCPCS: Performed by: PHYSICIAN ASSISTANT

## 2022-06-22 PROCEDURE — 96375 TX/PRO/DX INJ NEW DRUG ADDON: CPT

## 2022-06-22 PROCEDURE — 6370000000 HC RX 637 (ALT 250 FOR IP): Performed by: INTERNAL MEDICINE

## 2022-06-22 PROCEDURE — 83735 ASSAY OF MAGNESIUM: CPT

## 2022-06-22 PROCEDURE — 3430000000 HC RX DIAGNOSTIC RADIOPHARMACEUTICAL: Performed by: PHYSICIAN ASSISTANT

## 2022-06-22 PROCEDURE — 78452 HT MUSCLE IMAGE SPECT MULT: CPT

## 2022-06-22 PROCEDURE — 36415 COLL VENOUS BLD VENIPUNCTURE: CPT

## 2022-06-22 PROCEDURE — 83880 ASSAY OF NATRIURETIC PEPTIDE: CPT

## 2022-06-22 PROCEDURE — 87636 SARSCOV2 & INF A&B AMP PRB: CPT

## 2022-06-22 PROCEDURE — 85025 COMPLETE CBC W/AUTO DIFF WBC: CPT

## 2022-06-22 PROCEDURE — 93005 ELECTROCARDIOGRAM TRACING: CPT | Performed by: INTERNAL MEDICINE

## 2022-06-22 PROCEDURE — 84484 ASSAY OF TROPONIN QUANT: CPT

## 2022-06-22 PROCEDURE — 80048 BASIC METABOLIC PNL TOTAL CA: CPT

## 2022-06-22 PROCEDURE — 6370000000 HC RX 637 (ALT 250 FOR IP): Performed by: PHYSICIAN ASSISTANT

## 2022-06-22 PROCEDURE — 93005 ELECTROCARDIOGRAM TRACING: CPT | Performed by: PHYSICIAN ASSISTANT

## 2022-06-22 PROCEDURE — G0378 HOSPITAL OBSERVATION PER HR: HCPCS

## 2022-06-22 PROCEDURE — A9502 TC99M TETROFOSMIN: HCPCS | Performed by: PHYSICIAN ASSISTANT

## 2022-06-22 PROCEDURE — 99285 EMERGENCY DEPT VISIT HI MDM: CPT

## 2022-06-22 PROCEDURE — 6360000002 HC RX W HCPCS: Performed by: INTERNAL MEDICINE

## 2022-06-22 PROCEDURE — 93017 CV STRESS TEST TRACING ONLY: CPT

## 2022-06-22 PROCEDURE — 2580000003 HC RX 258: Performed by: PHYSICIAN ASSISTANT

## 2022-06-22 PROCEDURE — 71045 X-RAY EXAM CHEST 1 VIEW: CPT

## 2022-06-22 PROCEDURE — 96374 THER/PROPH/DIAG INJ IV PUSH: CPT

## 2022-06-22 RX ORDER — MORPHINE SULFATE 2 MG/ML
2 INJECTION, SOLUTION INTRAMUSCULAR; INTRAVENOUS
Status: COMPLETED | OUTPATIENT
Start: 2022-06-22 | End: 2022-06-23

## 2022-06-22 RX ORDER — SODIUM CHLORIDE 0.9 % (FLUSH) 0.9 %
5-40 SYRINGE (ML) INJECTION PRN
Status: DISCONTINUED | OUTPATIENT
Start: 2022-06-22 | End: 2022-06-23 | Stop reason: HOSPADM

## 2022-06-22 RX ORDER — KETOROLAC TROMETHAMINE 30 MG/ML
15 INJECTION, SOLUTION INTRAMUSCULAR; INTRAVENOUS ONCE
Status: COMPLETED | OUTPATIENT
Start: 2022-06-22 | End: 2022-06-22

## 2022-06-22 RX ORDER — DEXTROSE MONOHYDRATE 50 MG/ML
100 INJECTION, SOLUTION INTRAVENOUS PRN
Status: DISCONTINUED | OUTPATIENT
Start: 2022-06-22 | End: 2022-06-23 | Stop reason: HOSPADM

## 2022-06-22 RX ORDER — SODIUM CHLORIDE 9 MG/ML
INJECTION, SOLUTION INTRAVENOUS PRN
Status: DISCONTINUED | OUTPATIENT
Start: 2022-06-22 | End: 2022-06-23 | Stop reason: HOSPADM

## 2022-06-22 RX ORDER — SODIUM CHLORIDE 0.9 % (FLUSH) 0.9 %
5-40 SYRINGE (ML) INJECTION EVERY 12 HOURS SCHEDULED
Status: DISCONTINUED | OUTPATIENT
Start: 2022-06-22 | End: 2022-06-23 | Stop reason: HOSPADM

## 2022-06-22 RX ORDER — POTASSIUM CHLORIDE 20 MEQ/1
20 TABLET, EXTENDED RELEASE ORAL
Status: DISCONTINUED | OUTPATIENT
Start: 2022-06-23 | End: 2022-06-23 | Stop reason: HOSPADM

## 2022-06-22 RX ORDER — DILTIAZEM HYDROCHLORIDE 120 MG/1
120 CAPSULE, COATED, EXTENDED RELEASE ORAL DAILY
Status: DISCONTINUED | OUTPATIENT
Start: 2022-06-22 | End: 2022-06-23 | Stop reason: HOSPADM

## 2022-06-22 RX ORDER — LISINOPRIL 10 MG/1
10 TABLET ORAL DAILY
Status: DISCONTINUED | OUTPATIENT
Start: 2022-06-22 | End: 2022-06-23 | Stop reason: HOSPADM

## 2022-06-22 RX ORDER — ONDANSETRON 2 MG/ML
4 INJECTION INTRAMUSCULAR; INTRAVENOUS EVERY 30 MIN PRN
Status: DISCONTINUED | OUTPATIENT
Start: 2022-06-22 | End: 2022-06-23 | Stop reason: HOSPADM

## 2022-06-22 RX ORDER — INSULIN LISPRO 100 [IU]/ML
INJECTION, SOLUTION INTRAVENOUS; SUBCUTANEOUS
Qty: 5 PEN | Refills: 3 | Status: SHIPPED | OUTPATIENT
Start: 2022-06-22

## 2022-06-22 RX ORDER — FUROSEMIDE 80 MG
80 TABLET ORAL 2 TIMES DAILY
Status: DISCONTINUED | OUTPATIENT
Start: 2022-06-22 | End: 2022-06-23 | Stop reason: HOSPADM

## 2022-06-22 RX ORDER — ATORVASTATIN CALCIUM 40 MG/1
40 TABLET, FILM COATED ORAL NIGHTLY
Status: DISCONTINUED | OUTPATIENT
Start: 2022-06-22 | End: 2022-06-23 | Stop reason: HOSPADM

## 2022-06-22 RX ORDER — ASPIRIN 81 MG/1
81 TABLET, CHEWABLE ORAL DAILY
Status: DISCONTINUED | OUTPATIENT
Start: 2022-06-22 | End: 2022-06-23 | Stop reason: HOSPADM

## 2022-06-22 RX ORDER — INSULIN LISPRO 100 [IU]/ML
0-3 INJECTION, SOLUTION INTRAVENOUS; SUBCUTANEOUS NIGHTLY
Status: DISCONTINUED | OUTPATIENT
Start: 2022-06-22 | End: 2022-06-23 | Stop reason: HOSPADM

## 2022-06-22 RX ORDER — INSULIN LISPRO 100 [IU]/ML
0-6 INJECTION, SOLUTION INTRAVENOUS; SUBCUTANEOUS
Status: DISCONTINUED | OUTPATIENT
Start: 2022-06-22 | End: 2022-06-23 | Stop reason: HOSPADM

## 2022-06-22 RX ADMIN — ASPIRIN 81 MG: 81 TABLET, CHEWABLE ORAL at 18:20

## 2022-06-22 RX ADMIN — LISINOPRIL 10 MG: 10 TABLET ORAL at 18:22

## 2022-06-22 RX ADMIN — MORPHINE SULFATE 2 MG: 2 INJECTION, SOLUTION INTRAMUSCULAR; INTRAVENOUS at 22:15

## 2022-06-22 RX ADMIN — KETOROLAC TROMETHAMINE 15 MG: 30 INJECTION, SOLUTION INTRAMUSCULAR at 01:26

## 2022-06-22 RX ADMIN — TETROFOSMIN 30 MILLICURIE: 1.38 INJECTION, POWDER, LYOPHILIZED, FOR SOLUTION INTRAVENOUS at 09:08

## 2022-06-22 RX ADMIN — SODIUM CHLORIDE, PRESERVATIVE FREE 10 ML: 5 INJECTION INTRAVENOUS at 18:18

## 2022-06-22 RX ADMIN — DILTIAZEM HYDROCHLORIDE 120 MG: 120 CAPSULE, EXTENDED RELEASE ORAL at 18:21

## 2022-06-22 RX ADMIN — APIXABAN 5 MG: 5 TABLET, FILM COATED ORAL at 22:17

## 2022-06-22 RX ADMIN — INSULIN GLARGINE 20 UNITS: 100 INJECTION, SOLUTION SUBCUTANEOUS at 22:19

## 2022-06-22 RX ADMIN — ATORVASTATIN CALCIUM 40 MG: 40 TABLET, FILM COATED ORAL at 22:17

## 2022-06-22 RX ADMIN — SODIUM CHLORIDE, PRESERVATIVE FREE 10 ML: 5 INJECTION INTRAVENOUS at 22:17

## 2022-06-22 ASSESSMENT — PAIN DESCRIPTION - LOCATION
LOCATION: CHEST

## 2022-06-22 ASSESSMENT — ENCOUNTER SYMPTOMS
EYE PAIN: 0
SORE THROAT: 0
VOMITING: 0
NAUSEA: 0
ABDOMINAL PAIN: 0
ABDOMINAL DISTENTION: 0
COUGH: 0
WHEEZING: 0
TROUBLE SWALLOWING: 0
CHEST TIGHTNESS: 0
COLOR CHANGE: 0
COUGH: 1
DIARRHEA: 0
RHINORRHEA: 1
SHORTNESS OF BREATH: 0

## 2022-06-22 ASSESSMENT — PAIN SCALES - GENERAL
PAINLEVEL_OUTOF10: 0
PAINLEVEL_OUTOF10: 3
PAINLEVEL_OUTOF10: 8
PAINLEVEL_OUTOF10: 0
PAINLEVEL_OUTOF10: 6
PAINLEVEL_OUTOF10: 3
PAINLEVEL_OUTOF10: 6

## 2022-06-22 ASSESSMENT — PAIN DESCRIPTION - FREQUENCY
FREQUENCY: CONTINUOUS
FREQUENCY: CONTINUOUS

## 2022-06-22 ASSESSMENT — PAIN DESCRIPTION - ORIENTATION
ORIENTATION: RIGHT
ORIENTATION: LEFT;LOWER
ORIENTATION: LEFT;MID

## 2022-06-22 ASSESSMENT — PAIN DESCRIPTION - ONSET
ONSET: ON-GOING
ONSET: ON-GOING

## 2022-06-22 ASSESSMENT — HEART SCORE
ECG: 0
ECG: 0

## 2022-06-22 ASSESSMENT — PAIN DESCRIPTION - DESCRIPTORS
DESCRIPTORS: DISCOMFORT

## 2022-06-22 ASSESSMENT — PAIN DESCRIPTION - PAIN TYPE
TYPE: ACUTE PAIN
TYPE: ACUTE PAIN

## 2022-06-22 ASSESSMENT — PAIN - FUNCTIONAL ASSESSMENT: PAIN_FUNCTIONAL_ASSESSMENT: 0-10

## 2022-06-22 NOTE — PLAN OF CARE
Problem: Chronic Conditions and Co-morbidities  Goal: Patient's chronic conditions and co-morbidity symptoms are monitored and maintained or improved  Outcome: Progressing  Patient received from ED per stretcher. Alert and oriented. Denies chest pain, shortness of breath or palpitations. NSR on monitor. Will monitor.

## 2022-06-22 NOTE — PROGRESS NOTES
4 Eyes Admission Assessment       Patient refused 4 eyed skin assessment. Patient is without skin break down or sores per admission assessment. He is ambulatory in room. Will monitor.     Nurse 1 eSignature: Electronically signed by Con Blankenship RN on 6/22/22 at 6:48 PM EDT

## 2022-06-22 NOTE — ED PROVIDER NOTES
Respiratory: Positive for cough. Negative for chest tightness, shortness of breath and wheezing. Cardiovascular: Positive for chest pain. Negative for palpitations and leg swelling. Gastrointestinal: Negative for abdominal distention, abdominal pain, diarrhea, nausea and vomiting. Endocrine: Negative for polydipsia and polyuria. Genitourinary: Negative for dysuria. Musculoskeletal: Positive for myalgias. Negative for neck pain and neck stiffness. Skin: Negative for color change, pallor and rash. Neurological: Negative for dizziness, seizures, weakness, light-headedness and headaches. Hematological: Does not bruise/bleed easily. Psychiatric/Behavioral: Negative for confusion, hallucinations, self-injury and suicidal ideas. All other systems reviewed and are negative. Physical Exam     INITIAL VITALS: BP: 121/61, Temp: 98.2 °F (36.8 °C), Heart Rate: 76, Resp: 16, SpO2: 96 %     Nursing note and vitals reviewed. Physical Exam  Vitals and nursing note reviewed. Constitutional:       General: He is not in acute distress. Appearance: He is well-developed. He is obese. He is ill-appearing. He is not diaphoretic. HENT:      Head: Normocephalic and atraumatic. Eyes:      Pupils: Pupils are equal, round, and reactive to light. Neck:      Vascular: No JVD. Cardiovascular:      Rate and Rhythm: Normal rate and regular rhythm. Pulmonary:      Effort: Pulmonary effort is normal. No respiratory distress. Breath sounds: Normal breath sounds. No stridor. No wheezing or rales. Chest:      Chest wall: No tenderness. Abdominal:      General: There is no distension. Palpations: Abdomen is soft. Tenderness: There is no abdominal tenderness. Musculoskeletal:         General: No tenderness. Normal range of motion. Cervical back: Normal range of motion and neck supple. Skin:     General: Skin is warm and dry. Coloration: Skin is not pale.       Findings: No erythema or rash. Neurological:      Mental Status: He is alert and oriented to person, place, and time. Coordination: Coordination normal.      History: 1  EC  Patient Age: 1  *Risk factors for Atherosclerotic disease: Diabetes Mellitus; Hypertension; Hypercholesterolemia; Obesity  Risk Factors: 2  Troponin: 0  Heart Score Total: 4      Procedures     N/A    MEDICAL DECISION MAKING     Isabela De La Cruz is admitted to the Emergency Department for evaluation of his chief complaint as described in the history of present illness. Complete history and physical was performed by me and my attending. Nursing notes, past medical history, surgical history, family history and social history were reviewed and addressed in the HPI. Hank Mendoza is a 45 y.o. male who presents to the emergency department with a complaint of chest pain. The patient reports a sharp discomfort in his chest over the past week that has been relatively unchanged. No other associated symptoms or radiation of the pain. The pain was nonexertional at onset. The patient does have a history of atrial fibrillation with ablation 3 years ago, but this pain is dissimilar to anything that he is experienced in the past.  The patient feels generally rundown, fatigued and is experiencing some myalgias with mild URI-like symptoms. On arrival to the emergency department, the patient is hemodynamically stable and within normal limits. Afebrile. Continues to complain of 6 out of 10 chest discomfort. On physical examination, no significant abnormalities were noted. The chest pain was not reproducible with palpation to the chest or with movement. Overall the patient looks uncomfortable, even slightly ill-appearing, but is in no distress. Initial twelve-lead EKG on arrival to the emergency department demonstrates left axis deviation without evidence of ischemia, injury or infarct.   His EKG is consistent with prior ECGs from the previous 2 years.  CBC demonstrates no leukocytosis, anemia or thrombocytopenia. The patient's BMP demonstrates slight hypokalemia with a potassium of 3.4, a slight elevation in his glucose, though the patient is a type II diabetic and a minimal increase in his BUN with preserved GFR. No anion gap elevation or bicarbonate abnormalities. Initial troponin is negative. His BNP is unremarkable. The patient's heart score is a 4.  COVID and influenza testing, given the patient's viral syndrome-like presentation are negative. Given the patient's history of present illness, past medical history and heart score, I feel it would be prudent to keep the patient for further restratification by provocative testing in the morning. The patient will be placed in ED observation in order to obtain a nuclear medicine stress test in the morning. Of note, the patient was administered Toradol during his ED encounter, with no significant improvement in his pain. Symptoms are reported otherwise well controlled. I discussed this plan at length the patient who verbalizes understanding and is in agreement. The patient was seen and evaluated by myself and the attending physician, Bridgette Lambert MD who agrees with my assessment, treatment and plan. Clinical Impression     1. Chest pain, unspecified type        Disposition     DISPOSITION Ed Observation 06/22/2022 03:18:43 AM        Claudine Hurley PA-C  4:23 AM    Relevant History and Diagnostic Information     Past Medical, Surgical, Family, and Social History     He has a past medical history of Arthritis, Atrial fibrillation ([address] Congestive heart failure ([address] Depression, Diabetes mellitus ([address] GERD (gastroesophageal reflux disease), Hyperlipidemia, Hypertension, Morbid obesity ([address] Sleep apnea, and Type II or unspecified type diabetes mellitus without mention of complication, not stated as uncontrolled.   He has a past surgical history that includes ablation of dysrhythmic focus; Colonoscopy (4/13/2021); and Finger trigger release (Left, 6/10/2021). His family history includes Diabetes in his maternal aunt and mother; High Blood Pressure in his maternal grandmother and mother. He reports that he has never smoked. He has never used smokeless tobacco. He reports current drug use. Drugs: Marijuana (Weed) and Cocaine. He reports that he does not drink alcohol. Medications     Previous Medications    ACCU-CHEK FASTCLIX LANCETS MISC    20 Sticks by Does not apply route 2 times daily    ALCOHOL SWABS PADS    1 Container by Does not apply route three times daily    ALLOPURINOL (ZYLOPRIM) 300 MG TABLET    TAKE 1 TABLET BY MOUTH EVERY DAY    APIXABAN (ELIQUIS) 5 MG TABS TABLET    TAKE 1 TABLET BY MOUTH TWICE A DAY    ATORVASTATIN (LIPITOR) 20 MG TABLET    TAKE 1 TABLET BY MOUTH EVERY DAY    BLOOD GLUCOSE MONITOR STRIPS    by Other route 4 times daily (after meals and at bedtime)    BLOOD GLUCOSE MONITORING SUPPL (ACCU-CHEK VIK PLUS) W/DEVICE KIT    1 kit by Does not apply route 4 times daily (before meals and nightly). BLOOD GLUCOSE MONITORING SUPPL (BLOOD GLUCOSE MONITOR SYSTEM) W/DEVICE KIT    1 Units by Does not apply route daily    BLOOD PRESSURE KIT    1 Units by Does not apply route daily    BLOOD PRESSURE MONITORING (WRIST BLOOD PRESSURE MONITOR) MISC    1 Units by Does not apply route once for 1 dose    BUDESONIDE-FORMOTEROL (SYMBICORT) 80-4.5 MCG/ACT AERO    Inhale 2 puffs into the lungs 2 times daily    DILTIAZEM (CARDIZEM CD) 120 MG EXTENDED RELEASE CAPSULE    TAKE 1 CAPSULE BY MOUTH EVERY DAY    FLUTICASONE (FLONASE) 50 MCG/ACT NASAL SPRAY    1 spray by Nasal route daily    FUROSEMIDE (LASIX) 80 MG TABLET    TAKE 1 TABLET BY MOUTH TWICE A DAY    HANDICAP PLACARD MISC    by Does not apply route Handicap placard effective from 7/27/2021 through 7/27/2026    INSULIN GLARGINE (BASAGLAR KWIKPEN) 100 UNIT/ML INJECTION PEN    patient using vials per [facility]  inject 20 units at hs    INSULIN LISPRO, 1 UNIT DIAL, 100 UNIT/ML SOPN    INJECT 7 UNITS INTO THE SKIN 3 TIMES DAILY (BEFORE MEALS) INJECT SUBCUTANEOUSLY    INSULIN PEN NEEDLE (PEN NEEDLES) 31G X 6 MM MISC    1 each by Does not apply route daily May substitute to meet insur. requirements    INSULIN SYRINGE-NEEDLE U-100 (BD INSULIN SYRINGE U/F) 31G X 5/16\" 1 ML MISC    USE AS DIRECTED 4 TIMES A DAY    LISINOPRIL (PRINIVIL;ZESTRIL) 10 MG TABLET    TAKE 1 TABLET BY MOUTH EVERY DAY    METFORMIN (GLUCOPHAGE) 500 MG TABLET    Take one tablet twice per day with meals    MULTIPLE VITAMINS-MINERALS (THERAPEUTIC-M/LUTEIN) TABS    TAKE 1 TABLET BY MOUTH EVERY DAY    POTASSIUM CHLORIDE (KLOR-CON M20) 20 MEQ EXTENDED RELEASE TABLET    TAKE 1 TABLET BY MOUTH EVERY DAY WITH BREAKFAST    SENNOSIDES-DOCUSATE SODIUM (SENOKOT-S) 8.6-50 MG TABLET    Take 1 tablet by mouth 2 times daily as needed for Constipation    SILDENAFIL (VIAGRA) 50 MG TABLET    Take 1 tablet by mouth as needed for Erectile Dysfunction       Allergies     He has No Known Allergies. ED Course     Nursing Notes, Past Medical Hx, Past Surgical Hx, Social Hx,Allergies, and Family Hx were reviewed. Patient was given the following medications:  Orders Placed This Encounter   Medications    ketorolac (TORADOL) injection 15 mg    sodium chloride flush 0.9 % injection 5-40 mL    sodium chloride flush 0.9 % injection 5-40 mL    0.9 % sodium chloride infusion    aspirin chewable tablet 81 mg    ondansetron (ZOFRAN) injection 4 mg       Diagnostic Results     EKG   Interpreted in conjunction with emergency department physician RIO Smith MD  Rhythm: normal sinus   Rate: normal  Axis: left  Ectopy: none  Conduction: normal  ST Segments: normal  T Waves: normal  Q Waves:none  Clinical Impression: Sinus rhythm, no ectopy, left axis deviation, no evidence of ischemia, injury or infarct.   Comparison: ECG consistent with prior ECGs from 2020 and 2021    ED BEDSIDE ULTRASOUND:  N/A    RECENT VITALS:  BP: (!) 178/110,Temp: 98.2 °F (36.8 °C), Heart Rate: 59, Resp: 26, SpO2: 96 %     RADIOLOGY:  XR CHEST PORTABLE   Final Result   1. No evidence of acute cardiopulmonary disease.       NM Cardiac Stress Test Nuclear Imaging    (Results Pending)       LABS:   Results for orders placed or performed during the hospital encounter of 06/22/22   COVID-19 & Influenza Combo    Specimen: Nasopharyngeal Swab   Result Value Ref Range    SARS-CoV-2 RNA, RT PCR NOT DETECTED NOT DETECTED    INFLUENZA A NOT DETECTED NOT DETECTED    INFLUENZA B NOT DETECTED NOT DETECTED   CBC with Auto Differential   Result Value Ref Range    WBC 8.1 4.0 - 11.0 K/uL    RBC 4.72 4.20 - 5.90 M/uL    Hemoglobin 15.4 13.5 - 17.5 g/dL    Hematocrit 44.6 40.5 - 52.5 %    MCV 94.6 80.0 - 100.0 fL    MCH 32.6 26.0 - 34.0 pg    MCHC 34.5 31.0 - 36.0 g/dL    RDW 13.8 12.4 - 15.4 %    Platelets 652 181 - 852 K/uL    MPV 10.0 5.0 - 10.5 fL    Neutrophils % 55.9 %    Lymphocytes % 36.0 %    Monocytes % 5.9 %    Eosinophils % 0.8 %    Basophils % 1.4 %    Neutrophils Absolute 4.5 1.7 - 7.7 K/uL    Lymphocytes Absolute 2.9 1.0 - 5.1 K/uL    Monocytes Absolute 0.5 0.0 - 1.3 K/uL    Eosinophils Absolute 0.1 0.0 - 0.6 K/uL    Basophils Absolute 0.1 0.0 - 0.2 K/uL   Basic Metabolic Panel w/ Reflex to MG   Result Value Ref Range    Sodium 138 136 - 145 mmol/L    Potassium reflex Magnesium 3.4 (L) 3.5 - 5.1 mmol/L    Chloride 100 99 - 110 mmol/L    CO2 23 21 - 32 mmol/L    Anion Gap 15 3 - 16    Glucose 145 (H) 70 - 99 mg/dL    BUN 21 (H) 7 - 20 mg/dL    CREATININE 1.0 0.9 - 1.3 mg/dL    GFR Non-African American >60 >60    GFR African American >60 >60    Calcium 9.1 8.3 - 10.6 mg/dL   Troponin   Result Value Ref Range    Troponin <0.01 <0.01 ng/mL   Brain Natriuretic Peptide   Result Value Ref Range    Pro-BNP 77 0 - 124 pg/mL   Magnesium   Result Value Ref Range    Magnesium 1.90 1.80 - 2.40 mg/dL       CONSULTS:  None    PATIENT REFERRED TO:  No follow-up provider specified.     DISCHARGE MEDICATIONS:  New Prescriptions    No medications on file        [address], [place]  06/22/22 4150

## 2022-06-22 NOTE — ED NOTES
Spoke to tech from nuclear medicine. Pt's stress test results will not be read today and will go back for more testing tomorrow morning. Pt is cleared from MD to eat today and will be NPO after midnight tonight.  Pt ordered lunch and resting in bed peacefully      Ashanti Vega RN  06/22/22 [id number]

## 2022-06-22 NOTE — PROGRESS NOTES
[facility] Emergency Department  EDObservation Admission Note   Emergency Physicians          Impression and Plan      In summary, Chance De La Cruz is admitted by to the [facility] for chest pain. Dr. Bonifacio Winn is the CDU admission attending. This patient has beenrisk-stratified based on the available history, physical exam, and related study findings. Admission to observation status for further diagnosis/treatment/monitoring of chest pain is warranted clinically. This extended period of observation is specifically required to determine the need for hospitalization. We will observe the patient for the following endpoints:    - Negative serial troponins  - Negative stress test    When met, appropriate disposition will be arranged. Diagnostic Evaluation      Diagnostic studies and ED interventions germane to this period of clinical observation will include:    - Serial troponins, stress test       Consultant(s)      None       Patient History      Chance De La Cruz is a 45 y.o. male who presented to the Emergency Department for evaluation of chest pain. The acute evaluation included EKG, chest x-ray, troponins. Upon admission to the Clinical Decision Unit, Usman De La Cruz continued to have left-sided chest pressure.       Past Medical History  Past Medical History:   Diagnosis Date    Arthritis     Atrial fibrillation ([address]     Congestive heart failure (HCC)     Depression     Diabetes mellitus (HCC)     GERD (gastroesophageal reflux disease)     Hyperlipidemia     Hypertension     Morbid obesity ([address]     Sleep apnea     uses cpap at home q hs     Type II or unspecified type diabetes mellitus without mention of complication, not stated as uncontrolled      Past Surgical History:   Procedure Laterality Date    ABLATION OF DYSRHYTHMIC FOCUS      COLONOSCOPY  4/13/2021    COLONOSCOPY POLYPECTOMY SNARE/COLD BIOPSY performed by Leslie Celaya MD at [facility] ENDOSCOPY    FINGER TRIGGER RELEASE Left 6/10/2021    LEFT MIDDLE FINGER TRIGGER RELEASE performed by Georgette Wellington MD at [place]     [unfilled]  Social History     Tobacco Use    Smoking status: Never Smoker    Smokeless tobacco: Never Used   Vaping Use    Vaping Use: Never used   Substance Use Topics    Alcohol use: No     Alcohol/week: 0.0 standard drinks    Drug use: Yes     Types: Marijuana Zollie Axe), Cocaine     Comment: no cocaine for over 6 years, marijuana occ        Medications      Previous Medications    ACCU-CHEK FASTCLIX LANCETS MISC    20 Sticks by Does not apply route 2 times daily    ALCOHOL SWABS PADS    1 Container by Does not apply route three times daily    ALLOPURINOL (ZYLOPRIM) 300 MG TABLET    TAKE 1 TABLET BY MOUTH EVERY DAY    APIXABAN (ELIQUIS) 5 MG TABS TABLET    TAKE 1 TABLET BY MOUTH TWICE A DAY    ATORVASTATIN (LIPITOR) 20 MG TABLET    TAKE 1 TABLET BY MOUTH EVERY DAY    BLOOD GLUCOSE MONITOR STRIPS    by Other route 4 times daily (after meals and at bedtime)    BLOOD GLUCOSE MONITORING SUPPL (ACCU-CHEK VIK PLUS) W/DEVICE KIT    1 kit by Does not apply route 4 times daily (before meals and nightly).     BLOOD GLUCOSE MONITORING SUPPL (BLOOD GLUCOSE MONITOR SYSTEM) W/DEVICE KIT    1 Units by Does not apply route daily    BLOOD PRESSURE KIT    1 Units by Does not apply route daily    BLOOD PRESSURE MONITORING (WRIST BLOOD PRESSURE MONITOR) MISC    1 Units by Does not apply route once for 1 dose    BUDESONIDE-FORMOTEROL (SYMBICORT) 80-4.5 MCG/ACT AERO    Inhale 2 puffs into the lungs 2 times daily    DILTIAZEM (CARDIZEM CD) 120 MG EXTENDED RELEASE CAPSULE    TAKE 1 CAPSULE BY MOUTH EVERY DAY    FLUTICASONE (FLONASE) 50 MCG/ACT NASAL SPRAY    1 spray by Nasal route daily    FUROSEMIDE (LASIX) 80 MG TABLET    TAKE 1 TABLET BY MOUTH TWICE A DAY    HANDICAP PLACARD MISC    by Does not apply route Handicap placard effective from 7/27/2021 through 7/27/2026    INSULIN GLARGINE ([facility] KWIKPEN) 100 UNIT/ML INJECTION PEN    patient using vials per [facility] inject 20 units at hs    INSULIN LISPRO, 1 UNIT DIAL, 100 UNIT/ML SOPN    INJECT 7 UNITS INTO THE SKIN 3 TIMES DAILY (BEFORE MEALS) INJECT SUBCUTANEOUSLY    INSULIN PEN NEEDLE (PEN NEEDLES) 31G X 6 MM MISC    1 each by Does not apply route daily May substitute to meet insur. requirements    INSULIN SYRINGE-NEEDLE U-100 (BD INSULIN SYRINGE U/F) 31G X 5/16\" 1 ML MISC    USE AS DIRECTED 4 TIMES A DAY    LISINOPRIL (PRINIVIL;ZESTRIL) 10 MG TABLET    TAKE 1 TABLET BY MOUTH EVERY DAY    METFORMIN (GLUCOPHAGE) 500 MG TABLET    Take one tablet twice per day with meals    MULTIPLE VITAMINS-MINERALS (THERAPEUTIC-M/LUTEIN) TABS    TAKE 1 TABLET BY MOUTH EVERY DAY    POTASSIUM CHLORIDE (KLOR-CON M20) 20 MEQ EXTENDED RELEASE TABLET    TAKE 1 TABLET BY MOUTH EVERY DAY WITH BREAKFAST    SENNOSIDES-DOCUSATE SODIUM (SENOKOT-S) 8.6-50 MG TABLET    Take 1 tablet by mouth 2 times daily as needed for Constipation    SILDENAFIL (VIAGRA) 50 MG TABLET    Take 1 tablet by mouth as needed for Erectile Dysfunction          Review of Systems      Review of Systems   Constitutional: Negative for chills and fever. HENT: Negative for congestion. Eyes: Negative for pain. Respiratory: Negative for cough, shortness of breath and wheezing. Cardiovascular: Positive for chest pain. Negative for leg swelling. Gastrointestinal: Negative for abdominal pain, diarrhea, nausea and vomiting. Genitourinary: Negative for dysuria. Musculoskeletal: Negative for arthralgias. Skin: Negative for rash. Neurological: Negative for weakness and headaches. All other systems reviewed and are negative. Physical Examination      Physical Exam  Constitutional:       General: He is not in acute distress. Appearance: He is well-developed. HENT:      Head: Normocephalic and atraumatic. Eyes:      Pupils: Pupils are equal, round, and reactive to light.    Neck: Vascular: No JVD. Cardiovascular:      Rate and Rhythm: Normal rate and regular rhythm. Heart sounds: Normal heart sounds. No murmur heard. No friction rub. No gallop. Pulmonary:      Effort: Pulmonary effort is normal. No respiratory distress. Breath sounds: Normal breath sounds. No wheezing or rales. Abdominal:      General: There is no distension. Palpations: Abdomen is soft. Tenderness: There is no abdominal tenderness. Musculoskeletal:         General: Normal range of motion. Cervical back: Normal range of motion. Skin:     Findings: No rash. Neurological:      Mental Status: He is alert and oriented to person, place, and time.

## 2022-06-22 NOTE — ED PROVIDER NOTES
ED Attending Attestation Note     Date of evaluation: 6/22/2022    This patient was seen by the advance practice provider. I have seen and examined the patient, agree with the workup, evaluation, management and diagnosis. The care plan has been discussed. I have reviewed the ECG and concur with the CATARINO's interpretation. My assessment reveals very faint end expiratory wheezes without prolonged expiratory phase. Good air movement. Heart RRR without murmur.       Matthew Ayala MD  06/22/22 5630

## 2022-06-22 NOTE — ED TRIAGE NOTES
Chest discomfort for the past week with increased fatigue and SOB.  Hx of a fib, had ablation done 3 years ago

## 2022-06-22 NOTE — H&P
Hospital Medicine History & Physical      PCP: Corrina Truong MD    Date of Admission: 6/22/2022    Date of Service: Pt seen/examined on 06/22/22 and Placed in Observation. Chief Complaint:  Chest pain, tightness      History Of Present Illness:      45 y.o. male Chante De La Cruz   -History of morbid obesity with BMI 50.1 restrictive lung disease, recurrent right buttocks abscess, left knee severe osteoarthritis, hypertension, LEONA, atrial fibrillation, type 2 diabetes  -Presented to ED with complaints of chest pain/tightness over the past week, rates it 6 out of 10 intensity, described as sharp stabbing in the center left of the chest associated with increased fatigue muscle aches and shortness of breath on exertion.  -Yesterday history of A. fib with ablation approximately 3 years ago. He denies fever chills sweats or sick contacts. -EKG showed left axis deviation without evidence of ischemia inferior infarct. No acute changes were seen. No leukocytosis anemia or thrombocytopenia. Labs did show slight hypokalemia of 3.4. Initial troponin negative. Heart score equals 2 4. Patient was admitted under observation in the emergency room to get stress test in the morning, with underlying morbid obesity he will need to do testing and admitted under observation for this.       Past Medical History:          Diagnosis Date    Arthritis     Atrial fibrillation ([address]     Congestive heart failure (HCC)     Depression     Diabetes mellitus (HCC)     GERD (gastroesophageal reflux disease)     Hyperlipidemia     Hypertension     Morbid obesity ([address]     Sleep apnea     uses cpap at home q hs     Type II or unspecified type diabetes mellitus without mention of complication, not stated as uncontrolled        Past Surgical History:          Procedure Laterality Date    ABLATION OF DYSRHYTHMIC FOCUS      COLONOSCOPY  4/13/2021    COLONOSCOPY POLYPECTOMY SNARE/COLD BIOPSY performed by Joan Cespedes MD at [facility] Left 6/10/2021    LEFT MIDDLE FINGER TRIGGER RELEASE performed by John Nj MD at [address]       Medications Prior to Admission:      Prior to Admission medications    Medication Sig Start Date End Date Taking? Authorizing Provider   apixaban (ELIQUIS) 5 MG TABS tablet TAKE 1 TABLET BY MOUTH TWICE A DAY 6/17/22   Dori Penaloza MD   potassium chloride (KLOR-CON M20) 20 MEQ extended release tablet TAKE 1 TABLET BY MOUTH EVERY DAY WITH BREAKFAST 6/17/22   Dori Penaloza MD   dilTIAZem (CARDIZEM CD) 120 MG extended release capsule TAKE 1 CAPSULE BY MOUTH EVERY DAY 6/17/22   Dori Penaloza MD   metFORMIN (GLUCOPHAGE) 500 MG tablet Take one tablet twice per day with meals 6/17/22   Dori Penaloza MD   furosemide (LASIX) 80 MG tablet TAKE 1 TABLET BY MOUTH TWICE A DAY 6/14/22   Randye Officer, MD   Multiple Vitamins-Minerals (THERAPEUTIC-M/LUTEIN) TABS TAKE 1 TABLET BY MOUTH EVERY DAY 5/24/22   Pallavi Hensley MD   allopurinol (ZYLOPRIM) 300 MG tablet TAKE 1 TABLET BY MOUTH EVERY DAY 5/20/22   Pallavi Hensley MD   Insulin Pen Needle (PEN NEEDLES) 31G X 6 MM MISC 1 each by Does not apply route daily May substitute to meet insur. requirements 5/16/22   Madison Gonzalez DO   insulin lispro, 1 Unit Dial, 100 UNIT/ML SOPN INJECT 7 UNITS INTO THE SKIN 3 TIMES DAILY (BEFORE MEALS) INJECT SUBCUTANEOUSLY 4/28/22   Dori Penaloza MD   atorvastatin (LIPITOR) 20 MG tablet TAKE 1 TABLET BY MOUTH EVERY DAY 4/28/22   Dori Penaloza MD   lisinopril (PRINIVIL;ZESTRIL) 10 MG tablet TAKE 1 TABLET BY MOUTH EVERY DAY 4/13/22   Pallavi Hensley MD   budesonide-formoterol [facility]) 80-4.5 MCG/ACT AERO Inhale 2 puffs into the lungs 2 times daily 3/22/22   Suhas Camacho MD   Insulin Syringe-Needle U-100 (BD INSULIN SYRINGE U/F) 31G X 5/16\" 1 ML MISC USE AS DIRECTED 4 TIMES A DAY 12/10/21   Pallavi Hensley MD   Handicap Placard MISC by Does not Blood Pressure Maternal Grandmother        REVIEW OF SYSTEMS:   Pertinent positives as noted in the HPI. All other systems reviewed and negative. PHYSICAL EXAM PERFORMED:    BP (!) 197/160   Pulse 59   Temp 98.2 °F (36.8 °C) (Oral)   Resp 26   SpO2 92%     General appearance:  No apparent distress, appears stated age and cooperative. HEENT:  Normal cephalic, atraumatic without obvious deformity. Pupils equal, round, and reactive to light. Extra ocular muscles intact. Conjunctivae/corneas clear. Neck: Supple, with full range of motion. No jugular venous distention. Trachea midline. Respiratory:  Normal respiratory effort. Clear to auscultation, bilaterally without Rales/Wheezes/Rhonchi. Cardiovascular:  Regular rate and rhythm with normal S1/S2 without murmurs, rubs or gallops. Abdomen: Soft, non-tender, non-distended with normal bowel sounds. Musculoskeletal:  No clubbing, cyanosis or edema bilaterally. Full range of motion without deformity. Skin: Skin color, texture, turgor normal.  No rashes or lesions. Neurologic:  Neurovascularly intact without any focal sensory/motor deficits. Cranial nerves: II-XII intact, grossly non-focal.  Psychiatric:  Alert and oriented, thought content appropriate, normal insight  Capillary Refill: Brisk,< 3 seconds   Peripheral Pulses: +2 palpable, equal bilaterally       Labs:     Recent Labs     06/22/22  0125   WBC 8.1   HGB 15.4   HCT 44.6        Recent Labs     06/22/22  0125      K 3.4*      CO2 23   BUN 21*   CREATININE 1.0   CALCIUM 9.1     No results for input(s): AST, ALT, BILIDIR, BILITOT, ALKPHOS in the last 72 hours. No results for input(s): INR in the last 72 hours.   Recent Labs     06/22/22  0125 06/22/22  0409 06/22/22  0632   TROPONINI <0.01 <0.01 <0.01       Urinalysis:      Lab Results   Component Value Date    NITRU Negative 08/04/2018    WBCUA 3 08/04/2018    BACTERIA 2+ 03/12/2015    RBCUA 1 08/04/2018    BLOODU TRACE-INTACT 08/04/2018    SPECGRAV 1.010 08/04/2018    GLUCOSEU Negative 08/04/2018    GLUCOSEU Negative 09/28/2011       Radiology:     CXR: I have reviewed the CXR with the following interpretation: Edition effusion pneumothorax  EKG:  I have reviewed the EKG with the following interpretation: Reviewed, left acceleration no acute ST or T wave changes DDx dyspnea or infarction, no acute changes from prior EKGs    XR CHEST PORTABLE   Final Result   1. No evidence of acute cardiopulmonary disease. NM Cardiac Stress Test Nuclear Imaging    (Results Pending)       ASSESSMENT:    Active Hospital Problems    Diagnosis Date Noted    Chest pain [R07.9] 06/22/2022     Priority: Medium    PAF (paroxysmal atrial fibrillation) ([facility] [I48.0] 08/04/2018    LEONA (obstructive sleep apnea) [G47.33]     Morbid obesity ([facility] [E66.01]      1. Chest pain concern for ACS, he has multiple risk factors, including diabetes hypertension morbid obesity  2. Paroxysmal atrial fibrillation  3. Obstructive sleep apnea  4. Type 2 diabetes  5. Morbid obesity BMI 17.76- Complicating assessment and treatment. Placing patient at risk for multiple co-morbidities as well as early death and contributing to the patient's presentation.  on weight loss when appropriate. PLAN:    Patient will be admitted to the observation for chest pain work-up, he will need 2 days stress testing. We will continue with apixaban which he takes for atrial fibrillation  Blood pressure elevated but I see his blood pressure medication or not started  Will restart all his normal blood pressure medications  Home CPAP ordered  Monitor on telemetry  Lantus, sliding insulin, hypoglycemia protocol, carb controlled diet    DVT Prophylaxis: Apixaban  Diet: ADULT DIET; Regular; 4 carb choices (60 gm/meal); Low Sodium (2 gm);  No Caffeine  Code Status: Full Code    PT/OT Eval Status: n/a    Dispo -admit to obs, likely discharge tomorrow if stress test negative       Sheila Pinon Krishna Iqbal MD    Thank you Pallavi Hensley MD for the opportunity to be involved in this patient's care. If you have any questions or concerns please feel free to contact me at [phone].

## 2022-06-22 NOTE — ED PROVIDER NOTES
[facility] Emergency Department  ED Observation Disposition Note   Emergency Physicians         Diagnostic Evaluation      Diagnostic studies germane to this period of clinical observation include:    - Stress test  - Serial cardiac enzymes     Consultant(s) Final Recommendations      - hospitalist - admit due to fact that resting images cannot be obtained same day due to body habitus     Impression and Plan      Cheryl De La Cruz has been cared for according to the standard [facility] observation protocol for chest pain. This extended period of observation was specifically requiredto determine the need for hospitalization. Prior to discharge from observation, the final physical exam is documented above. Significant events during the course of observation based on the goals of the clinicalproblem list include:    -Patient was able to complete the stress portion of the nuclear stress test but due to his body habitus, the resting images are not able to be obtained today and therefore the patient will need to be admitted overnight for rest images tomorrow. Based on the patient's condition and test results, the plan is to Admit to telemetry    Thetotal length of observation was 8 hours. Dr. Taylor Anna is the CDU disposition attending. Subjective      Cheryl De La Cruz hasundergone comprehensive diagnostic evaluation and therapeutic management in accordance with the CDU guidelines for chest pain. Based on his clinical response and diagnostic information obtained during this period of observation, the disposition is Admit to telemetry     Physical Examination      Physical Exam  Vitals and nursing note reviewed. Constitutional:       General: He is not in acute distress. Appearance: He is well-developed. He is not diaphoretic. Cardiovascular:      Rate and Rhythm: Normal rate.    Pulmonary:      Effort: Pulmonary effort is normal.   Abdominal:      General: There is no distension. Neurological:      Mental Status: He is alert and oriented to person, place, and time.    Psychiatric:         Behavior: Behavior normal.             Nathaly Brooks MD  06/22/22 9761

## 2022-06-23 VITALS
WEIGHT: 315 LBS | HEIGHT: 72 IN | TEMPERATURE: 97.9 F | HEART RATE: 63 BPM | DIASTOLIC BLOOD PRESSURE: 82 MMHG | BODY MASS INDEX: 42.66 KG/M2 | RESPIRATION RATE: 18 BRPM | OXYGEN SATURATION: 97 % | SYSTOLIC BLOOD PRESSURE: 165 MMHG

## 2022-06-23 LAB
ALBUMIN SERPL-MCNC: 3.7 G/DL (ref 3.4–5)
ANION GAP SERPL CALCULATED.3IONS-SCNC: 10 MMOL/L (ref 3–16)
BUN BLDV-MCNC: 15 MG/DL (ref 7–20)
CALCIUM SERPL-MCNC: 9.1 MG/DL (ref 8.3–10.6)
CHLORIDE BLD-SCNC: 104 MMOL/L (ref 99–110)
CO2: 27 MMOL/L (ref 21–32)
CREAT SERPL-MCNC: 0.9 MG/DL (ref 0.9–1.3)
EKG ATRIAL RATE: 54 BPM
EKG DIAGNOSIS: NORMAL
EKG P AXIS: 10 DEGREES
EKG P-R INTERVAL: 144 MS
EKG Q-T INTERVAL: 444 MS
EKG QRS DURATION: 86 MS
EKG QTC CALCULATION (BAZETT): 421 MS
EKG R AXIS: -33 DEGREES
EKG T AXIS: -18 DEGREES
EKG VENTRICULAR RATE: 54 BPM
GFR AFRICAN AMERICAN: >60
GFR NON-AFRICAN AMERICAN: >60
GLUCOSE BLD-MCNC: 107 MG/DL (ref 70–99)
GLUCOSE BLD-MCNC: 121 MG/DL (ref 70–99)
GLUCOSE BLD-MCNC: 148 MG/DL (ref 70–99)
GLUCOSE BLD-MCNC: 152 MG/DL (ref 70–99)
PERFORMED ON: ABNORMAL
PHOSPHORUS: 3.4 MG/DL (ref 2.5–4.9)
POTASSIUM SERPL-SCNC: 3.6 MMOL/L (ref 3.5–5.1)
SODIUM BLD-SCNC: 141 MMOL/L (ref 136–145)

## 2022-06-23 PROCEDURE — 6370000000 HC RX 637 (ALT 250 FOR IP): Performed by: INTERNAL MEDICINE

## 2022-06-23 PROCEDURE — 3430000000 HC RX DIAGNOSTIC RADIOPHARMACEUTICAL: Performed by: PHYSICIAN ASSISTANT

## 2022-06-23 PROCEDURE — 36415 COLL VENOUS BLD VENIPUNCTURE: CPT

## 2022-06-23 PROCEDURE — 2580000003 HC RX 258: Performed by: PHYSICIAN ASSISTANT

## 2022-06-23 PROCEDURE — G0378 HOSPITAL OBSERVATION PER HR: HCPCS

## 2022-06-23 PROCEDURE — A9502 TC99M TETROFOSMIN: HCPCS | Performed by: PHYSICIAN ASSISTANT

## 2022-06-23 PROCEDURE — 80069 RENAL FUNCTION PANEL: CPT

## 2022-06-23 PROCEDURE — 96376 TX/PRO/DX INJ SAME DRUG ADON: CPT

## 2022-06-23 PROCEDURE — 94660 CPAP INITIATION&MGMT: CPT

## 2022-06-23 PROCEDURE — 6360000002 HC RX W HCPCS: Performed by: INTERNAL MEDICINE

## 2022-06-23 PROCEDURE — 93010 ELECTROCARDIOGRAM REPORT: CPT | Performed by: INTERNAL MEDICINE

## 2022-06-23 PROCEDURE — 6370000000 HC RX 637 (ALT 250 FOR IP): Performed by: PHYSICIAN ASSISTANT

## 2022-06-23 RX ADMIN — SODIUM CHLORIDE, PRESERVATIVE FREE 10 ML: 5 INJECTION INTRAVENOUS at 08:51

## 2022-06-23 RX ADMIN — TETROFOSMIN 30 MILLICURIE: 1.38 INJECTION, POWDER, LYOPHILIZED, FOR SOLUTION INTRAVENOUS at 08:25

## 2022-06-23 RX ADMIN — ASPIRIN 81 MG: 81 TABLET, CHEWABLE ORAL at 08:51

## 2022-06-23 RX ADMIN — FUROSEMIDE 80 MG: 80 TABLET ORAL at 08:52

## 2022-06-23 RX ADMIN — MORPHINE SULFATE 2 MG: 2 INJECTION, SOLUTION INTRAMUSCULAR; INTRAVENOUS at 09:10

## 2022-06-23 RX ADMIN — INSULIN LISPRO 1 UNITS: 100 INJECTION, SOLUTION INTRAVENOUS; SUBCUTANEOUS at 08:52

## 2022-06-23 RX ADMIN — APIXABAN 5 MG: 5 TABLET, FILM COATED ORAL at 08:51

## 2022-06-23 RX ADMIN — DILTIAZEM HYDROCHLORIDE 120 MG: 120 CAPSULE, EXTENDED RELEASE ORAL at 08:52

## 2022-06-23 RX ADMIN — LISINOPRIL 10 MG: 10 TABLET ORAL at 08:52

## 2022-06-23 RX ADMIN — POTASSIUM CHLORIDE 20 MEQ: 1500 TABLET, EXTENDED RELEASE ORAL at 08:51

## 2022-06-23 ASSESSMENT — PAIN SCALES - GENERAL
PAINLEVEL_OUTOF10: 7
PAINLEVEL_OUTOF10: 0
PAINLEVEL_OUTOF10: 0
PAINLEVEL_OUTOF10: 7
PAINLEVEL_OUTOF10: 7
PAINLEVEL_OUTOF10: 0

## 2022-06-23 ASSESSMENT — PAIN DESCRIPTION - PAIN TYPE
TYPE: ACUTE PAIN
TYPE: ACUTE PAIN

## 2022-06-23 ASSESSMENT — PAIN DESCRIPTION - DESCRIPTORS
DESCRIPTORS: DISCOMFORT
DESCRIPTORS: DISCOMFORT

## 2022-06-23 ASSESSMENT — PAIN DESCRIPTION - ORIENTATION
ORIENTATION: LEFT;LOWER
ORIENTATION: LEFT

## 2022-06-23 ASSESSMENT — PAIN DESCRIPTION - ONSET
ONSET: ON-GOING
ONSET: ON-GOING

## 2022-06-23 ASSESSMENT — PAIN DESCRIPTION - FREQUENCY
FREQUENCY: CONTINUOUS
FREQUENCY: CONTINUOUS

## 2022-06-23 ASSESSMENT — PAIN DESCRIPTION - LOCATION
LOCATION: CHEST
LOCATION: CHEST

## 2022-06-23 NOTE — PROGRESS NOTES
STAT EKG completed and placed in the front of pt's chart. On-call hospitalist notified.  Electronically signed by Tameka Ramírez RN on 6/22/2022 at 10:38 PM

## 2022-06-23 NOTE — PROGRESS NOTES
Pt d/c @ 1700. Telemetry removed by pt and returned to desk prior to d/c. IV removed. Education and instruction completed w/ pt. All questions asked and answered.

## 2022-06-23 NOTE — PROGRESS NOTES
Hospital Medicine Care  Progress Note      Chief complain; Chest Pain (chest tightness for the past week, hx of a fib with ablation 3 years ago ), Fatigue, and Shortness of Breath            Subjective:     CP improved, ocassionally gets cp  No cp on stress test   Review of Systems:     Review of Systems as mentioned above, other ros is negative. Objective:       Medications        Scheduled Meds:   sodium chloride flush  5-40 mL IntraVENous 2 times per day    aspirin  81 mg Oral Daily    apixaban  5 mg Oral BID    atorvastatin  40 mg Oral Nightly    dilTIAZem  120 mg Oral Daily    furosemide  80 mg Oral BID    insulin glargine  20 Units SubCUTAneous Nightly    lisinopril  10 mg Oral Daily    potassium chloride  20 mEq Oral Daily with breakfast    insulin lispro  0-6 Units SubCUTAneous TID WC    insulin lispro  0-3 Units SubCUTAneous Nightly     Continuous Infusions:   sodium chloride      dextrose       PRN Meds:.sodium chloride flush, sodium chloride, ondansetron, glucose, dextrose bolus **OR** dextrose bolus, glucagon (rDNA), dextrose, diclofenac sodium      Allergies  No Known Allergies      Vitals:    06/23/22 0837 06/23/22 0907 06/23/22 0940 06/23/22 1030   BP: (!) 157/95      Pulse: 58 79  58   Resp: 16  16    Temp: 98.1 °F (36.7 °C)      TempSrc: Oral      SpO2: 96%      Weight:       Height:             Physical exam:         Physical Exam  Constitutional:       Appearance: Normal appearance. HENT:      Head: Normocephalic and atraumatic. Cardiovascular:      Rate and Rhythm: Normal rate and regular rhythm. Pulses: Normal pulses. Heart sounds: Normal heart sounds. Pulmonary:      Effort: Pulmonary effort is normal.      Breath sounds: Normal breath sounds. Abdominal:      General: Abdomen is flat. Musculoskeletal:         General: Normal range of motion. Skin:     General: Skin is warm and dry. Neurological:      General: No focal deficit present.       Mental Status: He is alert and oriented to person, place, and time. Psychiatric:         Mood and Affect: Mood normal.         Behavior: Behavior normal.               Labs      Recent Labs     06/22/22  0125   WBC 8.1   HGB 15.4   HCT 44.6      MCV 94.6        Recent Labs     06/22/22  0125 06/23/22  0445    141   K 3.4* 3.6    104   CO2 23 27   PHOS  --  3.4   BUN 21* 15   CREATININE 1.0 0.9       No results for input(s): AST, ALT, ALB, BILIDIR, BILITOT, ALKPHOS in the last 72 hours. Recent Labs     06/22/22  0125   MG 1.90       Radiology  XR CHEST PORTABLE   Final Result   1. No evidence of acute cardiopulmonary disease. NM Cardiac Stress Test Nuclear Imaging    (Results Pending)           Assessment and Plan:   Principal Problem:    Chest pain  Active Problems: Morbid obesity (HCC)    LEONA (obstructive sleep apnea)    PAF (paroxysmal atrial fibrillation) ([facility]  Resolved Problems:    * No resolved hospital problems. *     CP  ACS ruled out  Stress pending  Low prob for VTE as he is already on anticoagulation and no dyspnea. Atrial fibrillation  Controlled  Anticoagulated with Eliquis      LEONA  Continue with CPAP    Obesity (Body mass index is 49.16 kg/m². ) - Complicating assessment and treatment. Placing patient at risk for multiple co-morbidities as well as early death and contributing to the patient's presentation.            Vannessa Benton MD  6/23/2022

## 2022-06-23 NOTE — PROGRESS NOTES
Patient complaining of chest pain  His cardiac enzymes x3 has been negative  Stat EKG done which shows sinus bradycardia with heart rate of 54 (patient is on Cardizem). No acute ST-T changes.   EKG is similar to the one from August 2021  Morphine 2 mg IV ordered  Patient is scheduled to undergo cardiac work-up for chest pain which was his presenting complaint

## 2022-06-23 NOTE — PLAN OF CARE
Problem: Cardiovascular - Adult  Goal: Maintains optimal cardiac output and hemodynamic stability  Outcome: Progressing  Flowsheets (Taken 6/23/2022 0321)  Maintains optimal cardiac output and hemodynamic stability:   Monitor blood pressure and heart rate   Assess for signs of decreased cardiac output  Note: Thus far this shift pt has had elevated BP and one complaint of chest pain that resolved with administration of morphine per MD order (see eMAR). Problem: Pain  Goal: Verbalizes/displays adequate comfort level or baseline comfort level  Outcome: Progressing  Flowsheets (Taken 6/23/2022 0319)  Verbalizes/displays adequate comfort level or baseline comfort level: Encourage patient to monitor pain and request assistance  Note: Pt complained of chest pain 8/10 overnight, pt administered a one time dose of Morphine per MD order (see eMAR). Upon reassessment pt stated pain control was satisfactory with pain level of 3/10.

## 2022-06-23 NOTE — PLAN OF CARE
Problem: Chronic Conditions and Co-morbidities  Goal: Patient's chronic conditions and co-morbidity symptoms are monitored and maintained or improved  Outcome: Progressing  Flowsheets (Taken 6/23/2022 1105)  Care Plan - Patient's Chronic Conditions and Co-Morbidity Symptoms are Monitored and Maintained or Improved:   Monitor and assess patient's chronic conditions and comorbid symptoms for stability, deterioration, or improvement   Collaborate with multidisciplinary team to address chronic and comorbid conditions and prevent exacerbation or deterioration  Note: Accessed sugars before meals and treated. Pt on continuous telemetry. Will continue to monitor. Problem: Pain  Goal: Verbalizes/displays adequate comfort level or baseline comfort level  6/23/2022 1105 by Kyler Tracy RN  Outcome: Progressing  Flowsheets (Taken 6/23/2022 1105)  Verbalizes/displays adequate comfort level or baseline comfort level:   Encourage patient to monitor pain and request assistance   Assess pain using appropriate pain scale   Administer analgesics based on type and severity of pain and evaluate response   Implement non-pharmacological measures as appropriate and evaluate response  Note: Pt did have chest discomfort post stress test. Pt communicated using an appropriate pain scale. Patient responded well to pharmaceutical intervention. Rest and repositioning were used as non-pharmaceutical comfort measures w/ limited relief reported.      Problem: Cardiovascular - Adult  Goal: Maintains optimal cardiac output and hemodynamic stability  6/23/2022 1105 by Kyler Tracy RN  Outcome: Progressing     Problem: Cardiovascular - Adult  Goal: Absence of cardiac dysrhythmias or at baseline  6/23/2022 1105 by Kyler Tracy RN  Outcome: Progressing  Flowsheets (Taken 6/23/2022 1105)  Absence of cardiac dysrhythmias or at baseline:   Monitor cardiac rate and rhythm   Assess for signs of decreased cardiac output  Note: Pt on cont telemetry monitoring. Pt remained NSR/sinus edwige during shift. Pt alert and oriented, w/ +2 pulses and adequate capillary refill. Will continue to monitor.

## 2022-06-23 NOTE — PROGRESS NOTES
Pt complains of chest discomfort 8/10 to L mid chest. On-call hospitalist notified. Order received for STAT EKG and 2mg IV morphine.  Electronically signed by Tameka Ramírez RN on 6/22/2022 at 10:38 PM

## 2022-06-23 NOTE — CARE COORDINATION
Case Management Assessment           Initial Evaluation                Date / Time of Evaluation: 6/23/2022 1:04 PM                 Assessment Completed by: Derick Rodriguez RN    Patient Name: Jordan De La Cruz     YOB: 1970  Diagnosis: Chest pain [R07.9]  Chest pain, unspecified type [R07.9]     Date / Time: 6/22/2022 12:50 AM    Patient Admission Status: Observation    If patient is discharged prior to next notation, then this note serves as note for discharge by case management. Current PCP: Natasha Vásquez MD  Clinic Patient: Yes    Chart Reviewed: Yes  Patient/ Family Interviewed: Yes    Initial assessment completed at bedside with: patient    Hospitalization in the last 30 days: No    Emergency Contacts:  Extended Emergency Contact Information  Primary Emergency Contact: ROSARIO Alcantar [address] Phone: [phone]  Work Phone: [phone]  Relation: Parent    Advance Directives:   Code Status: Full [address]: No  Agent:   Contact Number:     Copy present: No     In paper Chart: No    Scanned into EMR No    Financial  Payor: [employer] Standing / Plan: [employer] / Product Type: *No Product type* /     Pre-cert required for SNF: Yes    Pharmacy    [id number], [address] Moises Ambriz [phone] - [address] [place] [address]. [place] [address]  Phone: [phone] Fax: [phone]      Potential assistance Purchasing Medications: Potential Assistance Purchasing Medications: Yes  Does Patient want to participate in local refill/ meds to beds program?: Yes    Meds To Beds General Rules:  1. Can ONLY be done Monday- Friday between 8:30am-5pm  2. Prescription(s) must be in pharmacy by 3pm to be filled same day  3. Copy of patient's insurance/ prescription drug card and patient face sheet must be sent along with the prescription(s)  4.  Cost of Rx cannot be pain [R07.9]  Chest pain, unspecified type [R07.9]    The Patient and/or patient representative Jaida Willard and his family were provided with a choice of provider and agrees with the discharge plan Yes    Freedom of choice list was provided with basic dialogue that supports the patient's individualized plan of care/goals and shares the quality data associated with the providers.  Yes    Care Transition patient: Yes    Kenya Culp RN  [facility]  Case Management Department  Ph: [phone]   Fax: [phone]

## 2022-06-23 NOTE — DISCHARGE SUMMARY
Discharge Summary    Date:6/23/2022        Patient Name:Kemal De La Cruz     YOB: 1970     Age:51 y.o. Admit Date:6/22/2022   Admission Condition:fair   Discharged Condition:fair  Discharge Date: 06/23/22    Discharge Diagnoses   Principal Problem:    Chest pain  Active Problems: Morbid obesity (HCC)    LEONA (obstructive sleep apnea)    PAF (paroxysmal atrial fibrillation) ([address]  Resolved Problems:    * No resolved hospital problems. [facility]   Narrative of Hospital Course:        CP  ACS ruled out  Stress low risk for ischemia. Low prob for VTE as he is already on anticoagulation and no dyspnea. Likely MSK pain      Atrial fibrillation  Controlled  Anticoagulated with Eliquis        LEONA  Continue with CPAP     Obesity (Body mass index is 49.16 kg/m². ) - Complicating assessment and treatment. Placing patient at risk for multiple co-morbidities as well as early death and contributing to the patient's presentation. DM type 2 insulin dependent  Dc on home insulin    On dc Patient denies any CP,SOB,Denies any nausea, vomiting, abdominal pain. Denies any diarrhea. Patient denies any palpitations, lightheadedness, orthopnea, PND. Patient doesn't appear to be in any distress on discharge . Physical exam   Vitals:    06/23/22 1030 06/23/22 1105 06/23/22 1243 06/23/22 1615   BP:   (!) 145/70 (!) 165/82   Pulse: 58 57 53 63   Resp:   18 18   Temp:   98.1 °F (36.7 °C) 97.9 °F (36.6 °C)   TempSrc:   Oral Oral   SpO2:   97% 97%   Weight:       Height:             Physical Exam    Physical Exam  Constitutional:       Appearance: Normal appearance. HENT:      Head: Normocephalic and atraumatic. Cardiovascular:      Rate and Rhythm: Normal rate and regular rhythm. Pulses: Normal pulses. Heart sounds: Normal heart sounds. Pulmonary:      Effort: Pulmonary effort is normal.      Breath sounds: Normal breath sounds. Abdominal:      General: Abdomen is flat.    Musculoskeletal: Follow-Up:  NM Cardiac Stress Test Nuclear Imaging   Final Result      XR CHEST PORTABLE   Final Result   1. No evidence of acute cardiopulmonary disease. Discharge Medications         Medication List      CHANGE how you take these medications    insulin glargine 100 UNIT/ML injection pen  Commonly known as: Basaglar KwikPen  patient using vials per [facility]. inject 20 units at hs  What changed: additional instructions     insulin lispro (1 Unit Dial) 100 UNIT/ML Sopn  INJECT 7 UNITS INTO THE SKIN 3 TIMES DAILY BEFORE MEALS  What changed: See the new instructions. Notes to patient: Insulin Lispro  (Humalog)  USE--  Treatment of diabetes or high blood sugar  SIDE EFFECTS--  Low blood sugar, irritation at injection site        CONTINUE taking these medications    * Accu-Chek Fabiola Plus w/Device Kit  1 kit by Does not apply route 4 times daily (before meals and nightly).      * Blood Glucose Monitor System w/Device Kit  1 Units by Does not apply route daily     Accu-Chek FastClix Lancets Misc  20 Sticks by Does not apply route 2 times daily     Alcohol Swabs Pads  1 Container by Does not apply route three times daily     allopurinol 300 MG tablet  Commonly known as: ZYLOPRIM  TAKE 1 TABLET BY MOUTH EVERY DAY     apixaban 5 MG Tabs tablet  Commonly known as: Eliquis  TAKE 1 TABLET BY MOUTH TWICE A DAY     atorvastatin 20 MG tablet  Commonly known as: LIPITOR  TAKE 1 TABLET BY MOUTH EVERY DAY     blood glucose test strips  by Other route 4 times daily (after meals and at bedtime)     * Blood Pressure Kit  1 Units by Does not apply route daily     * Wrist Blood Pressure Monitor Misc  1 Units by Does not apply route once for 1 dose     budesonide-formoterol 80-4.5 MCG/ACT Aero  Commonly known as: Symbicort  Inhale 2 puffs into the lungs 2 times daily     dilTIAZem 120 MG extended release capsule  Commonly known as: CARDIZEM CD  TAKE 1 CAPSULE BY MOUTH EVERY DAY     fluticasone 50 MCG/ACT nasal spray  Commonly known as: Flonase  1 spray by Nasal route daily     furosemide 80 MG tablet  Commonly known as: LASIX  TAKE 1 TABLET BY MOUTH TWICE A DAY     Handicap Placard Misc  by Does not apply route Handicap placard effective from 7/27/2021 through 7/27/2026     Insulin Syringe-Needle U-100 31G X 5/16\" 1 ML Misc  Commonly known as: BD Insulin Syringe U/F  USE AS DIRECTED 4 TIMES A DAY     lisinopril 10 MG tablet  Commonly known as: PRINIVIL;ZESTRIL  TAKE 1 TABLET BY MOUTH EVERY DAY     metFORMIN 500 MG tablet  Commonly known as: GLUCOPHAGE  Take one tablet twice per day with meals     Pen Needles 31G X 6 MM Misc  1 each by Does not apply route daily May substitute to meet insur. requirements     potassium chloride 20 MEQ extended release tablet  Commonly known as: Klor-Con M20  TAKE 1 TABLET BY MOUTH EVERY DAY WITH BREAKFAST     sennosides-docusate sodium 8.6-50 MG tablet  Commonly known as: SENOKOT-S  Take 1 tablet by mouth 2 times daily as needed for Constipation     sildenafil 50 MG tablet  Commonly known as: VIAGRA  Take 1 tablet by mouth as needed for Erectile Dysfunction     Therapeutic-M/Lutein Tabs  TAKE 1 TABLET BY MOUTH EVERY DAY         * This list has 4 medication(s) that are the same as other medications prescribed for you. Read the directions carefully, and ask your doctor or other care provider to review them with you. Where to Get Your Medications      These medications were sent to Samanta Mccormack, Vanessa Gonzalez [address]  Brooke Bello [phone] Mirna Burnett [phone]  [address] [facility] [address]    Phone: [phone]   · insulin lispro (1 Unit Dial) 100 UNIT/ML Sopn         Electronically signed by Debora Rosario MD on 6/23/22 at 6:14 PM EDT

## 2022-06-24 ENCOUNTER — CARE COORDINATION (OUTPATIENT)
Dept: CASE MANAGEMENT | Age: 52
End: 2022-06-24

## 2022-06-24 DIAGNOSIS — R07.9 CHEST PAIN, UNSPECIFIED TYPE: Primary | ICD-10-CM

## 2022-06-24 NOTE — CARE COORDINATION
José 45 Transitions Initial Follow Up Call    Call within 2 business days of discharge: Yes    Patient: Soco De La Cruz Patient : 1970    MRN: [id number]  Reason for Admission: Chest pain  Discharge Date: 22 RARS: No data recorded    Transitions of Care Initial Call    Talked to pt, doing well, some chest discomfort, was told while at hospital probably a strain of some sort. Denies SOB. States all testing was normal. Will call PCP for f/u appt. Blood sugars are WNL, says in the 120s, reviewed insulins with pt. Was this an external facility discharge? No       Challenges to be reviewed by the provider   Additional needs identified to be addressed with provider: Yes  pt needs hospital f/u appt. Method of communication with provider : chart routing    Advance Care Planning:   Does patient have an Advance Directive: reviewed and current and decision maker updated. Advance Care Planning   Healthcare Decision Maker:    Primary Decision Maker: Maureen Lea [facility] - [phone]    Click here to complete Healthcare Decision Makers including selection of the Healthcare Decision Maker Relationship (ie \"Primary\"). Today we documented Decision Maker(s) consistent with Legal Next of Kin hierarchy. Care Transition Nurse contacted the patient by telephone to perform post hospital discharge assessment. Verified name and  with patient as identifiers. Provided introduction to self, and explanation of the CTN role. CTN reviewed discharge instructions, medical action plan and red flags with patient who verbalized understanding. Patient given an opportunity to ask questions and does not have any further questions or concerns at this time. Were discharge instructions available to patient? Yes. Reviewed appropriate site of care based on symptoms and resources available to patient including: PCP  Specialist  When to call 911.  The patient agrees to contact the PCP office for questions related to their healthcare. Medication reconciliation was performed with patient, who verbalizes understanding of administration of home medications. Advised obtaining a 90-day supply of all daily and as-needed medications. Was patient discharged with a pulse oximeter? no    CTN provided contact information. Plan for follow-up call in 5-7 days based on severity of symptoms and risk factors. Plan for next call: symptom management-chest pain related  follow up appointment-did he make one with PCP  medication management-any changes? Last Discharge [facility]       Complaint Diagnosis Description Type Department Provider    6/22/22 Chest Pain; Fatigue; Shortness of Breath Chest pain, unspecified type ED to Hosp-Admission (Discharged) (ADMITTED) [facility] [address] Cirilo Don MD; Jenna Eng. ..            Spoke with: Alma De La Cruz      Facility: [facility]    Non-face-to-face services provided:  Obtained and reviewed discharge summary and/or continuity of care documents    Care Transitions 24 Hour Call    Do you have a copy of your discharge instructions?: Yes  Do you have all of your prescriptions and are they filled?: Yes  Have you been contacted by a [address]?: No  Have you scheduled your follow up appointment?: No (Comment: pt calling today, didnt know he should call.)  Do you feel like you have everything you need to keep you well at home?: Yes  Care Transitions Interventions  No Identified Needs         Follow Up  Future Appointments   Date Time Provider Pranay Hui   8/4/2022 10:15 AM RESIDENT CLINIC 1 hospitals

## 2022-06-30 ENCOUNTER — CARE COORDINATION (OUTPATIENT)
Dept: CASE MANAGEMENT | Age: 52
End: 2022-06-30

## 2022-06-30 NOTE — CARE COORDINATION
José 45 Transitions Follow Up Call    2022    Patient: Jodi De La Cruz  Patient : 1970   MRN: [id number]   Reason for Admission:  Chest pain   Discharge Date: 22 RARS: No data recorded       Spoke with: Jodi De La Cruz    Care Transitions Subsequent and Final Call    Subsequent and Final Calls  Do you have any ongoing symptoms?: Yes  Patient-reported symptoms: Pain  Interventions for patient-reported symptoms: Other  Have your medications changed?: No  Do you have any questions related to your medications?: No  Do you currently have any active services?: No  Do you have any needs or concerns that I can assist you with?: No  Identified Barriers: None  Care Transitions Interventions  Other Interventions:         Care Transitions Follow Up Call    Challenges to be reviewed by the provider   Additional needs identified to be addressed with provider:     yinka MENDEZ CC Provider Discharge Needs: none      Method of communication with provider : none     Care Transition Nurse (CTN) contacted the patient by telephone to follow up after admission:  Verified name with patient as identifier. Provided introduction to self, and explanation of the CTN role. Addressed changes since last contact:  home health care-NA  medications-no changes   DME-use cane     Discussed follow-up appointments. If no appointment was previously scheduled, appointment scheduling offered: No appt since last conversation with CTN     Is follow up appointment scheduled within 7 days of discharge? No    Advance Care Planning:   Does patient have an Advance Directive: patient declined education      CTN reviewed discharge instructions, medical action plan and red flags with patient and discussed any barriers to care and/or understanding of plan of care after discharge.      Discussed appropriate site of care based on symptoms and resources available to patient including:      PCP   Specialist   After hour contact number   Urgent surfaces.  Avoid all cruise travel and non-essential air travel.  Call your healthcare professional if you have concerns about COVID-19 and your underlying condition or if you are sick. Pt will take all meds as prescribed and schedule / keep doctor's appt. CTC provided education on s/s that require medical attention and when to seek medical attention. CTC advised Pt of use urgent care or physician's 24 hr access line if assistance is needed after hours or on the weekend. Patient denies any needs or concerns and is agreeable with additional calls.     Follow Up  Future Appointments   Date Time Provider Pranay Hui   7/5/2022 10:15 AM DO Royal White [address] [facility] BEHAVIORAL HEALTH SERVICES [facility]   7/12/2022  1:00 PM Maudine Severance, PA [facility]   8/4/2022 10:15 AM RESIDENT CLINIC [address] Alyssa Craig, RN

## 2022-07-06 ENCOUNTER — OFFICE VISIT (OUTPATIENT)
Dept: INTERNAL MEDICINE CLINIC | Age: 52
End: 2022-07-06
Payer: MEDICAID

## 2022-07-06 ENCOUNTER — CARE COORDINATION (OUTPATIENT)
Dept: CASE MANAGEMENT | Age: 52
End: 2022-07-06

## 2022-07-06 VITALS
BODY MASS INDEX: 42.66 KG/M2 | HEIGHT: 72 IN | TEMPERATURE: 96.4 F | HEART RATE: 72 BPM | DIASTOLIC BLOOD PRESSURE: 83 MMHG | OXYGEN SATURATION: 94 % | WEIGHT: 315 LBS | SYSTOLIC BLOOD PRESSURE: 136 MMHG

## 2022-07-06 DIAGNOSIS — I50.32 CHRONIC DIASTOLIC HEART FAILURE (HCC): ICD-10-CM

## 2022-07-06 DIAGNOSIS — E11.9 DIABETES MELLITUS TYPE 2, INSULIN DEPENDENT (HCC): Primary | ICD-10-CM

## 2022-07-06 DIAGNOSIS — M94.0 COSTOCHONDRITIS: ICD-10-CM

## 2022-07-06 DIAGNOSIS — E66.01 CLASS 3 SEVERE OBESITY WITH BODY MASS INDEX (BMI) OF 50.0 TO 59.9 IN ADULT, UNSPECIFIED OBESITY TYPE, UNSPECIFIED WHETHER SERIOUS COMORBIDITY PRESENT (HCC): ICD-10-CM

## 2022-07-06 DIAGNOSIS — I48.0 PAF (PAROXYSMAL ATRIAL FIBRILLATION) (HCC): ICD-10-CM

## 2022-07-06 DIAGNOSIS — Z79.4 DIABETES MELLITUS TYPE 2, INSULIN DEPENDENT (HCC): Primary | ICD-10-CM

## 2022-07-06 DIAGNOSIS — M17.0 OSTEOARTHRITIS OF BOTH KNEES, UNSPECIFIED OSTEOARTHRITIS TYPE: ICD-10-CM

## 2022-07-06 LAB — HBA1C MFR BLD: 6.1 %

## 2022-07-06 PROCEDURE — 99213 OFFICE O/P EST LOW 20 MIN: CPT | Performed by: STUDENT IN AN ORGANIZED HEALTH CARE EDUCATION/TRAINING PROGRAM

## 2022-07-06 ASSESSMENT — ENCOUNTER SYMPTOMS
ABDOMINAL PAIN: 0
SHORTNESS OF BREATH: 0

## 2022-07-06 NOTE — PROGRESS NOTES
Outpatient Clinic Established Patient Note    Patient: Van De La Cruz  : 1970 (45 y.o.)  Date: 2022    CC: hospital follow-up    HPI:    Frank Heath is a 46 yo M woth PMH of afib s/p ablation, DM2, afib s/p ablation, osteoarthritis and hypertension who presents to the clinic for hospital follow-up. Pt was admitted for chest pain, ACS rule out. He had a stress test done that was low risk of ischemic disease and thought to be MSK pain. The pt has been using his upper body a lot due things and help himself up because his bilateral knee pain that has been bothering him constantly and has been worsening. Today he states that he continues to have achy pain in his chest. He has knee pain that is preventing him from doing his a lot of his daily activities. He has an appointment with the orthopedic surgeon for Enfelexxa injection of his knee. He states that he was seeing pain management in the past for his knee pain. Home Meds:  Prior to Visit Medications    Medication Sig Taking?  Authorizing Provider   insulin lispro, 1 Unit Dial, 100 UNIT/ML SOPN INJECT 7 UNITS INTO THE SKIN 3 TIMES DAILY BEFORE MEALS Yes Tayler Galdamez MD   apixaban (ELIQUIS) 5 MG TABS tablet TAKE 1 TABLET BY MOUTH TWICE A DAY Yes David Gaston MD   potassium chloride (KLOR-CON M20) 20 MEQ extended release tablet TAKE 1 TABLET BY MOUTH [address] Yes David Gaston MD   dilTIAZem (CARDIZEM CD) 120 MG extended release capsule TAKE 1 CAPSULE BY MOUTH EVERY DAY Yes David Gaston MD   metFORMIN (GLUCOPHAGE) 500 MG tablet Take one tablet twice per day with meals Yes David Gaston MD   furosemide (LASIX) 80 MG tablet TAKE 1 TABLET BY MOUTH TWICE A DAY Yes Matilda Moody MD   Multiple Vitamins-Minerals (THERAPEUTIC-M/LUTEIN) TABS TAKE 1 TABLET BY MOUTH EVERY DAY Yes Cinthia Grey MD   allopurinol (ZYLOPRIM) 300 MG tablet TAKE 1 TABLET BY MOUTH EVERY DAY Yes Cinthia Grey MD   Insulin Pen Needle (PEN NEEDLES) 31G X 6 MM MISC 1 each by Does not apply route daily May substitute to meet insur. requirements Yes Tiffanie Pantoja DO   atorvastatin (LIPITOR) 20 MG tablet TAKE 1 TABLET BY MOUTH EVERY DAY Yes Oliver Vang MD   lisinopril (PRINIVIL;ZESTRIL) 10 MG tablet TAKE 1 TABLET BY MOUTH EVERY DAY Yes Jacqueline Melgar MD   Insulin Syringe-Needle U-100 (BD INSULIN SYRINGE U/F) 31G X 5/16\" 1 ML MISC USE AS DIRECTED 4 TIMES A DAY Yes Jacqueline Melgar MD   Handicap Placard MISC by Does not apply route Handicap placard effective from 7/27/2021 through 7/27/2026 Yes Pau Cronin MD   sennosides-docusate sodium (SENOKOT-S) 8.6-50 MG tablet Take 1 tablet by mouth 2 times daily as needed for Constipation Yes Alex Olmos MD   sildenafil (VIAGRA) 50 MG tablet Take 1 tablet by mouth as needed for Erectile Dysfunction Yes Steevn Campuzano MD   fluticasone (FLONASE) 50 MCG/ACT nasal spray 1 spray by Nasal route daily Yes Tara Hussein MD   blood glucose monitor strips by Other route 4 times daily (after meals and at bedtime) Yes Akhil Lord MD   insulin glargine (BASAGLAR KWIKPEN) 100 UNIT/ML injection pen patient using vials per [facility] inject 20 units at hs Yes Estelle Edmonds MD   Alcohol Swabs PADS 1 Container by Does not apply route three times daily Yes Steven Smart MD   ACCU-CHEK FASTCLIX LANCETS MISC 20 Sticks by Does not apply route 2 times daily Yes Fabian Elias MD   Blood Glucose Monitoring Suppl (BLOOD GLUCOSE MONITOR SYSTEM) W/DEVICE KIT 1 Units by Does not apply route daily Yes Nidia Velázquez MD   Blood Glucose Monitoring Suppl (ACCU-CHEK VIK PLUS) W/DEVICE KIT 1 kit by Does not apply route 4 times daily (before meals and nightly).  Yes Kamar Lovesins   budesonide-formoterol (SYMBICORT) 80-4.5 MCG/ACT AERO Inhale 2 puffs into the lungs 2 times daily  Patient not taking: Reported on 6/22/2022  Radha Menon MD   Blood Pressure Monitoring (WRIST BLOOD PRESSURE MONITOR) [facility] 1 Units by Does not apply route once for 1 dose  Domingo Curran MD   Blood Pressure KIT 1 Units by Does not apply route daily  Patient not taking: Reported on 7/6/2022  Nhi Garcias MD       Allergies:    Patient has no known allergies. Health Maintenance Due   Topic Date Due    Depression Monitoring  Never done    Hepatitis C screen  Never done    Hepatitis B vaccine (1 of 3 - Risk 3-dose series) Never done    Pneumococcal 0-64 years Vaccine (2 - PCV) 09/15/2018    Diabetic retinal exam  08/28/2019    Shingles vaccine (1 of 2) Never done    Diabetic foot exam  02/25/2021    COVID-19 Vaccine (3 - Booster for Moderna series) 09/12/2021    Lipids  03/02/2022       Immunization History   Administered Date(s) Administered    COVID-19, MODERNA BLUE border, Primary or Immunocompromised, (age 12y+), IM, 100 mcg/0.5mL 03/15/2021, 04/12/2021    Influenza Virus Vaccine 02/27/2014, 12/03/2015, 11/15/2017    Influenza, Ghulam Pilar, 6 mo and older, IM, PF (Flulaval, Fluarix) 11/14/2018    Influenza, Quadv, IM, (6 mo and older Fluzone, Flulaval, Fluarix and 3 yrs and older Afluria) 11/03/2017, 11/03/2017, 11/15/2017    Influenza, Quadv, IM, PF (6 mo and older Fluzone, Flulaval, Fluarix, and 3 yrs and older Afluria) 01/08/2020    Influenza, Triv, 3 Years and older, IM, PF (Afluria 5yrs and older) 09/12/2016    Pneumococcal Polysaccharide ([id number]) 09/15/2017    Pneumococcal Vaccine 09/15/2017    Tdap (Boostrix, Adacel) 08/04/2018       Review of Systems   Constitutional: Positive for fatigue. Negative for activity change. Respiratory: Negative for shortness of breath. Cardiovascular: Negative for chest pain and palpitations. Gastrointestinal: Negative for abdominal pain. Genitourinary: Negative for difficulty urinating. Skin: Negative for rash. A 10-organ Review Of Systems was obtained and otherwise unremarkable except as per HPI. Data: Old records have been reviewed electronically.     PHYSICAL EXAM:  BP 136/83 (Site: Left Upper Arm, Position: Sitting, Cuff Size: Large Adult)   Pulse 72   Temp (!) 96.4 °F (35.8 °C) (Temporal)   Ht 6' (1.829 m)   Wt (!) 369 lb 3.2 oz (167.5 kg)   SpO2 94%   BMI 50.07 kg/m²   Physical Exam  Constitutional:       Appearance: He is obese. HENT:      Mouth/Throat:      Mouth: Mucous membranes are moist.   Eyes:      Extraocular Movements: Extraocular movements intact. Cardiovascular:      Rate and Rhythm: Normal rate. Rhythm irregular. Pulses: Normal pulses. Comments: Chest wall TTP  Pulmonary:      Effort: Pulmonary effort is normal. No respiratory distress. Breath sounds: Normal breath sounds. No wheezing or rales. Abdominal:      General: Abdomen is flat. Palpations: Abdomen is soft. Musculoskeletal:         General: Normal range of motion. Cervical back: Normal range of motion and neck supple. Right knee: No swelling. Left knee: No swelling. Skin:     General: Skin is warm and dry. Neurological:      General: No focal deficit present. Mental Status: He is alert and oriented to person, place, and time. Psychiatric:         Mood and Affect: Mood normal.         Assessment & Plan:      1. Osteoarthritis of both knees, unspecified osteoarthritis type  Pt has chronic pain in both knees. Following with orthopedic surgery, recently got approved for Enflexxa shot, also discussed platelet rich plasma injection with pt and how he may bring it up with his orthopedist  - Was previously following with pain management for knee pain, will make referral  - pt unable to take ibuprofen for pain due to being on Jason Pearl MD, Pain Management, [facility]    2.  Class 3 severe obesity with body mass index (BMI) of 50.0 to 59.9 in adult, unspecified obesity type, unspecified whether serious comorbidity present ([address]  pt counseled on the importance of weight loss and how weight is contributing to current state of health and worsening OA. He states he was previously following at the weight loss clinic and lost 30lbs  - will make referral  - Silvino Mcdonald MD, Bariatric Surgery, [facility]    3. Costochondritis  Pt still complaining \"achiness\" in chest. Tenderness reproducible with palpation. He continues to use upper body to help himself get up due to knee pain  - treatment as above     4. Diabetes mellitus type 2, insulin dependent (HCC)  A1c today 6.1 (5.9) 8 mo ago  - POCT glycosylated hemoglobin (Hb A1C)  - continue on current insulin regiment    5. Chronic diastolic heart failure (HCC)  - cont on lasix 80 mg    6. PAF (paroxysmal atrial fibrillation) ([address]  Rate controlled  - continue eliquis and diltiazem  - pt should follow-up with cardiologist Dr. Shakila Morrison    Return in about 3 months (around 10/6/2022). Dispo: Pt has been staffed with Dr. Derinda Brittle  _______________  Marina Shah MD, 7/6/2022 5:43 PM   PGY-2    Addendum to Resident H& P/Progress note:  I have personally seen,examined and evaluated the patient.  I have reviewed the current history, physical findings, labs and assessment and plan and agree with note as documented by resident MD ( Praveena Joy)      Madelaine Beltran MD, [address]

## 2022-07-06 NOTE — CARE COORDINATION
José 45 Transitions Follow Up Call    2022    Patient: Isak De La Cruz  Patient : 1970   MRN: [id number]   Reason for Admission:  Chest pain   Discharge Date: 22 RARS: No data recorded       Spoke with: Isak De La Cruz    Care Transitions Follow Up Call    Challenges to be reviewed by the provider   Additional needs identified to be addressed with provider:  no    AMB CC Provider Discharge Needs: none      Method of communication with provider :  None     Care Transition Nurse (CTN) contacted the patient by telephone to follow up after admission:   Verified name with patient as identifier. Provided introduction to self, and explanation of the CTN role. Discussed follow-up appointments. If no appointment was previously scheduled, appointment scheduling offered: No at appt at this time      Is follow up appointment scheduled within 7 days of discharge? No    Plan for f/u call in 1-2 days based on severity of symptoms and risk factors. Plan for next call:   symptom management  self-management  follow up appointment  medication management    SUMMARY  CTN spoke to the Pt who states he is at [facility] at this time for HFU appt. Pt states he is doing \"ok\" and ok for CTN to call at another time.        Follow Up  Future Appointments   Date Time Provider Pranay Hui   2022  2:45 PM MD Royal Mitchell [address] [facility] BEHAVIORAL HEALTH SERVICES [facility]   2022  1:00 PM EROS Roy [facility]   2022 10:15 AM RESIDENT CLINIC [address] Alyssa Craig RN

## 2022-07-06 NOTE — PATIENT INSTRUCTIONS
We will make a referral to Dr. Victor Manuel Espinoza with pain management and with Dr. Tomás Puente for weight loss clinic. Please follow-up with your cardiologist Dr. Shakila Morrison, cardiologist for your afib.   Follow-up in 3 months

## 2022-07-07 ENCOUNTER — CARE COORDINATION (OUTPATIENT)
Dept: CASE MANAGEMENT | Age: 52
End: 2022-07-07

## 2022-07-07 NOTE — CARE COORDINATION
José 45 Transitions Initial Follow Up Call    22    Patient:  Dago Henderson  Patient :  1970  MRN:  [id number]   Reason for Admission:  Chest pain   Discharge Date:  22  RARS: 0      Transitions of Care Initial Call    Was this an external facility discharge? No     Discharge Facility: [address] [place] [address] to be reviewed by the provider   Additional needs identified to be addressed with provider:    yinka    AMB CC Provider Discharge Needs: none            CTC attempt to reach Pt regarding recent hospital discharge. CTC unable to leave voice recording with call back number requesting a call back / no voicemail setup. Follow up appointments:    Future Appointments   Date Time Provider Pranay Hui   2022  1:00 PM Roshan Farrar [facility]   10/5/2022  2:45 PM Flower Aggarwal MD [facility] BEHAVIORAL HEALTH SERVICES [facility]       MATTHEW Harvey, RN  Care Transition Coordinator  Contact Number:  [phone]

## 2022-07-11 ENCOUNTER — TELEPHONE (OUTPATIENT)
Dept: INTERNAL MEDICINE CLINIC | Age: 52
End: 2022-07-11

## 2022-07-11 NOTE — TELEPHONE ENCOUNTER
Returned call to patient advised information has been faxed to Dr. Deshawn Coronado office as requested

## 2022-07-11 NOTE — TELEPHONE ENCOUNTER
PT STATED PAIN MANAGEMENT  ( [facility]) OFFICE NEED LAST 3 O/V NOTES, AND IMAGES IF ANY.  PT CAN BE REACHED [phone]

## 2022-07-14 ENCOUNTER — CARE COORDINATION (OUTPATIENT)
Dept: CASE MANAGEMENT | Age: 52
End: 2022-07-14

## 2022-07-14 NOTE — CARE COORDINATION
[facility] Transitions Initial Follow Up Call    22    Patient:  Beny Hubbard  Patient :  1970  MRN:  [id number]   Reason for Admission:  Chest pain   Discharge Date:  22  RARS: 0      Transitions of Care Initial Call    Was this an external facility discharge? No    Discharge Facility: [address] [place] [address] to be reviewed by the provider   Additional needs identified to be addressed with provider:    yinka    AMB CC Provider Discharge Needs: none            CTC attempt to reach Pt regarding recent hospital discharge. CTC unable to leave voice recording with call back number requesting a call back / no voicemail setup. Follow up appointments:    Future Appointments   Date Time Provider Pranay Hui   2022  1:30 PM SCHEDULE, [facility]   2022  2:30 PM Darryle Goo, MD [facility]   10/5/2022  2:45 PM Mina Lutz MD [facility] BEHAVIORAL HEALTH SERVICES [facility]       WELLINGTON DurhamN, RN  Care Transition Coordinator  Contact Number:  [phone]

## 2022-07-21 ENCOUNTER — CARE COORDINATION (OUTPATIENT)
Dept: CASE MANAGEMENT | Age: 52
End: 2022-07-21

## 2022-07-21 RX ORDER — DILTIAZEM HYDROCHLORIDE 60 MG/1
TABLET, FILM COATED ORAL
Qty: 10.2 EACH | Refills: 3 | Status: SHIPPED | OUTPATIENT
Start: 2022-07-21

## 2022-07-21 NOTE — CARE COORDINATION
[facility] Transitions Initial Follow Up Call    22    Patient:  Elizabeth Love  Patient :  1970  MRN:  [id number]   Reason for Admission:  chest pain   Discharge Date:  22  RARS: 0      Transitions of Care Initial Call    Was this an external facility discharge? no    Discharge Facility: [facility], [facility].      Challenges to be reviewed by the provider   Additional needs identified to be addressed with provider:    no    AMB CC Provider Discharge Needs: none            CTC attempt to reach Pt regarding recent hospital discharge. CTC left voice recording with call back number requesting a call back. CT episode closed / unable to reach. Follow up appointments:    Future Appointments   Date Time Provider Pranay Hui   2022  1:30 PM SCHEDULE, HEALTHY WEIGHT HEALTHY [facility]   2022  2:30 PM Trini Lovelace MD HEALTHY [facility]   10/5/2022  2:45 PM Sedrick Bryson MD [facility] BEHAVIORAL HEALTH SERVICES [facility]       MATTHEW Del Rio, RN  Care Transition Coordinator  Contact Number:  [phone]

## 2022-07-29 ENCOUNTER — TELEPHONE (OUTPATIENT)
Dept: ADMINISTRATIVE | Age: 52
End: 2022-07-29

## 2022-07-29 ENCOUNTER — OFFICE VISIT (OUTPATIENT)
Dept: PAIN MANAGEMENT | Age: 52
End: 2022-07-29
Payer: MEDICAID

## 2022-07-29 VITALS
DIASTOLIC BLOOD PRESSURE: 80 MMHG | HEIGHT: 72 IN | SYSTOLIC BLOOD PRESSURE: 136 MMHG | OXYGEN SATURATION: 98 % | WEIGHT: 315 LBS | HEART RATE: 80 BPM | BODY MASS INDEX: 42.66 KG/M2

## 2022-07-29 DIAGNOSIS — M17.0 PRIMARY OSTEOARTHRITIS OF BOTH KNEES: Primary | ICD-10-CM

## 2022-07-29 DIAGNOSIS — G89.4 CHRONIC PAIN SYNDROME: ICD-10-CM

## 2022-07-29 DIAGNOSIS — E66.01 MORBID OBESITY (HCC): ICD-10-CM

## 2022-07-29 DIAGNOSIS — Z79.4 TYPE 2 DIABETES MELLITUS WITH DIABETIC NEPHROPATHY, WITH LONG-TERM CURRENT USE OF INSULIN (HCC): ICD-10-CM

## 2022-07-29 DIAGNOSIS — E11.21 TYPE 2 DIABETES MELLITUS WITH DIABETIC NEPHROPATHY, WITH LONG-TERM CURRENT USE OF INSULIN (HCC): ICD-10-CM

## 2022-07-29 DIAGNOSIS — Z51.81 ENCOUNTER FOR THERAPEUTIC DRUG MONITORING: ICD-10-CM

## 2022-07-29 PROCEDURE — 99244 OFF/OP CNSLTJ NEW/EST MOD 40: CPT | Performed by: NURSE PRACTITIONER

## 2022-07-29 PROCEDURE — G8427 DOCREV CUR MEDS BY ELIG CLIN: HCPCS | Performed by: NURSE PRACTITIONER

## 2022-07-29 PROCEDURE — 2022F DILAT RTA XM EVC RTNOPTHY: CPT | Performed by: NURSE PRACTITIONER

## 2022-07-29 PROCEDURE — G8417 CALC BMI ABV UP PARAM F/U: HCPCS | Performed by: NURSE PRACTITIONER

## 2022-07-29 RX ORDER — OXYCODONE HYDROCHLORIDE AND ACETAMINOPHEN 5; 325 MG/1; MG/1
1 TABLET ORAL EVERY 6 HOURS PRN
Qty: 112 TABLET | Refills: 0 | Status: SHIPPED | OUTPATIENT
Start: 2022-07-29 | End: 2022-08-26

## 2022-07-29 RX ORDER — PREGABALIN 50 MG/1
50 CAPSULE ORAL 3 TIMES DAILY
Qty: 90 CAPSULE | Refills: 0 | Status: SHIPPED | OUTPATIENT
Start: 2022-07-29 | End: 2022-10-05

## 2022-07-29 ASSESSMENT — ENCOUNTER SYMPTOMS
SHORTNESS OF BREATH: 0
BACK PAIN: 1
CHEST TIGHTNESS: 0
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
## 2022-08-01 ENCOUNTER — TELEPHONE (OUTPATIENT)
Dept: PAIN MANAGEMENT | Age: 52
End: 2022-08-01

## 2022-08-01 ASSESSMENT — ENCOUNTER SYMPTOMS
DIARRHEA: 0
CONSTIPATION: 0

## 2022-08-01 NOTE — TELEPHONE ENCOUNTER
Submitted PA for Pregabalin 50MG capsules [facility] Key: [id number] STATUS: \"Your PA has been resolved, no additional PA is required\"

## 2022-08-01 NOTE — TELEPHONE ENCOUNTER
Patient needs one of you to call him about his oxycodone. Said he's been calling PA department all day with no answer.      Please call patient on status of is pain meds

## 2022-08-02 NOTE — TELEPHONE ENCOUNTER
Resubmitted PA for OXYCODONE  [facility]ia [facility] Key: [id number] STATUS: APPROVED. The patient was notified by phone.

## 2022-08-24 ENCOUNTER — OFFICE VISIT (OUTPATIENT)
Dept: BARIATRICS/WEIGHT MGMT | Age: 52
End: 2022-08-24

## 2022-08-24 VITALS
BODY MASS INDEX: 42.66 KG/M2 | DIASTOLIC BLOOD PRESSURE: 76 MMHG | SYSTOLIC BLOOD PRESSURE: 130 MMHG | HEIGHT: 72 IN | WEIGHT: 315 LBS

## 2022-08-24 DIAGNOSIS — E66.01 MORBID OBESITY WITH BMI OF 50.0-59.9, ADULT (HCC): ICD-10-CM

## 2022-08-24 DIAGNOSIS — Z01.818 PREOPERATIVE CLEARANCE: ICD-10-CM

## 2022-08-24 PROBLEM — I25.10 CORONARY ARTERY DISEASE: Status: ACTIVE | Noted: 2022-08-24

## 2022-08-24 PROCEDURE — 99999 PR OFFICE/OUTPT VISIT,PROCEDURE ONLY: CPT

## 2022-08-24 NOTE — PROGRESS NOTES
Annalee De La Cruz is a 45 y.o. male with a date of birth of 1970. Vitals:    08/24/22 1406   BP: 130/76   Site: Left Upper Arm   Weight: (!) 378 lb (171.5 kg)   Height: 6' (1.829 m)    BMI: Body mass index is 51.27 kg/m². Obesity Classification: Class III    Weight History: Wt Readings from Last 3 Encounters:   08/24/22 (!) 378 lb (171.5 kg)   07/29/22 (!) 369 lb 9.6 oz (167.6 kg)   07/06/22 (!) 369 lb 3.2 oz (167.5 kg)       Patient's lowest adult weight was 240 lb at age 28. Patient's highest adult weight was ~420 lb at age 38. Patient has participated in the following weight loss programs: calorie restrictions, low carb, nutrition counseling with RD and physician supervised. Patient has not participated in meal replacement/liquid diets. Patient has participated in weight loss medications. Qsymia 2015    Patient is not lactose intolerant. Patient does not have food allergies. Patient does not have Yazidi/cultural food preferences. 24 hour recall/food frequency chart: Reports no sodium of fluid restriction, just general lower sodium diet patterns. Breakfast: 2 Eggs + 2 slices turkey elias + 1 slice toast  Snack: Chips/ Cookies/ Cake  Lunch: None  Snack: None  Dinner: 3 Chix thighs + Rice + Cabbage   Snack: None  Drinks throughout the day: water, diet pop, diet tea   Do you drink alcohol? yes. How often/how much alcohol do you drink: 2 Beers per week. Patient does meet the criteria for binge eating disorder. Patient does have grazing. Patient does not have night eating - has in the past. Patient does have a history of emotional eating or eating out of boredom/stress.       Goals  Weight: 220 lbs  Health Improvement: improve knee pain/mobility, improve CHF/HTN, decrease medication     Assessment  Nutritional Needs: RMR=(9.99 x 171.5) + (6.25 x 182.9) - (4.92 x 51 y.o.) +5= 2605 kcal x 1.3 (sedentary activity factor)= 3387 kcal - 1000 (for 2 lb weight loss/week)= 2387 kcal.    Plan  Plan/Recommendations: General weight loss/lifestyle modification strategies discussed (elicit support from others; identify saboteurs; non-food rewards, etc). Diet interventions: 1800 kcal LC. Regular aerobic exercise program discussed. Optifast:  NA  Diet Medications:  NA  Surgery: Interested     Handouts: Surgery packet, LC plate, 4424 kcal LCMP    PES Statement: Overweight/Obesity related to lack of exercise, sedentary lifestyle, unhealthy eating habits, and unsuccessful diet attempts as evidenced by BMI. Body mass index is 51.27 kg/m². Will follow up as necessary.     Atul Joshi RD, LD

## 2022-08-26 ENCOUNTER — OFFICE VISIT (OUTPATIENT)
Dept: PAIN MANAGEMENT | Age: 52
End: 2022-08-26
Payer: MEDICAID

## 2022-08-26 VITALS
BODY MASS INDEX: 51.16 KG/M2 | SYSTOLIC BLOOD PRESSURE: 166 MMHG | WEIGHT: 315 LBS | HEART RATE: 51 BPM | OXYGEN SATURATION: 95 % | DIASTOLIC BLOOD PRESSURE: 106 MMHG

## 2022-08-26 DIAGNOSIS — Z79.4 TYPE 2 DIABETES MELLITUS WITH DIABETIC NEPHROPATHY, WITH LONG-TERM CURRENT USE OF INSULIN (HCC): ICD-10-CM

## 2022-08-26 DIAGNOSIS — M17.0 PRIMARY OSTEOARTHRITIS OF BOTH KNEES: ICD-10-CM

## 2022-08-26 DIAGNOSIS — Z51.81 ENCOUNTER FOR THERAPEUTIC DRUG MONITORING: ICD-10-CM

## 2022-08-26 DIAGNOSIS — G89.4 CHRONIC PAIN SYNDROME: ICD-10-CM

## 2022-08-26 DIAGNOSIS — E11.21 TYPE 2 DIABETES MELLITUS WITH DIABETIC NEPHROPATHY, WITH LONG-TERM CURRENT USE OF INSULIN (HCC): ICD-10-CM

## 2022-08-26 DIAGNOSIS — E66.01 MORBID OBESITY (HCC): ICD-10-CM

## 2022-08-26 PROCEDURE — G8427 DOCREV CUR MEDS BY ELIG CLIN: HCPCS | Performed by: NURSE PRACTITIONER

## 2022-08-26 PROCEDURE — 3044F HG A1C LEVEL LT 7.0%: CPT | Performed by: NURSE PRACTITIONER

## 2022-08-26 PROCEDURE — 99213 OFFICE O/P EST LOW 20 MIN: CPT | Performed by: NURSE PRACTITIONER

## 2022-08-26 PROCEDURE — G8417 CALC BMI ABV UP PARAM F/U: HCPCS | Performed by: NURSE PRACTITIONER

## 2022-08-26 PROCEDURE — 2022F DILAT RTA XM EVC RTNOPTHY: CPT | Performed by: NURSE PRACTITIONER

## 2022-08-26 PROCEDURE — 1036F TOBACCO NON-USER: CPT | Performed by: NURSE PRACTITIONER

## 2022-08-26 PROCEDURE — 3017F COLORECTAL CA SCREEN DOC REV: CPT | Performed by: NURSE PRACTITIONER

## 2022-08-26 NOTE — PROGRESS NOTES
Curtis De La Cruz  1970  [id number]      HISTORY OF PRESENT ILLNESS: Mr. Dusty Chavis is a 45 y.o. male returns for a follow up visit for pain management  He has a diagnosis of   1. Primary osteoarthritis of both knees    2. Type 2 diabetes mellitus with diabetic nephropathy, with long-term current use of insulin ([facility]    3. Encounter for therapeutic drug monitoring    4. Chronic pain syndrome    5. Morbid obesity ([facility]    . As per Information Obtained from the PADT (Patient Assessment and Documentation Tool)    He complains of pain in the  bilateral knees. He rates the pain 7/10 and describes it as burning, shooting. Current treatment regimen has helped relieve about 40% of the pain. He denies any side effects from the current pain regimen. Patient reports that since the last follow up visit the physical functioning is better, family/social relationships are unchanged, mood is unchanged sleep patterns are unchanged, and that the overall functioning is better. Patient denies misusing/abusing his narcotic pain medications or using any illegal drugs. Upon obtaining medical history from Mr. De La Cruz    ALLERGIES: Patients list of allergies were reviewed     MEDICATIONS: Mr. De La Cruz list of medications were reviewed. His current medications are   Outpatient Medications Prior to Visit   Medication Sig Dispense Refill    oxyCODONE-acetaminophen (ENDOCET) 5-325 MG per tablet Take 1 tablet by mouth every 6 hours as needed for Pain for up to 28 days. 112 tablet 0    pregabalin (LYRICA) 50 MG capsule Take 1 capsule by mouth in the morning and 1 capsule at noon and 1 capsule before bedtime. Do all this for 30 days.  90 capsule 0    SYMBICORT 80-4.5 MCG/ACT AERO INHALE 2 PUFFS INTO THE LUNGS TWICE A DAY 10.2 each 3    insulin lispro, 1 Unit Dial, 100 UNIT/ML SOPN INJECT 7 UNITS INTO THE SKIN 3 TIMES DAILY BEFORE MEALS 5 pen 3    apixaban (ELIQUIS) 5 MG TABS tablet TAKE 1 TABLET BY MOUTH TWICE A DAY 60 tablet 5 potassium chloride (KLOR-CON M20) 20 MEQ extended release tablet TAKE 1 TABLET BY MOUTH EVERY DAY WITH BREAKFAST 90 tablet 0    dilTIAZem (CARDIZEM CD) 120 MG extended release capsule TAKE 1 CAPSULE BY MOUTH EVERY DAY 90 capsule 0    metFORMIN (GLUCOPHAGE) 500 MG tablet Take one tablet twice per day with meals 180 tablet 1    furosemide (LASIX) 80 MG tablet TAKE 1 TABLET BY MOUTH TWICE A DAY 60 tablet 3    Multiple Vitamins-Minerals (THERAPEUTIC-M/LUTEIN) TABS TAKE 1 TABLET BY MOUTH EVERY DAY 30 tablet 3    allopurinol (ZYLOPRIM) 300 MG tablet TAKE 1 TABLET BY MOUTH EVERY DAY 30 tablet 3    Insulin Pen Needle (PEN NEEDLES) 31G X 6 MM MISC 1 each by Does not apply route daily May substitute to meet insur. requirements 100 each 5    atorvastatin (LIPITOR) 20 MG tablet TAKE 1 TABLET BY MOUTH EVERY DAY 30 tablet 5    lisinopril (PRINIVIL;ZESTRIL) 10 MG tablet TAKE 1 TABLET BY MOUTH EVERY DAY 30 tablet 5    Insulin Syringe-Needle U-100 (BD INSULIN SYRINGE U/F) 31G X 5/16\" 1 ML MISC USE AS DIRECTED 4 TIMES A  each 5    Handicap Placard MISC by Does not apply route Handicap placard effective from 7/27/2021 through 7/27/2026 1 each 0    sennosides-docusate sodium (SENOKOT-S) 8.6-50 MG tablet Take 1 tablet by mouth 2 times daily as needed for Constipation 20 tablet 0    sildenafil (VIAGRA) 50 MG tablet Take 1 tablet by mouth as needed for Erectile Dysfunction 30 tablet 3    blood glucose monitor strips by Other route 4 times daily (after meals and at bedtime) 60 strip 2    insulin glargine (BASAGLAR KWIKPEN) 100 UNIT/ML injection pen patient using vials per [facility]  inject 20 units at hs 5 mL 2    Alcohol Swabs PADS 1 Container by Does not apply route three times daily 100 each 5    ACCU-CHEK FASTCLIX LANCETS MISC 20 Sticks by Does not apply route 2 times daily 20 each 3    Blood Pressure KIT 1 Units by Does not apply route daily 1 kit 0    Blood Glucose Monitoring Suppl (BLOOD GLUCOSE MONITOR SYSTEM) W/DEVICE KIT 1 Units by Does not apply route daily 1 kit 0    Blood Glucose Monitoring Suppl (ACCU-CHEK VIK PLUS) W/DEVICE KIT 1 kit by Does not apply route 4 times daily (before meals and nightly). 2 kit 0    Blood Pressure Monitoring (WRIST BLOOD PRESSURE MONITOR) MISC 1 Units by Does not apply route once for 1 dose 1 each 0     No facility-administered medications prior to visit. SOCIAL/FAMILY/PAST MEDICAL HISTORY: Mr. De La Cruz social, family and past medical history was reviewed. REVIEW OF SYSTEMS:    Respiratory: Negative for apnea, chest tightness and shortness of breath or change in baseline breathing. Gastrointestinal: Negative for nausea, vomiting, abdominal pain, diarrhea, constipation, blood in stool and abdominal distention. PHYSICAL EXAM:   Nursing note and vitals reviewed. BP (!) 166/106   Pulse 51   Wt (!) 377 lb 3.2 oz (171.1 kg)   SpO2 95%   BMI 51.16 kg/m²   Constitutional: He appears well-developed and well-nourished. No acute distress. Skin: Skin is warm and dry, good turgor. No rash noted. He is not diaphoretic. Cardiovascular: Normal rate, regular rhythm, normal heart sounds, and does not have murmur. Pulmonary/Chest: Effort normal. No respiratory distress. He does not have wheezes in the lung fields. He has no rales. Neurological/Psychiatric:He is alert and oriented to person, place, and time. Coordination is  normal.  His mood isAppropriate and affect is Neutral/Euthymic(normal) .      Prescription pain medication monitoring:                  MEDD current = 30              ORT Score = 0              Other Risk factors - (mood)              Date of Last Medication Agreement:07/29/2022              Date Naloxone prescribed:NA, opiate DC today r/t UDS              UDT:                          Date of last UDT: 07/29/2022                          Adverse report: YES INCONSISTENT WITH HUMAN URINE              OARRS:                          Checked today: Yes, filled percocet 5mg tabs 08/02/2022 #112 for 28 days                          Adverse report: No    IMPRESSION:   1. Primary osteoarthritis of both knees    2. Type 2 diabetes mellitus with diabetic nephropathy, with long-term current use of insulin ([facility]    3. Encounter for therapeutic drug monitoring    4. Chronic pain syndrome    5. Morbid obesity ([facility]        PLAN:  Informed verbal consent was obtained:  -DC percocet, pt gave UDS that was inconsistent for human urine. He denies sample and states the sample was from him. -D/w pt we can continue lyrica is he would like. They did approve the lyrica for BID but he did not pick it up  -F/U PRN for non opiate treatment plans    Analgesic Plan:              Continue present regimen:no              Adjust dose of present analgesic:yes, DC              Switch analgesics:no              Add/Adjust concomitant therapy:no      Current Outpatient Medications   Medication Sig Dispense Refill    oxyCODONE-acetaminophen (ENDOCET) 5-325 MG per tablet Take 1 tablet by mouth every 6 hours as needed for Pain for up to 28 days. 112 tablet 0    pregabalin (LYRICA) 50 MG capsule Take 1 capsule by mouth in the morning and 1 capsule at noon and 1 capsule before bedtime. Do all this for 30 days.  90 capsule 0    SYMBICORT 80-4.5 MCG/ACT AERO INHALE 2 PUFFS INTO THE LUNGS TWICE A DAY 10.2 each 3    insulin lispro, 1 Unit Dial, 100 UNIT/ML SOPN INJECT 7 UNITS INTO THE SKIN 3 TIMES DAILY BEFORE MEALS 5 pen 3    apixaban (ELIQUIS) 5 MG TABS tablet TAKE 1 TABLET BY MOUTH TWICE A DAY 60 tablet 5    potassium chloride (KLOR-CON M20) 20 MEQ extended release tablet TAKE 1 TABLET BY MOUTH EVERY DAY WITH BREAKFAST 90 tablet 0    dilTIAZem (CARDIZEM CD) 120 MG extended release capsule TAKE 1 CAPSULE BY MOUTH EVERY DAY 90 capsule 0    metFORMIN (GLUCOPHAGE) 500 MG tablet Take one tablet twice per day with meals 180 tablet 1    furosemide (LASIX) 80 MG tablet TAKE 1 TABLET BY MOUTH TWICE A DAY 60 tablet 3 Multiple Vitamins-Minerals (THERAPEUTIC-M/LUTEIN) TABS TAKE 1 TABLET BY MOUTH EVERY DAY 30 tablet 3    allopurinol (ZYLOPRIM) 300 MG tablet TAKE 1 TABLET BY MOUTH EVERY DAY 30 tablet 3    Insulin Pen Needle (PEN NEEDLES) 31G X 6 MM MISC 1 each by Does not apply route daily May substitute to meet insur. requirements 100 each 5    atorvastatin (LIPITOR) 20 MG tablet TAKE 1 TABLET BY MOUTH EVERY DAY 30 tablet 5    lisinopril (PRINIVIL;ZESTRIL) 10 MG tablet TAKE 1 TABLET BY MOUTH EVERY DAY 30 tablet 5    Insulin Syringe-Needle U-100 (BD INSULIN SYRINGE U/F) 31G X 5/16\" 1 ML MISC USE AS DIRECTED 4 TIMES A  each 5    Handicap Placard MISC by Does not apply route Handicap placard effective from 7/27/2021 through 7/27/2026 1 each 0    sennosides-docusate sodium (SENOKOT-S) 8.6-50 MG tablet Take 1 tablet by mouth 2 times daily as needed for Constipation 20 tablet 0    sildenafil (VIAGRA) 50 MG tablet Take 1 tablet by mouth as needed for Erectile Dysfunction 30 tablet 3    blood glucose monitor strips by Other route 4 times daily (after meals and at bedtime) 60 strip 2    insulin glargine (BASAGLAR KWIKPEN) 100 UNIT/ML injection pen patient using vials per [facility]. inject 20 units at hs 5 mL 2    Alcohol Swabs PADS 1 Container by Does not apply route three times daily 100 each 5    ACCU-CHEK FASTCLIX LANCETS MISC 20 Sticks by Does not apply route 2 times daily 20 each 3    Blood Pressure KIT 1 Units by Does not apply route daily 1 kit 0    Blood Glucose Monitoring Suppl (BLOOD GLUCOSE MONITOR SYSTEM) W/DEVICE KIT 1 Units by Does not apply route daily 1 kit 0    Blood Glucose Monitoring Suppl (ACCU-CHEK VIK PLUS) W/DEVICE KIT 1 kit by Does not apply route 4 times daily (before meals and nightly). 2 kit 0    Blood Pressure Monitoring (WRIST BLOOD PRESSURE MONITOR) MISC 1 Units by Does not apply route once for 1 dose 1 each 0     No current facility-administered medications for this visit.      I will be unable to continue his current medication regimen  which is part of the above treatment schedule. It has been helping with Mr. De La Cruz's chronic  medical problems which for this visit include:   Diagnoses of Primary osteoarthritis of both knees, Type 2 diabetes mellitus with diabetic nephropathy, with long-term current use of insulin ([facility] Encounter for therapeutic drug monitoring, Chronic pain syndrome, and Morbid obesity ([facility] were pertinent to this visit. Goals of current treatment regimen include improvement in pain, restoration of functioning- with focus on improvement in physical performance, general activity, work or disability,emotional distress, health care utilization and  decreased medication consumption. Will continue to monitor progress towards achieving/maintaining therapeutic goals with special emphasis on    1. Improvement in perceived interfernce  of pain with ADL's. Ability to do home exercises independently. Ability to do household chores indoor and/or outdoor work and social and leisure activities. Improve psychosocial and physical functioning. PROGRESSING        While transcribing every attempt was made to maintain the accuracy of the note in terms of it's contents,there may have been some errors made inadvertently. Thank you for allowing me to participate in the care of this patient.     Kenia Figueroa, NP-C    Cc: Marquetta Hashimoto, MD

## 2022-08-31 ENCOUNTER — TELEPHONE (OUTPATIENT)
Dept: PULMONOLOGY | Age: 52
End: 2022-08-31

## 2022-09-14 RX ORDER — CLINDAMYCIN PHOSPHATE 10 MG/G
GEL TOPICAL
Qty: 1 EACH | Refills: 1 | Status: SHIPPED | OUTPATIENT
Start: 2022-09-14 | End: 2022-09-20

## 2022-09-15 ENCOUNTER — TELEPHONE (OUTPATIENT)
Dept: PULMONOLOGY | Age: 52
End: 2022-09-15

## 2022-09-19 NOTE — PROGRESS NOTES
Sherryle Munda D Ringer         : 1970        PHONE: [phone] (home)   [x] [address]     [] [address]      [] [facility]     [][facility]    [] [employer]  [] [facility]     [] Other:    Diagnosis: [x] LEONA (G47.33) [] CSA (G47.31) [] Apnea (G47.30)   Length of Need: [] 12 Months [x] 99 Months [] Other:    Machine (WILFRED!): [] Respironics Dream Station      Auto [] ResMed AirSense     Auto [] Other:     []  CPAP () [] Bilevel ()   Mode: [] Auto [] Spontaneous    Mode: [] Auto [] Spontaneous                            Comfort Settings:   - Ramp Pressure: 5 cmH2O                                        - Ramp time: 15 min                                     -  Flex/EPR - 3 full time                                    - For ResMed Bilevel (TiMax-4 sec   TiMin- 0.2 sec)     Humidifier: [] Heated ()        [x] Water chamber replacement ()/ 1 per 6 months        Mask:   [x] Nasal () /1 per 3 months [x] Full Face () /1 per 3 months   [x] Patient choice -Size and fit mask [x] Patient Choice - Size and fit mask   [] Dispense:  [] Dispense:    [x] Headgear () / 1 per 3 months [x] Headgear () / 1 per 3 months   [x] Replacement Nasal Cushion ()/2 per month [x] Interface Replacement ()/1 per month   [x] Replacement Nasal Pillows ()/2 per month         Tubing: [x] Heated ()/1 per 3 months    [] Standard ()/1 per 3 months [] Other:           Filters: [x] Non-disposable ()/1 per 6 months     [x] Ultra-Fine, Disposable ()/2 per month        Miscellaneous: [] Chin Strap ()/ 1 per 6 months [] O2 bleed-in:       LPM   [] Oximetry on CPAP/Bilevel []  Other:          Start Order Date: 22    MEDICAL JUSTIFICATION:  I, the undersigned, certify that the above prescribed supplies are medically necessary for this patients wellbeing.   In my opinion, the supplies are both reasonable and necessary in reference to accepted standards of medicalpractice in treatment of this patients condition.     Dorian Cherry MD      NPI: [id number]       Order Signed Date: 09/19/22    Electronically signed by Dorian Cherry MD on 9/19/2022 at 8:09 AM

## 2022-09-22 RX ORDER — DILTIAZEM HYDROCHLORIDE 120 MG/1
CAPSULE, COATED, EXTENDED RELEASE ORAL
Qty: 90 CAPSULE | Refills: 0 | Status: CANCELLED | OUTPATIENT
Start: 2022-09-22

## 2022-09-23 PROBLEM — Z01.818 PREOPERATIVE CLEARANCE: Status: RESOLVED | Noted: 2022-08-24 | Resolved: 2022-09-23

## 2022-09-26 DIAGNOSIS — E87.6 POTASSIUM DEFICIENCY: ICD-10-CM

## 2022-09-26 DIAGNOSIS — I10 ESSENTIAL HYPERTENSION: Chronic | ICD-10-CM

## 2022-09-26 RX ORDER — ALLOPURINOL 300 MG/1
TABLET ORAL
Qty: 30 TABLET | Refills: 3 | Status: SHIPPED | OUTPATIENT
Start: 2022-09-26 | End: 2022-10-05

## 2022-09-29 RX ORDER — POTASSIUM CHLORIDE 20 MEQ/1
TABLET, EXTENDED RELEASE ORAL
Qty: 90 TABLET | Refills: 0 | Status: SHIPPED | OUTPATIENT
Start: 2022-09-29 | End: 2022-10-05

## 2022-09-29 RX ORDER — DILTIAZEM HYDROCHLORIDE 120 MG/1
CAPSULE, COATED, EXTENDED RELEASE ORAL
Qty: 90 CAPSULE | Refills: 0 | Status: SHIPPED | OUTPATIENT
Start: 2022-09-29 | End: 2022-10-05

## 2022-09-30 RX ORDER — FUROSEMIDE 80 MG
TABLET ORAL
Qty: 60 TABLET | Refills: 0 | Status: SHIPPED | OUTPATIENT
Start: 2022-09-30 | End: 2022-10-05

## 2022-10-04 ENCOUNTER — OFFICE VISIT (OUTPATIENT)
Dept: SLEEP MEDICINE | Age: 52
End: 2022-10-04
Payer: MEDICAID

## 2022-10-04 VITALS
DIASTOLIC BLOOD PRESSURE: 80 MMHG | RESPIRATION RATE: 24 BRPM | BODY MASS INDEX: 42.66 KG/M2 | OXYGEN SATURATION: 96 % | HEIGHT: 72 IN | WEIGHT: 315 LBS | HEART RATE: 65 BPM | SYSTOLIC BLOOD PRESSURE: 130 MMHG | TEMPERATURE: 97.3 F

## 2022-10-04 DIAGNOSIS — E66.01 CLASS 3 SEVERE OBESITY DUE TO EXCESS CALORIES WITH SERIOUS COMORBIDITY AND BODY MASS INDEX (BMI) OF 50.0 TO 59.9 IN ADULT (HCC): ICD-10-CM

## 2022-10-04 DIAGNOSIS — G47.33 OSA TREATED WITH BIPAP: Primary | ICD-10-CM

## 2022-10-04 DIAGNOSIS — Z99.89 DEPENDENCE ON OTHER ENABLING MACHINES AND DEVICES: ICD-10-CM

## 2022-10-04 DIAGNOSIS — Z86.79 HISTORY OF ATRIAL FIBRILLATION: ICD-10-CM

## 2022-10-04 DIAGNOSIS — Z86.79 H/O CHF: ICD-10-CM

## 2022-10-04 PROCEDURE — G8427 DOCREV CUR MEDS BY ELIG CLIN: HCPCS | Performed by: PSYCHIATRY & NEUROLOGY

## 2022-10-04 PROCEDURE — G8484 FLU IMMUNIZE NO ADMIN: HCPCS | Performed by: PSYCHIATRY & NEUROLOGY

## 2022-10-04 PROCEDURE — 99214 OFFICE O/P EST MOD 30 MIN: CPT | Performed by: PSYCHIATRY & NEUROLOGY

## 2022-10-04 PROCEDURE — 3017F COLORECTAL CA SCREEN DOC REV: CPT | Performed by: PSYCHIATRY & NEUROLOGY

## 2022-10-04 PROCEDURE — 1036F TOBACCO NON-USER: CPT | Performed by: PSYCHIATRY & NEUROLOGY

## 2022-10-04 PROCEDURE — G8417 CALC BMI ABV UP PARAM F/U: HCPCS | Performed by: PSYCHIATRY & NEUROLOGY

## 2022-10-04 RX ORDER — CLINDAMYCIN PHOSPHATE 10 MG/G
GEL TOPICAL
Qty: 1 EACH | Refills: 1 | Status: CANCELLED | OUTPATIENT
Start: 2022-10-04 | End: 2022-10-10

## 2022-10-04 ASSESSMENT — SLEEP AND FATIGUE QUESTIONNAIRES
HOW LIKELY ARE YOU TO NOD OFF OR FALL ASLEEP WHILE SITTING QUIETLY AFTER LUNCH WITHOUT ALCOHOL: 0
HOW LIKELY ARE YOU TO NOD OFF OR FALL ASLEEP WHILE SITTING INACTIVE IN A PUBLIC PLACE: 2
HOW LIKELY ARE YOU TO NOD OFF OR FALL ASLEEP IN A CAR, WHILE STOPPED FOR A FEW MINUTES IN TRAFFIC: 0
HOW LIKELY ARE YOU TO NOD OFF OR FALL ASLEEP WHILE SITTING AND TALKING TO SOMEONE: 0
HOW LIKELY ARE YOU TO NOD OFF OR FALL ASLEEP WHILE WATCHING TV: 2
HOW LIKELY ARE YOU TO NOD OFF OR FALL ASLEEP WHILE SITTING AND READING: 0
HOW LIKELY ARE YOU TO NOD OFF OR FALL ASLEEP WHILE LYING DOWN TO REST IN THE AFTERNOON WHEN CIRCUMSTANCES PERMIT: 2
ESS TOTAL SCORE: 6
HOW LIKELY ARE YOU TO NOD OFF OR FALL ASLEEP WHEN YOU ARE A PASSENGER IN A CAR FOR AN HOUR WITHOUT A BREAK: 0

## 2022-10-04 ASSESSMENT — ENCOUNTER SYMPTOMS: APNEA: 0

## 2022-10-04 NOTE — PROGRESS NOTES
Yoly De La Cruz         : 1970        PHONE: [phone] (home)   [x] [address]     [] [address]      [] [place]     [][employer]    [] [facility]  [] [employer]   [] [address] [place] [address]   [] Other:    Diagnosis: [x] LEONA (G47.33) [] CSA (G47.31) [] Apnea (G47.30)   Length of Need: [] 12 Months [x] 99 Months [] Other:    Machine (WILFRED!): [] Respironics Dream Station      Auto [] ResMed AirSense     Auto [] Other:     []  CPAP () [] Bilevel ()   Mode: [] Auto [] Spontaneous    Mode: [] Auto [] Spontaneous                            Comfort Settings:   - Ramp Pressure: 5 cmH2O                                        - Ramp time: 15 min                                     -  Flex/EPR - 3 full time                                    - For ResMed Bilevel (TiMax-4 sec   TiMin- 0.2 sec)     Humidifier: [] Heated ()        [x] Water chamber replacement ()/ 1 per 6 months        Mask:   [x] Nasal () /1 per 3 months [] Full Face () /1 per 3 months   [] Patient choice -Size and fit mask [] Patient Choice - Size and fit mask   [] Dispense: P10 nasal pillow [] Dispense:    [x] Headgear () / 1 per 3 months [] Headgear () / 1 per 3 months   [x] Replacement Nasal Cushion ()/2 per month [] Interface Replacement ()/1 per month   [x] Replacement Nasal Pillows ()/2 per month         Tubing: [x] Heated ()/1 per 3 months    [] Standard ()/1 per 3 months [] Other:           Filters: [x] Non-disposable ()/1 per 6 months     [x] Ultra-Fine, Disposable ()/2 per month        Miscellaneous: [x] Chin Strap ()/ 1 per 6 months [] O2 bleed-in:       LPM   [] Oximetry on CPAP/Bilevel []  Other:          Start Order Date: 10/04/22    MEDICAL JUSTIFICATION:  I, the undersigned, certify that the above prescribed supplies are medically necessary for this patients wellbeing.   In my opinion, the supplies are both reasonable and necessary in reference to accepted standards of medicalpractice in treatment of this patients condition.     Amrit Bragg MD      NPI: [id number]       Order Signed Date: 10/04/22    Electronically signed by Amrit Bragg MD on 10/4/2022 at 11:10 AM

## 2022-10-04 NOTE — PROGRESS NOTES
MD ERICK Villanueva Board Certified in Sleep Medicine  Certified in [address] [place] [address] Certified in Neurology [address] [facility]  Varun Thakur  [address] [place],  Zachery Carranza   [phone]   [address] [place] [address], [facility] [place]                      [address]  [address] [address] [phone]    Subjective:     Patient ID: Maira De La Cruz is a 47 y.o. male. Chief Complaint   Patient presents with    Follow-up         HPI:        Maira De La Cruz is a 47 y.o. male was seen today as annual follow for severe obstructive sleep apnea with an AHI - 86.3/hr with Low SaO2 - 79% and time below 90% of 50.6mi  Patient is using the PAP machine about 100% of the time, more than 4 hours a nightabout  97 %, in total average of 6:55 hours a night in last 90 days. Currently on PAP at 14/10 cm (), the AHI is only 0.4 events per hour at this pressure. Patient improved regarding daytime sleepiness and fatigue, wakes up refreshed in the morning. The Patient scored [place] Sleepiness Score: 6 on [place] Sleepiness Scale ( more than 10 is indicative of daytime sleepiness)   Patient has no problem with PAP pressure or mask, uses FFM. Wakes up in the morning with dry mouth, the setting of the heated humidifier at # auto. Patient hastrouble falling asleep while on PAP machine. Patient improved after mask fitting. BP, CHF, and A-Fib are stable. Has gained 10 pounds since last year.    DOT/CDL - N/A        Previous Report(s)Reviewed: historical medical records         Social History     Socioeconomic History    Marital status: Single     Spouse name: Not on file    Number of children: Not on file    Years of education: Not on file    Highest education level: Not on file   Occupational History    Not on file   Tobacco Use    Smoking status: Never     Passive exposure: Never    Smokeless tobacco: Never   Vaping Use    Vaping Use: Never used   Substance and Sexual Activity    Alcohol use: No     Alcohol/week: 0.0 standard drinks    Drug use: Yes     Types: Marijuana (Merryl Layman), Cocaine     Comment: no cocaine for over 6 years, marijuana occ    Sexual activity: Not Currently   Other Topics Concern    Not on file   Social History Narrative    Not on file     Social Determinants of Health     Financial Resource Strain: Not on file   Food Insecurity: Not on file   Transportation Needs: Not on file   Physical Activity: Not on file   Stress: Not on file   Social Connections: Not on file   Intimate Partner Violence: Not on file   Housing Stability: Not on file       Prior to Admission medications    Medication Sig Start Date End Date Taking?  Authorizing Provider   furosemide (LASIX) 80 MG tablet TAKE 1 TABLET BY MOUTH TWICE A DAY 9/30/22  Yes Jeromy Short MD   metFORMIN (GLUCOPHAGE) 500 MG tablet Take one tablet twice per day with meals 9/30/22  Yes Jeromy Short MD   dilTIAZem (CARDIZEM CD) 120 MG extended release capsule TAKE 1 CAPSULE BY MOUTH EVERY DAY 9/29/22  Yes Good Emery MD   potassium chloride (KLOR-CON M20) 20 MEQ extended release tablet TAKE 1 TABLET BY MOUTH EVERY DAY WITH BREAKFAST 9/29/22  Yes Good Emery MD   allopurinol (ZYLOPRIM) 300 MG tablet TAKE 1 TABLET BY MOUTH EVERY DAY 9/26/22  Yes Jeromy Short MD   SYMBICORT 80-4.5 MCG/ACT AERO INHALE 2 PUFFS INTO THE LUNGS TWICE A DAY 7/21/22  Yes Marielena Moses MD   insulin lispro, 1 Unit Dial, 100 UNIT/ML SOPN INJECT 7 UNITS INTO THE SKIN 3 TIMES DAILY BEFORE MEALS 6/22/22  Yes Raj Neville MD   apixaban (ELIQUIS) 5 MG TABS tablet TAKE 1 TABLET BY MOUTH TWICE A DAY 6/17/22  Yes Magdalene Yanes MD   Multiple Vitamins-Minerals (THERAPEUTIC-M/LUTEIN) TABS TAKE 1 TABLET BY MOUTH EVERY DAY 5/24/22  Yes Ligia Navarrete MD   Insulin Pen Needle (PEN NEEDLES) 31G X 6 MM MISC 1 each by Does not apply route daily May substitute to meet insur. requirements 5/16/22  Yes Jyotsna Burns DO   atorvastatin (LIPITOR) 20 MG tablet TAKE 1 TABLET BY MOUTH EVERY DAY 4/28/22  Yes Jennifer Fox MD   lisinopril (PRINIVIL;ZESTRIL) 10 MG tablet TAKE 1 TABLET BY MOUTH EVERY DAY 4/13/22  Yes Arian Kim MD   Insulin Syringe-Needle U-100 (BD INSULIN SYRINGE U/F) 31G X 5/16\" 1 ML MISC USE AS DIRECTED 4 TIMES A DAY 12/10/21  Yes Arian Kim MD   Handicap Yuan MISC by Does not apply route Handicap placard effective from 7/27/2021 through 7/27/2026 7/27/21  Yes Kushal Echeverria MD   sennosides-docusate sodium (SENOKOT-S) 8.6-50 MG tablet Take 1 tablet by mouth 2 times daily as needed for Constipation 5/25/21  Yes Cesar Triana MD   sildenafil (VIAGRA) 50 MG tablet Take 1 tablet by mouth as needed for Erectile Dysfunction 2/14/20  Yes Jimmey Frankel, MD   blood glucose monitor strips by Other route 4 times daily (after meals and at bedtime) 86/16/84  Yes Mihai Shepherd MD   insulin glargine (BASAGLAR KWIKPEN) 100 UNIT/ML injection pen patient using vials per [facility]. inject 20 units at hs 11/12/19  Yes Bashir Warren MD   Alcohol Swabs PADS 1 Container by Does not apply route three times daily 10/1/19  Yes Jimmey Frankel, MD   ACCU-CHEK FASTCLIX LANCETS MISC 20 Sticks by Does not apply route 2 times daily 10/1/19  Yes Jimmey Frankel, MD   Blood Pressure KIT 1 Units by Does not apply route daily 4/17/17  Yes Ольга Mckeon MD   Blood Glucose Monitoring Suppl (BLOOD GLUCOSE MONITOR SYSTEM) W/DEVICE KIT 1 Units by Does not apply route daily 4/17/17  Yes Ольга Mckeon MD   Blood Glucose Monitoring Suppl (ACCU-CHEK VIK PLUS) W/DEVICE KIT 1 kit by Does not apply route 4 times daily (before meals and nightly).  1/6/14  Yes Scott Davenport   pregabalin (LYRICA) 50 MG capsule Take 1 capsule by mouth in the morning and 1 capsule at noon and 1 capsule before bedtime. Do all this for 30 days.   Patient not taking: Reported on 10/4/2022 7/29/22 8/28/22  TATO Paredes - CNP   Blood Pressure Monitoring (WRIST BLOOD PRESSURE MONITOR) MISC 1 Units by Does not apply route once for 1 dose 12/27/18 12/27/18  Maisha Arita MD       Allergies as of 10/04/2022    (No Known Allergies)       Patient Active Problem List   Diagnosis    DM2 (diabetes mellitus, type 2) ([facility]    Morbid obesity ([address]    LEONA (obstructive sleep apnea)    Atrial fibrillation    Non compliance w medication regimen    Dyspnea    Bradycardia    Class 3 severe obesity due to excess calories with serious comorbidity and body mass index (BMI) of 50.0 to 59.9 in [facility])    Hypertension    Hypertensive heart disease with chronic diastolic congestive heart failure (HCC)    GERD (gastroesophageal reflux disease)    Hx of atrial fibrillation without current medication    PAF (paroxysmal atrial fibrillation) ([facility])    S/P ablation of atrial fibrillation    Persistent atrial fibrillation    Abscess of gluteal region    Arthritis    Chronic gout of multiple sites    Current mild episode of major depressive disorder without prior episode ([facility])    Generalized osteoarthritis of multiple sites    Hyperlipidemia associated with type 2 diabetes mellitus ([address]    Onychomycosis of multiple toenails with type 2 diabetes mellitus (HCC)    Osteoarthritis of knee    Sciatica    Type 2 diabetes mellitus with diabetic nephropathy, with long-term current use of insulin ([facility])    Gastroesophageal reflux disease without esophagitis    Hypertension associated with diabetes ([address]    Trigger middle finger of left hand    Chest pain    Primary osteoarthritis of both knees    Chronic pain syndrome    Coronary artery disease    Morbid obesity with BMI of 50.0-59.9, [facility])       Past Medical History:   Diagnosis Date    Arthritis     Atrial fibrillation ([address]     Congestive heart failure (HCC)     Coronary artery disease Depression     Diabetes mellitus ([facility]     GERD (gastroesophageal reflux disease)     Hyperlipidemia     Hypertension     Morbid obesity ([facility]     Sleep apnea     uses cpap at home q hs     Type II or unspecified type diabetes mellitus without mention of complication, not stated as uncontrolled        Past Surgical History:   Procedure Laterality Date    ABLATION OF DYSRHYTHMIC FOCUS      COLONOSCOPY  4/13/2021    COLONOSCOPY POLYPECTOMY SNARE/COLD BIOPSY performed by Ham Jones MD at [address] Left 6/10/2021    LEFT MIDDLE FINGER TRIGGER RELEASE performed by Roseann Tolliver MD at [facility]       Family History   Problem Relation Age of Onset    Sleep Apnea Mother     Diabetes Mother     High Blood Pressure Mother     Diabetes Maternal Aunt     High Blood Pressure Maternal Grandmother        Review of Systems   Constitutional:  Negative for fatigue. Respiratory:  Negative for apnea. Neurological:  Negative for headaches. Objective:     Vitals:  Weight BMI Neck circumference    Wt Readings from Last 3 Encounters:   10/04/22 (!) 381 lb 3.2 oz (172.9 kg)   08/26/22 (!) 377 lb 3.2 oz (171.1 kg)   08/24/22 (!) 378 lb (171.5 kg)    Body mass index is 51.7 kg/m². BP HR SaO2   BP Readings from Last 3 Encounters:   10/04/22 130/80   08/26/22 (!) 166/106   08/24/22 130/76    Pulse Readings from Last 3 Encounters:   10/04/22 65   08/26/22 51   07/29/22 80    SpO2 Readings from Last 3 Encounters:   10/04/22 96%   08/26/22 95%   07/29/22 98%        Themandibular molar Class :   [x]1 []2 []3      Mallampati I Airway Classification:   []1 []2 []3 [x]4      Physical Exam  Vitals and nursing note reviewed. Cardiovascular:      Rate and Rhythm: Normal rate and regular rhythm. Pulmonary:      Effort: Pulmonary effort is normal.      Breath sounds: Normal breath sounds. Musculoskeletal:      Right lower leg: No edema.       Left lower leg: No edema.       :   Severe Obstructive Sleep Apnea/Hypopnea Syndrome under good control on PAP at 14/10 cmwp. Diagnosis Orders   1. LEONA treated with BiPAP        2. Dependence on other enabling machines and devices        3. H/O CHF        4. History of atrial fibrillation        5. Class 3 severe obesity due to excess calories with serious comorbidity and body mass index (BMI) of 50.0 to 59.9 in adult [facility])          Plan: Will continue the PAP at 14/10 cmwp. Mask fitting with P10 airfit with chin strap. I educated the Patient about the PAP machine including how to change the heated humidifier. I will order PAP supplies, mask, filters. ... Most likely treating the LEONA will have positive impact on HTN, CHF, A-Fib control. Weight loss:  Most likely the patient will benefit from the gastric sleeve surgery in treating of the obstructive sleep apnea. In 2013, in a study published in Obesity Surgery Journal  A total of 69 studies with 13,900 patients were included and revealed that all the procedures achieved profound effects on LEONA, as over 75 % of patients saw at least an improvement in their sleep apnea, bariatric surgery is a definitive treatment for obstructive sleep apnea, regardless of the specific type of surgery. We discussed the proportionality between weight and AHI. With 10% weight change, the AHI has a 27% proportionate change. With 20% weight change, the AHI has a 45-50% proportionate change. The Patient is considered low risk for post operative pulmonary complications following sleeve gastrectomy from obstructive sleep apnea standpoint. No orders of the defined types were placed in this encounter. Return in about 1 year (around 10/4/2023) for Reveiwing CPAP usage and compliance report and tro.     Diana Walter MD  Medical Director - [facility]

## 2022-10-05 ENCOUNTER — OFFICE VISIT (OUTPATIENT)
Dept: INTERNAL MEDICINE CLINIC | Age: 52
End: 2022-10-05
Payer: MEDICAID

## 2022-10-05 VITALS
HEIGHT: 72 IN | HEART RATE: 70 BPM | WEIGHT: 315 LBS | TEMPERATURE: 97 F | BODY MASS INDEX: 42.66 KG/M2 | OXYGEN SATURATION: 92 % | SYSTOLIC BLOOD PRESSURE: 151 MMHG | DIASTOLIC BLOOD PRESSURE: 90 MMHG

## 2022-10-05 DIAGNOSIS — G47.33 OSA (OBSTRUCTIVE SLEEP APNEA): ICD-10-CM

## 2022-10-05 DIAGNOSIS — I10 ESSENTIAL HYPERTENSION: Chronic | ICD-10-CM

## 2022-10-05 DIAGNOSIS — I48.0 PAROXYSMAL ATRIAL FIBRILLATION (HCC): ICD-10-CM

## 2022-10-05 DIAGNOSIS — E11.9 TYPE 2 DIABETES MELLITUS WITHOUT COMPLICATION, UNSPECIFIED WHETHER LONG TERM INSULIN USE (HCC): Chronic | ICD-10-CM

## 2022-10-05 DIAGNOSIS — E78.5 HYPERLIPIDEMIA, UNSPECIFIED HYPERLIPIDEMIA TYPE: ICD-10-CM

## 2022-10-05 DIAGNOSIS — E87.6 POTASSIUM DEFICIENCY: ICD-10-CM

## 2022-10-05 DIAGNOSIS — E66.01 MORBID OBESITY WITH BMI OF 50.0-59.9, ADULT (HCC): ICD-10-CM

## 2022-10-05 DIAGNOSIS — N52.9 ERECTILE DYSFUNCTION, UNSPECIFIED ERECTILE DYSFUNCTION TYPE: ICD-10-CM

## 2022-10-05 DIAGNOSIS — L73.2 HIDRADENITIS SUPPURATIVA: ICD-10-CM

## 2022-10-05 LAB — HBA1C MFR BLD: 6.4 %

## 2022-10-05 PROCEDURE — 83036 HEMOGLOBIN GLYCOSYLATED A1C: CPT | Performed by: STUDENT IN AN ORGANIZED HEALTH CARE EDUCATION/TRAINING PROGRAM

## 2022-10-05 PROCEDURE — 99213 OFFICE O/P EST LOW 20 MIN: CPT | Performed by: STUDENT IN AN ORGANIZED HEALTH CARE EDUCATION/TRAINING PROGRAM

## 2022-10-05 RX ORDER — VALSARTAN 160 MG/1
160 TABLET ORAL DAILY
Qty: 90 TABLET | Refills: 1 | Status: SHIPPED | OUTPATIENT
Start: 2022-10-05

## 2022-10-05 RX ORDER — SILDENAFIL 50 MG/1
50 TABLET, FILM COATED ORAL PRN
Qty: 30 TABLET | Refills: 3 | Status: CANCELLED | OUTPATIENT
Start: 2022-10-05

## 2022-10-05 RX ORDER — DILTIAZEM HYDROCHLORIDE 120 MG/1
CAPSULE, COATED, EXTENDED RELEASE ORAL
Qty: 90 CAPSULE | Refills: 0 | Status: SHIPPED | OUTPATIENT
Start: 2022-10-05

## 2022-10-05 RX ORDER — POTASSIUM CHLORIDE 20 MEQ/1
TABLET, EXTENDED RELEASE ORAL
Qty: 90 TABLET | Refills: 0 | Status: SHIPPED | OUTPATIENT
Start: 2022-10-05

## 2022-10-05 RX ORDER — ALLOPURINOL 300 MG/1
TABLET ORAL
Qty: 30 TABLET | Refills: 3 | Status: SHIPPED | OUTPATIENT
Start: 2022-10-05

## 2022-10-05 RX ORDER — SILDENAFIL 50 MG/1
50 TABLET, FILM COATED ORAL PRN
Qty: 30 TABLET | Refills: 3 | Status: SHIPPED | OUTPATIENT
Start: 2022-10-05 | End: 2022-10-19 | Stop reason: SDUPTHER

## 2022-10-05 RX ORDER — FUROSEMIDE 80 MG
TABLET ORAL
Qty: 60 TABLET | Refills: 0 | Status: SHIPPED | OUTPATIENT
Start: 2022-10-05

## 2022-10-05 ASSESSMENT — ENCOUNTER SYMPTOMS
CONSTIPATION: 1
ABDOMINAL PAIN: 0
SHORTNESS OF BREATH: 1

## 2022-10-05 NOTE — ASSESSMENT & PLAN NOTE
A1c today is 6.4% up from 6.1% 3 months ago  -Controlled on current regimen of metformin and insulin

## 2022-10-05 NOTE — ASSESSMENT & PLAN NOTE
Patient has had recurrent abscesses since 1991.   He was seen by general surgery in the past, will refer again

## 2022-10-05 NOTE — ASSESSMENT & PLAN NOTE
Blood pressure today 151/90. His past appointments have been elevated in the 160s. Weight could be a contributing factor.   We will switch him from lisinopril to valsartan 160 mg  -Advised to keep a blood pressure log daily  -Follow-up in 2 weeks for blood pressure check

## 2022-10-05 NOTE — PATIENT INSTRUCTIONS
Stop taking lisinopril and start taking valsartan. Keep a log of your BP daily  Try to limit salt intake  Your prescriptions were sent to [facility] in [place]  Please get bloodwork done.   We will schedule you an appointment to see general surgery for your abscesses  Please make an appointment for your eye exam  Follow-up in 2 weeks for BP follow-up

## 2022-10-05 NOTE — PROGRESS NOTES
[facility]    Ziyad De La Cruz is a 47 y.o. male, here for evaluation of the following concerns:    INDER  Khushboo Weston is a 46 yo M wo PMH of afib s/p ablation, DM2, afib s/p ablation, osteoarthritis, hydradenitis and hypertension who presents to the clinic for 3 month follow-up. Patient states that he has abscesses on his buttocks that are painful for him. A history of hidradenitis and multiple I&D's. He also states that she has bilateral knee pain for which she sees an orthopedic surgeon he was getting Enflexxa shots but has not gotten them yet. He admits to very poor diet. Eating a lot of fast food all the time. He does not have anything to measure his blood pressure at home. He has gained about 20 pounds since May. He is seeing weight loss clinic in [place]. He has an appointment tomorrow. They have discussed gastric sleeve with him. He checks his blood sugars regularly at home and they range from 1 10-1 70. He has regular follow-up with his pulmonologist for LEONA. He uses his CPAP regularly       Review of Systems   Constitutional:  Negative for chills and fever. Respiratory:  Positive for shortness of breath (on exertion). Cardiovascular:  Negative for chest pain. Gastrointestinal:  Positive for constipation (poor diet). Negative for abdominal pain. Genitourinary:  Negative for difficulty urinating. Musculoskeletal:  Positive for arthralgias (bilateral knee pain). Neurological:  Negative for dizziness and light-headedness. Psychiatric/Behavioral:  Negative for agitation. MEDICATIONS:  Prior to Visit Medications    Medication Sig Taking?  Authorizing Provider   furosemide (LASIX) 80 MG tablet TAKE 1 TABLET BY MOUTH TWICE A DAY Yes Arturo Anna MD   metFORMIN (GLUCOPHAGE) 500 MG tablet Take one tablet twice per day with meals Yes Arturo Anna MD   dilTIAZem (CARDIZEM CD) 120 MG extended release capsule TAKE 1 CAPSULE BY MOUTH EVERY DAY Yes Ozzy Tamayo MD   potassium chloride (KLOR-CON M20) 20 MEQ extended release tablet TAKE 1 TABLET BY MOUTH EVERY DAY WITH BREAKFAST Yes Ozzy Tamayo MD   allopurinol (ZYLOPRIM) 300 MG tablet Take one tablet daily Yes Ozzy Tamayo MD   valsartan (DIOVAN) 160 MG tablet Take 1 tablet by mouth daily Yes Ozzy Tamayo MD   sildenafil (VIAGRA) 50 MG tablet Take 1 tablet by mouth as needed for Erectile Dysfunction Yes Ozzy Tamayo MD   SYMBICORT 80-4.5 MCG/ACT AERO INHALE 2 PUFFS INTO THE LUNGS TWICE A DAY Yes iGlma Nichols MD   insulin lispro, 1 Unit Dial, 100 UNIT/ML SOPN INJECT 7 UNITS INTO THE SKIN 3 TIMES DAILY BEFORE MEALS Yes Richard Tim MD   apixaban (ELIQUIS) 5 MG TABS tablet TAKE 1 TABLET BY MOUTH TWICE A DAY Yes Osmel Mora MD   Multiple Vitamins-Minerals (THERAPEUTIC-M/LUTEIN) TABS TAKE 1 TABLET BY MOUTH EVERY DAY Yes Cate Huerta MD   atorvastatin (LIPITOR) 20 MG tablet TAKE 1 TABLET BY MOUTH EVERY DAY Yes Osmel Mora MD   Handicap Placard MISC by Does not apply route Handicap placard effective from 7/27/2021 through 7/27/2026 Yes Benedicto Rodriguez MD   sennosides-docusate sodium (SENOKOT-S) 8.6-50 MG tablet Take 1 tablet by mouth 2 times daily as needed for Constipation Yes Yosi Reeves MD   blood glucose monitor strips by Other route 4 times daily (after meals and at bedtime) Yes Dwain Sousa MD   insulin glargine (BASAGLAR KWIKPEN) 100 UNIT/ML injection pen patient using vials per [facility]  inject 20 units at hs Yes Donna Carter MD   Alcohol Swabs PADS 1 Container by Does not apply route three times daily Yes Steven Ashton MD   ACCU-CHEK FASTCLIX LANCETS MISC 20 Sticks by Does not apply route 2 times daily Yes Raffy Wilcox MD   Blood Glucose Monitoring Suppl (BLOOD GLUCOSE MONITOR SYSTEM) W/DEVICE KIT 1 Units by Does not apply route daily Yes Ayden Parada MD   Insulin Pen Needle (PEN NEEDLES) 31G X 6 MM MISC 1 each by Does not apply route daily May substitute to meet insur. requirements  Aj Cazares,    Insulin Syringe-Needle U-100 (BD INSULIN SYRINGE U/F) 31G X 5/16\" 1 ML MISC USE AS DIRECTED 4 TIMES A DAY  Felipe Fowler MD   Blood Pressure KIT 1 Units by Does not apply route daily  Patient not taking: Reported on 10/5/2022  Brynn Osorio MD        Vitals:    10/05/22 1458   BP: (!) 151/90   Site: Left Upper Arm   Position: Sitting   Cuff Size: Large Adult   Pulse: 70   Temp: 97 °F (36.1 °C)   TempSrc: Temporal   SpO2: 92%   Weight: (!) 381 lb 6.4 oz (173 kg)   Height: 6' (1.829 m)      Estimated body mass index is 51.73 kg/m² as calculated from the following:    Height as of this encounter: 6' (1.829 m). Weight as of this encounter: 381 lb 6.4 oz (173 kg). Physical Exam  Constitutional:       Appearance: He is obese. HENT:      Head: Normocephalic and atraumatic. Eyes:      Conjunctiva/sclera: Conjunctivae normal.   Cardiovascular:      Rate and Rhythm: Normal rate and regular rhythm. Pulmonary:      Effort: Pulmonary effort is normal.      Comments: Diminished  Abdominal:      General: Bowel sounds are normal.      Palpations: Abdomen is soft. Musculoskeletal:      Right lower leg: No edema. Left lower leg: No edema. Skin:     General: Skin is warm and dry. Comments: Bilateral areas of induration, not red or inflamed   Neurological:      General: No focal deficit present. Mental Status: He is alert. Psychiatric:         Mood and Affect: Mood normal.       ASSESSMENT/PLAN:     1. Essential hypertension  Assessment & Plan:   Blood pressure today 151/90. His past appointments have been elevated in the 160s. Weight could be a contributing factor.   We will switch him from lisinopril to valsartan 160 mg  -Advised to keep a blood pressure log daily  -Follow-up in 2 weeks for blood pressure check  Orders:  - potassium chloride (KLOR-CON M20) 20 MEQ extended release tablet; TAKE 1 TABLET BY MOUTH EVERY DAY WITH BREAKFAST, Disp-90 tablet, R-0Normal  2. Hidradenitis suppurativa  Assessment & Plan:   Patient has had recurrent abscesses since 1991. He was seen by general surgery in the past, will refer again  Orders:  -     Adriana Valdez MD, General Surgery, [facility]  3. Morbid obesity with BMI of 50.0-59.9, adult [facility])  Assessment & Plan:  Patient has gained about 20 pounds since May 2022. He reports having very poor diet, eating fast food.  -Continue follow-up with weight management clinic, gastric sleeve was discussed with him there.  -Patient advised that he could try swimming for exercise. 4. Type 2 diabetes mellitus without complication, unspecified whether long term insulin use (HCC)  Assessment & Plan:   A1c today is 6.4% up from 6.1% 3 months ago  -Controlled on current regimen of metformin and insulin    Orders:  -     POCT glycosylated hemoglobin (Hb A1C)  -     External Referral To Ophthalmology  5. Hyperlipidemia, unspecified hyperlipidemia type  Assessment & Plan:   Controlled on atorvastatin 20 mg  Will recheck lipid level  Orders:  -     LIPID PANEL; Future  6. LEONA (obstructive sleep apnea)  Assessment & Plan:  Follows up with pulmonologist.  Wears his CPAP every time he sleeps. 7. Potassium deficiency  -     potassium chloride (KLOR-CON M20) 20 MEQ extended release tablet; TAKE 1 TABLET BY MOUTH EVERY DAY WITH BREAKFAST, Disp-90 tablet, R-0Normal  8. Erectile dysfunction, unspecified erectile dysfunction type  -     sildenafil (VIAGRA) 50 MG tablet; Take 1 tablet by mouth as needed for Erectile Dysfunction, Disp-30 tablet, R-3Print  9. Paroxysmal atrial fibrillation [facility])  Assessment & Plan:   Status post ablation. Currently rate controlled on diltiazem. Continue Eliquis    Return in about 2 weeks (around 10/19/2022).     The patient was staffed with the teaching attending: Dr. Nain Stephens Aaron Oliva. An electronic signature was used to authenticate this note. --Omar Aguirre MD    Addendum to Resident H& P/Progress note:  I have personally seen,examined and evaluated the patient.  I have reviewed the current history, physical findings, labs and assessment and plan and agree with note as documented by resident MD ( Mami Burciaga)      Lisha Cates MD, [address]

## 2022-10-05 NOTE — ASSESSMENT & PLAN NOTE
Patient has gained about 20 pounds since May 2022. He reports having very poor diet, eating fast food.  -Continue follow-up with weight management clinic, gastric sleeve was discussed with him there.  -Patient advised that he could try swimming for exercise.

## 2022-10-06 ENCOUNTER — OFFICE VISIT (OUTPATIENT)
Dept: BARIATRICS/WEIGHT MGMT | Age: 52
End: 2022-10-06
Payer: MEDICAID

## 2022-10-06 VITALS
WEIGHT: 315 LBS | HEIGHT: 72 IN | BODY MASS INDEX: 42.66 KG/M2 | OXYGEN SATURATION: 98 % | DIASTOLIC BLOOD PRESSURE: 90 MMHG | SYSTOLIC BLOOD PRESSURE: 160 MMHG | HEART RATE: 67 BPM | RESPIRATION RATE: 18 BRPM

## 2022-10-06 DIAGNOSIS — E11.21 TYPE 2 DIABETES MELLITUS WITH DIABETIC NEPHROPATHY, WITH LONG-TERM CURRENT USE OF INSULIN (HCC): ICD-10-CM

## 2022-10-06 DIAGNOSIS — Z01.818 PREOPERATIVE CLEARANCE: ICD-10-CM

## 2022-10-06 DIAGNOSIS — E11.69 DIABETES MELLITUS TYPE 2 IN OBESE (HCC): ICD-10-CM

## 2022-10-06 DIAGNOSIS — E66.01 MORBID OBESITY WITH BMI OF 50.0-59.9, ADULT (HCC): Primary | ICD-10-CM

## 2022-10-06 DIAGNOSIS — I10 ESSENTIAL HYPERTENSION: ICD-10-CM

## 2022-10-06 DIAGNOSIS — G47.33 OBSTRUCTIVE SLEEP APNEA: ICD-10-CM

## 2022-10-06 DIAGNOSIS — E78.2 HYPERLIPIDEMIA, MIXED: ICD-10-CM

## 2022-10-06 DIAGNOSIS — M17.0 PRIMARY OSTEOARTHRITIS OF BOTH KNEES: ICD-10-CM

## 2022-10-06 DIAGNOSIS — Z79.4 TYPE 2 DIABETES MELLITUS WITH DIABETIC NEPHROPATHY, WITH LONG-TERM CURRENT USE OF INSULIN (HCC): ICD-10-CM

## 2022-10-06 DIAGNOSIS — E66.9 DIABETES MELLITUS TYPE 2 IN OBESE (HCC): ICD-10-CM

## 2022-10-06 PROCEDURE — G8484 FLU IMMUNIZE NO ADMIN: HCPCS | Performed by: SURGERY

## 2022-10-06 PROCEDURE — 2022F DILAT RTA XM EVC RTNOPTHY: CPT | Performed by: SURGERY

## 2022-10-06 PROCEDURE — G8417 CALC BMI ABV UP PARAM F/U: HCPCS | Performed by: SURGERY

## 2022-10-06 PROCEDURE — G8427 DOCREV CUR MEDS BY ELIG CLIN: HCPCS | Performed by: SURGERY

## 2022-10-06 PROCEDURE — 1036F TOBACCO NON-USER: CPT | Performed by: SURGERY

## 2022-10-06 PROCEDURE — 3044F HG A1C LEVEL LT 7.0%: CPT | Performed by: SURGERY

## 2022-10-06 PROCEDURE — 3017F COLORECTAL CA SCREEN DOC REV: CPT | Performed by: SURGERY

## 2022-10-06 PROCEDURE — 99205 OFFICE O/P NEW HI 60 MIN: CPT | Performed by: SURGERY

## 2022-10-06 NOTE — PROGRESS NOTES
[facility]) Physicians   [employer]  Lois Feldman MD, [facility], [address], [address] [facility] [address] . Phone: [phone]  Fax: [phone]       Chief Complaint   Patient presents with    Bariatric, Initial Visit     NP Oliva Sarkar           HPI:    Bernice De La Cruz is a very pleasant 47 y.o. obese male ,   Body mass index is 51.67 kg/m². And multiple medical problems who is presenting for weight loss surgery evaluation and consultation by Dr. Kati Craft. Patient has been struggling for several years now with obesity. Patient feels the weight is an obstacle to achieve and perform things in daily living as well risk on health. Tries to diet, and exercise but can't keep the weight off. Patient tried calorie restrictions, low carb, nutrition counseling with RD and physician supervised. Patient has not participated in meal replacement/liquid diets. Patient has participated in weight loss medications. Qsymia 2015 and other regimens, but with no sustainable weight loss. Patient  is very determined to lose weight and be healthy, and is interested in surgical weight loss for future weight loss. .    Otherwise patient denies any nausea, vomiting, fevers, chills, shortness of breath, chest pain, constipation or urinary symptoms.         Obesity related problems Conchissusan Cedeno is dealing with:  Patient Active Problem List   Diagnosis    Diabetes mellitus type 2 in obese ([address]    Morbid obesity ([address]    Obstructive sleep apnea    Atrial fibrillation    Non compliance w medication regimen    Dyspnea    Bradycardia    Class 3 severe obesity due to excess calories with serious comorbidity and body mass index (BMI) of 50.0 to 59.9 in adult [facility])    Hypertension    Hypertensive heart disease with chronic diastolic congestive heart failure (HCC)    GERD (gastroesophageal reflux disease)    Hx of atrial fibrillation without current medication    PAF (paroxysmal atrial fibrillation) (HCC)    S/P ablation of atrial fibrillation    Persistent atrial fibrillation    Abscess of gluteal region    Arthritis    Chronic gout of multiple sites    Current mild episode of major depressive disorder without prior episode (HCC)    Generalized osteoarthritis of multiple sites    Hyperlipidemia, mixed    Onychomycosis of multiple toenails with type 2 diabetes mellitus (HCC)    Osteoarthritis of knee    Sciatica    Type 2 diabetes mellitus with diabetic nephropathy, with long-term current use of insulin (HCC)    Gastroesophageal reflux disease without esophagitis    Essential hypertension    Trigger middle finger of left hand    Chest pain    Primary osteoarthritis of both knees    Chronic pain syndrome    Coronary artery disease    Morbid obesity with BMI of 50.0-59.9, adult ([facility])    Hidradenitis suppurativa    Potassium deficiency           Pain Assessment   Denies any abdominal pain     Past Medical History:   Diagnosis Date    Arthritis     Atrial fibrillation (HCC)     Congestive heart failure (HCC)     Coronary artery disease     Depression     Diabetes mellitus (HCC)     GERD (gastroesophageal reflux disease)     Hyperlipidemia     Hypertension     Morbid obesity ([address]     Sleep apnea     uses cpap at home q hs     Type II or unspecified type diabetes mellitus without mention of complication, not stated as uncontrolled      Past Surgical History:   Procedure Laterality Date    ABLATION OF DYSRHYTHMIC FOCUS      COLONOSCOPY  4/13/2021    COLONOSCOPY POLYPECTOMY SNARE/COLD BIOPSY performed by Bhavna Lundy MD at [address] Left 6/10/2021    LEFT MIDDLE FINGER TRIGGER RELEASE performed by Radha Diamond MD at [facility]     Family History   Problem Relation Age of Onset    Sleep Apnea Mother     Diabetes Mother     High Blood Pressure Mother     Diabetes Maternal Aunt     High Blood Pressure Maternal Grandmother      Social History     Tobacco Use    Smoking status: Never     Passive exposure: Never    Smokeless tobacco: Never   Substance Use Topics    Alcohol use: No     Alcohol/week: 0.0 standard drinks         I counseled the patient on the risks of Smoking, ETOH or Drug use, and importance of completely avoiding them, otherwise patient risks surgery cancellation or post operative serious complications if they start using any. Anson ESCALERA Ringer acknowledged, agreed not to use and wants to proceed. No Known Allergies  Vitals:    10/06/22 1147   BP: (!) 162/90   Pulse: 67   Resp: 18   SpO2: 98%   Weight: (!) 381 lb (172.8 kg)   Height: 6' (1.829 m)       Body mass index is 51.67 kg/m².       Current Outpatient Medications:     furosemide (LASIX) 80 MG tablet, TAKE 1 TABLET BY MOUTH TWICE A DAY, Disp: 60 tablet, Rfl: 0    metFORMIN (GLUCOPHAGE) 500 MG tablet, Take one tablet twice per day with meals, Disp: 180 tablet, Rfl: 0    dilTIAZem (CARDIZEM CD) 120 MG extended release capsule, TAKE 1 CAPSULE BY MOUTH EVERY DAY, Disp: 90 capsule, Rfl: 0    potassium chloride (KLOR-CON M20) 20 MEQ extended release tablet, TAKE 1 TABLET BY MOUTH EVERY DAY WITH BREAKFAST, Disp: 90 tablet, Rfl: 0    allopurinol (ZYLOPRIM) 300 MG tablet, Take one tablet daily, Disp: 30 tablet, Rfl: 3    valsartan (DIOVAN) 160 MG tablet, Take 1 tablet by mouth daily, Disp: 90 tablet, Rfl: 1    sildenafil (VIAGRA) 50 MG tablet, Take 1 tablet by mouth as needed for Erectile Dysfunction, Disp: 30 tablet, Rfl: 3    SYMBICORT 80-4.5 MCG/ACT AERO, INHALE 2 PUFFS INTO THE LUNGS TWICE A DAY, Disp: 10.2 each, Rfl: 3    insulin lispro, 1 Unit Dial, 100 UNIT/ML SOPN, INJECT 7 UNITS INTO THE SKIN 3 TIMES DAILY BEFORE MEALS, Disp: 5 pen, Rfl: 3    apixaban (ELIQUIS) 5 MG TABS tablet, TAKE 1 TABLET BY MOUTH TWICE A DAY, Disp: 60 tablet, Rfl: 5    Multiple Vitamins-Minerals (THERAPEUTIC-M/LUTEIN) TABS, TAKE 1 TABLET BY MOUTH EVERY DAY, Disp: 30 tablet, Rfl: 3    Insulin Pen Needle (PEN NEEDLES) 31G X 6 MM MISC, 1 each by Does not apply route daily May substitute to meet insur. requirements, Disp: 100 each, Rfl: 5    atorvastatin (LIPITOR) 20 MG tablet, TAKE 1 TABLET BY MOUTH EVERY DAY, Disp: 30 tablet, Rfl: 5    Insulin Syringe-Needle U-100 (BD INSULIN SYRINGE U/F) 31G X 5/16\" 1 ML MISC, USE AS DIRECTED 4 TIMES A DAY, Disp: 100 each, Rfl: 5    Handicap Placard MISC, by Does not apply route Handicap placard effective from 7/27/2021 through 7/27/2026, Disp: 1 each, Rfl: 0    sennosides-docusate sodium (SENOKOT-S) 8.6-50 MG tablet, Take 1 tablet by mouth 2 times daily as needed for Constipation, Disp: 20 tablet, Rfl: 0    blood glucose monitor strips, by Other route 4 times daily (after meals and at bedtime), Disp: 60 strip, Rfl: 2    insulin glargine (BASAGLAR KWIKPEN) 100 UNIT/ML injection pen, patient using vials per [facility] inject 20 units at hs, Disp: 5 mL, Rfl: 2    Alcohol Swabs PADS, 1 Container by Does not apply route three times daily, Disp: 100 each, Rfl: 5    ACCU-CHEK FASTCLIX LANCETS MISC, 20 Sticks by Does not apply route 2 times daily, Disp: 20 each, Rfl: 3    Blood Glucose Monitoring Suppl (BLOOD GLUCOSE MONITOR SYSTEM) W/DEVICE KIT, 1 Units by Does not apply route daily, Disp: 1 kit, Rfl: 0    Blood Pressure KIT, 1 Units by Does not apply route daily (Patient not taking: Reported on 10/6/2022), Disp: 1 kit, Rfl: 0      Review of Systems - History obtained from the patient  General ROS: overweight   Psychological ROS: negative  Ophthalmic ROS: negative  Neurological ROS: negative  ENT ROS: negative  Allergy and Immunology ROS: negative  Hematological and Lymphatic ROS: negative  Endocrine ROS: overweight  Breast ROS: negative  Respiratory ROS: negative  Cardiovascular ROS: negative  Gastrointestinal ROS:negative  Genito-Urinary ROS: negative  Musculoskeletal ROS: weight effects on joints  Skin ROS: negative    Physical Exam   Constitutional: Patient is oriented to person, place, and time.  Vital signs are normal. Patient  appears well-developed and well-nourished. Patient  is active and cooperative. Non-toxic appearance. No distress. HENT:   Head: Normocephalic and atraumatic. Head is without laceration. Right Ear: External ear normal. No lacerations. No drainage, swelling or tenderness. Left Ear: External ear normal. No lacerations. No drainage, swelling or tenderness. Nose/Mouth/Throat: Patient is wearing mask due to Covid-19 pandemic precautions, following CDC and health authorities guidelines. Eyes: Conjunctivae, EOM and lids are normal. Pupils are equal, round, and reactive to light. Right eye exhibits no discharge. No foreign body present in the right eye. Left eye exhibits no discharge. No foreign body present in the left eye. No scleral icterus. Neck: Trachea normal and normal range of motion. Neck supple. No JVD present. No tracheal tenderness present. Carotid bruit is not present. No rigidity. No tracheal deviation and no edema present. No thyromegaly present. Cardiovascular: Normal rate, regular rhythm, normal heart sounds, intact distal pulses and normal pulses. Pulmonary/Chest: Effort normal and breath sounds normal. No stridor. No respiratory distress. Patient  has no wheezes. Patient has no rales. Patient exhibits no tenderness and no crepitus. Abdominal: Soft. Normal appearance and bowel sounds are normal. Patient exhibits no distension, no abdominal bruit, no ascites and no mass. There is no hepatosplenomegaly. There is no tenderness. There is no rigidity, no rebound, no guarding and no CVA tenderness. No hernia. Hernia confirmed negative in the ventral area. Musculoskeletal: Normal range of motion. Patient exhibits no edema or tenderness. Lymphadenopathy:        Head (right side): No submental, no submandibular, no preauricular, no posterior auricular and no occipital adenopathy present.         Head (left side): No submental, no submandibular, no preauricular, no posterior auricular and no occipital adenopathy present. Patient  has no cervical adenopathy. Right: No supraclavicular adenopathy present. Left: No supraclavicular adenopathy present. Neurological: Patient is alert and oriented to person, place, and time. Patient has normal strength. Coordination and gait normal. GCS eye subscore is 4. GCS verbal subscore is 5. GCS motor subscore is 6. Skin: Skin is warm and dry. No abrasion and no rash noted. Patient  is not diaphoretic. No cyanosis or erythema. Psychiatric: Patient has a normal mood and affect. speech is normal and behavior is normal. Cognition and memory are normal.         Tatyana Scott was seen today for bariatric, initial visit. Diagnoses and all orders for this visit:    Morbid obesity with BMI of 50.0-59.9, adult ([email or web address]  -     CBC with Auto Differential; Future  -     Comprehensive Metabolic Panel; Future  -     Hemoglobin A1C; Future  -     Iron and TIBC; Future  -     Lipid Panel; Future  -     TSH with Reflex; Future  -     Vitamin A; Future  -     Vitamin B1, Whole Blood; Future  -     Vitamin B12 & Folate; Future  -     Vitamin D 25 Hydroxy; Future  -     Vitamin E; Future  -     Protime-INR; Future  -     Ambulatory referral to Cardiology    Preoperative clearance  -     CBC with Auto Differential; Future  -     Comprehensive Metabolic Panel; Future  -     Hemoglobin A1C; Future  -     Iron and TIBC; Future  -     Lipid Panel; Future  -     TSH with Reflex; Future  -     Vitamin A; Future  -     Vitamin B1, Whole Blood; Future  -     Vitamin B12 & Folate; Future  -     Vitamin D 25 Hydroxy; Future  -     Vitamin E; Future  -     Protime-INR; Future  -     Ambulatory referral to Cardiology    Diabetes mellitus type 2 in obese ([facility] [[email or web address] 75[facility]  -     CBC with Auto Differential; Future  -     Comprehensive Metabolic Panel; Future  -     Hemoglobin A1C; Future  -     Iron and TIBC; Future  -     Lipid Panel; Future  -     TSH with Reflex;  Future  - Vitamin A; Future  -     Vitamin B1, Whole Blood; Future  -     Vitamin B12 & Folate; Future  -     Vitamin D 25 Hydroxy; Future  -     Vitamin E; Future  -     Protime-INR; Future  -     Ambulatory referral to Cardiology    Obstructive sleep apnea  -     CBC with Auto Differential; Future  -     Comprehensive Metabolic Panel; Future  -     Hemoglobin A1C; Future  -     Iron and TIBC; Future  -     Lipid Panel; Future  -     TSH with Reflex; Future  -     Vitamin A; Future  -     Vitamin B1, Whole Blood; Future  -     Vitamin B12 & Folate; Future  -     Vitamin D 25 Hydroxy; Future  -     Vitamin E; Future  -     Protime-INR; Future  -     Ambulatory referral to Cardiology    Primary osteoarthritis of both knees  -     CBC with Auto Differential; Future  -     Comprehensive Metabolic Panel; Future  -     Hemoglobin A1C; Future  -     Iron and TIBC; Future  -     Lipid Panel; Future  -     TSH with Reflex; Future  -     Vitamin A; Future  -     Vitamin B1, Whole Blood; Future  -     Vitamin B12 & Folate; Future  -     Vitamin D 25 Hydroxy; Future  -     Vitamin E; Future  -     Protime-INR; Future  -     Ambulatory referral to Cardiology    Hyperlipidemia, mixed  -     CBC with Auto Differential; Future  -     Comprehensive Metabolic Panel; Future  -     Hemoglobin A1C; Future  -     Iron and TIBC; Future  -     Lipid Panel; Future  -     TSH with Reflex; Future  -     Vitamin A; Future  -     Vitamin B1, Whole Blood; Future  -     Vitamin B12 & Folate; Future  -     Vitamin D 25 Hydroxy; Future  -     Vitamin E; Future  -     Protime-INR; Future  -     Ambulatory referral to Cardiology    Essential hypertension  -     CBC with Auto Differential; Future  -     Comprehensive Metabolic Panel; Future  -     Hemoglobin A1C; Future  -     Iron and TIBC; Future  -     Lipid Panel; Future  -     TSH with Reflex; Future  -     Vitamin A; Future  -     Vitamin B1, Whole Blood; Future  -     Vitamin B12 & Folate;  Future  - Vitamin D 25 Hydroxy; Future  -     Vitamin E; Future  -     Protime-INR; Future  -     Ambulatory referral to Cardiology    Type 2 diabetes mellitus with diabetic nephropathy, with long-term current use of insulin (HCC)  -     CBC with Auto Differential; Future  -     Comprehensive Metabolic Panel; Future  -     Hemoglobin A1C; Future  -     Iron and TIBC; Future  -     Lipid Panel; Future  -     TSH with Reflex; Future  -     Vitamin A; Future  -     Vitamin B1, Whole Blood; Future  -     Vitamin B12 & Folate; Future  -     Vitamin D 25 Hydroxy; Future  -     Vitamin E; Future  -     Protime-INR; Future  -     Ambulatory referral to Cardiology          A/P  Carol De La Cruz is a very pleasant 47 y.o. male with Obesity,  Body mass index is 51.67 kg/m². and multiple obesity related co-morbidities. Eugene De La Cruz is very motivated to lose weight and being more healthy.      We discussed how his weight affects his overall health including:  Patient Active Problem List   Diagnosis    Diabetes mellitus type 2 in obese ([facility])    Morbid obesity ([address]    Obstructive sleep apnea    Atrial fibrillation    Non compliance w medication regimen    Dyspnea    Bradycardia    Class 3 severe obesity due to excess calories with serious comorbidity and body mass index (BMI) of 50.0 to 59.9 in adult [facility])    Hypertension    Hypertensive heart disease with chronic diastolic congestive heart failure ([facility])    GERD (gastroesophageal reflux disease)    Hx of atrial fibrillation without current medication    PAF (paroxysmal atrial fibrillation) ([facility])    S/P ablation of atrial fibrillation    Persistent atrial fibrillation    Abscess of gluteal region    Arthritis    Chronic gout of multiple sites    Current mild episode of major depressive disorder without prior episode ([facility])    Generalized osteoarthritis of multiple sites    Hyperlipidemia, mixed    Onychomycosis of multiple toenails with type 2 diabetes mellitus ([facility])    Osteoarthritis of knee    Sciatica    Type 2 diabetes mellitus with diabetic nephropathy, with long-term current use of insulin (HCC)    Gastroesophageal reflux disease without esophagitis    Essential hypertension    Trigger middle finger of left hand    Chest pain    Primary osteoarthritis of both knees    Chronic pain syndrome    Coronary artery disease    Morbid obesity with BMI of 50.0-59.9, adult (HCC)    Hidradenitis suppurativa    Potassium deficiency          The patient underwent extensive dietary counseling with the registered dietitian. I have reviewed, discussed and agree with the dietary plan. Medical weight loss and different surgical options were discussed in details with patient. Samir Matos is interested in surgical weight loss for future weight loss. Case volume and outcomes data in our program were discussed and reviewed with the patient. Questions and concerns were addressed today. Patient is interested in Laparoscopic Sleeve Gastrectomy, which I believe is an excellent option. I explained to the patient that surgery does carry a risk specially with the coexisting comorbid conditions the patient have. Surgery as well in obese patiens can carry more risk. Risks including but not limited to; Infection, bleeding, gastric leak or obstruction, persistent nausea, vomiting, or reflux, injury to surrounding structures, risks of anesthesia, stricture, delayed gastric emptying, staple line leak, incisional hernia, malnutrition , vitamin deficiency, neurological complications, paralysis, hair loss, and/ or Conversion to Open surgery may be necessary. Failure to lose or maintain weight loss, Gallstones or Kidney Stones, Deep Venous Thrombosis , pulmonary embolism and / or death. However I do believe the benefits outweighs that risk. Samir De La Cruz understands the risks and wants to proceed. We will proceed with pre-operative work up labs and studies.  Will also petition patient's  insurance for approval for this procedure. I advised the patient that we can't guarantee final insurance approval.      Patient received dietary handouts and education. Patient advised that its their responsibility to follow up for studies, referrals and/or labs ordered today. Also discussed in details the importance of follow up, as well following the recommendations and completing the whole program to improve outcomes when it comes to healthier lifestyle as well weight loss. Patient also advised about risks and benefits being on a strict dietary regimen as well using supplements. Patient agrees and wants to proceed with weight loss planning     Today's encounter included any number of the following: Bariatric Pre/Post operative work up/protocols, review of labs, imaging, provider notes, outside hospital records, performing examination/evaluation, counseling patient and/or family, ordering medications/tests, placing referrals and communication with referring physicians, coordination of care; discussing dietary plan/recall with the patient as well with registered dietitian and documentation in the EHR. Of note, the above was done during same day of the actual patient encounter. Obesity as a disease is considered a high risk to patients overall health and should therefore be considered a high risk disease state. Advised the patient that not getting there weight under control (weight loss hopefully will help with resolving/improving of the comorbid conditions),  that could increase risk of complications/worsening of those conditions on the long-term. With Covid-19 pandemic, CDC and health authorities did classify obese patients as vulnerable and high risk as well. Which makes weight loss a priority for improvement of their wellbeing and overall health.    CDC has issued the following statement as far Obese patients being at Increased Risk of being critically ill from SARS-Cov-2  \"Severe obesity increases the risk of a

## 2022-10-06 NOTE — PROGRESS NOTES
Carol De La Cruz is a 47 y.o. male with a date of birth of 1970. Vitals:    10/06/22 1147   BP: (!) 162/90   Pulse: 67   Resp: 18   SpO2: 98%    BMI: Body mass index is 51.67 kg/m². Obesity Classification: Class III    Weight History: Wt Readings from Last 3 Encounters:   10/06/22 (!) 381 lb (172.8 kg)   10/05/22 (!) 381 lb 6.4 oz (173 kg)   10/04/22 (!) 381 lb 3.2 oz (172.9 kg)     Patient's lowest adult weight was 240 lb at age 28. Patient's highest adult weight was ~420 lb at age 38. Patient has participated in the following weight loss programs: calorie restrictions, low carb, nutrition counseling with RD and physician supervised. Patient has not participated in meal replacement/liquid diets. Patient has participated in weight loss medications. Qsymia 2015    Patient is not lactose intolerant. Patient does not have food allergies. Patient does not have Anglican/cultural food preferences. Patient does tolerate artificial sweeteners. 24 hour recall/food frequency chart: Pt reports avoiding fried foods and soda over past 2 months. Struggles with weight fluctuations and feels frustrated that he cannot keep weight off long term. Breakfast: None/ can't remember  Snack: None  Lunch: Out- [employer] steak w/ mashed pots/green beans/gravy   Snack: Small bag party mix  Dinner: BBQ leg quarter + 1 cup cabbage + 1/3 cup mac'n'cheese  Snack: None  Drinks throughout the day: water/ CL, diet tea, avoiding pop   Do you drink alcohol? Yes. How often/how much alcohol do you drink: 2-4 Beers per week. Patient does meet the criteria for binge eating disorder. Patient does have grazing. Patient does not have night eating - has in the past. Patient does have a history of emotional eating or eating out of boredom/stress. Surgery  Patient does feel confident in his ability to make these changes. The patient's expectations of post-surgical eating habits realistic.     Patient states he does understand the consequences of not complying with post-op food guidelines. Patient states he does understands the long term changes in food intake that will be necessary for all occasions after surgery for the rest of her life. Patient is deemed nutritionally appropriate to proceed. Goals  Weight: 220 lbs  Health Improvement: improve knee pain/mobility, improve CHF/HTN, decrease medication     Assessment  Nutritional Needs: RMR=(9.99 x 172.8) + (6.25 x 182.9) - (4.92 x 52 y.o.) +5= 2613 kcal x 1.3 (sedentary activity factor)= 3397 kcal - 1000 (for 2 lb weight loss/week)= 2397 kcal.    Plan  Plan/Recommendations: Start presurgical guidelines. Goals:   -Eat 4-5 times daily  -Avoid high fat and high sugar foods  -Include protein with all meals and snacks  -Avoid carbonation and caffeine  -Avoid calorie containing beverages  -Increase physical activity as tolerated    PES Statement:  Overweight/Obesity related to lack of exercise, sedentary lifestyle, unhealthy eating habits, and unsuccessful diet attempts as evidenced by BMI. Body mass index is 51.67 kg/m². Will follow up as necessary.     Nilton Sanchez RD, LD

## 2022-10-06 NOTE — Clinical Note
Greatly appreciate the referral.  Excellent candidate for weight loss. We will keep you posted on Mary lott.

## 2022-10-06 NOTE — PATIENT INSTRUCTIONS
-Plan for Laparoscopic sleeve gastrectomy      Pre-operative work up Ordered:    - F/U in 4 weeks. - Nutrition Labs. - Protein Shake Trial.  - Psych Evaluation.   - Cardiac Clearance. - CPAP Compliance. DONE  - EGD (endoscopy to check your stomach). - Support Group Attendance. - Obtain letter of medical necessity (PCP Letter). - Quit Smoking,  Alcohol, Caffeine and Carbonated Drinks  - Obtain records for Weight History 2 yrs. - Start Regular Exercise and track your activities. - Start Tracking your food Intake and follow dietary guidelines. Patient advised that its their responsibility to follow up for studies, referrals and/or labs ordered today.

## 2022-10-14 ENCOUNTER — OFFICE VISIT (OUTPATIENT)
Dept: CARDIOLOGY CLINIC | Age: 52
End: 2022-10-14
Payer: MEDICAID

## 2022-10-14 VITALS
WEIGHT: 315 LBS | BODY MASS INDEX: 51.4 KG/M2 | HEART RATE: 88 BPM | DIASTOLIC BLOOD PRESSURE: 80 MMHG | SYSTOLIC BLOOD PRESSURE: 130 MMHG

## 2022-10-14 DIAGNOSIS — Z86.79 S/P ABLATION OF ATRIAL FIBRILLATION: ICD-10-CM

## 2022-10-14 DIAGNOSIS — Z01.818 PRE-OP EVALUATION: Primary | ICD-10-CM

## 2022-10-14 DIAGNOSIS — I10 ESSENTIAL HYPERTENSION: ICD-10-CM

## 2022-10-14 DIAGNOSIS — Z98.890 S/P ABLATION OF ATRIAL FIBRILLATION: ICD-10-CM

## 2022-10-14 DIAGNOSIS — I48.0 PAROXYSMAL ATRIAL FIBRILLATION (HCC): ICD-10-CM

## 2022-10-14 DIAGNOSIS — E78.5 HYPERLIPIDEMIA, UNSPECIFIED HYPERLIPIDEMIA TYPE: ICD-10-CM

## 2022-10-14 PROCEDURE — G8427 DOCREV CUR MEDS BY ELIG CLIN: HCPCS | Performed by: INTERNAL MEDICINE

## 2022-10-14 PROCEDURE — G8417 CALC BMI ABV UP PARAM F/U: HCPCS | Performed by: INTERNAL MEDICINE

## 2022-10-14 PROCEDURE — 99204 OFFICE O/P NEW MOD 45 MIN: CPT | Performed by: INTERNAL MEDICINE

## 2022-10-14 PROCEDURE — 93000 ELECTROCARDIOGRAM COMPLETE: CPT | Performed by: INTERNAL MEDICINE

## 2022-10-14 PROCEDURE — 3017F COLORECTAL CA SCREEN DOC REV: CPT | Performed by: INTERNAL MEDICINE

## 2022-10-14 PROCEDURE — 1036F TOBACCO NON-USER: CPT | Performed by: INTERNAL MEDICINE

## 2022-10-14 PROCEDURE — G8484 FLU IMMUNIZE NO ADMIN: HCPCS | Performed by: INTERNAL MEDICINE

## 2022-10-14 ASSESSMENT — ENCOUNTER SYMPTOMS
ABDOMINAL DISTENTION: 0
APNEA: 0
SHORTNESS OF BREATH: 0
CHOKING: 0
CHEST TIGHTNESS: 0
COUGH: 0

## 2022-10-14 NOTE — PROGRESS NOTES
Subjective:      Patient ID: Rio De La Cruz is a 47 y.o. male. HPI  Referred for pre op  Also with hx of afib and s/p ablation/htn/lipids. Pre op for gastric sleeve. Active but limited by knees. No exertional cp/sob. No pnd or orthopnea. No edema. Afib under good control. No hx CAD. No hx mi.       Past Medical History:   Diagnosis Date    Arthritis     Atrial fibrillation (HCC)     Congestive heart failure (HCC)     Coronary artery disease     Depression     Diabetes mellitus (HCC)     GERD (gastroesophageal reflux disease)     Hyperlipidemia     Hypertension     Morbid obesity (HCC)     Sleep apnea     uses cpap at home q hs     Type II or unspecified type diabetes mellitus without mention of complication, not stated as uncontrolled      Past Surgical History:   Procedure Laterality Date    ABLATION OF DYSRHYTHMIC FOCUS      COLONOSCOPY  4/13/2021    COLONOSCOPY POLYPECTOMY SNARE/COLD BIOPSY performed by Denise Iyer MD at [facility] Left 6/10/2021    LEFT MIDDLE FINGER TRIGGER RELEASE performed by Michelle Luis MD at [id number] History     Socioeconomic History    Marital status: Single     Spouse name: Not on file    Number of children: Not on file    Years of education: Not on file    Highest education level: Not on file   Occupational History    Not on file   Tobacco Use    Smoking status: Never     Passive exposure: Never    Smokeless tobacco: Never   Vaping Use    Vaping Use: Never used   Substance and Sexual Activity    Alcohol use: No     Alcohol/week: 0.0 standard drinks    Drug use: Yes     Types: Marijuana (Paulo Merchant), Cocaine     Comment: no cocaine for over 6 years, marijuana occ    Sexual activity: Not Currently   Other Topics Concern    Not on file   Social History Narrative    Not on file     Social Determinants of Health     Financial Resource Strain: Not on file   Food Insecurity: Not on file   Transportation Needs: Not on file   Physical Activity: Not on file   Stress: Not on file   Social Connections: Not on file   Intimate Partner Violence: Not on file   Housing Stability: Not on file     FH reviewed, GM mi in 48. Vitals:    10/14/22 1432   BP: 130/80   Pulse: 88     Wt 379      Review of Systems   Constitutional:  Negative for activity change and fatigue. Respiratory:  Negative for apnea, cough, choking, chest tightness and shortness of breath. Cardiovascular:  Negative for chest pain, palpitations and leg swelling. No PND or orthopnea. No tachycardia. Gastrointestinal:  Negative for abdominal distention. Musculoskeletal:  Negative for myalgias. Neurological:  Negative for dizziness, syncope and light-headedness. Psychiatric/Behavioral:  Negative for agitation and behavioral problems. Objective:   Physical Exam  Constitutional:       General: He is not in acute distress. Appearance: Normal appearance. He is well-developed. HENT:      Head: Normocephalic and atraumatic. Right Ear: External ear normal.      Left Ear: External ear normal.      Nose: Nose normal.   Neck:      Vascular: No JVD. Cardiovascular:      Rate and Rhythm: Normal rate and regular rhythm. Heart sounds: Normal heart sounds. No gallop. Pulmonary:      Effort: Pulmonary effort is normal. No respiratory distress. Breath sounds: Normal breath sounds. No wheezing or rales. Abdominal:      General: Bowel sounds are normal.      Palpations: Abdomen is soft. Tenderness: There is no abdominal tenderness. Musculoskeletal:         General: Normal range of motion. Cervical back: Normal range of motion and neck supple. Skin:     General: Skin is warm and dry. Neurological:      General: No focal deficit present. Mental Status: He is alert and oriented to person, place, and time.    Psychiatric:         Mood and Affect: Mood normal.         Behavior: Behavior normal.       Assessment:       Diagnosis Orders 1. Pre-op evaluation  EKG 12 Lead      2. Paroxysmal atrial fibrillation (HCC)  EKG 12 Lead      3. S/P ablation of atrial fibrillation  EKG 12 Lead      4. Essential hypertension  EKG 12 Lead      5. Hyperlipidemia, unspecified hyperlipidemia type  EKG 12 Lead              Plan:      CV stable. No angina. No sx of CHF. EKG today shows NSR, LAFB, NSSTTW changes. Reviewed previous records and testing including myoview 6/22 which was negative and with normal EF. Since CV stable, no angina and negative stress, he would be reasonable candidate for gastric sleeve.         Jessica Cai MD

## 2022-10-19 ENCOUNTER — OFFICE VISIT (OUTPATIENT)
Dept: INTERNAL MEDICINE CLINIC | Age: 52
End: 2022-10-19
Payer: MEDICAID

## 2022-10-19 VITALS
BODY MASS INDEX: 51.56 KG/M2 | TEMPERATURE: 97.2 F | RESPIRATION RATE: 18 BRPM | WEIGHT: 315 LBS | OXYGEN SATURATION: 94 % | HEART RATE: 67 BPM | DIASTOLIC BLOOD PRESSURE: 84 MMHG | SYSTOLIC BLOOD PRESSURE: 131 MMHG

## 2022-10-19 DIAGNOSIS — I10 PRIMARY HYPERTENSION: Primary | ICD-10-CM

## 2022-10-19 DIAGNOSIS — N52.9 ERECTILE DYSFUNCTION, UNSPECIFIED ERECTILE DYSFUNCTION TYPE: ICD-10-CM

## 2022-10-19 DIAGNOSIS — E11.69 DIABETES MELLITUS TYPE 2 IN OBESE (HCC): ICD-10-CM

## 2022-10-19 DIAGNOSIS — E66.01 MORBID OBESITY WITH BMI OF 50.0-59.9, ADULT (HCC): ICD-10-CM

## 2022-10-19 DIAGNOSIS — Z00.00 HEALTHCARE MAINTENANCE: ICD-10-CM

## 2022-10-19 DIAGNOSIS — E66.9 DIABETES MELLITUS TYPE 2 IN OBESE (HCC): ICD-10-CM

## 2022-10-19 PROCEDURE — 99213 OFFICE O/P EST LOW 20 MIN: CPT | Performed by: STUDENT IN AN ORGANIZED HEALTH CARE EDUCATION/TRAINING PROGRAM

## 2022-10-19 RX ORDER — SEMAGLUTIDE 1.34 MG/ML
0.5 INJECTION, SOLUTION SUBCUTANEOUS WEEKLY
Qty: 4 ADJUSTABLE DOSE PRE-FILLED PEN SYRINGE | Refills: 3 | Status: SHIPPED | OUTPATIENT
Start: 2022-10-19 | End: 2022-11-16

## 2022-10-19 RX ORDER — SILDENAFIL 50 MG/1
100 TABLET, FILM COATED ORAL PRN
Qty: 30 TABLET | Refills: 3 | Status: SHIPPED | OUTPATIENT
Start: 2022-10-19

## 2022-10-19 ASSESSMENT — ENCOUNTER SYMPTOMS: SHORTNESS OF BREATH: 0

## 2022-10-19 NOTE — PROGRESS NOTES
[facility]    Nathalie De La Cruz is a 47 y.o. male, here for evaluation of the following concerns: f/u BP    HPI  Sharon Franklin is a 46 yo M with PMH of afib s/p ablation, DM2, afib s/p ablation, osteoarthritis, hydradenitis and hypertension who presents for 2 week follow-up for BP pt was switched from lisinopril to valsartan. States he does not have any BP monitor at home. He was advised to get blood pressure monitor. He states he was talking to his insurance nurse practitioner suggested that he go on a medication for his diabetes that will help with weight. He was seen by weight loss management and is getting preop testing for gastric sleeve. He states that he is trying to be compliant with diet. He states that sildenafil is not working and he needs a higher dose. Review of Systems   Respiratory:  Negative for shortness of breath. Cardiovascular:  Negative for chest pain. Neurological:  Negative for dizziness and light-headedness. MEDICATIONS:  Prior to Visit Medications    Medication Sig Taking?  Authorizing Provider   Semaglutide,0.25 or 0.5MG/DOS, (OZEMPIC, 0.25 OR 0.5 MG/DOSE,) 2 MG/1.5ML SOPN Inject 0.5 mg into the skin once a week for 28 days Yes Eileen Garza MD   sildenafil (VIAGRA) 50 MG tablet Take 2 tablets by mouth as needed for Erectile Dysfunction Yes Eileen Garza MD   furosemide (LASIX) 80 MG tablet TAKE 1 TABLET BY MOUTH TWICE A DAY Yes Eileen Garza MD   metFORMIN (GLUCOPHAGE) 500 MG tablet Take one tablet twice per day with meals Yes Eileen Garza MD   dilTIAZem (CARDIZEM CD) 120 MG extended release capsule TAKE 1 CAPSULE BY MOUTH EVERY DAY Yes Eileen Garza MD   potassium chloride (KLOR-CON M20) 20 MEQ extended release tablet TAKE 1 TABLET BY MOUTH [address] Yes Eileen Garza MD   allopurinol (ZYLOPRIM) 300 MG tablet Take one tablet daily Yes Carmella Mayer MD   valsartan (DIOVAN) 160 MG tablet Take 1 tablet by mouth daily Yes Carmella Mayer MD   SYMBICORT 80-4.5 MCG/ACT AERO INHALE 2 PUFFS INTO THE LUNGS TWICE A DAY Yes Lizzie Martin MD   insulin lispro, 1 Unit Dial, 100 UNIT/ML SOPN INJECT 7 UNITS INTO THE SKIN 3 TIMES DAILY BEFORE MEALS Yes Kevin De León MD   apixaban (ELIQUIS) 5 MG TABS tablet TAKE 1 TABLET BY MOUTH TWICE A DAY Yes Louise Wilkerson MD   Multiple Vitamins-Minerals (THERAPEUTIC-M/LUTEIN) TABS TAKE 1 TABLET BY MOUTH EVERY DAY Yes Maximiliano Hill MD   Insulin Pen Needle (PEN NEEDLES) 31G X 6 MM MISC 1 each by Does not apply route daily May substitute to meet insur. requirements Yes Adilene Tran DO   atorvastatin (LIPITOR) 20 MG tablet TAKE 1 TABLET BY MOUTH EVERY DAY Yes Louise Wilkerson MD   Insulin Syringe-Needle U-100 (BD INSULIN SYRINGE U/F) 31G X 5/16\" 1 ML MISC USE AS DIRECTED 4 TIMES A DAY Yes Maximiliano Hill MD   Handicap Placard MISC by Does not apply route Handicap placard effective from 7/27/2021 through 7/27/2026 Yes Claudette Shall, MD   sennosides-docusate sodium (SENOKOT-S) 8.6-50 MG tablet Take 1 tablet by mouth 2 times daily as needed for Constipation Yes Elda Anders MD   blood glucose monitor strips by Other route 4 times daily (after meals and at bedtime) Yes Gabino Chris MD   insulin glargine (BASAGLAR KWIKPEN) 100 UNIT/ML injection pen patient using vials per [facility]  inject 20 units at hs Yes Najma Reilly MD   Alcohol Swabs PADS 1 Container by Does not apply route three times daily Yes Steven Mcgregor MD   ACCU-CHEK FASTCLIX LANCETS MISC 20 Sticks by Does not apply route 2 times daily Yes Manson Kanner, MD   Blood Pressure KIT 1 Units by Does not apply route daily Yes Gerald Rangel MD   Blood Glucose Monitoring Suppl (BLOOD GLUCOSE MONITOR SYSTEM) W/DEVICE KIT 1 Units by Does not apply route daily Yes Gerald Rangel MD        Vitals:    10/19/22 1521 10/19/22 1555   BP: (!) 141/97 131/84   Site: Left Upper Arm    Position: Sitting    Cuff Size: Large Adult    Pulse: 67    Resp: 18    Temp: 97.2 °F (36.2 °C)    TempSrc: Temporal    SpO2: 94%    Weight: (!) 380 lb 3.2 oz (172.5 kg)       Estimated body mass index is 51.56 kg/m² as calculated from the following:    Height as of 10/6/22: 6' (1.829 m). Weight as of this encounter: 380 lb 3.2 oz (172.5 kg). Physical Exam  Constitutional:       Appearance: Normal appearance. HENT:      Head: Normocephalic and atraumatic. Nose: Nose normal.   Cardiovascular:      Pulses: Normal pulses. Pulmonary:      Effort: Pulmonary effort is normal.   Abdominal:      General: Abdomen is flat. Musculoskeletal:         General: Normal range of motion. Skin:     General: Skin is warm. Neurological:      General: No focal deficit present. Mental Status: He is alert. ASSESSMENT/PLAN:     1. Primary hypertension  Assessment & Plan:   /84, no dizziness or lightheadedness.  -Switched from lisinopril to valsartan at last visit. We will continue with valsartan 160 mg.  2. Diabetes mellitus type 2 in obese [facility])  Assessment & Plan:  Currently well controlled at 6.4% A1c  -We will order Ozempic. Patient advised that he call office to make an appointment once he gets the medication and before starting it.  -He will make appointment for diabetic eye and foot exam.  3. Morbid obesity with BMI of 50.0-59.9, adult [facility])  Assessment & Plan:   Continue diet and exercise. We will start Ozempic. He is currently following with weight management and getting preop testing for gastric sleeve. He is unsure if he wants to go through with this. 4. Erectile dysfunction, unspecified erectile dysfunction type  Assessment & Plan:  States that 50 mg is not working, will increase dose to 100 mg as needed  Orders:  -     sildenafil (VIAGRA) 50 MG tablet;  Take 2 tablets by mouth as needed for Erectile Dysfunction, Disp-30 tablet, R-3Print    Return in about 3 months (around 1/19/2023). The patient was staffed with the teaching attending: Dr. Micheline Lou. An electronic signature was used to authenticate this note. --Hellen Duong MD, PGY-2    Addendum to Resident H& P/Progress note:  I have personally seen,examined and evaluated the patient.  I have reviewed the current history, physical findings, labs and assessment and plan and agree with note as documented by resident MD ( Ila Lambert)      Micheline Lou MD, [address]

## 2022-10-19 NOTE — ASSESSMENT & PLAN NOTE
Continue diet and exercise. We will start Ozempic. He is currently following with weight management and getting preop testing for gastric sleeve. He is unsure if he wants to go through with this.

## 2022-10-19 NOTE — ASSESSMENT & PLAN NOTE
/84, no dizziness or lightheadedness.  -Switched from lisinopril to valsartan at last visit. We will continue with valsartan 160 mg.

## 2022-10-19 NOTE — PATIENT INSTRUCTIONS
Continue to take insulin as prescribed   When and if you receive ozempic schedule a follow-up appointment before starting. Make appointment for diabetic eye and foot exam  Continue exercise  Get BP monitor to check BP daily  Follow- up in 3 months.

## 2022-10-19 NOTE — ASSESSMENT & PLAN NOTE
Currently well controlled at 6.4% A1c  -We will order Ozempic.   Patient advised that he call office to make an appointment once he gets the medication and before starting it.  -He will make appointment for diabetic eye and foot exam.

## 2022-10-27 NOTE — TELEPHONE ENCOUNTER
I called patient about refill request from [facility] for Clindamycin gel 1%. Patient states he did not ask for this and he does not need it at this time. will deny request and fax paper request back to [facility] on [facility].   [phone]

## 2022-11-03 ENCOUNTER — OFFICE VISIT (OUTPATIENT)
Dept: BARIATRICS/WEIGHT MGMT | Age: 52
End: 2022-11-03
Payer: MEDICAID

## 2022-11-03 VITALS
RESPIRATION RATE: 18 BRPM | DIASTOLIC BLOOD PRESSURE: 82 MMHG | SYSTOLIC BLOOD PRESSURE: 136 MMHG | HEIGHT: 72 IN | WEIGHT: 315 LBS | BODY MASS INDEX: 42.66 KG/M2 | OXYGEN SATURATION: 97 % | HEART RATE: 63 BPM

## 2022-11-03 DIAGNOSIS — E66.01 MORBID OBESITY WITH BMI OF 50.0-59.9, ADULT (HCC): Primary | ICD-10-CM

## 2022-11-03 DIAGNOSIS — K21.9 GASTROESOPHAGEAL REFLUX DISEASE WITHOUT ESOPHAGITIS: ICD-10-CM

## 2022-11-03 DIAGNOSIS — I10 ESSENTIAL HYPERTENSION: ICD-10-CM

## 2022-11-03 DIAGNOSIS — G47.33 OBSTRUCTIVE SLEEP APNEA: ICD-10-CM

## 2022-11-03 DIAGNOSIS — E66.9 DIABETES MELLITUS TYPE 2 IN OBESE (HCC): ICD-10-CM

## 2022-11-03 DIAGNOSIS — E78.2 HYPERLIPIDEMIA, MIXED: ICD-10-CM

## 2022-11-03 DIAGNOSIS — E11.69 DIABETES MELLITUS TYPE 2 IN OBESE (HCC): ICD-10-CM

## 2022-11-03 PROCEDURE — 2022F DILAT RTA XM EVC RTNOPTHY: CPT | Performed by: SURGERY

## 2022-11-03 PROCEDURE — G8427 DOCREV CUR MEDS BY ELIG CLIN: HCPCS | Performed by: SURGERY

## 2022-11-03 PROCEDURE — 3074F SYST BP LT 130 MM HG: CPT | Performed by: SURGERY

## 2022-11-03 PROCEDURE — G8484 FLU IMMUNIZE NO ADMIN: HCPCS | Performed by: SURGERY

## 2022-11-03 PROCEDURE — 3017F COLORECTAL CA SCREEN DOC REV: CPT | Performed by: SURGERY

## 2022-11-03 PROCEDURE — G8417 CALC BMI ABV UP PARAM F/U: HCPCS | Performed by: SURGERY

## 2022-11-03 PROCEDURE — 3044F HG A1C LEVEL LT 7.0%: CPT | Performed by: SURGERY

## 2022-11-03 PROCEDURE — 3078F DIAST BP <80 MM HG: CPT | Performed by: SURGERY

## 2022-11-03 PROCEDURE — 99214 OFFICE O/P EST MOD 30 MIN: CPT | Performed by: SURGERY

## 2022-11-03 PROCEDURE — 1036F TOBACCO NON-USER: CPT | Performed by: SURGERY

## 2022-11-03 NOTE — PROGRESS NOTES
[facility]) Physicians   [employer]  Trinidad Garcia MD, [facility] [address], [address], [address] [facility] [address] . Phone: [phone]  Fax: [phone]          Chief Complaint   Patient presents with    Obesity     2nd pre-surg         HPI:     Martín De La Cruz is a very pleasant 47 y.o. male with Body mass index is 51.81 kg/m². / Chronic Obesity. Romario Freeman has been struggling for several years now with obesity. Romario Freeman feels the weight is an obstacle to achieve and perform things in daily living as well risk on health. Patient  is very determined to lose weight and be healthy, and is working towards  surgical weight loss to achieve this goal. Pre-operative clearance and work up pending. Working hard to keep good dietary habits as well level of activity. Patient denies any nausea, vomiting, fevers, chills, shortness of breath, chest pain, cough, constipation or difficulty urinating.     Pain Assessment   Denies any abdominal pain       Past Medical History:   Diagnosis Date    Arthritis     Atrial fibrillation (HCC)     Congestive heart failure (HCC)     Coronary artery disease     Depression     Diabetes mellitus (HCC)     GERD (gastroesophageal reflux disease)     Hyperlipidemia     Hypertension     Morbid obesity (HCC)     Sleep apnea     uses cpap at home q hs     Type II or unspecified type diabetes mellitus without mention of complication, not stated as uncontrolled      Past Surgical History:   Procedure Laterality Date    ABLATION OF DYSRHYTHMIC FOCUS      COLONOSCOPY  4/13/2021    COLONOSCOPY POLYPECTOMY SNARE/COLD BIOPSY performed by Arianna Fields MD at [address] Left 6/10/2021    LEFT MIDDLE FINGER TRIGGER RELEASE performed by Elizabeth Ca MD at [facility]     Family History   Problem Relation Age of Onset    Sleep Apnea Mother     Diabetes Mother     High Blood Pressure Mother     Diabetes Maternal Aunt     High Blood Pressure Maternal Grandmother      Social History     Tobacco Use    Smoking status: Never     Passive exposure: Never    Smokeless tobacco: Never   Substance Use Topics    Alcohol use: No     Alcohol/week: 0.0 standard drinks     I counseled the patient on the importance of not smoking and risks of ETOH. No Known Allergies  Vitals:    11/03/22 1131   BP: (!) 162/90   Pulse: 63   Resp: 18   SpO2: 97%   Weight: (!) 382 lb (173.3 kg)   Height: 6' (1.829 m)       Body mass index is 51.81 kg/m².     Lab Results   Component Value Date/Time    WBC 8.1 06/22/2022 01:25 AM    RBC 4.72 06/22/2022 01:25 AM    HGB 15.4 06/22/2022 01:25 AM    HCT 44.6 06/22/2022 01:25 AM    MCV 94.6 06/22/2022 01:25 AM    MCH 32.6 06/22/2022 01:25 AM    MCHC 34.5 06/22/2022 01:25 AM    MPV 10.0 06/22/2022 01:25 AM    NEUTOPHILPCT 55.9 06/22/2022 01:25 AM    LYMPHOPCT 36.0 06/22/2022 01:25 AM    MONOPCT 5.9 06/22/2022 01:25 AM    EOSRELPCT 0.8 06/22/2022 01:25 AM    BASOPCT 1.4 06/22/2022 01:25 AM    NEUTROABS 4.5 06/22/2022 01:25 AM    LYMPHSABS 2.9 06/22/2022 01:25 AM    MONOSABS 0.5 06/22/2022 01:25 AM    EOSABS 0.1 06/22/2022 01:25 AM     Lab Results   Component Value Date/Time     06/23/2022 04:45 AM    K 3.6 06/23/2022 04:45 AM    K 3.4 06/22/2022 01:25 AM     06/23/2022 04:45 AM    CO2 27 06/23/2022 04:45 AM    ANIONGAP 10 06/23/2022 04:45 AM    GLUCOSE 121 06/23/2022 04:45 AM    BUN 15 06/23/2022 04:45 AM    CREATININE 0.9 06/23/2022 04:45 AM    LABGLOM >60 06/23/2022 04:45 AM    GFRAA >60 06/23/2022 04:45 AM    CALCIUM 9.1 06/23/2022 04:45 AM    PROT 7.3 03/02/2021 08:29 AM    PROT 7.4 03/02/2021 08:29 AM    PROT 8.1 09/28/2011 07:15 PM    LABALBU 3.7 06/23/2022 04:45 AM    AGRATIO 1.4 03/02/2021 08:29 AM    BILITOT 0.5 03/02/2021 08:29 AM    BILITOT 0.5 03/02/2021 08:29 AM    ALKPHOS 82 03/02/2021 08:29 AM    ALKPHOS 82 03/02/2021 08:29 AM    ALT 18 03/02/2021 08:29 AM    ALT 18 03/02/2021 08:29 AM    AST 14 03/02/2021 08:29 AM AST 15 03/02/2021 08:29 AM    GLOB 3.1 03/02/2021 08:29 AM     Lab Results   Component Value Date/Time    CHOL 103 02/25/2020 11:21 AM    TRIG 61 02/25/2020 11:21 AM    HDL 37 03/02/2021 08:29 AM    LDLCALC 95 03/02/2021 08:29 AM    LABVLDL 33 03/02/2021 08:29 AM     Lab Results   Component Value Date/Time    TSHREFLEX 1.52 02/25/2020 11:21 AM     No results found for: IRON, TIBC, LABIRON  No results found for: CREORPEJ81, FOLATE  No results found for: VITD25  Lab Results   Component Value Date/Time    LABA1C 6.4 10/05/2022 03:11 PM    .5 02/25/2020 11:21 AM         Current Outpatient Medications:     Semaglutide,0.25 or 0.5MG/DOS, (OZEMPIC, 0.25 OR 0.5 MG/DOSE,) 2 MG/1.5ML SOPN, Inject 0.5 mg into the skin once a week for 28 days, Disp: 4 Adjustable Dose Pre-filled Pen Syringe, Rfl: 3    sildenafil (VIAGRA) 50 MG tablet, Take 2 tablets by mouth as needed for Erectile Dysfunction, Disp: 30 tablet, Rfl: 3    furosemide (LASIX) 80 MG tablet, TAKE 1 TABLET BY MOUTH TWICE A DAY, Disp: 60 tablet, Rfl: 0    metFORMIN (GLUCOPHAGE) 500 MG tablet, Take one tablet twice per day with meals, Disp: 180 tablet, Rfl: 0    dilTIAZem (CARDIZEM CD) 120 MG extended release capsule, TAKE 1 CAPSULE BY MOUTH EVERY DAY, Disp: 90 capsule, Rfl: 0    potassium chloride (KLOR-CON M20) 20 MEQ extended release tablet, TAKE 1 TABLET BY MOUTH EVERY DAY WITH BREAKFAST, Disp: 90 tablet, Rfl: 0    allopurinol (ZYLOPRIM) 300 MG tablet, Take one tablet daily, Disp: 30 tablet, Rfl: 3    valsartan (DIOVAN) 160 MG tablet, Take 1 tablet by mouth daily, Disp: 90 tablet, Rfl: 1    SYMBICORT 80-4.5 MCG/ACT AERO, INHALE 2 PUFFS INTO THE LUNGS TWICE A DAY, Disp: 10.2 each, Rfl: 3    insulin lispro, 1 Unit Dial, 100 UNIT/ML SOPN, INJECT 7 UNITS INTO THE SKIN 3 TIMES DAILY BEFORE MEALS, Disp: 5 pen, Rfl: 3    apixaban (ELIQUIS) 5 MG TABS tablet, TAKE 1 TABLET BY MOUTH TWICE A DAY, Disp: 60 tablet, Rfl: 5    Multiple Vitamins-Minerals (THERAPEUTIC-M/LUTEIN) TABS, TAKE 1 TABLET BY MOUTH EVERY DAY, Disp: 30 tablet, Rfl: 3    Insulin Pen Needle (PEN NEEDLES) 31G X 6 MM MISC, 1 each by Does not apply route daily May substitute to meet insur. requirements, Disp: 100 each, Rfl: 5    atorvastatin (LIPITOR) 20 MG tablet, TAKE 1 TABLET BY MOUTH EVERY DAY, Disp: 30 tablet, Rfl: 5    Insulin Syringe-Needle U-100 (BD INSULIN SYRINGE U/F) 31G X 5/16\" 1 ML MISC, USE AS DIRECTED 4 TIMES A DAY, Disp: 100 each, Rfl: 5    Handicap Placard MISC, by Does not apply route Handicap placard effective from 7/27/2021 through 7/27/2026, Disp: 1 each, Rfl: 0    sennosides-docusate sodium (SENOKOT-S) 8.6-50 MG tablet, Take 1 tablet by mouth 2 times daily as needed for Constipation, Disp: 20 tablet, Rfl: 0    blood glucose monitor strips, by Other route 4 times daily (after meals and at bedtime), Disp: 60 strip, Rfl: 2    insulin glargine (BASAGLAR KWIKPEN) 100 UNIT/ML injection pen, patient using vials per [facility] [facility].  inject 20 units at hs, Disp: 5 mL, Rfl: 2    Alcohol Swabs PADS, 1 Container by Does not apply route three times daily, Disp: 100 each, Rfl: 5    ACCU-CHEK FASTCLIX LANCETS MISC, 20 Sticks by Does not apply route 2 times daily, Disp: 20 each, Rfl: 3    Blood Pressure KIT, 1 Units by Does not apply route daily, Disp: 1 kit, Rfl: 0    Blood Glucose Monitoring Suppl (BLOOD GLUCOSE MONITOR SYSTEM) W/DEVICE KIT, 1 Units by Does not apply route daily, Disp: 1 kit, Rfl: 0    Review of Systems - History obtained from the patient  General ROS: negative  Psychological ROS: negative  Ophthalmic ROS: negative  Neurological ROS: negative  ENT ROS: negative  Allergy and Immunology ROS: negative  Hematological and Lymphatic ROS: negative  Endocrine ROS: negative  Breast ROS: negative  Respiratory ROS: negative  Cardiovascular ROS: negative  Gastrointestinal ROS:negative  Genito-Urinary ROS: negative  Musculoskeletal ROS: negative   Skin ROS: negative    Physical Exam   Vitals Reviewed   Constitutional: Patient is oriented to person, place, and time. Patient appears well-developed and well-nourished. Patient is active and cooperative. Non-toxic appearance. No distress. HENT:   Head: Normocephalic and atraumatic. Head is without abrasion and without laceration. Hair is normal.   Right Ear: External ear normal. No lacerations. No drainage, swelling . Left Ear: External ear normal. No lacerations. No drainage, swelling. Nose/Mouth: face mask in place  Eyes: Conjunctivae, EOM and lids are normal. Right eye exhibits no discharge. No foreign body present in the right eye. Left eye exhibits no discharge. No foreign body present in the left eye. No scleral icterus. Neck: Trachea normal and normal range of motion. No JVD present. Pulmonary/Chest: Effort normal. No accessory muscle usage or stridor. No apnea. No respiratory distress. Cardiovascular: Normal rate and no JVD. Abdominal: Normal appearance. Patient exhibits no distension. Abdomen is soft, obese, non tender. Musculoskeletal: Normal range of motion. Patient exhibits no edema. Neurological: Patient is alert and oriented to person, place, and time. Patient has normal strength. GCS eye subscore is 4. GCS verbal subscore is 5. GCS motor subscore is 6. Skin: Skin is warm and dry. No abrasion and no rash noted. Patient is not diaphoretic. No cyanosis or erythema. Psychiatric: Patient has a normal mood and affect. Speech is normal and behavior is normal. Cognition and memory are normal.       A/P    Bernice De La Cruz is 47 y.o. male, Body mass index is 51.81 kg/m². pre surgery, has gained 1 lb since last visit. The patient underwent extensive dietary counseling with registered dietician. I have reviewed, discussed and agree with the dietary plan. Patient is trying hard to keep good dietary and behavior modifications. Patient is monitoring portion sizes, food choices and liquid calories.   Patient is trying to exercise regularly as much as possible. Obesity as a disease is considered a high risk to patients overall health and should therefore be considered a high risk disease state. Advised the patient that not getting there weight under control, that could increase risk of complications/worsening of those conditions on the long-term. (Goal of weight loss surgery is to alleviate/control some of those co-morbidities)    Now with Covid-19 pandemic, CDC and health authorities does classify obese patients as vulnerable and high risk as well. Which makes weight loss a priority for improvement of their wellbeing and overall health. I encouraged the patient to continue exercise and keeping healthy eating habits. Discussed pre-op labs and work up till now. Also counseled the patient extensively on Surgery. I did explain thoroughly to the patient that compliance with pre- and post op diet and other recommendations are integral part to improve the chances of successful weight loss and also not following it could end with serious health complications. Some strategies discussed today include but not limited to : 30/30/30 minutes rule, food diary, avoid fast food and packing/planing ahead, & increasing exercise. Also stressed to the patient importance of taking the multivitamins as instructed, otherwise risk significant complications. The visit today, included any number of the following: Bariatric Preoperative work up/protocols, review of labs, imaging, provider notes, outside hospital records, performing examination/evaluation, counseling patient and/or family, ordering medications/tests, placing referrals and communication with referring physicians, coordination of care; discussing dietary plan/recall with the patient as well with registered dietitian and documentation in the EHR. Of note, the above was done during same day of the actual patient encounter. William Zazueta is here for his second presurgical visit. Patient expressed today some of his longstanding dietary issues that he did not really allude to clearly in the first visit which is he has a lot of food sensitivities that even sometimes causes him to have his syncope. He said that he initially thought it was related to his A. fib and after the ablation that still continues to happen. Given all of that I do not feel surgery is a safe option as that would restrict significantly the amounts and the types of foods he can consume. I think the safest option for him will be medical weight loss. Recommend the patient join the medical weight loss program to continue weight loss towards their goal. Patient understands that there is no further surgical options at this point that we can offer. We discussed how his excess weight affects his overall health and importance of weight loss, healthy diet and active lifestyle to alleviate those co morbid conditions, otherwise risk deterioration. Morbid Obesity: Body mass index is 51.81 kg/m². [x] Continue to make dietary and lifestyle modifications. [x] Plan for [facility]. [x] Return for follow-up next month. Chronic GERD:   [x] Continue to make dietary and lifestyle modifications. [x] Continue Omeprazole. [] Continue Famotidine. [] Plan for EGD to evaluate the stomach. Essential Hypertension:  [x] Continue to make dietary and lifestyle modifications. [x] Reviewed the importance of checking blood pressure. [x] Continue to follow up with their PCP for medication management and monitoring. Diabetes Mellitus Type II in Obese:   [x] Continue to make dietary and lifestyle modifications. [x] Reviewed the importance of checking blood sugars. [x] Continue to follow up with their PCP and/or Endocrinologist for medication management and monitoring. Hyperlipidemia:   [x] Continue to make dietary and lifestyle modifications.   [x] Continue to follow up with their PCP for medication management and monitoring. Obstructive Sleep Apnea:   [x] Continue to make dietary and lifestyle modifications. [x] Reviewed the importance of wearing / compliance with CPAP/BIPAP. [x] Continue to follow up with their sleep medicine provider for CPAP/BIPAP management and monitoring. Patient advised that its their responsibility to follow up for studies, referrals and/or labs ordered today.

## 2022-11-03 NOTE — Clinical Note
Initially presented to surgical weight loss however he apparently has some food sensitivity issues to the point that he claims that he gets syncope from and denies that this is related to his A. fib. At this point I do not feel safe proceeding with surgery as this will definitely limit his choices of food more more.  Is open to trying Optifast if you feel it is appropriate

## 2022-11-07 ENCOUNTER — OFFICE VISIT (OUTPATIENT)
Dept: SURGERY | Age: 52
End: 2022-11-07
Payer: MEDICAID

## 2022-11-07 VITALS
WEIGHT: 315 LBS | DIASTOLIC BLOOD PRESSURE: 97 MMHG | HEART RATE: 52 BPM | SYSTOLIC BLOOD PRESSURE: 152 MMHG | BODY MASS INDEX: 42.66 KG/M2 | HEIGHT: 72 IN

## 2022-11-07 DIAGNOSIS — L73.2 HIDRADENITIS SUPPURATIVA: Primary | ICD-10-CM

## 2022-11-07 PROCEDURE — 3078F DIAST BP <80 MM HG: CPT | Performed by: SURGERY

## 2022-11-07 PROCEDURE — G8484 FLU IMMUNIZE NO ADMIN: HCPCS | Performed by: SURGERY

## 2022-11-07 PROCEDURE — G8417 CALC BMI ABV UP PARAM F/U: HCPCS | Performed by: SURGERY

## 2022-11-07 PROCEDURE — 3017F COLORECTAL CA SCREEN DOC REV: CPT | Performed by: SURGERY

## 2022-11-07 PROCEDURE — G8427 DOCREV CUR MEDS BY ELIG CLIN: HCPCS | Performed by: SURGERY

## 2022-11-07 PROCEDURE — 3074F SYST BP LT 130 MM HG: CPT | Performed by: SURGERY

## 2022-11-07 PROCEDURE — 99213 OFFICE O/P EST LOW 20 MIN: CPT | Performed by: SURGERY

## 2022-11-07 PROCEDURE — 1036F TOBACCO NON-USER: CPT | Performed by: SURGERY

## 2022-11-07 RX ORDER — BUDESONIDE AND FORMOTEROL FUMARATE DIHYDRATE 80; 4.5 UG/1; UG/1
AEROSOL RESPIRATORY (INHALATION)
Qty: 10.2 EACH | Refills: 3 | Status: SHIPPED | OUTPATIENT
Start: 2022-11-07

## 2022-11-07 RX ORDER — ATORVASTATIN CALCIUM 20 MG/1
TABLET, FILM COATED ORAL
Qty: 30 TABLET | Refills: 5 | Status: SHIPPED | OUTPATIENT
Start: 2022-11-07 | End: 2022-11-09 | Stop reason: SDUPTHER

## 2022-11-07 NOTE — PROGRESS NOTES
PATIENT NAME: Donna De La Cruz     YOB: 1970     TODAY'S DATE: 12/1/2022    Reason for Visit:  Skin tags and hidradenitis. Requesting Physician:  Dr. Moreno Terrazas:              The patient is a 47 y.o. male with a PMHx as delineated below who presents with complaints of chronic hidradenitis of the axilla and groins. The patient has received appropriate treatment and these continue to flare. He also complains of multiple skin tags he wishes to have removed.      Chief Complaint   Patient presents with    New Patient     hidradenitis       REVIEW OF SYSTEMS:  CONSTITUTIONAL:  negative  HEENT:  negative  RESPIRATORY:  negative  CARDIOVASCULAR:  negative  GASTROINTESTINAL:  negative  GENITOURINARY:  negative  HEMATOLOGIC/LYMPHATIC:  negative  MUSCULOSKELETAL: negative  NEUROLOGICAL:  negative    PMH  Past Medical History:   Diagnosis Date    Arthritis     Atrial fibrillation (HCC)     Congestive heart failure (HCC)     Coronary artery disease     Depression     Diabetes mellitus (HCC)     GERD (gastroesophageal reflux disease)     Hyperlipidemia     Hypertension     Morbid obesity (HCC)     Sleep apnea     uses cpap at home q hs     Type II or unspecified type diabetes mellitus without mention of complication, not stated as uncontrolled        PSH  Past Surgical History:   Procedure Laterality Date    ABLATION OF DYSRHYTHMIC FOCUS      COLONOSCOPY  4/13/2021    COLONOSCOPY POLYPECTOMY SNARE/COLD BIOPSY performed by Alvaro Murguia MD at [address] Left 6/10/2021    LEFT MIDDLE FINGER TRIGGER RELEASE performed by Vesna Zamora MD at [address] [place] [address] History  Social History     Socioeconomic History    Marital status: Single     Spouse name: Not on file    Number of children: Not on file    Years of education: Not on file    Highest education level: Not on file   Occupational History    Not on file   Tobacco Use    Smoking status: Never Passive exposure: Never    Smokeless tobacco: Never   Vaping Use    Vaping Use: Never used   Substance and Sexual Activity    Alcohol use: No     Alcohol/week: 0.0 standard drinks    Drug use: Yes     Types: Marijuana (Weed), Cocaine     Comment: no cocaine for over 6 years, marijuana occ    Sexual activity: Not Currently   Other Topics Concern    Not on file   Social History Narrative    Not on file     Social Determinants of Health     Financial Resource Strain: Not on file   Food Insecurity: Not on file   Transportation Needs: Not on file   Physical Activity: Not on file   Stress: Not on file   Social Connections: Not on file   Intimate Partner Violence: Not on file   Housing Stability: Not on file       Family History:       Problem Relation Age of Onset    Sleep Apnea Mother     Diabetes Mother     High Blood Pressure Mother     Diabetes Maternal Aunt     High Blood Pressure Maternal Grandmother        Allergy: No Known Allergies    PHYSICAL EXAM:  VITALS:  BP (!) 152/97   Pulse 52   Ht 6' (1.829 m)   Wt (!) 384 lb (174.2 kg)   BMI 52.08 kg/m²     CONSTITUTIONAL:  alert, no apparent distress and morbidly obese  EYES:  sclera clear  ENT:  normocepalic, without obvious abnormality  NECK:  supple, symmetrical, trachea midline and no carotid bruits  LUNGS:  clear to auscultation  CARDIOVASCULAR:  regular rate and rhythm and no murmur noted  ABDOMEN:  no scars, normal bowel sounds, soft, non-distended, non-tender, voluntary guarding absent, no masses palpated and hernia absent  MUSCULOSKELETAL:  0+ pitting edema lower extremities  NEUROLOGIC:  Mental Status Exam:  Level of Alertness:   awake  Orientation:   person, place, time  SKIN:  Evidence of chronic hidradenitis of axilla and chest. Multiple smaller skin tags noted    IMPRESSION/RECOMMENDATIONS:    Multiple skin tags noted that we will excise in the office. Recurrent hidradenitis that has not responded to standard care.  Probably benefit from seeing dermatology but after surgery.     Jace Chapa MD

## 2022-11-07 NOTE — TELEPHONE ENCOUNTER
Requested Prescriptions     Pending Prescriptions Disp Refills    atorvastatin (LIPITOR) 20 MG tablet 30 tablet 5     Sig: TAKE 1 TABLET BY MOUTH EVERY DAY       Last Clinic Visit:  10/19/2022     Next Clinic Appointment:  1/18/2023

## 2022-11-09 RX ORDER — ATORVASTATIN CALCIUM 20 MG/1
TABLET, FILM COATED ORAL
Qty: 30 TABLET | Refills: 5 | Status: SHIPPED | OUTPATIENT
Start: 2022-11-09

## 2022-11-09 NOTE — TELEPHONE ENCOUNTER
Requested Prescriptions     Pending Prescriptions Disp Refills    atorvastatin (LIPITOR) 20 MG tablet 30 tablet 5     Sig: TAKE 1 TABLET BY MOUTH EVERY DAY       Last Clinic Visit:  10/19/2022     Next Clinic Appointment:  1/18/2023
non-reactive

## 2022-11-18 PROBLEM — Z00.00 HEALTHCARE MAINTENANCE: Status: RESOLVED | Noted: 2022-10-19 | Resolved: 2022-11-18

## 2022-11-22 ENCOUNTER — TELEPHONE (OUTPATIENT)
Dept: SURGERY | Age: 52
End: 2022-11-22

## 2022-12-07 ENCOUNTER — PROCEDURE VISIT (OUTPATIENT)
Dept: SURGERY | Age: 52
End: 2022-12-07
Payer: MEDICAID

## 2022-12-07 DIAGNOSIS — L91.8 CUTANEOUS SKIN TAGS: Primary | ICD-10-CM

## 2022-12-07 PROCEDURE — 11200 RMVL SKIN TAGS UP TO&INC 15: CPT | Performed by: SURGERY

## 2022-12-07 PROCEDURE — 99024 POSTOP FOLLOW-UP VISIT: CPT | Performed by: SURGERY

## 2022-12-13 NOTE — PROGRESS NOTES
Subjective:       Alveria Severance Ringer is a 47 y.o. male who complains of skin tags. The patient wishes skin tag removed as the lesion is getting caught on clothing and/or jewelry and is recurrently irritated.      Past Medical History:   Diagnosis Date    Arthritis     Atrial fibrillation (HCC)     Congestive heart failure (HCC)     Coronary artery disease     Depression     Diabetes mellitus (HCC)     GERD (gastroesophageal reflux disease)     Hyperlipidemia     Hypertension     Morbid obesity ([address]     Sleep apnea     uses cpap at home q hs     Type II or unspecified type diabetes mellitus without mention of complication, not stated as uncontrolled      Past Surgical History:   Procedure Laterality Date    ABLATION OF DYSRHYTHMIC FOCUS      COLONOSCOPY  4/13/2021    COLONOSCOPY POLYPECTOMY SNARE/COLD BIOPSY performed by Gregor Urena MD at [address] Left 6/10/2021    LEFT MIDDLE FINGER TRIGGER RELEASE performed by Zaira Renteria MD at [address] History     Socioeconomic History    Marital status: Single     Spouse name: None    Number of children: None    Years of education: None    Highest education level: None   Tobacco Use    Smoking status: Never     Passive exposure: Never    Smokeless tobacco: Never   Vaping Use    Vaping Use: Never used   Substance and Sexual Activity    Alcohol use: No     Alcohol/week: 0.0 standard drinks    Drug use: Yes     Types: Marijuana Jimi Spina), Cocaine     Comment: no cocaine for over 6 years, marijuana occ    Sexual activity: Not Currently     Current Outpatient Medications   Medication Sig Dispense Refill    atorvastatin (LIPITOR) 20 MG tablet TAKE 1 TABLET BY MOUTH EVERY DAY 30 tablet 5    budesonide-formoterol (SYMBICORT) 80-4.5 MCG/ACT AERO INHALE 2 PUFFS INTO THE LUNGS TWICE A DAY 10.2 each 3    sildenafil (VIAGRA) 50 MG tablet Take 2 tablets by mouth as needed for Erectile Dysfunction 30 tablet 3    furosemide (LASIX) 80 MG tablet TAKE 1 TABLET BY MOUTH TWICE A DAY 60 tablet 0    metFORMIN (GLUCOPHAGE) 500 MG tablet Take one tablet twice per day with meals 180 tablet 0    dilTIAZem (CARDIZEM CD) 120 MG extended release capsule TAKE 1 CAPSULE BY MOUTH EVERY DAY 90 capsule 0    potassium chloride (KLOR-CON M20) 20 MEQ extended release tablet TAKE 1 TABLET BY MOUTH EVERY DAY WITH BREAKFAST 90 tablet 0    allopurinol (ZYLOPRIM) 300 MG tablet Take one tablet daily 30 tablet 3    valsartan (DIOVAN) 160 MG tablet Take 1 tablet by mouth daily 90 tablet 1    insulin lispro, 1 Unit Dial, 100 UNIT/ML SOPN INJECT 7 UNITS INTO THE SKIN 3 TIMES DAILY BEFORE MEALS 5 pen 3    apixaban (ELIQUIS) 5 MG TABS tablet TAKE 1 TABLET BY MOUTH TWICE A DAY 60 tablet 5    Multiple Vitamins-Minerals (THERAPEUTIC-M/LUTEIN) TABS TAKE 1 TABLET BY MOUTH EVERY DAY 30 tablet 3    Insulin Pen Needle (PEN NEEDLES) 31G X 6 MM MISC 1 each by Does not apply route daily May substitute to meet insur. requirements 100 each 5    Insulin Syringe-Needle U-100 (BD INSULIN SYRINGE U/F) 31G X 5/16\" 1 ML MISC USE AS DIRECTED 4 TIMES A  each 5    Handicap Placard MISC by Does not apply route Handicap placard effective from 7/27/2021 through 7/27/2026 1 each 0    sennosides-docusate sodium (SENOKOT-S) 8.6-50 MG tablet Take 1 tablet by mouth 2 times daily as needed for Constipation 20 tablet 0    blood glucose monitor strips by Other route 4 times daily (after meals and at bedtime) 60 strip 2    insulin glargine (BASAGLAR KWIKPEN) 100 UNIT/ML injection pen patient using vials per [facility].  inject 20 units at hs 5 mL 2    Alcohol Swabs PADS 1 Container by Does not apply route three times daily 100 each 5    ACCU-CHEK FASTCLIX LANCETS MISC 20 Sticks by Does not apply route 2 times daily 20 each 3    Blood Pressure KIT 1 Units by Does not apply route daily 1 kit 0    Blood Glucose Monitoring Suppl (BLOOD GLUCOSE MONITOR SYSTEM) W/DEVICE KIT 1 Units by Does not apply route daily 1 kit 0 No current facility-administered medications for this visit. No Known Allergies    Review of Systems  Pertinent items are noted in HPI. Objective:      Skin:  6 skin tag in the thorax and back region, were removed using scissors and forceps after alcohol prep; hemostasis is obtained with hot cautery and discarded. Assessment:      Chronically irritated skin tag, removed. Plan:       1. The patient is instructed to watch for signs of infection including erythema, pain,       purulent discharge, or crusting. 2. Verbal patient instruction given. 3. Follow up in 1 weeks.

## 2023-01-08 ENCOUNTER — HOSPITAL ENCOUNTER (EMERGENCY)
Age: 53
Discharge: HOME | End: 2023-01-08
Attending: EMERGENCY MEDICINE
Payer: MEDICAID

## 2023-01-08 VITALS
WEIGHT: 315 LBS | HEART RATE: 92 BPM | TEMPERATURE: 98.3 F | HEIGHT: 74 IN | OXYGEN SATURATION: 97 % | BODY MASS INDEX: 40.43 KG/M2 | DIASTOLIC BLOOD PRESSURE: 103 MMHG | SYSTOLIC BLOOD PRESSURE: 145 MMHG | RESPIRATION RATE: 19 BRPM

## 2023-01-08 DIAGNOSIS — L02.91 ABSCESS: Primary | ICD-10-CM

## 2023-01-08 PROCEDURE — 10060 I&D ABSCESS SIMPLE/SINGLE: CPT

## 2023-01-08 PROCEDURE — 99283 EMERGENCY DEPT VISIT LOW MDM: CPT

## 2023-01-08 PROCEDURE — 6370000000 HC RX 637 (ALT 250 FOR IP)

## 2023-01-08 PROCEDURE — 2500000003 HC RX 250 WO HCPCS

## 2023-01-08 RX ORDER — SULFAMETHOXAZOLE AND TRIMETHOPRIM 800; 160 MG/1; MG/1
1 TABLET ORAL 2 TIMES DAILY
Qty: 14 TABLET | Refills: 0 | Status: SHIPPED | OUTPATIENT
Start: 2023-01-08 | End: 2023-01-15

## 2023-01-08 RX ORDER — ACETAMINOPHEN 325 MG/1
650 TABLET ORAL ONCE
Status: COMPLETED | OUTPATIENT
Start: 2023-01-08 | End: 2023-01-08

## 2023-01-08 RX ORDER — LIDOCAINE HYDROCHLORIDE AND EPINEPHRINE 10; 10 MG/ML; UG/ML
20 INJECTION, SOLUTION INFILTRATION; PERINEURAL ONCE
Status: COMPLETED | OUTPATIENT
Start: 2023-01-08 | End: 2023-01-08

## 2023-01-08 RX ORDER — IBUPROFEN 600 MG/1
600 TABLET ORAL 4 TIMES DAILY PRN
Qty: 40 TABLET | Refills: 0 | Status: SHIPPED | OUTPATIENT
Start: 2023-01-08

## 2023-01-08 RX ADMIN — LIDOCAINE HYDROCHLORIDE,EPINEPHRINE BITARTRATE 20 ML: 10; .01 INJECTION, SOLUTION INFILTRATION; PERINEURAL at 20:23

## 2023-01-08 RX ADMIN — ACETAMINOPHEN 650 MG: 325 TABLET ORAL at 19:47

## 2023-01-08 ASSESSMENT — ENCOUNTER SYMPTOMS
ABDOMINAL PAIN: 0
VOMITING: 0
NAUSEA: 0
COUGH: 0
BACK PAIN: 0

## 2023-01-08 ASSESSMENT — PAIN - FUNCTIONAL ASSESSMENT
PAIN_FUNCTIONAL_ASSESSMENT: 0-10
PAIN_FUNCTIONAL_ASSESSMENT: NONE - DENIES PAIN

## 2023-01-08 ASSESSMENT — PAIN DESCRIPTION - DESCRIPTORS: DESCRIPTORS: SHARP

## 2023-01-08 ASSESSMENT — PAIN DESCRIPTION - FREQUENCY: FREQUENCY: CONTINUOUS

## 2023-01-08 ASSESSMENT — PAIN DESCRIPTION - LOCATION: LOCATION: BACK

## 2023-01-08 ASSESSMENT — PAIN SCALES - GENERAL: PAINLEVEL_OUTOF10: 10

## 2023-01-08 ASSESSMENT — PAIN DESCRIPTION - PAIN TYPE: TYPE: ACUTE PAIN

## 2023-01-08 ASSESSMENT — PAIN DESCRIPTION - ORIENTATION: ORIENTATION: RIGHT;MID

## 2023-01-09 NOTE — ED NOTES
Initial contact with patient. States abscess for several day to right mid back. Swollen area noted, red and warm to touch. No drainage. Updated on plan of care.       Elda Hall RN  01/08/23 1931

## 2023-01-09 NOTE — ED PROVIDER NOTES
ED Attending Attestation Note     Date of evaluation: 1/8/2023    This patient was seen by the resident. I have seen and examined the patient, agree with the workup, evaluation, management and diagnosis. The care plan has been discussed. Mr. Jaquan Schreiber is a 39-year-old male with a past medical history of hidradenitis, arthritis, atrial fibrillation on apixaban, diabetes, GERD, hypertension, hyperlipidemia, LEONA who presents to the emergency department today with a abscess. This is been ongoing for the past couple of days. Is progressively getting worse. He has had them in the past, states they have been on his buttocks. He is eating and drinking normally. He is voiding normally and having normal bowel movements. He has had chills but no fevers. On exam, he has an area of fluctuance to his left side underneath the skin fold. Lungs are clear and equal.  No CVA tenderness bilaterally. Abdomen is soft and nontender. Radial pulses 2+. Luz Linder MD  01/08/23 2013    Addendum: I was present for the key and critical portions of the procedure.      Luz Linder MD  01/08/23 2014

## 2023-01-09 NOTE — DISCHARGE INSTRUCTIONS
You are seen in the emergency department for an abscess. We drained it today. You can expect to have some drainage for the next several days, which is normal.  Do not change the dressing for the next 24 hours. Do not soak the area. You can take Tylenol and ibuprofen for the pain. Have also prescribed you a 7-day course of antibiotics please complete them. Return to the emergency department if you develop any fevers, severe nausea vomiting, you think that the abscess is closed and is growing again.

## 2023-01-09 NOTE — ED NOTES
Discharge instructions given per provider order. 2 Prescription(s) reviewed. Patient verbalized understanding.         Gaye Romo RN  01/08/23 2116

## 2023-01-09 NOTE — ED PROVIDER NOTES
[address] [facility] [address] ENCOUNTER          EM RESIDENT NOTE       Date of evaluation: 1/8/2023    Chief Complaint     Abscess      of Present Illness     Curtis De La Cruz is a 47 y.o. male who presents with a complaint of an abscess on his right side. Patient states he has had the abscess for 2 days with increasing swelling and pain. He denies any fevers. States he has had abscesses in the past most notably on his buttocks that have required drainage and antibiotics. He does not follow with dermatology. Review of Systems     Review of Systems   Constitutional:  Negative for fever. HENT:  Negative for congestion. Eyes:  Negative for visual disturbance. Respiratory:  Negative for cough. Cardiovascular:  Negative for chest pain. Gastrointestinal:  Negative for abdominal pain, nausea and vomiting. Genitourinary:  Negative for dysuria. Musculoskeletal:  Negative for back pain. Skin:  Negative for rash and wound. Abscess on R side   Neurological:  Negative for weakness, numbness and headaches. Past Medical, Surgical, Family, and Social History     He has a past medical history of Arthritis, Atrial fibrillation ([address] Congestive heart failure ([address] Coronary artery disease, Depression, Diabetes mellitus ([address] GERD (gastroesophageal reflux disease), Hyperlipidemia, Hypertension, Morbid obesity ([address] Sleep apnea, and Type II or unspecified type diabetes mellitus without mention of complication, not stated as uncontrolled. He has a past surgical history that includes ablation of dysrhythmic focus; Colonoscopy (4/13/2021); and Finger trigger release (Left, 6/10/2021). His family history includes Diabetes in his maternal aunt and mother; High Blood Pressure in his maternal grandmother and mother; Sleep Apnea in his mother. He reports that he has never smoked. He has never been exposed to tobacco smoke. He has never used smokeless tobacco. He reports current drug use. Drugs: Marijuana (Weed) and Cocaine. He reports that he does not drink alcohol. Medications     Discharge Medication List as of 1/8/2023  8:49 PM        CONTINUE these medications which have NOT CHANGED    Details   atorvastatin (LIPITOR) 20 MG tablet TAKE 1 TABLET BY MOUTH EVERY DAY, Disp-30 tablet, R-5Normal      budesonide-formoterol (SYMBICORT) 80-4.5 MCG/ACT AERO INHALE 2 PUFFS INTO THE LUNGS TWICE A DAY, Disp-10.2 each, R-3Normal      sildenafil (VIAGRA) 50 MG tablet Take 2 tablets by mouth as needed for Erectile Dysfunction, Disp-30 tablet, R-3Print      furosemide (LASIX) 80 MG tablet TAKE 1 TABLET BY MOUTH TWICE A DAY, Disp-60 tablet, R-0Normal      metFORMIN (GLUCOPHAGE) 500 MG tablet Take one tablet twice per day with meals, Disp-180 tablet, R-0Normal      dilTIAZem (CARDIZEM CD) 120 MG extended release capsule TAKE 1 CAPSULE BY MOUTH EVERY DAY, Disp-90 capsule, R-0Normal      potassium chloride (KLOR-CON M20) 20 MEQ extended release tablet TAKE 1 TABLET BY MOUTH EVERY DAY WITH BREAKFAST, Disp-90 tablet, R-0Normal      allopurinol (ZYLOPRIM) 300 MG tablet Take one tablet daily, Disp-30 tablet, R-3Normal      valsartan (DIOVAN) 160 MG tablet Take 1 tablet by mouth daily, Disp-90 tablet, R-1Normal      insulin lispro, 1 Unit Dial, 100 UNIT/ML SOPN INJECT 7 UNITS INTO THE SKIN 3 TIMES DAILY BEFORE MEALS, Disp-5 pen, R-3Normal      apixaban (ELIQUIS) 5 MG TABS tablet TAKE 1 TABLET BY MOUTH TWICE A DAY, Disp-60 tablet, R-5Normal      Multiple Vitamins-Minerals (THERAPEUTIC-M/LUTEIN) TABS TAKE 1 TABLET BY MOUTH EVERY DAY, Disp-30 tablet, R-3Normal      Insulin Pen Needle (PEN NEEDLES) 31G X 6 MM MISC DAILY Starting Mon 5/16/2022, Disp-100 each, R-5, NormalMay substitute to meet insur. requirements      Insulin Syringe-Needle U-100 (BD INSULIN SYRINGE U/F) 31G X 5/16\" 1 ML MISC Disp-100 each, R-5, NormalUSE AS DIRECTED 4 TIMES A DAY      Handicap Yuan [facility] Starting Tue 7/27/2021, Disp-1 each, R-0, [place] placard effective from 7/27/2021 through 7/27/2026      sennosides-docusate sodium (SENOKOT-S) 8.6-50 MG tablet Take 1 tablet by mouth 2 times daily as needed for Constipation, Disp-20 tablet, R-0Print      blood glucose monitor strips by Other route 4 times daily (after meals and at bedtime), Other, 4 TIMES DAILY AFTER MEALS AND BEFORE BEDTIME Starting Wed 12/18/2019, Disp-60 strip, R-2, Normal      insulin glargine (BASAGLAR KWIKPEN) 100 UNIT/ML injection pen patient using vials per [facility] inject 20 units at hs, Disp-5 mL, R-2Normal      Alcohol Swabs PADS 3 TIMES DAILY Starting Tue 10/1/2019, Disp-100 each, R-5, Normal      ACCU-CHEK FASTCLIX LANCETS MISC 2 TIMES DAILY Starting Tue 10/1/2019, Disp-20 each, R-3, Normal      Blood Pressure KIT DAILY Starting 4/17/2017, Until Discontinued, Disp-1 kit, R-0, Print      Blood Glucose Monitoring Suppl (BLOOD GLUCOSE MONITOR SYSTEM) W/DEVICE KIT DAILY Starting 4/17/2017, Until Discontinued, Disp-1 kit, R-0, Print             Allergies     He has No Known Allergies. Physical Exam     INITIAL VITALS: BP: (!) 150/106, Temp: 98.3 °F (36.8 °C), Heart Rate: 65, Resp: 18, SpO2: 98 %   Physical Exam  Vitals reviewed. Constitutional:       General: He is not in acute distress. Appearance: Normal appearance. HENT:      Right Ear: Tympanic membrane normal.      Left Ear: Tympanic membrane normal.      Nose: No rhinorrhea. Mouth/Throat:      Mouth: Mucous membranes are moist.   Eyes:      General: No scleral icterus. Extraocular Movements: Extraocular movements intact. Pupils: Pupils are equal, round, and reactive to light. Cardiovascular:      Rate and Rhythm: Normal rate and regular rhythm. Heart sounds: Normal heart sounds. No murmur heard. Pulmonary:      Effort: Pulmonary effort is normal.      Breath sounds: Normal breath sounds. No wheezing. Abdominal:      General: Abdomen is flat. There is no distension.       Palpations: Abdomen is soft. Tenderness: There is no abdominal tenderness. Musculoskeletal:         General: No swelling or tenderness. Normal range of motion. Cervical back: Normal range of motion and neck supple. Lymphadenopathy:      Cervical: No cervical adenopathy. Skin:     General: Skin is warm. Coloration: Skin is not pale. Findings: No bruising. Comments: 4 x 2 cm fluctuant mass inside the patient's skin fold on the right side  Tenderness to palpation over the mass  No overlying or surrounding erythema, warmth  No overlying rash   Neurological:      General: No focal deficit present. Mental Status: He is alert and oriented to person, place, and time. Motor: No weakness. DiagnosticResults       RADIOLOGY:  No orders to display       LABS:   No results found for this visit on 01/08/23. ED BEDSIDE ULTRASOUND:  No results found.     RECENT VITALS:  BP: (!) 145/103, Temp: 98.3 °F (36.8 °C), Heart Rate: 92,Resp: 19, SpO2: 97 %     Procedures     Incision/Drainage    Date/Time: 1/8/2023 8:53 PM  Performed by: Dawson Rey MD  Authorized by: Gina Portillo MD     Consent:     Consent obtained:  Verbal    Consent given by:  Patient    Risks, benefits, and alternatives were discussed: yes      Risks discussed:  Bleeding, incomplete drainage, pain and infection  Universal protocol:     Patient identity confirmed:  Verbally with patient and arm band  Location:     Type:  Abscess    Size:  4x2cm    Location:  Trunk    Trunk location:  Chest (R skin fold)  Pre-procedure details:     Skin preparation:  Chlorhexidine  Sedation:     Sedation type:  None  Anesthesia:     Anesthesia method:  Local infiltration  Procedure type:     Complexity:  Simple  Procedure details:     Ultrasound guidance: yes      Incision types:  Stab incision    Incision depth:  Dermal    Drainage:  Serosanguinous and purulent    Drainage amount:  Scant    Wound treatment:  Wound left open    Packing materials:  None  Post-procedure details:     Procedure completion:  Tolerated well, no immediate complications      ED Course     Nursing Notes, Past Medical Hx, Past Surgical Hx, Social Hx, Allergies, and Family Hx were reviewed. The patient was given the followingmedications:  Orders Placed This Encounter   Medications    acetaminophen (TYLENOL) tablet 650 mg    lidocaine-EPINEPHrine 1 %-1:[id number] injection 20 mL    ibuprofen (ADVIL;MOTRIN) 600 MG tablet     Sig: Take 1 tablet by mouth 4 times daily as needed for Pain     Dispense:  40 tablet     Refill:  0    sulfamethoxazole-trimethoprim (BACTRIM DS;SEPTRA DS) 800-160 MG per tablet     Sig: Take 1 tablet by mouth 2 times daily for 7 days     Dispense:  14 tablet     Refill:  0       CONSULTS:  None    MEDICAL DECISION MAKING / ASSESSMENT / PLAN         Medical Decision Making  Patient is a 58-year-old male with a history of recurrence of abscesses presenting to the emergency department with a mass consistent with abscess in his right skin fold. Patient had no systemic symptoms. He had no surrounding symptoms concerning for cellulitis. The abscess was drained at bedside, refer to the procedure note above. Given patient has a very frequent recurrence of these abscesses, I opted to discharge the patient with antibiotics. Risk  OTC drugs. Prescription drug management. This patient was also evaluated by the attending physician. All care plans werediscussed and agreed upon. Clinical Impression     1. Abscess        Disposition     PATIENT REFERRED TO:  No follow-up provider specified.     DISCHARGE MEDICATIONS:  Discharge Medication List as of 1/8/2023  8:49 PM        START taking these medications    Details   ibuprofen (ADVIL;MOTRIN) 600 MG tablet Take 1 tablet by mouth 4 times daily as needed for Pain, Disp-40 tablet, R-0Normal      sulfamethoxazole-trimethoprim (BACTRIM DS;SEPTRA DS) 800-160 MG per tablet Take 1 tablet by mouth 2 times daily for 7 days, Disp-14 tablet, R-0Normal             DISPOSITION Decision To Discharge 01/08/2023 08:40:58 PM     Wendi Pina MD  Resident  01/08/23 2056

## 2023-01-19 ENCOUNTER — OFFICE VISIT (OUTPATIENT)
Dept: INTERNAL MEDICINE CLINIC | Age: 53
End: 2023-01-19
Payer: MEDICAID

## 2023-01-19 VITALS
OXYGEN SATURATION: 92 % | HEIGHT: 72 IN | BODY MASS INDEX: 42.66 KG/M2 | HEART RATE: 68 BPM | WEIGHT: 315 LBS | SYSTOLIC BLOOD PRESSURE: 120 MMHG | TEMPERATURE: 97.5 F | DIASTOLIC BLOOD PRESSURE: 76 MMHG

## 2023-01-19 DIAGNOSIS — L02.91 ABSCESS: Primary | ICD-10-CM

## 2023-01-19 DIAGNOSIS — I10 ESSENTIAL HYPERTENSION: ICD-10-CM

## 2023-01-19 DIAGNOSIS — E11.69 DIABETES MELLITUS TYPE 2 IN OBESE (HCC): ICD-10-CM

## 2023-01-19 DIAGNOSIS — I10 PRIMARY HYPERTENSION: ICD-10-CM

## 2023-01-19 DIAGNOSIS — E66.9 DIABETES MELLITUS TYPE 2 IN OBESE (HCC): ICD-10-CM

## 2023-01-19 LAB — HBA1C MFR BLD: 6.9 %

## 2023-01-19 PROCEDURE — 99213 OFFICE O/P EST LOW 20 MIN: CPT | Performed by: STUDENT IN AN ORGANIZED HEALTH CARE EDUCATION/TRAINING PROGRAM

## 2023-01-19 RX ORDER — GLUCOSA SU 2KCL/CHONDROITIN SU 500-400 MG
CAPSULE ORAL
Qty: 30 TABLET | Refills: 2 | Status: SHIPPED | OUTPATIENT
Start: 2023-01-19

## 2023-01-19 RX ORDER — INSULIN GLARGINE 100 [IU]/ML
INJECTION, SOLUTION SUBCUTANEOUS
Qty: 5 ML | Refills: 2 | Status: SHIPPED | OUTPATIENT
Start: 2023-01-19

## 2023-01-19 RX ORDER — SENNA AND DOCUSATE SODIUM 50; 8.6 MG/1; MG/1
1 TABLET, FILM COATED ORAL 2 TIMES DAILY PRN
Qty: 20 TABLET | Refills: 0 | Status: SHIPPED | OUTPATIENT
Start: 2023-01-19

## 2023-01-19 ASSESSMENT — ENCOUNTER SYMPTOMS
SHORTNESS OF BREATH: 0
ABDOMINAL PAIN: 0
NAUSEA: 0
DIARRHEA: 0
VOMITING: 0
COUGH: 0
RHINORRHEA: 0

## 2023-01-19 NOTE — PROGRESS NOTES
[facility]    Raphael De La Cruz is a 47 y.o. male, here for evaluation of the following concerns: ED f/u    HPI  The pt is a 46 y/o M with a PMHx of afib s/p ablation, HTN, DM, who presents to the clinic following an ED visit on 1/08/2022. He was found to have an abscess in a skin fold of his R chest wall. He was tx with a one week course of Bactrim, and discharged to home from the ED. The pt has a hx of HTN, his lisinopril was recently changed to losartan,  on 1/8/22, decreased from prior SBP in 150s. He is on Cardizem, and Valsartan, his BP is 120/76 during this visit. The pt has a hx of DM, his Hgb A1c was 6.4 % on 10/05/22, his last DM foot exam was with podiatry one month ago. He still needs to schedule a DM eye exam this year. He DM is managed with metformin, Lantus, and Lispro. He states that his diet is poor, and that he would like to lose weight. Review of Systems   Constitutional:  Negative for chills and fever. HENT:  Negative for congestion and rhinorrhea. Respiratory:  Negative for cough and shortness of breath. Cardiovascular:  Negative for chest pain. Gastrointestinal:  Negative for abdominal pain, diarrhea, nausea and vomiting. Skin:  Positive for rash. Neurological:  Negative for headaches. MEDICATIONS:  Prior to Visit Medications    Medication Sig Taking?  Authorizing Provider   Handicap Placard MISC by Does not apply route Valid from 1/19/2022 until 1/19/2024 Yes Jeral Innocent, DO   ibuprofen (ADVIL;MOTRIN) 600 MG tablet Take 1 tablet by mouth 4 times daily as needed for Pain Yes Nathalia Smith MD   atorvastatin (LIPITOR) 20 MG tablet TAKE 1 TABLET BY MOUTH EVERY DAY Yes Gilles Castañeda MD   budesonide-formoterol (SYMBICORT) 80-4.5 MCG/ACT AERO INHALE 2 PUFFS INTO THE LUNGS TWICE A DAY Yes Cliff Fox MD   sildenafil (VIAGRA) 50 MG tablet Take 2 tablets by mouth as needed for Erectile Dysfunction Yes Alphonso Sanz MD   furosemide (LASIX) 80 MG tablet TAKE 1 TABLET BY MOUTH TWICE A DAY Yes Alphonso Sanz MD   metFORMIN (GLUCOPHAGE) 500 MG tablet Take one tablet twice per day with meals Yes Alphonso Sanz MD   dilTIAZem (CARDIZEM CD) 120 MG extended release capsule TAKE 1 CAPSULE BY MOUTH EVERY DAY Yes Alphonso Sanz MD   potassium chloride (KLOR-CON M20) 20 MEQ extended release tablet TAKE 1 TABLET BY MOUTH EVERY DAY WITH BREAKFAST Yes Alphonso Sanz MD   allopurinol (ZYLOPRIM) 300 MG tablet Take one tablet daily Yes Alphonso Sanz MD   valsartan (DIOVAN) 160 MG tablet Take 1 tablet by mouth daily Yes Alphonso Sanz MD   insulin lispro, 1 Unit Dial, 100 UNIT/ML SOPN INJECT 7 UNITS INTO THE SKIN 3 TIMES DAILY BEFORE MEALS Yes Mariah Cat MD   apixaban (ELIQUIS) 5 MG TABS tablet TAKE 1 TABLET BY MOUTH TWICE A DAY Yes Dede Heaton MD   Multiple Vitamins-Minerals (THERAPEUTIC-M/LUTEIN) TABS TAKE 1 TABLET BY MOUTH EVERY DAY Yes Joan Lockwood MD   Insulin Pen Needle (PEN NEEDLES) 31G X 6 MM MISC 1 each by Does not apply route daily May substitute to meet insur. requirements Yes Semaj Aiken, DO   Insulin Syringe-Needle U-100 (BD INSULIN SYRINGE U/F) 31G X 5/16\" 1 ML MISC USE AS DIRECTED 4 TIMES A DAY Yes Joan Lockwood MD   sennosides-docusate sodium (SENOKOT-S) 8.6-50 MG tablet Take 1 tablet by mouth 2 times daily as needed for Constipation Yes Zach Marin MD   blood glucose monitor strips by Other route 4 times daily (after meals and at bedtime) Yes Rufino May MD   insulin glargine (BASAGLAR KWIKPEN) 100 UNIT/ML injection pen patient using vials per [facility]  inject 20 units at hs Yes Kristi Ramirez MD   Alcohol Swabs PADS 1 Container by Does not apply route three times daily Yes Steven Trinidad MD   ACCU-CHEK FASTCLIX LANCETS MISC 20 Sticks by Does not apply route 2 times daily Yes Steven Weiner, MD   Blood Pressure KIT 1 Units by Does not apply route daily Yes Jasmin Villar MD   Blood Glucose Monitoring Suppl (BLOOD GLUCOSE MONITOR SYSTEM) W/DEVICE KIT 1 Units by Does not apply route daily Yes Jasmin Villar MD        Vitals:    01/19/23 1337   BP: 120/76   Site: Left Upper Arm   Position: Sitting   Cuff Size: Large Adult   Pulse: 68   Temp: 97.5 °F (36.4 °C)   TempSrc: Oral   SpO2: 92%   Weight: (!) 376 lb 3.2 oz (170.6 kg)   Height: 6' (1.829 m)      Estimated body mass index is 51.02 kg/m² as calculated from the following:    Height as of this encounter: 6' (1.829 m). Weight as of this encounter: 376 lb 3.2 oz (170.6 kg). Physical Exam  Constitutional:       General: He is not in acute distress. Appearance: Normal appearance. He is obese. He is not ill-appearing or diaphoretic. HENT:      Head: Normocephalic and atraumatic. Eyes:      Extraocular Movements: Extraocular movements intact. Cardiovascular:      Rate and Rhythm: Normal rate and regular rhythm. Heart sounds: No murmur heard. No friction rub. No gallop. Pulmonary:      Breath sounds: No wheezing, rhonchi or rales. Abdominal:      General: Bowel sounds are normal. There is no distension. Palpations: Abdomen is soft. Tenderness: There is no abdominal tenderness. There is no guarding or rebound. Musculoskeletal:      Right lower leg: No edema. Left lower leg: No edema. Skin:     General: Skin is warm. Findings: Lesion present. No erythema. Comments: Lesion in R Lateral chest wall with clear drainage from wound. Neurological:      Mental Status: He is alert. Mental status is at baseline. ASSESSMENT/PLAN:     1. Abscess  -Finish course of Bactrim prescribed by ED  2. Diabetes mellitus type 2 in obese (HCC)  -     MICROALBUMIN / CREATININE URINE RATIO; Future  -Continue Metformin 500 mg BID  -Continue Lantus 20u qHS  -Continue Lispro 7u three times daily  3.  Primary hypertension  -Continue Cardizem 120 mg daily  -Continue Valsartan 160 mg daily   Return in about 3 months (around 4/19/2023) for f/u DM. The patient was staffed with the teaching attending: Dr. Lety Morelos. An electronic signature was used to authenticate this note.     --Laura Brown DO, PGY-2

## 2023-01-19 NOTE — PATIENT INSTRUCTIONS
Abscess  -Continue antibiotic Bactrim that was prescribed to you by the ED  -If you should start to have fever, chills, increase in purulent drainage, or increased pain please call the clinic or go to an emergency department    Diabetes  -Continue Metformin 500 mg BID  -Continue Insulin Glargine 20u qHS  -Continue Lispro 7u three times daily  -Go to [facility] and have labs drawn  -Please check your blood sugar daily and bring values with you at your next appointment  -Please follow the diabetic diet  -Please call and schedule an Diabetic Retinopathy Exam. You can call the [facility] at     HTN  -Continue Cardizem 120 mg daily  -Continue Valsartan 160 mg daily

## 2023-02-01 DIAGNOSIS — E87.6 POTASSIUM DEFICIENCY: ICD-10-CM

## 2023-02-01 DIAGNOSIS — I10 ESSENTIAL HYPERTENSION: Chronic | ICD-10-CM

## 2023-02-01 RX ORDER — DILTIAZEM HYDROCHLORIDE 120 MG/1
CAPSULE, COATED, EXTENDED RELEASE ORAL
Qty: 90 CAPSULE | Refills: 0 | Status: SHIPPED | OUTPATIENT
Start: 2023-02-01

## 2023-02-01 RX ORDER — POTASSIUM CHLORIDE 20 MEQ/1
TABLET, EXTENDED RELEASE ORAL
Qty: 90 TABLET | Refills: 0 | Status: SHIPPED | OUTPATIENT
Start: 2023-02-01

## 2023-02-01 NOTE — TELEPHONE ENCOUNTER
Requested Prescriptions     Pending Prescriptions Disp Refills    potassium chloride (KLOR-CON M20) 20 MEQ extended release tablet [Pharmacy Med Name: KLOR-CON M20 TABLET] 90 tablet 0     Sig: TAKE 1 TABLET BY MOUTH EVERY DAY WITH BREAKFAST    dilTIAZem (CARDIZEM CD) 120 MG extended release capsule [Pharmacy Med Name: DILTIAZEM 24H ER(CD) 120 MG CP] 90 capsule 0     Sig: TAKE 1 CAPSULE BY MOUTH EVERY DAY       Last Clinic Visit:  1/19/2023     Next Clinic Appointment:  4/20/2023

## 2023-02-16 ENCOUNTER — OFFICE VISIT (OUTPATIENT)
Dept: ORTHOPEDIC SURGERY | Age: 53
End: 2023-02-16
Payer: MEDICAID

## 2023-02-16 VITALS — WEIGHT: 315 LBS | BODY MASS INDEX: 42.66 KG/M2 | HEIGHT: 72 IN

## 2023-02-16 DIAGNOSIS — M17.12 PRIMARY OSTEOARTHRITIS OF LEFT KNEE: Primary | ICD-10-CM

## 2023-02-16 DIAGNOSIS — M25.562 ACUTE PAIN OF LEFT KNEE: ICD-10-CM

## 2023-02-16 PROCEDURE — 99203 OFFICE O/P NEW LOW 30 MIN: CPT | Performed by: ORTHOPAEDIC SURGERY

## 2023-02-16 PROCEDURE — 1036F TOBACCO NON-USER: CPT | Performed by: ORTHOPAEDIC SURGERY

## 2023-02-16 PROCEDURE — G8417 CALC BMI ABV UP PARAM F/U: HCPCS | Performed by: ORTHOPAEDIC SURGERY

## 2023-02-16 PROCEDURE — G8428 CUR MEDS NOT DOCUMENT: HCPCS | Performed by: ORTHOPAEDIC SURGERY

## 2023-02-16 PROCEDURE — 20610 DRAIN/INJ JOINT/BURSA W/O US: CPT | Performed by: ORTHOPAEDIC SURGERY

## 2023-02-16 PROCEDURE — G8484 FLU IMMUNIZE NO ADMIN: HCPCS | Performed by: ORTHOPAEDIC SURGERY

## 2023-02-16 PROCEDURE — 3017F COLORECTAL CA SCREEN DOC REV: CPT | Performed by: ORTHOPAEDIC SURGERY

## 2023-02-16 RX ORDER — METHYLPREDNISOLONE ACETATE 40 MG/ML
40 INJECTION, SUSPENSION INTRA-ARTICULAR; INTRALESIONAL; INTRAMUSCULAR; SOFT TISSUE ONCE
Status: COMPLETED | OUTPATIENT
Start: 2023-02-16 | End: 2023-02-16

## 2023-02-16 RX ADMIN — METHYLPREDNISOLONE ACETATE 40 MG: 40 INJECTION, SUSPENSION INTRA-ARTICULAR; INTRALESIONAL; INTRAMUSCULAR; SOFT TISSUE at 16:01

## 2023-02-16 NOTE — PROGRESS NOTES
12 [facility]  History and Physical      Date:  2023    Name:  Evaline Cabot Ringer  [facility] [id number]    :  1970      Age:   47 y.o. Chief Complaint    Knee Pain (NP Left knee )      History of Present Illness:  Evaline Cabot Ringer is a 47 y.o. male who presents for evaluation left knee pain. Pain has been present for several years duration. Previous diagnosis of left knee osteoarthritis with valgus deformity. He has previously been treated with corticosteroid injections which gave him some relief. Pain is worse with activity but is present at all times. He is able to walk without assistive devices. He would like to discuss corticosteroid injection versus viscosupplementation. The patient denies any new injury. The patient denies any catching, giving way, joint locking, numbness, paresthesias, or weakness.            Past Medical History:   Diagnosis Date    Arthritis     Atrial fibrillation (HCC)     Congestive heart failure (HCC)     Coronary artery disease     Depression     Diabetes mellitus (HCC)     GERD (gastroesophageal reflux disease)     Hyperlipidemia     Hypertension     Morbid obesity (HCC)     Sleep apnea     uses cpap at home q hs     Type II or unspecified type diabetes mellitus without mention of complication, not stated as uncontrolled         Past Surgical History:   Procedure Laterality Date    ABLATION OF DYSRHYTHMIC FOCUS      COLONOSCOPY  2021    COLONOSCOPY POLYPECTOMY SNARE/COLD BIOPSY performed by Gaby Talavera MD at [address] Left 6/10/2021    LEFT MIDDLE FINGER TRIGGER RELEASE performed by Marjorie Heath MD at [facility]       Family History   Problem Relation Age of Onset    Sleep Apnea Mother     Diabetes Mother     High Blood Pressure Mother     Diabetes Maternal Aunt     High Blood Pressure Maternal Grandmother        Social History     Socioeconomic History    Marital status: Single     Spouse name: None    Number of children: None    Years of education: None    Highest education level: None   Tobacco Use    Smoking status: Never     Passive exposure: Never    Smokeless tobacco: Never   Vaping Use    Vaping Use: Never used   Substance and Sexual Activity    Alcohol use: No     Alcohol/week: 0.0 standard drinks    Drug use: Yes     Types: Marijuana (Ryan Coronado), Cocaine     Comment: no cocaine for over 6 years, marijuana occ    Sexual activity: Not Currently       Current Outpatient Medications   Medication Sig Dispense Refill    potassium chloride (KLOR-CON M20) 20 MEQ extended release tablet TAKE 1 TABLET BY MOUTH EVERY DAY WITH BREAKFAST 90 tablet 0    dilTIAZem (CARDIZEM CD) 120 MG extended release capsule TAKE 1 CAPSULE BY MOUTH EVERY DAY 90 capsule 0    Handicap Placard MISC by Does not apply route Valid from 1/19/2022 until 1/19/2024 1 each 0    sennosides-docusate sodium (SENOKOT-S) 8.6-50 MG tablet Take 1 tablet by mouth 2 times daily as needed for Constipation 20 tablet 0    Multiple Vitamins-Minerals (THERAPEUTIC-M/LUTEIN) TABS TAKE 1 TABLET BY MOUTH EVERY DAY 30 tablet 2    insulin glargine (BASAGLAR KWIKPEN) 100 UNIT/ML injection pen patient using vials per [facility].  inject 20 units at hs 5 mL 2    ibuprofen (ADVIL;MOTRIN) 600 MG tablet Take 1 tablet by mouth 4 times daily as needed for Pain 40 tablet 0    atorvastatin (LIPITOR) 20 MG tablet TAKE 1 TABLET BY MOUTH EVERY DAY 30 tablet 5    budesonide-formoterol (SYMBICORT) 80-4.5 MCG/ACT AERO INHALE 2 PUFFS INTO THE LUNGS TWICE A DAY 10.2 each 3    sildenafil (VIAGRA) 50 MG tablet Take 2 tablets by mouth as needed for Erectile Dysfunction 30 tablet 3    furosemide (LASIX) 80 MG tablet TAKE 1 TABLET BY MOUTH TWICE A DAY 60 tablet 0    metFORMIN (GLUCOPHAGE) 500 MG tablet Take one tablet twice per day with meals 180 tablet 0    allopurinol (ZYLOPRIM) 300 MG tablet Take one tablet daily 30 tablet 3 valsartan (DIOVAN) 160 MG tablet Take 1 tablet by mouth daily 90 tablet 1    insulin lispro, 1 Unit Dial, 100 UNIT/ML SOPN INJECT 7 UNITS INTO THE SKIN 3 TIMES DAILY BEFORE MEALS 5 pen 3    apixaban (ELIQUIS) 5 MG TABS tablet TAKE 1 TABLET BY MOUTH TWICE A DAY 60 tablet 5    Insulin Pen Needle (PEN NEEDLES) 31G X 6 MM MISC 1 each by Does not apply route daily May substitute to meet insur. requirements 100 each 5    Insulin Syringe-Needle U-100 (BD INSULIN SYRINGE U/F) 31G X 5/16\" 1 ML MISC USE AS DIRECTED 4 TIMES A  each 5    blood glucose monitor strips by Other route 4 times daily (after meals and at bedtime) 60 strip 2    Alcohol Swabs PADS 1 Container by Does not apply route three times daily 100 each 5    ACCU-CHEK FASTCLIX LANCETS MISC 20 Sticks by Does not apply route 2 times daily 20 each 3    Blood Pressure KIT 1 Units by Does not apply route daily 1 kit 0    Blood Glucose Monitoring Suppl (BLOOD GLUCOSE MONITOR SYSTEM) W/DEVICE KIT 1 Units by Does not apply route daily 1 kit 0     No current facility-administered medications for this visit. No Known Allergies      Review of Systems:  A 14 point review of systems was completed by the patient and is available in the media section of the scanned medical record and was reviewed. Vital Signs:   Ht 6' (1.829 m)   Wt (!) 376 lb (170.6 kg)   BMI 50.99 kg/m²     General Exam:    General: Anny De La Cruz is a healthy and well appearing 47y.o. year old male who is sitting comfortably in our office in no acute distress. Neuro: Alert & Oriented x 3,  normal,  no focal deficits noted. Normal mood & affect. Eyes: Sclera clear  Ears: Normal external ear  Mouth: Patient wearing a mask  Pulm: Respirations unlabored and regular   Skin: Warm, well perfused      Knee Examination: Left    Inspection: Moderate effusion; no ecchymosis, discoloration, erythema, excessive warmth. no gross deformities, no signs of infection.      Palpation: There is moderate patellofemoral crepitus, there is lateral joint line tenderness.  No other osseous or soft tissue tenderness around the knee.  Negative calf tenderness    Range of Motion: 0-130 degrees without pain or difficulty.  Slightly limited patellar mobility.    Strength:  5/5 quadriceps, 5/5 hamstrings    Special Tests:  No ligament instability, valgus and varus stress test are stable at 0 and 30°.  Lachman's exhibits a solid endpoint.  Negative posterior drawer.  Negative Homans sign    Gait: Non antalgic, without the use of assistive devices    Alignment: Valgus    Neuro: Sensation equal bilateral lower extremities    Vascular: 2+ posterior tibialis pulse      Radiology:     X-rays obtained and reviewed in office: AP and merchant view of both knees and a lateral of the left knee.       Impression: No fractures, dislocations, visible tumors, or signs of acute trauma.  The medial and lateral femoral-tibial compartments exhibit narrowed joint space on the left in the medial tibiofemoral compartment. There's narrowed joint spacing in the patellofemoral joints worse on the left. KL grade 4. Patella is riding laterally in the trochlear groove with mild tilt.  Livan-Antelmo ratio of ~1.  No osteophytes or bone growths noted. No loose bodies or foreign bodies.        Office Procedures:  Orders Placed This Encounter   Procedures    XR KNEE LEFT (3 VIEWS)     Standing Status:   Future     Number of Occurrences:   1     Standing Expiration Date:   2/16/2024     Order Specific Question:   Reason for exam:     Answer:   left knee pain     After informed consent was provided, the patient was seated on the exam table with the left knee(s) flexed to 90 degrees. The anterolateral aspect of the knee adjacent to the joint line was prepped with Chlora-prep. The skin and subcutaneous tissues were anesthetized with ethyl chloride spray. A 20-gauge needle was inserted into the left knee(s) and 2 ml of 40 mg/ml DepoMedrol was injected. The  needle was withdrawn and the puncture site sealed with a Band-Aid. The patient tolerated the procedure well. Post injection instructions given and any problems to notify us. Assessment: Patient is a 47 y.o. male advanced left knee osteoarthritis with valgus deformity    Encounter Diagnoses   Name Primary? Acute pain of left knee Yes    Primary osteoarthritis of left knee        No orders of the defined types were placed in this encounter. Medical decision making:  Patient's workup and evaluation were reviewed with the patient in the office today. Imaging was reviewed with the patient. We discussed the patient's diagnoses of advanced left knee osteoarthritis with valgus deformity. Corticosteroid injection was provided into the left knee today. We will also submit for viscosupplementation injection. He will follow-up with [facility] once pre-CERT is obtained. We will refer him to Dr. Reddy Zaldivar for discussion regarding total knee arthroplasty. He was also advised to follow-up in [facility] for weight loss therapy. All the patient's questions were answered while in the clinic. The patient is understanding of all instructions and agrees with the plan. Treatment Plan:    Left knee corticosteroid injection provided in office today. Referral to Dr. Reddy Zaldivar for consideration of total joint arthroplasty. Viscosupplementation precertification submitted today. He will follow-up with [facility] once approval is obtained. Activity modification/Rest   Ice 20 minutes ever 1-2 hours PRN  Take medications as prescribed/instructed  Elevation   Lightweight exercise/low impact exercise  Appropriate diet/weight management      Follow up: After pre-CERT obtained for viscosupplementation      Kana Bowen D.O.  Orthopedic Surgeon, [facility] Fellow    02/16/23 3:47 PM    This patient exhibits mild complexity for obtaining an independent history, reviewing past medical records, independent interpretation of diagnostic imaging, and further coordinating care. The patient exhibits mild risk for decision making. This dictation was performed with a verbal recognition program (DRAGON) and it was checked for errors. It is possible that there are still dictated errors within this office note. If so, please bring any errors to my attention for an addendum. All efforts were made to ensure that this office note is accurate. This dictation was performed with a verbal recognition program (DRAGON) and it was checked for errors. It is possible that there are still dictated errors within this office note. If so, please bring any errors to my attention for an addendum. All efforts were made to ensure that this office note is accurate. Supervising Physician Attestation:  I, Dr. Robina Campos, personally performed the services described in this documentation as above, and it is both accurate and complete and I agree with all pertinent clinical information. I personally interviewed the patient and performed a physical examination and medical decision making. I discussed the patient's condition and treatment options and have  reviewed and agree with the past medical, family and social history unless otherwise noted. All of the patient's questions were answered. I personally supervised the Orthopedic and Sportsmedicine Fellow when when noted in the evaluation and treatment plan of the patient.       Board Certified Orthopaedic Surgeon  [facility] and [address]  [facility] and [address] and [employer]  Professor of Theresa Meng

## 2023-02-17 ENCOUNTER — TELEPHONE (OUTPATIENT)
Dept: ORTHOPEDIC SURGERY | Age: 53
End: 2023-02-17

## 2023-02-17 DIAGNOSIS — M25.562 ACUTE PAIN OF LEFT KNEE: Primary | ICD-10-CM

## 2023-02-17 RX ORDER — HYDROCODONE BITARTRATE AND ACETAMINOPHEN 5; 325 MG/1; MG/1
1 TABLET ORAL EVERY 4 HOURS PRN
Qty: 18 TABLET | Refills: 0 | Status: SHIPPED | OUTPATIENT
Start: 2023-02-17 | End: 2023-02-18

## 2023-02-17 NOTE — TELEPHONE ENCOUNTER
Other PATIENT RECEIVED A CORTISONE INJECTION YESTERDAY AND SAYS LATER ON HE STARTED TO EXPERIENCE PAIN AND UNABLE TO BEAR WEIGHT ON LEFT LEG.  PATIENT WAS TRANSFERRED TO TRIAGE TEAM

## 2023-02-17 NOTE — TELEPHONE ENCOUNTER
I spoke with the patient about his symptoms. Medications have been sent to his pharmacy. He was educated on the risks and benefits of these medications. See chart for further details.       Darrel Pichardo PA-C    Physician Assistant - Certified  [address] [place] [address]    02/17/23 4:05 PM

## 2023-02-17 NOTE — TELEPHONE ENCOUNTER
PER PATIENT, WOKE UP THIS MORNING CANNOT BEAR WEIGHT, HAS SWELLING AND EXTREME PAIN. HAS NEVER HAD THIS REACTION BEFORE FROM A CORTISONE INJECTION. WILL REACH OUT TO DR. Siva Nj.     CONTACT: [phone]

## 2023-02-17 NOTE — TELEPHONE ENCOUNTER
Patient calling again in regards to cortisone injection that he has yesterday, patient also needs work excuse due to the pain     [phone]

## 2023-02-17 NOTE — PROGRESS NOTES
The patient is exhibiting increased knee pain after his left knee injection. The pain is only with weightbearing. He denies any erythema, ecchymosis or other signs or symptoms of infection. He was educated on conservative treatment for his condition. All of his questions were answered and he agrees with the plan.       Tara Figueroa PA-C    Physician Assistant - Certified  [address] [place] [address]    02/17/23 3:52 PM

## 2023-02-18 DIAGNOSIS — M25.569 ACUTE KNEE PAIN, UNSPECIFIED LATERALITY: Primary | ICD-10-CM

## 2023-02-18 RX ORDER — HYDROCODONE BITARTRATE AND ACETAMINOPHEN 10; 325 MG/1; MG/1
0.5 TABLET ORAL EVERY 8 HOURS PRN
Qty: 5 TABLET | Refills: 0 | Status: SHIPPED | OUTPATIENT
Start: 2023-02-18 | End: 2023-02-21

## 2023-03-14 ENCOUNTER — OFFICE VISIT (OUTPATIENT)
Dept: ORTHOPEDIC SURGERY | Age: 53
End: 2023-03-14

## 2023-03-14 VITALS — WEIGHT: 315 LBS | HEIGHT: 72 IN | BODY MASS INDEX: 42.66 KG/M2

## 2023-03-14 DIAGNOSIS — M25.562 CHRONIC PAIN OF LEFT KNEE: ICD-10-CM

## 2023-03-14 DIAGNOSIS — G89.29 CHRONIC PAIN OF LEFT KNEE: ICD-10-CM

## 2023-03-14 DIAGNOSIS — M17.12 PRIMARY OSTEOARTHRITIS OF LEFT KNEE: Primary | ICD-10-CM

## 2023-03-14 RX ORDER — HYALURONATE SODIUM 10 MG/ML
20 SYRINGE (ML) INTRAARTICULAR ONCE
Status: COMPLETED | OUTPATIENT
Start: 2023-03-14 | End: 2023-03-14

## 2023-03-14 RX ORDER — LIDOCAINE HYDROCHLORIDE 10 MG/ML
3 INJECTION, SOLUTION EPIDURAL; INFILTRATION; INTRACAUDAL; PERINEURAL ONCE
Status: COMPLETED | OUTPATIENT
Start: 2023-03-14 | End: 2023-03-14

## 2023-03-14 RX ADMIN — Medication 20 MG: at 16:32

## 2023-03-14 RX ADMIN — LIDOCAINE HYDROCHLORIDE 3 ML: 10 INJECTION, SOLUTION EPIDURAL; INFILTRATION; INTRACAUDAL; PERINEURAL at 16:31

## 2023-03-14 NOTE — PROGRESS NOTES
12 [facility]  History and Physical      Date:  3/14/2023    Name:  Bernadette De La Cruz  Address:  [address] [place] [id number]    :  1970      Age:   47 y.o. Chief Complaint    Knee Pain (Left knee #1 Euflexxa inj)      History of Present Illness:  Bernadette De La Cruz is a 47 y.o. male who presents for dose 1 of Euflexxa. He states that his symptoms have not changed since he was last assessed in clinic. He did express desire to proceed with injection today. Past Medical History:   Diagnosis Date    Arthritis     Atrial fibrillation (HCC)     Congestive heart failure (HCC)     Coronary artery disease     Depression     Diabetes mellitus (HCC)     GERD (gastroesophageal reflux disease)     Hyperlipidemia     Hypertension     Morbid obesity (HCC)     Sleep apnea     uses cpap at home q hs     Type II or unspecified type diabetes mellitus without mention of complication, not stated as uncontrolled         Past Surgical History:   Procedure Laterality Date    ABLATION OF DYSRHYTHMIC FOCUS      COLONOSCOPY  2021    COLONOSCOPY POLYPECTOMY SNARE/COLD BIOPSY performed by Henry Glover MD at [address] Left 6/10/2021    LEFT MIDDLE FINGER TRIGGER RELEASE performed by Olive Cesar MD at [facility]       Family History   Problem Relation Age of Onset    Sleep Apnea Mother     Diabetes Mother     High Blood Pressure Mother     Diabetes Maternal Aunt     High Blood Pressure Maternal Grandmother        Social History     Socioeconomic History    Marital status: Single     Spouse name: None    Number of children: None    Years of education: None    Highest education level: None   Tobacco Use    Smoking status: Never     Passive exposure: Never    Smokeless tobacco: Never   Vaping Use    Vaping Use: Never used   Substance and Sexual Activity    Alcohol use: No     Alcohol/week: 0.0 standard drinks    Drug use:  Yes Types: Marijuana (Marcianne Tamica), Cocaine     Comment: no cocaine for over 6 years, marijuana occ    Sexual activity: Not Currently       Current Outpatient Medications   Medication Sig Dispense Refill    potassium chloride (KLOR-CON M20) 20 MEQ extended release tablet TAKE 1 TABLET BY MOUTH EVERY DAY WITH BREAKFAST 90 tablet 0    dilTIAZem (CARDIZEM CD) 120 MG extended release capsule TAKE 1 CAPSULE BY MOUTH EVERY DAY 90 capsule 0    Handicap Placard MISC by Does not apply route Valid from 1/19/2022 until 1/19/2024 1 each 0    sennosides-docusate sodium (SENOKOT-S) 8.6-50 MG tablet Take 1 tablet by mouth 2 times daily as needed for Constipation 20 tablet 0    Multiple Vitamins-Minerals (THERAPEUTIC-M/LUTEIN) TABS TAKE 1 TABLET BY MOUTH EVERY DAY 30 tablet 2    insulin glargine (BASAGLAR KWIKPEN) 100 UNIT/ML injection pen patient using vials per [facility].  inject 20 units at hs 5 mL 2    ibuprofen (ADVIL;MOTRIN) 600 MG tablet Take 1 tablet by mouth 4 times daily as needed for Pain 40 tablet 0    atorvastatin (LIPITOR) 20 MG tablet TAKE 1 TABLET BY MOUTH EVERY DAY 30 tablet 5    budesonide-formoterol (SYMBICORT) 80-4.5 MCG/ACT AERO INHALE 2 PUFFS INTO THE LUNGS TWICE A DAY 10.2 each 3    sildenafil (VIAGRA) 50 MG tablet Take 2 tablets by mouth as needed for Erectile Dysfunction 30 tablet 3    furosemide (LASIX) 80 MG tablet TAKE 1 TABLET BY MOUTH TWICE A DAY 60 tablet 0    metFORMIN (GLUCOPHAGE) 500 MG tablet Take one tablet twice per day with meals 180 tablet 0    allopurinol (ZYLOPRIM) 300 MG tablet Take one tablet daily 30 tablet 3    valsartan (DIOVAN) 160 MG tablet Take 1 tablet by mouth daily 90 tablet 1    insulin lispro, 1 Unit Dial, 100 UNIT/ML SOPN INJECT 7 UNITS INTO THE SKIN 3 TIMES DAILY BEFORE MEALS 5 pen 3    apixaban (ELIQUIS) 5 MG TABS tablet TAKE 1 TABLET BY MOUTH TWICE A DAY 60 tablet 5    Insulin Pen Needle (PEN NEEDLES) 31G X 6 MM MISC 1 each by Does not apply route daily May substitute to meet insur. requirements 100 each 5    Insulin Syringe-Needle U-100 (BD INSULIN SYRINGE U/F) 31G X 5/16\" 1 ML MISC USE AS DIRECTED 4 TIMES A  each 5    blood glucose monitor strips by Other route 4 times daily (after meals and at bedtime) 60 strip 2    Alcohol Swabs PADS 1 Container by Does not apply route three times daily 100 each 5    ACCU-CHEK FASTCLIX LANCETS MISC 20 Sticks by Does not apply route 2 times daily 20 each 3    Blood Pressure KIT 1 Units by Does not apply route daily 1 kit 0    Blood Glucose Monitoring Suppl (BLOOD GLUCOSE MONITOR SYSTEM) W/DEVICE KIT 1 Units by Does not apply route daily 1 kit 0     No current facility-administered medications for this visit. No Known Allergies      Review of Systems:  A 14 point review of systems was completed by the patient and is available in the media section of the scanned medical record and was reviewed. Vital Signs:   Ht 6' (1.829 m)   Wt (!) 376 lb (170.6 kg)   BMI 50.99 kg/m²     General Exam:    General: Jeff De La Cruz is a healthy and well appearing 47y.o. year old male who is sitting comfortably in our office in no acute distress. Neuro: Alert & Oriented x 3,  normal,  no focal deficits noted. Normal mood & affect. Eyes: Sclera clear  Ears: Normal external ear  Mouth: Patient wearing a mask  Pulm: Respirations unlabored and regular   Skin: Warm, well perfused      Knee Examination: Left    Inspection: Moderate effusion; no ecchymosis, discoloration, erythema, excessive warmth. no gross deformities, no signs of infection. Palpation: There is moderate patellofemoral crepitus, there is lateral joint line tenderness. No other osseous or soft tissue tenderness around the knee. Negative calf tenderness    Range of Motion: 0-130 degrees without pain or difficulty. Slightly limited patellar mobility.     Strength:  5/5 quadriceps, 5/5 hamstrings    Special Tests:  No ligament instability, valgus and varus stress test are stable at 0 and 30°. Lachman's exhibits a solid endpoint. Negative posterior drawer. Negative Homans sign    Gait: Non antalgic, without the use of assistive devices    Alignment: Valgus    Neuro: Sensation equal bilateral lower extremities    Vascular: 2+ posterior tibialis pulse      Radiology:     X-rays obtained and reviewed in office: AP and merchant view of both knees and a lateral of the left knee. Impression: No fractures, dislocations, visible tumors, or signs of acute trauma. The medial and lateral femoral-tibial compartments exhibit narrowed joint space on the left in the medial tibiofemoral compartment. There's narrowed joint spacing in the patellofemoral joints worse on the left. KL grade 4. Patella is riding laterally in the trochlear groove with mild tilt. Livan-Antelmo ratio of ~1. No osteophytes or bone growths noted. No loose bodies or foreign bodies. Office Procedures:  Orders Placed This Encounter   Procedures    IA ARTHROCENTESIS ASPIR&/INJ MAJOR JT/BURSA W/O US     Left Knee Injection Procedure: The indications and risks of steroid injection as well as treatment alternatives were discussed with the patient who consented to the procedure. Under sterile conditions and after informed consent was obtained the patient was given an injection into the left knee. 1 mL of 0.5% ropivacaine (Naropin) was placed in the skin subcutaneous tissues and capsule after it was prepped with chlorhexidine, the Euflexxa was then injected into the joint. This resulted in good relief of symptoms. There were no complications. The patient was advised to ice the knee this evening and to avoid vigorous activities for the next 2 days. They were advised to call us if there was any erythema, enduration, swelling or increasing pain. Assessment: Patient is a 47 y.o. male advanced left knee osteoarthritis with valgus deformity    Encounter Diagnoses   Name Primary?     Primary osteoarthritis of left knee Yes Chronic pain of left knee        Orders Placed This Encounter   Medications    sodium hyaluronate (EUFLEXXA, HYALGAN) injection 20 mg     Order Specific Question:   Which brand are you ordering? Answer:   Euflexxa    lidocaine PF 1 % injection 3 mL       Medical decision making:  Patient's workup and evaluation were reviewed with the patient in the office today. Imaging was reviewed with the patient. We discussed the patient's diagnoses of advanced left knee osteoarthritis with valgus deformity. All the patient's questions were answered while in the clinic. The patient is understanding of all instructions and agrees with the plan. Treatment Plan:    Left knee Euflexxa injection provided in office today. Referral to Dr. Mili Pena for consideration of total joint arthroplasty. Follow-up next week for dose #2 of Euflexxa  Activity modification/Rest   Ice 20 minutes ever 1-2 hours PRN  Take medications as prescribed/instructed  Elevation   Lightweight exercise/low impact exercise  Appropriate diet/weight management      Follow up: Next week for Euflexxa dose #2    Kelsie Ferrera MD [facility]    Orthopaedic Surgeon, Clinical Fellow  [address] and [facility]   On behalf of    This dictation was performed with a verbal recognition program [facility] ) and it was checked for errors. It is possible that there are still dictated errors within this office note. If so, please bring any errors to my attention for an addendum. All efforts were made to ensure that this office note is accurate. Supervising Physician Attestation:  I, Dr. Gladys Rossi, personally performed the services described in this documentation as above, and it is both accurate and complete and I agree with all pertinent clinical information. I personally interviewed the patient and performed a physical examination and medical decision making.  I discussed the patient's condition and treatment options and have  reviewed and agree with the past medical, family and social history unless otherwise noted. All of the patient's questions were answered. I personally supervised the Orthopedic and Sportsmedicine Fellow when when noted in the evaluation and treatment plan of the patient.       Board Certified Orthopaedic Surgeon  [address] [facility] and [address] [place] [address] and [address] and [employer]  Professor of [address]

## 2023-03-21 ENCOUNTER — OFFICE VISIT (OUTPATIENT)
Dept: ORTHOPEDIC SURGERY | Age: 53
End: 2023-03-21
Payer: MEDICAID

## 2023-03-21 DIAGNOSIS — G89.29 CHRONIC PAIN OF LEFT KNEE: ICD-10-CM

## 2023-03-21 DIAGNOSIS — M25.562 CHRONIC PAIN OF LEFT KNEE: ICD-10-CM

## 2023-03-21 DIAGNOSIS — M17.12 PRIMARY OSTEOARTHRITIS OF LEFT KNEE: Primary | ICD-10-CM

## 2023-03-21 PROCEDURE — 99999 PR OFFICE/OUTPT VISIT,PROCEDURE ONLY: CPT | Performed by: PHYSICIAN ASSISTANT

## 2023-03-21 PROCEDURE — 20610 DRAIN/INJ JOINT/BURSA W/O US: CPT | Performed by: PHYSICIAN ASSISTANT

## 2023-03-21 RX ORDER — IBUPROFEN 600 MG/1
600 TABLET ORAL EVERY 8 HOURS PRN
Qty: 42 TABLET | Refills: 0 | Status: SHIPPED | OUTPATIENT
Start: 2023-03-21 | End: 2023-04-04

## 2023-03-21 RX ORDER — HYALURONATE SODIUM 10 MG/ML
20 SYRINGE (ML) INTRAARTICULAR ONCE
Status: COMPLETED | OUTPATIENT
Start: 2023-03-21 | End: 2023-03-21

## 2023-03-21 RX ORDER — LIDOCAINE HYDROCHLORIDE 10 MG/ML
3 INJECTION, SOLUTION EPIDURAL; INFILTRATION; INTRACAUDAL; PERINEURAL ONCE
Status: COMPLETED | OUTPATIENT
Start: 2023-03-21 | End: 2023-03-21

## 2023-03-21 RX ADMIN — Medication 20 MG: at 14:23

## 2023-03-21 RX ADMIN — LIDOCAINE HYDROCHLORIDE 3 ML: 10 INJECTION, SOLUTION EPIDURAL; INFILTRATION; INTRACAUDAL; PERINEURAL at 14:23

## 2023-03-21 NOTE — PROGRESS NOTES
office note. If so, please bring any errors to my attention for an addendum. All efforts were made to ensure that this office note is accurate.

## 2023-03-27 ENCOUNTER — OFFICE VISIT (OUTPATIENT)
Dept: INTERNAL MEDICINE CLINIC | Age: 53
End: 2023-03-27
Payer: MEDICAID

## 2023-03-27 VITALS
DIASTOLIC BLOOD PRESSURE: 85 MMHG | HEIGHT: 72 IN | SYSTOLIC BLOOD PRESSURE: 123 MMHG | BODY MASS INDEX: 42.66 KG/M2 | WEIGHT: 315 LBS | OXYGEN SATURATION: 97 % | HEART RATE: 66 BPM | RESPIRATION RATE: 20 BRPM | TEMPERATURE: 97.2 F

## 2023-03-27 DIAGNOSIS — I10 ESSENTIAL HYPERTENSION: ICD-10-CM

## 2023-03-27 DIAGNOSIS — E11.21 TYPE 2 DIABETES MELLITUS WITH DIABETIC NEPHROPATHY, WITH LONG-TERM CURRENT USE OF INSULIN (HCC): ICD-10-CM

## 2023-03-27 DIAGNOSIS — I10 ESSENTIAL HYPERTENSION: Primary | ICD-10-CM

## 2023-03-27 DIAGNOSIS — I48.0 PAROXYSMAL ATRIAL FIBRILLATION (HCC): ICD-10-CM

## 2023-03-27 DIAGNOSIS — Z12.5 PROSTATE CANCER SCREENING: ICD-10-CM

## 2023-03-27 DIAGNOSIS — Z23 NEEDS FLU SHOT: ICD-10-CM

## 2023-03-27 DIAGNOSIS — E66.01 CLASS 3 SEVERE OBESITY WITH BODY MASS INDEX (BMI) OF 50.0 TO 59.9 IN ADULT, UNSPECIFIED OBESITY TYPE, UNSPECIFIED WHETHER SERIOUS COMORBIDITY PRESENT (HCC): ICD-10-CM

## 2023-03-27 DIAGNOSIS — E66.9 DIABETES MELLITUS TYPE 2 IN OBESE (HCC): ICD-10-CM

## 2023-03-27 DIAGNOSIS — I48.19 PERSISTENT ATRIAL FIBRILLATION (HCC): ICD-10-CM

## 2023-03-27 DIAGNOSIS — L05.01 PILONIDAL ABSCESS: ICD-10-CM

## 2023-03-27 DIAGNOSIS — Z00.00 HEALTHCARE MAINTENANCE: ICD-10-CM

## 2023-03-27 DIAGNOSIS — I50.32 CHRONIC DIASTOLIC HEART FAILURE (HCC): ICD-10-CM

## 2023-03-27 DIAGNOSIS — E11.69 DIABETES MELLITUS TYPE 2 IN OBESE (HCC): ICD-10-CM

## 2023-03-27 DIAGNOSIS — E78.2 HYPERLIPIDEMIA, MIXED: ICD-10-CM

## 2023-03-27 DIAGNOSIS — L02.31 ABSCESS OF GLUTEAL REGION: ICD-10-CM

## 2023-03-27 DIAGNOSIS — E66.01 CLASS 3 SEVERE OBESITY DUE TO EXCESS CALORIES WITH SERIOUS COMORBIDITY AND BODY MASS INDEX (BMI) OF 50.0 TO 59.9 IN ADULT (HCC): ICD-10-CM

## 2023-03-27 DIAGNOSIS — L05.91 PILONIDAL CYST: ICD-10-CM

## 2023-03-27 DIAGNOSIS — G47.33 OBSTRUCTIVE SLEEP APNEA: ICD-10-CM

## 2023-03-27 DIAGNOSIS — N52.9 ERECTILE DYSFUNCTION, UNSPECIFIED ERECTILE DYSFUNCTION TYPE: ICD-10-CM

## 2023-03-27 DIAGNOSIS — Z79.4 TYPE 2 DIABETES MELLITUS WITH DIABETIC NEPHROPATHY, WITH LONG-TERM CURRENT USE OF INSULIN (HCC): ICD-10-CM

## 2023-03-27 PROBLEM — R07.9 CHEST PAIN: Status: RESOLVED | Noted: 2022-06-22 | Resolved: 2023-03-27

## 2023-03-27 PROBLEM — M65.332 TRIGGER MIDDLE FINGER OF LEFT HAND: Status: RESOLVED | Noted: 2021-06-09 | Resolved: 2023-03-27

## 2023-03-27 PROBLEM — I11.0 HYPERTENSIVE HEART DISEASE WITH CHRONIC DIASTOLIC CONGESTIVE HEART FAILURE (HCC): Status: RESOLVED | Noted: 2018-08-30 | Resolved: 2023-03-27

## 2023-03-27 PROBLEM — L73.2 HIDRADENITIS SUPPURATIVA: Status: RESOLVED | Noted: 2022-10-05 | Resolved: 2023-03-27

## 2023-03-27 PROBLEM — B35.1 ONYCHOMYCOSIS OF MULTIPLE TOENAILS WITH TYPE 2 DIABETES MELLITUS (HCC): Status: RESOLVED | Noted: 2018-08-30 | Resolved: 2023-03-27

## 2023-03-27 PROBLEM — K21.9 GASTROESOPHAGEAL REFLUX DISEASE WITHOUT ESOPHAGITIS: Status: RESOLVED | Noted: 2018-08-30 | Resolved: 2023-03-27

## 2023-03-27 PROBLEM — M17.0 PRIMARY OSTEOARTHRITIS OF BOTH KNEES: Status: RESOLVED | Noted: 2022-07-29 | Resolved: 2023-03-27

## 2023-03-27 PROBLEM — E87.6 POTASSIUM DEFICIENCY: Status: RESOLVED | Noted: 2022-10-05 | Resolved: 2023-03-27

## 2023-03-27 PROBLEM — M19.90 ARTHRITIS: Status: RESOLVED | Noted: 2019-08-02 | Resolved: 2023-03-27

## 2023-03-27 PROBLEM — G89.4 CHRONIC PAIN SYNDROME: Status: RESOLVED | Noted: 2022-07-29 | Resolved: 2023-03-27

## 2023-03-27 PROBLEM — M17.9 OSTEOARTHRITIS OF KNEE: Status: RESOLVED | Noted: 2019-11-27 | Resolved: 2023-03-27

## 2023-03-27 PROBLEM — M1A.09X0 CHRONIC GOUT OF MULTIPLE SITES: Status: RESOLVED | Noted: 2018-08-30 | Resolved: 2023-03-27

## 2023-03-27 PROBLEM — Z86.79 S/P ABLATION OF ATRIAL FIBRILLATION: Status: RESOLVED | Noted: 2018-11-13 | Resolved: 2023-03-27

## 2023-03-27 PROBLEM — I25.10 CORONARY ARTERY DISEASE: Status: RESOLVED | Noted: 2022-08-24 | Resolved: 2023-03-27

## 2023-03-27 PROBLEM — M54.30 SCIATICA: Status: RESOLVED | Noted: 2019-11-27 | Resolved: 2023-03-27

## 2023-03-27 PROBLEM — F32.0 CURRENT MILD EPISODE OF MAJOR DEPRESSIVE DISORDER WITHOUT PRIOR EPISODE (HCC): Status: RESOLVED | Noted: 2018-08-30 | Resolved: 2023-03-27

## 2023-03-27 PROBLEM — M15.9 GENERALIZED OSTEOARTHRITIS OF MULTIPLE SITES: Status: RESOLVED | Noted: 2018-08-30 | Resolved: 2023-03-27

## 2023-03-27 PROBLEM — Z98.890 S/P ABLATION OF ATRIAL FIBRILLATION: Status: RESOLVED | Noted: 2018-11-13 | Resolved: 2023-03-27

## 2023-03-27 PROCEDURE — 90674 CCIIV4 VAC NO PRSV 0.5 ML IM: CPT | Performed by: STUDENT IN AN ORGANIZED HEALTH CARE EDUCATION/TRAINING PROGRAM

## 2023-03-27 PROCEDURE — 6360000002 HC RX W HCPCS: Performed by: STUDENT IN AN ORGANIZED HEALTH CARE EDUCATION/TRAINING PROGRAM

## 2023-03-27 PROCEDURE — 99213 OFFICE O/P EST LOW 20 MIN: CPT | Performed by: STUDENT IN AN ORGANIZED HEALTH CARE EDUCATION/TRAINING PROGRAM

## 2023-03-27 PROCEDURE — G0008 ADMIN INFLUENZA VIRUS VAC: HCPCS | Performed by: STUDENT IN AN ORGANIZED HEALTH CARE EDUCATION/TRAINING PROGRAM

## 2023-03-27 PROCEDURE — 96372 THER/PROPH/DIAG INJ SC/IM: CPT

## 2023-03-27 RX ORDER — TADALAFIL 10 MG/1
10 TABLET ORAL PRN
Qty: 30 TABLET | Refills: 3 | Status: SHIPPED | OUTPATIENT
Start: 2023-03-27

## 2023-03-27 RX ORDER — CEFTRIAXONE 500 MG/1
500 INJECTION, POWDER, FOR SOLUTION INTRAMUSCULAR; INTRAVENOUS ONCE
Status: COMPLETED | OUTPATIENT
Start: 2023-03-27 | End: 2023-03-27

## 2023-03-27 RX ORDER — BLOOD PRESSURE TEST KIT
KIT MISCELLANEOUS
Qty: 1 KIT | Refills: 0 | Status: SHIPPED | OUTPATIENT
Start: 2023-03-27 | End: 2023-03-27

## 2023-03-27 RX ORDER — SULFAMETHOXAZOLE AND TRIMETHOPRIM 800; 160 MG/1; MG/1
1 TABLET ORAL 2 TIMES DAILY
Qty: 14 TABLET | Refills: 0 | Status: SHIPPED | OUTPATIENT
Start: 2023-03-27 | End: 2023-03-27

## 2023-03-27 RX ORDER — BLOOD PRESSURE TEST KIT
KIT MISCELLANEOUS
Qty: 1 KIT | Refills: 0 | Status: SHIPPED | OUTPATIENT
Start: 2023-03-27

## 2023-03-27 RX ORDER — TADALAFIL 10 MG/1
10 TABLET ORAL PRN
Qty: 30 TABLET | Refills: 3 | Status: SHIPPED | OUTPATIENT
Start: 2023-03-27 | End: 2023-03-27 | Stop reason: SDUPTHER

## 2023-03-27 RX ORDER — SULFAMETHOXAZOLE AND TRIMETHOPRIM 800; 160 MG/1; MG/1
1 TABLET ORAL 2 TIMES DAILY
Qty: 14 TABLET | Refills: 0 | Status: SHIPPED | OUTPATIENT
Start: 2023-03-27 | End: 2023-04-03

## 2023-03-27 RX ADMIN — CEFTRIAXONE SODIUM 500 MG: 500 INJECTION, POWDER, FOR SOLUTION INTRAMUSCULAR; INTRAVENOUS at 15:41

## 2023-03-27 RX ADMIN — INFLUENZA A VIRUS A/DELAWARE/55/2019 CVR-45 (H1N1) ANTIGEN (MDCK CELL DERIVED, PROPIOLACTONE INACTIVATED), INFLUENZA A VIRUS A/DARWIN/11/2021 (H3N2) ANTIGEN (MDCK CELL DERIVED, PROPIOLACTONE INACTIVATED), INFLUENZA B VIRUS B/SINGAPORE/WUH4618/2021 ANTIGEN (MDCK CELL DERIVED, PROPIOLACTONE INACTIVATED), INFLUENZA B VIRUS B/SINGAPORE/INFTT-16-0610/2016 ANTIGEN (MDCK CELL DERIVED, PROPIOLACTONE INACTIVATED) 0.5 ML: 15; 15; 15; 15 INJECTION, SUSPENSION INTRAMUSCULAR at 15:55

## 2023-03-27 NOTE — ASSESSMENT & PLAN NOTE
Patient endorses that this problem is currently stable. He wanted to change of his medication from Viagra to Cialis.   -Cialis 10 mg as needed ordered

## 2023-03-27 NOTE — ASSESSMENT & PLAN NOTE
This problem is currently stable. Patient is s/p ablation  -Patient is on Eliquis. Patient denies any episodes of bleeding.    -His rate is well controlled with diltiazem.   -Continue current management and follow-up with cardiology

## 2023-03-27 NOTE — Clinical Note
March 27, 2023       Alison De La Cruz YOB: 1970   [address] Date of Visit:  3/27/2023       To Whom It May Concern: It is my medical opinion that Kemal De La Cruz {Work release (duty restriction):69223}. If you have any questions or concerns, please don't hesitate to call.     Sincerely,        Berenice Cruz MD

## 2023-03-27 NOTE — PROGRESS NOTES
[facility]    Fiona De La Cruz is a 47 y.o. male, here for evaluation of the following concerns:    1. T2DM  2. HTN  3. Pilonidal abscess  4. HLD    HPI    Patient is a 62-year-old male with a past medical history of HTN, T2DM, atrial fibrillation (on Eliquis), erectile dysfunction who presented to the clinic for follow-up appointment. Patient endorses that since the weekend he has been having diarrhea (3 episodes of watery semisolid stool on Saturday and 2 episodes on Sunday), chills, abdominal pain relieved by defecation, lightheadedness and he noticed that before all the symptoms occurred he had pain in his rectal area with drainage of pus mixed with blood on his bed. He endorses that he thinks he may have an abscess in his gluteal area which is causing all the symptoms. Patient endorses that the symptoms did not improve and therefore he wanted to come in for further evaluation and may be a course of antibiotics. In the past, he has had multiple abscesses that have been managed with Bactrim. Review of Systems  Patient denies any fever, nausea, chest pain constipation, dysuria, flank pain, urinary urgency or hesitancy. MEDICATIONS:  Prior to Visit Medications    Medication Sig Taking?  Authorizing Provider   Handicap Placard MISC by Does not apply route Okay effective from 3/27/2023 through 3/27/2028 Yes Naresh Cox MD   tadalafil (CIALIS) 10 MG tablet Take 1 tablet by mouth as needed for Erectile Dysfunction Yes Naresh Cox MD   sulfamethoxazole-trimethoprim (BACTRIM DS) 800-160 MG per tablet Take 1 tablet by mouth 2 times daily for 7 days Yes Naresh Cox MD   Blood Pressure KIT Please take your blood pressure on a daily basis Yes Naresh Cox MD   ibuprofen (ADVIL;MOTRIN) 600 MG tablet Take 1 tablet by mouth every 8 hours as needed for Pain Yes Adelso Matute PA-C   potassium chloride (KLOR-CON M20) 20 MEQ extended release tablet TAKE 1 TABLET BY MOUTH EVERY DAY

## 2023-03-27 NOTE — ASSESSMENT & PLAN NOTE
This problem is currently stable.   Patient denies any episodes of hypoglycemia.  -Continue metformin

## 2023-03-27 NOTE — LETTER
March 27, 2023       Jordyn De La Cruz YOB: 1970   [address] Date of Visit:  3/27/2023       To Whom It May Concern: It is my medical opinion that Billie De La Cruz may return to work on 3/3/23 due to his current medical condition. He will be able to return to full duty after 3/30/2023. If you have any questions or concerns, please don't hesitate to call.     Sincerely,        Irais Humphrey MD

## 2023-03-27 NOTE — LETTER
March 27, 2023      Ekaterina Ferguson, [address] [id number]      Patient: Charles Lozano   MR Number: [id number]   YOB: 1970   Date of Visit: 3/27/2023       Dear Ekaterina Ferguson: Thank you for referring Clemencia De La Cruz to me for evaluation/treatment. Below are the relevant portions of my assessment and plan of care. If you have questions, please do not hesitate to call me. I look forward to following Clemencia Bloom along with you.     Sincerely,        Jacky Schofield MD

## 2023-03-27 NOTE — ASSESSMENT & PLAN NOTE
Patient endorses that prior to the systemic symptoms he experienced over the weekend he noticed drainage of foul-smelling blood mixed with pus on his bed requiring change of his sheets. Patient endorses pain and erythema and region. Upon expection there is fluctuance in the mid gluteal region. Concerning for possible abscess.  -Told patient to perform sitz bath's to help with the drainage  -Given prescription for Bactrim for 7 days.

## 2023-03-27 NOTE — ASSESSMENT & PLAN NOTE
This problem is currently stable.   Patient's BP in the office today was 122/77.  -Continue current management

## 2023-03-27 NOTE — ASSESSMENT & PLAN NOTE
Patient claims that he has been seen for gastric sleeve but currently claims that the surgeon wants to wait to make sure things are adequate before he proceeds with possible surgery. In the meantime, he claims that the surgeon told him that he should take that nonsurgical approach.

## 2023-03-28 LAB
ALBUMIN SERPL-MCNC: 4.2 G/DL (ref 3.4–5)
ALBUMIN/GLOB SERPL: 1.3 {RATIO} (ref 1.1–2.2)
ALP SERPL-CCNC: 86 U/L (ref 40–129)
ALT SERPL-CCNC: 24 U/L (ref 10–40)
ANION GAP SERPL CALCULATED.3IONS-SCNC: 12 MMOL/L (ref 3–16)
AST SERPL-CCNC: 14 U/L (ref 15–37)
BASOPHILS # BLD: 0 K/UL (ref 0–0.2)
BASOPHILS NFR BLD: 0.3 %
BILIRUB SERPL-MCNC: 0.5 MG/DL (ref 0–1)
BUN SERPL-MCNC: 15 MG/DL (ref 7–20)
CALCIUM SERPL-MCNC: 9.6 MG/DL (ref 8.3–10.6)
CHLORIDE SERPL-SCNC: 100 MMOL/L (ref 99–110)
CO2 SERPL-SCNC: 28 MMOL/L (ref 21–32)
CREAT SERPL-MCNC: 1 MG/DL (ref 0.9–1.3)
DEPRECATED RDW RBC AUTO: 13.7 % (ref 12.4–15.4)
EOSINOPHIL # BLD: 0 K/UL (ref 0–0.6)
EOSINOPHIL NFR BLD: 0.8 %
GFR SERPLBLD CREATININE-BSD FMLA CKD-EPI: >60 ML/MIN/{1.73_M2}
GLUCOSE SERPL-MCNC: 154 MG/DL (ref 70–99)
HCT VFR BLD AUTO: 48.8 % (ref 40.5–52.5)
HCV AB SERPL QL IA: NORMAL
HGB BLD-MCNC: 16.3 G/DL (ref 13.5–17.5)
LYMPHOCYTES # BLD: 2.4 K/UL (ref 1–5.1)
LYMPHOCYTES NFR BLD: 42.5 %
MCH RBC QN AUTO: 31.5 PG (ref 26–34)
MCHC RBC AUTO-ENTMCNC: 33.3 G/DL (ref 31–36)
MCV RBC AUTO: 94.6 FL (ref 80–100)
MONOCYTES # BLD: 0.4 K/UL (ref 0–1.3)
MONOCYTES NFR BLD: 6.3 %
NEUTROPHILS # BLD: 2.9 K/UL (ref 1.7–7.7)
NEUTROPHILS NFR BLD: 50.1 %
PLATELET # BLD AUTO: 228 K/UL (ref 135–450)
PMV BLD AUTO: 9.8 FL (ref 5–10.5)
POTASSIUM SERPL-SCNC: 4.3 MMOL/L (ref 3.5–5.1)
PROT SERPL-MCNC: 7.4 G/DL (ref 6.4–8.2)
PSA SERPL DL<=0.01 NG/ML-MCNC: 1.41 NG/ML (ref 0–4)
RBC # BLD AUTO: 5.16 M/UL (ref 4.2–5.9)
SODIUM SERPL-SCNC: 140 MMOL/L (ref 136–145)
WBC # BLD AUTO: 5.7 K/UL (ref 4–11)

## 2023-04-05 RX ORDER — VALSARTAN 160 MG/1
TABLET ORAL
Qty: 90 TABLET | Refills: 1 | Status: SHIPPED | OUTPATIENT
Start: 2023-04-05

## 2023-04-05 NOTE — TELEPHONE ENCOUNTER
Requested Prescriptions     Pending Prescriptions Disp Refills    valsartan (DIOVAN) 160 MG tablet [Pharmacy Med Name: VALSARTAN 160 MG TABLET] 90 tablet 1     Sig: TAKE 1 TABLET BY MOUTH EVERY DAY       Last Clinic Visit:  3/27/2023     Next Clinic Appointment:  4/20/2023

## 2023-04-07 ENCOUNTER — TELEPHONE (OUTPATIENT)
Dept: INTERNAL MEDICINE CLINIC | Age: 53
End: 2023-04-07

## 2023-04-28 ENCOUNTER — OFFICE VISIT (OUTPATIENT)
Dept: INTERNAL MEDICINE CLINIC | Age: 53
End: 2023-04-28
Payer: MEDICAID

## 2023-04-28 VITALS
TEMPERATURE: 97.7 F | BODY MASS INDEX: 42.66 KG/M2 | OXYGEN SATURATION: 93 % | SYSTOLIC BLOOD PRESSURE: 158 MMHG | HEIGHT: 72 IN | DIASTOLIC BLOOD PRESSURE: 84 MMHG | WEIGHT: 315 LBS | HEART RATE: 73 BPM | RESPIRATION RATE: 20 BRPM

## 2023-04-28 DIAGNOSIS — N52.9 ERECTILE DYSFUNCTION, UNSPECIFIED ERECTILE DYSFUNCTION TYPE: ICD-10-CM

## 2023-04-28 DIAGNOSIS — L02.31 ABSCESS OF GLUTEAL REGION: Primary | ICD-10-CM

## 2023-04-28 PROCEDURE — 99213 OFFICE O/P EST LOW 20 MIN: CPT | Performed by: STUDENT IN AN ORGANIZED HEALTH CARE EDUCATION/TRAINING PROGRAM

## 2023-04-28 RX ORDER — DOXYCYCLINE HYCLATE 100 MG
100 TABLET ORAL 2 TIMES DAILY
Qty: 14 TABLET | Refills: 0 | Status: SHIPPED | OUTPATIENT
Start: 2023-04-28 | End: 2023-05-05

## 2023-04-28 RX ORDER — TADALAFIL 20 MG/1
20 TABLET ORAL PRN
Qty: 30 TABLET | Refills: 1 | Status: SHIPPED | OUTPATIENT
Start: 2023-04-28 | End: 2023-06-27

## 2023-04-28 NOTE — PATIENT INSTRUCTIONS
Follow up in 1 month. You will see surgery resident clinic for possible I&D given your abscess not improving despite 7 days of antibiotic therapy on 5/01/2023    I will start you on doxycycline for 7 days, please avoid taking doxycycline with milk products.

## 2023-04-28 NOTE — PROGRESS NOTES
3/27/2023  Joi Gandhi, DO   Multiple Vitamins-Minerals (THERAPEUTIC-M/LUTEIN) TABS TAKE 1 TABLET BY MOUTH EVERY DAY  Janusz Carbajal DO   insulin glargine (BASAGLAR KWIKPEN) 100 UNIT/ML injection pen patient using vials per [facility] inject 20 units at hs  Joi Gandhi DO   atorvastatin (LIPITOR) 20 MG tablet TAKE 1 TABLET BY MOUTH EVERY DAY  Raul Ragland MD   furosemide (LASIX) 80 MG tablet TAKE 1 TABLET BY MOUTH TWICE A Camille Lacy MD   metFORMIN (GLUCOPHAGE) 500 MG tablet Take one tablet twice per day with meals  Jadyn Burciaga MD   allopurinol (ZYLOPRIM) 300 MG tablet Take one tablet daily  Jadyn Burciaga MD   insulin lispro, 1 Unit Dial, 100 UNIT/ML SOPN INJECT 7 UNITS INTO THE SKIN 3 TIMES DAILY BEFORE MEALS  Areli Matthew MD   apixaban (ELIQUIS) 5 MG TABS tablet TAKE 1 TABLET BY MOUTH TWICE A DAY  Jennifer Webb MD   Insulin Pen Needle (PEN NEEDLES) 31G X 6 MM MISC 1 each by Does not apply route daily May substitute to meet insur. requirements  Thelma Ortiz DO   Insulin Syringe-Needle U-100 (BD INSULIN SYRINGE U/F) 31G X 5/16\" 1 ML MISC USE AS DIRECTED 4 TIMES A DAY  Sera Duarte MD   blood glucose monitor strips by Other route 4 times daily (after meals and at bedtime)  Bienvenido Jean Baptiste MD   Alcohol Swabs PADS 1 Container by Does not apply route three times daily  Steven Rowell MD   ACCU-CHEK FASTCLIX LANCETS MISC 20 Sticks by Does not apply route 2 times daily  Washington Zepeda MD   Blood Glucose Monitoring Suppl (BLOOD GLUCOSE MONITOR SYSTEM) W/DEVICE KIT 1 Units by Does not apply route daily  Stacie Lopez MD        There were no vitals filed for this visit. Estimated body mass index is 52.27 kg/m² as calculated from the following:    Height as of this encounter: 6' (1.829 m). Weight as of this encounter: 385 lb 6.4 oz (174.8 kg). Physical Exam  Vitals reviewed. HENT:      Head: Normocephalic.       Mouth/Throat:      Mouth:

## 2023-05-01 ENCOUNTER — OFFICE VISIT (OUTPATIENT)
Dept: INTERNAL MEDICINE CLINIC | Age: 53
End: 2023-05-01
Payer: MEDICAID

## 2023-05-01 VITALS
HEART RATE: 60 BPM | DIASTOLIC BLOOD PRESSURE: 92 MMHG | OXYGEN SATURATION: 94 % | SYSTOLIC BLOOD PRESSURE: 142 MMHG | WEIGHT: 315 LBS | RESPIRATION RATE: 20 BRPM | HEIGHT: 72 IN | BODY MASS INDEX: 42.66 KG/M2 | TEMPERATURE: 97.3 F

## 2023-05-01 DIAGNOSIS — L73.2 HIDRADENITIS SUPPURATIVA: Primary | ICD-10-CM

## 2023-05-01 PROCEDURE — 99213 OFFICE O/P EST LOW 20 MIN: CPT | Performed by: SURGERY

## 2023-05-01 RX ORDER — DOXYCYCLINE HYCLATE 100 MG
100 TABLET ORAL DAILY
Qty: 30 TABLET | Refills: 3 | Status: SHIPPED | OUTPATIENT
Start: 2023-05-06 | End: 2023-09-03

## 2023-05-01 NOTE — PROGRESS NOTES
Department of General Surgery - Adult  General Surgery Service  Resident Clinic Note      CC:  abscess    History Obtained From:  patient    HISTORY OF PRESENT ILLNESS:  This is a 47 y.o. male with Hx of as delineated below including T2DM (last A1c 6.9, on insulin) who presents with long standing recurrent abscess in the lower abdomen skin folds and bilateral gluteal area. He said he usually requiring I&D approximately 3-5 times a year for the past 30 years. He started to have small drainage in the gluteal cleft that started a few day ago. He was started on abx however he has not started it yet. Denies fever. Denies tobacco smoking. Past Medical History:   Diagnosis Date    Arthritis     Arthritis 8/2/2019    Atrial fibrillation     Atrial fibrillation (HCC)     Bradycardia     Chest pain 6/22/2022    Chronic gout of multiple sites 8/30/2018    Last Assessment & Plan:  Condition: stable  Diagnosis based upon a face-to-face evaluation, medication and chart review, physical exam, and ROS. Requested patient to continue regular follow-up with their PCP/specialist.  Follow up in: one year    Chronic pain syndrome 7/29/2022    Congestive heart failure (HCC)     Coronary artery disease     Coronary artery disease 8/24/2022    Current mild episode of major depressive disorder without prior episode ([facility] [address] 8/30/2018    Overview:  Depression screening completed this visit - see results. Member has no thoughts, plans or intentions to harm himself / herself or others. Last Assessment & Plan:  Condition: stable  Diagnosis based upon a face-to-face evaluation, medication and chart review, physical exam, and ROS.    Requested patient to continue regular follow-up with their PCP/specialist.  Follow up in: one year    Depression     Diabetes mellitus ([address]     Diabetes mellitus type 2 in obese ([address] 1/1/2014    Dyspnea     Gastroesophageal reflux disease without esophagitis 8/30/2018    Overview:  Asymptomatic on PPI / H2

## 2023-05-08 DIAGNOSIS — N52.9 ERECTILE DYSFUNCTION, UNSPECIFIED ERECTILE DYSFUNCTION TYPE: ICD-10-CM

## 2023-05-08 RX ORDER — TADALAFIL 20 MG/1
20 TABLET ORAL PRN
Qty: 30 TABLET | Refills: 1 | Status: SHIPPED | OUTPATIENT
Start: 2023-05-08 | End: 2023-07-07

## 2023-05-08 RX ORDER — ALLOPURINOL 300 MG/1
TABLET ORAL
Qty: 90 TABLET | Refills: 1 | Status: SHIPPED | OUTPATIENT
Start: 2023-05-08

## 2023-05-08 NOTE — TELEPHONE ENCOUNTER
Requested Prescriptions     Pending Prescriptions Disp Refills    allopurinol (ZYLOPRIM) 300 MG tablet [Pharmacy Med Name: ALLOPURINOL 300 MG TABLET] 90 tablet 1     Sig: TAKE 1 TABLET  [facility] [address] Visit:  5/1/2023     Next Clinic Appointment:  5/26/2023

## 2023-05-08 NOTE — TELEPHONE ENCOUNTER
Patient asking for paper rx for Cialis. last script was e-prescibed to [facility] and the cost would be $1200.00  Patient asking for paper rx so he can use discount card. Patient wants to be called and he will p/u script  at our front window.

## 2023-05-15 RX ORDER — ATORVASTATIN CALCIUM 20 MG/1
TABLET, FILM COATED ORAL
Qty: 30 TABLET | Refills: 5 | Status: SHIPPED | OUTPATIENT
Start: 2023-05-15 | End: 2023-05-18 | Stop reason: SDUPTHER

## 2023-05-15 NOTE — TELEPHONE ENCOUNTER
Requested Prescriptions     Pending Prescriptions Disp Refills    atorvastatin (LIPITOR) 20 MG tablet 30 tablet 5     Sig: TAKE 1 TABLET BY MOUTH EVERY DAY       Last Clinic Visit:  5/1/2023     Next Clinic Appointment:  6/5/2023

## 2023-05-18 RX ORDER — ATORVASTATIN CALCIUM 20 MG/1
TABLET, FILM COATED ORAL
Qty: 30 TABLET | Refills: 5 | Status: SHIPPED | OUTPATIENT
Start: 2023-05-18

## 2023-05-19 DIAGNOSIS — E87.6 POTASSIUM DEFICIENCY: ICD-10-CM

## 2023-05-19 DIAGNOSIS — I10 ESSENTIAL HYPERTENSION: Chronic | ICD-10-CM

## 2023-05-19 RX ORDER — DILTIAZEM HYDROCHLORIDE 120 MG/1
120 CAPSULE, COATED, EXTENDED RELEASE ORAL DAILY
Qty: 30 CAPSULE | Refills: 3 | Status: SHIPPED | OUTPATIENT
Start: 2023-05-19 | End: 2023-09-16

## 2023-05-19 RX ORDER — POTASSIUM CHLORIDE 20 MEQ/1
TABLET, EXTENDED RELEASE ORAL
Qty: 90 TABLET | Refills: 3 | Status: SHIPPED | OUTPATIENT
Start: 2023-05-19

## 2023-05-19 NOTE — PROGRESS NOTES
Pt called [facility] phone reporting that he has left multiple messages at the clinic for refilling his diltiazem and KCL but no one has gotten back to him.    Refills ordered and send to [facility] sameer [facility] per pt's wishes

## 2023-06-14 ENCOUNTER — TELEPHONE (OUTPATIENT)
Dept: INTERNAL MEDICINE CLINIC | Age: 53
End: 2023-06-14

## 2023-06-14 NOTE — TELEPHONE ENCOUNTER
PT WANTS TO KNOW HOW DO HE GO ABOUT GETTING SUPPLIES FOR HIS CPAP MACHINE?  PT CAN BE REACHED [phone]

## 2023-06-15 NOTE — TELEPHONE ENCOUNTER
Called and spoke with pt.  Pt informed me that he already spoke with someone about his CPAP supplies

## 2023-06-19 RX ORDER — SILDENAFIL 50 MG/1
TABLET, FILM COATED ORAL
Qty: 30 TABLET | OUTPATIENT
Start: 2023-06-19

## 2023-06-21 DIAGNOSIS — I48.0 PAROXYSMAL A-FIB (HCC): ICD-10-CM

## 2023-06-21 NOTE — TELEPHONE ENCOUNTER
Requested Prescriptions     Pending Prescriptions Disp Refills    apixaban (ELIQUIS) 5 MG TABS tablet 60 tablet 5     Sig: TAKE 1 TABLET BY MOUTH TWICE A DAY       Last Clinic Visit:  5/1/2023     Next Clinic Appointment:  Visit date not found

## 2023-06-22 DIAGNOSIS — I48.0 PAROXYSMAL A-FIB (HCC): ICD-10-CM

## 2023-06-22 NOTE — PROGRESS NOTES
Patient called requesting prescription for eliquis as he is completely out. He said he had called for it earlier, but he was not able to get it from the pharmacy he uses ([facility]). He said he previously took medications like aspirin and ibuprofen but they have been discontinued and he no longer takes them. I informed the patient that I will sent the prescription to that pharmacy so that he may pick it up.     Yoel Matson PGY2

## 2023-06-28 RX ORDER — PEN NEEDLE, DIABETIC 31 G X1/4"
1 NEEDLE, DISPOSABLE MISCELLANEOUS DAILY
Qty: 100 EACH | Refills: 5 | Status: SHIPPED | OUTPATIENT
Start: 2023-06-28

## 2023-08-21 DIAGNOSIS — I48.0 PAROXYSMAL A-FIB (HCC): ICD-10-CM

## 2023-08-22 DIAGNOSIS — I48.0 PAROXYSMAL A-FIB (HCC): ICD-10-CM

## 2023-08-22 NOTE — TELEPHONE ENCOUNTER
PT STATED HE NEED REFILL ON ELIQUIS BUT FOR SOME REASON HE CAN'T IT REFILLED. PT CAN BE REACHED [phone] ONCE MEDICATION IS REFILLED.

## 2023-08-22 NOTE — TELEPHONE ENCOUNTER
Requested Prescriptions     Pending Prescriptions Disp Refills    apixaban (ELIQUIS) 5 MG TABS tablet 60 tablet 0     Sig: TAKE 1 TABLET BY MOUTH TWICE A DAY       Last Clinic Visit:  5/1/2023     Next Clinic Appointment:  Visit date not found

## 2023-08-22 NOTE — TELEPHONE ENCOUNTER
Requested Prescriptions     Pending Prescriptions Disp Refills    apixaban (ELIQUIS) 5 MG TABS tablet [Pharmacy Med Name: ELIQUIS 5 MG TABLET] 60 tablet 0     Sig: TAKE 1 TABLET BY MOUTH TWICE A DAY       Last Clinic Visit:  5/1/2023     Next Clinic Appointment:  Visit date not found

## 2023-09-06 ENCOUNTER — TELEPHONE (OUTPATIENT)
Dept: FAMILY MEDICINE CLINIC | Age: 53
End: 2023-09-06

## 2023-09-06 NOTE — TELEPHONE ENCOUNTER
----- Message from Hector Ortega sent at 9/1/2023  4:59 PM EDT -----  Subject: Message to Provider    QUESTIONS  Information for Provider? Aide Leal Aneudy   TO CALL THEM ABOUT THIS PATIENT. PLEASE CALL [phone], PT NEEDS A BLOOD   PRESSURE CUFF AND HAS KAYLA HAMMER LISTED AS HIS PRIMARY CARE PROVIDER  ---------------------------------------------------------------------------  --------------  [facility]  [phone]; OK to leave message on voicemail  ---------------------------------------------------------------------------  --------------  SCRIPT ANSWERS  Relationship to Patient? Covered Entity  Covered Entity Type? Durable Medical Equipment? Representative Name?  RAYMOND

## 2023-09-12 ENCOUNTER — OFFICE VISIT (OUTPATIENT)
Dept: INTERNAL MEDICINE CLINIC | Age: 53
End: 2023-09-12
Payer: MEDICAID

## 2023-09-12 VITALS
WEIGHT: 315 LBS | TEMPERATURE: 97.2 F | OXYGEN SATURATION: 93 % | HEIGHT: 72 IN | HEART RATE: 71 BPM | DIASTOLIC BLOOD PRESSURE: 88 MMHG | BODY MASS INDEX: 42.66 KG/M2 | SYSTOLIC BLOOD PRESSURE: 133 MMHG

## 2023-09-12 DIAGNOSIS — L02.31 ABSCESS OF GLUTEAL REGION: ICD-10-CM

## 2023-09-12 DIAGNOSIS — M25.562 CHRONIC PAIN OF LEFT KNEE: ICD-10-CM

## 2023-09-12 DIAGNOSIS — E66.9 DIABETES MELLITUS TYPE 2 IN OBESE (HCC): ICD-10-CM

## 2023-09-12 DIAGNOSIS — M25.532 LEFT WRIST PAIN: Primary | ICD-10-CM

## 2023-09-12 DIAGNOSIS — N52.9 ERECTILE DYSFUNCTION, UNSPECIFIED ERECTILE DYSFUNCTION TYPE: ICD-10-CM

## 2023-09-12 DIAGNOSIS — I10 ESSENTIAL HYPERTENSION: ICD-10-CM

## 2023-09-12 DIAGNOSIS — I48.0 PAROXYSMAL A-FIB (HCC): ICD-10-CM

## 2023-09-12 DIAGNOSIS — M17.12 PRIMARY OSTEOARTHRITIS OF LEFT KNEE: ICD-10-CM

## 2023-09-12 DIAGNOSIS — E11.69 DIABETES MELLITUS TYPE 2 IN OBESE (HCC): ICD-10-CM

## 2023-09-12 DIAGNOSIS — G89.29 CHRONIC PAIN OF LEFT KNEE: ICD-10-CM

## 2023-09-12 DIAGNOSIS — E66.01 CLASS 3 SEVERE OBESITY WITH BODY MASS INDEX (BMI) OF 50.0 TO 59.9 IN ADULT, UNSPECIFIED OBESITY TYPE, UNSPECIFIED WHETHER SERIOUS COMORBIDITY PRESENT (HCC): ICD-10-CM

## 2023-09-12 LAB — HBA1C MFR BLD: 7.2 %

## 2023-09-12 PROCEDURE — 99213 OFFICE O/P EST LOW 20 MIN: CPT | Performed by: STUDENT IN AN ORGANIZED HEALTH CARE EDUCATION/TRAINING PROGRAM

## 2023-09-12 RX ORDER — ALLOPURINOL 300 MG/1
TABLET ORAL
Qty: 90 TABLET | Refills: 1 | Status: SHIPPED | OUTPATIENT
Start: 2023-09-12

## 2023-09-12 RX ORDER — ATORVASTATIN CALCIUM 20 MG/1
TABLET, FILM COATED ORAL
Qty: 30 TABLET | Refills: 5 | Status: SHIPPED | OUTPATIENT
Start: 2023-09-12

## 2023-09-12 RX ORDER — DILTIAZEM HYDROCHLORIDE 120 MG/1
120 CAPSULE, COATED, EXTENDED RELEASE ORAL DAILY
Qty: 30 CAPSULE | Refills: 3 | Status: SHIPPED | OUTPATIENT
Start: 2023-09-12 | End: 2024-01-10

## 2023-09-12 RX ORDER — TADALAFIL 20 MG/1
20 TABLET ORAL PRN
Qty: 30 TABLET | Refills: 1 | Status: SHIPPED | OUTPATIENT
Start: 2023-09-12 | End: 2023-11-11

## 2023-09-12 RX ORDER — VALSARTAN 160 MG/1
160 TABLET ORAL DAILY
Qty: 30 TABLET | Refills: 1 | Status: SHIPPED | OUTPATIENT
Start: 2023-09-12 | End: 2023-11-11

## 2023-09-12 RX ORDER — INSULIN GLARGINE 100 [IU]/ML
INJECTION, SOLUTION SUBCUTANEOUS
Qty: 5 ML | Refills: 2 | Status: SHIPPED | OUTPATIENT
Start: 2023-09-12

## 2023-09-12 RX ORDER — IBUPROFEN 600 MG/1
600 TABLET ORAL EVERY 8 HOURS PRN
Qty: 42 TABLET | Refills: 0 | Status: SHIPPED | OUTPATIENT
Start: 2023-09-12 | End: 2023-09-26

## 2023-09-12 ASSESSMENT — ENCOUNTER SYMPTOMS
SHORTNESS OF BREATH: 0
CONSTIPATION: 0
ABDOMINAL DISTENTION: 0
DIARRHEA: 0
NAUSEA: 0
ABDOMINAL PAIN: 0
BLOOD IN STOOL: 0
VOMITING: 0
COUGH: 0

## 2023-09-12 NOTE — PROGRESS NOTES
1 tablet by mouth every 8 hours as needed for Pain Yes Jonny Melvin MD   Insulin Pen Needle (PEN NEEDLES) 31G X 6 MM MISC 1 each by Does not apply route daily May substitute to meet insur. requirements  Shelton Allen MD   potassium chloride (KLOR-CON M20) 20 MEQ extended release tablet TAKE 1 TABLET BY MOUTH EVERY DAY WITH BREAKFAST  Philly Jones MD   [facility] by Does not apply route Okay effective from 3/27/2023 through 3/27/2028  Sonali Huerta MD   Blood Pressure KIT Please take your blood pressure on a daily basis  Sonali Huerta MD   sennosides-docusate sodium (SENOKOT-S) 8.6-50 MG tablet Take 1 tablet by mouth 2 times daily as needed for Constipation  Lewis Ferguson, DO   Multiple Vitamins-Minerals (THERAPEUTIC-M/LUTEIN) TABS TAKE 1 TABLET BY MOUTH EVERY DAY  Janusz Carbajal, DO   furosemide (LASIX) 80 MG tablet TAKE 1 TABLET BY MOUTH TWICE A DAY  Debora Miller MD   insulin lispro, 1 Unit Dial, 100 UNIT/ML SOPN INJECT 7 UNITS INTO THE SKIN 3 TIMES DAILY BEFORE MEALS  Rony Gaming MD   Insulin Syringe-Needle U-100 (BD INSULIN SYRINGE U/F) 31G X 5/16\" 1 ML MISC USE AS DIRECTED 4 TIMES A DAY  Roseann Shaffer MD   blood glucose monitor strips by Other route 4 times daily (after meals and at bedtime)  Catrina Cota MD   Alcohol Swabs PADS 1 Container by Does not apply route three times daily  Steven Ni MD   ACCU-CHEK FASTCLIX LANCETS MISC 20 Sticks by Does not apply route 2 times daily  Steven Ni MD   Blood Glucose Monitoring Suppl (BLOOD GLUCOSE MONITOR SYSTEM) W/DEVICE KIT 1 Units by Does not apply route daily  Kenya Hernandez MD       Allergies:    Patient has no known allergies.     Health Maintenance Due   Topic Date Due    Hepatitis B vaccine (1 of 3 - 3-dose series) Never done    Depression Screen  Never done    Diabetic retinal exam  Never done    Pneumococcal 0-64 years Vaccine (2 - PCV) 09/15/2018    Shingles vaccine (1 of 2) Never done    Diabetic foot

## 2023-09-12 NOTE — PATIENT INSTRUCTIONS
-we will send refills of your medications to your pharmacy  -we will prescribe medication for pain relief  -please follow up with hand surgery for evaluation for potential surgical intervention regarding your hand  -continue taking your other medications as prescribed  -RTC 3 months

## 2023-09-14 ENCOUNTER — TELEPHONE (OUTPATIENT)
Dept: INTERNAL MEDICINE CLINIC | Age: 53
End: 2023-09-14

## 2023-09-14 NOTE — TELEPHONE ENCOUNTER
Spoke to this patient about his Tadalafil. He states he usually get's a paper script from the clinic and he takes it to [facility] and pay out of pocket. Doesn't need a prior authorization for this med.

## 2023-09-19 ENCOUNTER — OFFICE VISIT (OUTPATIENT)
Dept: ORTHOPEDIC SURGERY | Age: 53
End: 2023-09-19

## 2023-09-19 VITALS — BODY MASS INDEX: 42.66 KG/M2 | HEIGHT: 72 IN | WEIGHT: 315 LBS

## 2023-09-19 DIAGNOSIS — M17.12 PRIMARY OSTEOARTHRITIS OF LEFT KNEE: Primary | ICD-10-CM

## 2023-09-19 DIAGNOSIS — G89.29 CHRONIC PAIN OF LEFT KNEE: ICD-10-CM

## 2023-09-19 DIAGNOSIS — M25.562 CHRONIC PAIN OF LEFT KNEE: ICD-10-CM

## 2023-09-19 RX ORDER — METHYLPREDNISOLONE ACETATE 40 MG/ML
40 INJECTION, SUSPENSION INTRA-ARTICULAR; INTRALESIONAL; INTRAMUSCULAR; SOFT TISSUE ONCE
Status: COMPLETED | OUTPATIENT
Start: 2023-09-19 | End: 2023-09-19

## 2023-09-19 RX ADMIN — METHYLPREDNISOLONE ACETATE 40 MG: 40 INJECTION, SUSPENSION INTRA-ARTICULAR; INTRALESIONAL; INTRAMUSCULAR; SOFT TISSUE at 15:39

## 2023-09-20 ENCOUNTER — APPOINTMENT (OUTPATIENT)
Dept: CT IMAGING | Age: 53
DRG: 171 | End: 2023-09-20
Payer: MEDICAID

## 2023-09-20 ENCOUNTER — HOSPITAL ENCOUNTER (INPATIENT)
Age: 53
LOS: 3 days | Discharge: HOME | DRG: 171 | End: 2023-09-23
Attending: EMERGENCY MEDICINE | Admitting: STUDENT IN AN ORGANIZED HEALTH CARE EDUCATION/TRAINING PROGRAM
Payer: MEDICAID

## 2023-09-20 ENCOUNTER — APPOINTMENT (OUTPATIENT)
Dept: GENERAL RADIOLOGY | Age: 53
DRG: 171 | End: 2023-09-20
Payer: MEDICAID

## 2023-09-20 DIAGNOSIS — R42 LIGHTHEADEDNESS: ICD-10-CM

## 2023-09-20 DIAGNOSIS — R79.89 ELEVATED TROPONIN: ICD-10-CM

## 2023-09-20 DIAGNOSIS — R06.02 SHORTNESS OF BREATH: Primary | ICD-10-CM

## 2023-09-20 DIAGNOSIS — I10 ESSENTIAL HYPERTENSION: ICD-10-CM

## 2023-09-20 PROBLEM — R77.8 ELEVATED TROPONIN: Status: ACTIVE | Noted: 2023-09-20

## 2023-09-20 PROBLEM — R55 SYNCOPE AND COLLAPSE: Status: ACTIVE | Noted: 2023-09-20

## 2023-09-20 LAB
ANION GAP SERPL CALCULATED.3IONS-SCNC: 14 MMOL/L (ref 3–16)
BASE EXCESS BLDV CALC-SCNC: 5.9 MMOL/L (ref -2–3)
BASOPHILS # BLD: 0 K/UL (ref 0–0.2)
BASOPHILS NFR BLD: 0.4 %
BUN SERPL-MCNC: 15 MG/DL (ref 7–20)
CALCIUM SERPL-MCNC: 9.8 MG/DL (ref 8.3–10.6)
CHLORIDE SERPL-SCNC: 98 MMOL/L (ref 99–110)
CO2 BLDV-SCNC: 37 MMOL/L
CO2 SERPL-SCNC: 29 MMOL/L (ref 21–32)
COHGB MFR BLDV: 2.6 % (ref 0–1.5)
CREAT SERPL-MCNC: 1.2 MG/DL (ref 0.9–1.3)
DEPRECATED RDW RBC AUTO: 13.4 % (ref 12.4–15.4)
DO-HGB MFR BLDV: 74.1 %
EKG ATRIAL RATE: 57 BPM
EKG ATRIAL RATE: 82 BPM
EKG DIAGNOSIS: NORMAL
EKG DIAGNOSIS: NORMAL
EKG P AXIS: 28 DEGREES
EKG P-R INTERVAL: 146 MS
EKG Q-T INTERVAL: 376 MS
EKG Q-T INTERVAL: 476 MS
EKG QRS DURATION: 88 MS
EKG QRS DURATION: 92 MS
EKG QTC CALCULATION (BAZETT): 439 MS
EKG QTC CALCULATION (BAZETT): 463 MS
EKG R AXIS: -40 DEGREES
EKG R AXIS: -45 DEGREES
EKG T AXIS: -7 DEGREES
EKG T AXIS: 10 DEGREES
EKG VENTRICULAR RATE: 57 BPM
EKG VENTRICULAR RATE: 82 BPM
EOSINOPHIL # BLD: 0.1 K/UL (ref 0–0.6)
EOSINOPHIL NFR BLD: 0.7 %
GFR SERPLBLD CREATININE-BSD FMLA CKD-EPI: >60 ML/MIN/{1.73_M2}
GLUCOSE BLD-MCNC: 151 MG/DL (ref 70–99)
GLUCOSE BLD-MCNC: 153 MG/DL (ref 70–99)
GLUCOSE BLD-MCNC: 193 MG/DL (ref 70–99)
GLUCOSE SERPL-MCNC: 267 MG/DL (ref 70–99)
HCO3 BLDV-SCNC: 35.3 MMOL/L (ref 24–28)
HCT VFR BLD AUTO: 49.2 % (ref 40.5–52.5)
HGB BLD-MCNC: 16.4 G/DL (ref 13.5–17.5)
LYMPHOCYTES # BLD: 3.2 K/UL (ref 1–5.1)
LYMPHOCYTES NFR BLD: 44.2 %
MCH RBC QN AUTO: 31.4 PG (ref 26–34)
MCHC RBC AUTO-ENTMCNC: 33.3 G/DL (ref 31–36)
MCV RBC AUTO: 94.5 FL (ref 80–100)
METHGB MFR BLDV: 0 % (ref 0–1.5)
MONOCYTES # BLD: 0.2 K/UL (ref 0–1.3)
MONOCYTES NFR BLD: 2.5 %
NEUTROPHILS # BLD: 3.8 K/UL (ref 1.7–7.7)
NEUTROPHILS NFR BLD: 52.2 %
NT-PROBNP SERPL-MCNC: 43 PG/ML (ref 0–124)
PCO2 BLDV: 69.4 MMHG (ref 41–51)
PERFORMED ON: ABNORMAL
PH BLDV: 7.32 [PH] (ref 7.35–7.45)
PLATELET # BLD AUTO: 261 K/UL (ref 135–450)
PMV BLD AUTO: 9.2 FL (ref 5–10.5)
PO2 BLDV: <30 MMHG (ref 25–40)
POTASSIUM SERPL-SCNC: 3.6 MMOL/L (ref 3.5–5.1)
RBC # BLD AUTO: 5.2 M/UL (ref 4.2–5.9)
SAO2 % BLDV: 24 %
SARS-COV-2 RDRP RESP QL NAA+PROBE: NOT DETECTED
SODIUM SERPL-SCNC: 141 MMOL/L (ref 136–145)
TROPONIN, HIGH SENSITIVITY: 17 NG/L (ref 0–22)
TROPONIN, HIGH SENSITIVITY: 27 NG/L (ref 0–22)
TROPONIN, HIGH SENSITIVITY: 34 NG/L (ref 0–22)
TROPONIN, HIGH SENSITIVITY: 43 NG/L (ref 0–22)
WBC # BLD AUTO: 7.2 K/UL (ref 4–11)

## 2023-09-20 PROCEDURE — 83880 ASSAY OF NATRIURETIC PEPTIDE: CPT

## 2023-09-20 PROCEDURE — 6370000000 HC RX 637 (ALT 250 FOR IP): Performed by: STUDENT IN AN ORGANIZED HEALTH CARE EDUCATION/TRAINING PROGRAM

## 2023-09-20 PROCEDURE — 78452 HT MUSCLE IMAGE SPECT MULT: CPT

## 2023-09-20 PROCEDURE — 1200000000 HC SEMI PRIVATE

## 2023-09-20 PROCEDURE — 3430000000 HC RX DIAGNOSTIC RADIOPHARMACEUTICAL: Performed by: INTERNAL MEDICINE

## 2023-09-20 PROCEDURE — 93017 CV STRESS TEST TRACING ONLY: CPT

## 2023-09-20 PROCEDURE — 82803 BLOOD GASES ANY COMBINATION: CPT

## 2023-09-20 PROCEDURE — 36415 COLL VENOUS BLD VENIPUNCTURE: CPT

## 2023-09-20 PROCEDURE — 99254 IP/OBS CNSLTJ NEW/EST MOD 60: CPT | Performed by: INTERNAL MEDICINE

## 2023-09-20 PROCEDURE — 2580000003 HC RX 258: Performed by: STUDENT IN AN ORGANIZED HEALTH CARE EDUCATION/TRAINING PROGRAM

## 2023-09-20 PROCEDURE — C8929 TTE W OR WO FOL WCON,DOPPLER: HCPCS

## 2023-09-20 PROCEDURE — 70450 CT HEAD/BRAIN W/O DYE: CPT

## 2023-09-20 PROCEDURE — 93005 ELECTROCARDIOGRAM TRACING: CPT | Performed by: EMERGENCY MEDICINE

## 2023-09-20 PROCEDURE — 71045 X-RAY EXAM CHEST 1 VIEW: CPT

## 2023-09-20 PROCEDURE — 3E073KZ INTRODUCTION OF OTHER DIAGNOSTIC SUBSTANCE INTO CORONARY ARTERY, PERCUTANEOUS APPROACH: ICD-10-PCS | Performed by: INTERNAL MEDICINE

## 2023-09-20 PROCEDURE — 4A12XM4 MONITORING OF CARDIAC STRESS, EXTERNAL APPROACH: ICD-10-PCS | Performed by: INTERNAL MEDICINE

## 2023-09-20 PROCEDURE — 84484 ASSAY OF TROPONIN QUANT: CPT

## 2023-09-20 PROCEDURE — 99285 EMERGENCY DEPT VISIT HI MDM: CPT

## 2023-09-20 PROCEDURE — 83036 HEMOGLOBIN GLYCOSYLATED A1C: CPT

## 2023-09-20 PROCEDURE — 87635 SARS-COV-2 COVID-19 AMP PRB: CPT

## 2023-09-20 PROCEDURE — A9502 TC99M TETROFOSMIN: HCPCS | Performed by: INTERNAL MEDICINE

## 2023-09-20 PROCEDURE — 85025 COMPLETE CBC W/AUTO DIFF WBC: CPT

## 2023-09-20 PROCEDURE — 6360000004 HC RX CONTRAST MEDICATION: Performed by: STUDENT IN AN ORGANIZED HEALTH CARE EDUCATION/TRAINING PROGRAM

## 2023-09-20 PROCEDURE — 80048 BASIC METABOLIC PNL TOTAL CA: CPT

## 2023-09-20 RX ORDER — POTASSIUM CHLORIDE 20 MEQ/1
40 TABLET, EXTENDED RELEASE ORAL ONCE
Status: COMPLETED | OUTPATIENT
Start: 2023-09-20 | End: 2023-09-20

## 2023-09-20 RX ORDER — SODIUM CHLORIDE 0.9 % (FLUSH) 0.9 %
5-40 SYRINGE (ML) INJECTION PRN
Status: DISCONTINUED | OUTPATIENT
Start: 2023-09-20 | End: 2023-09-23 | Stop reason: HOSPADM

## 2023-09-20 RX ORDER — DEXTROSE MONOHYDRATE 100 MG/ML
INJECTION, SOLUTION INTRAVENOUS CONTINUOUS PRN
Status: DISCONTINUED | OUTPATIENT
Start: 2023-09-20 | End: 2023-09-23 | Stop reason: HOSPADM

## 2023-09-20 RX ORDER — VALSARTAN 160 MG/1
160 TABLET ORAL DAILY
Status: DISCONTINUED | OUTPATIENT
Start: 2023-09-20 | End: 2023-09-22

## 2023-09-20 RX ORDER — INSULIN LISPRO 100 [IU]/ML
0-4 INJECTION, SOLUTION INTRAVENOUS; SUBCUTANEOUS
Status: DISCONTINUED | OUTPATIENT
Start: 2023-09-20 | End: 2023-09-23 | Stop reason: HOSPADM

## 2023-09-20 RX ORDER — ENOXAPARIN SODIUM 100 MG/ML
40 INJECTION SUBCUTANEOUS 2 TIMES DAILY
Status: CANCELLED | OUTPATIENT
Start: 2023-09-20

## 2023-09-20 RX ORDER — FUROSEMIDE 80 MG
80 TABLET ORAL 2 TIMES DAILY
Status: DISCONTINUED | OUTPATIENT
Start: 2023-09-20 | End: 2023-09-23 | Stop reason: HOSPADM

## 2023-09-20 RX ORDER — ACETAMINOPHEN 650 MG/1
650 SUPPOSITORY RECTAL EVERY 6 HOURS PRN
Status: DISCONTINUED | OUTPATIENT
Start: 2023-09-20 | End: 2023-09-23 | Stop reason: HOSPADM

## 2023-09-20 RX ORDER — ATORVASTATIN CALCIUM 20 MG/1
20 TABLET, FILM COATED ORAL NIGHTLY
Status: DISCONTINUED | OUTPATIENT
Start: 2023-09-20 | End: 2023-09-23 | Stop reason: HOSPADM

## 2023-09-20 RX ORDER — ALLOPURINOL 300 MG/1
300 TABLET ORAL DAILY
Status: DISCONTINUED | OUTPATIENT
Start: 2023-09-20 | End: 2023-09-23 | Stop reason: HOSPADM

## 2023-09-20 RX ORDER — INSULIN LISPRO 100 [IU]/ML
4 INJECTION, SOLUTION INTRAVENOUS; SUBCUTANEOUS
Status: DISCONTINUED | OUTPATIENT
Start: 2023-09-20 | End: 2023-09-23 | Stop reason: HOSPADM

## 2023-09-20 RX ORDER — DILTIAZEM HYDROCHLORIDE 120 MG/1
120 CAPSULE, COATED, EXTENDED RELEASE ORAL DAILY
Status: DISCONTINUED | OUTPATIENT
Start: 2023-09-20 | End: 2023-09-22

## 2023-09-20 RX ORDER — SODIUM CHLORIDE 9 MG/ML
INJECTION, SOLUTION INTRAVENOUS PRN
Status: DISCONTINUED | OUTPATIENT
Start: 2023-09-20 | End: 2023-09-22 | Stop reason: SDUPTHER

## 2023-09-20 RX ORDER — POLYETHYLENE GLYCOL 3350 17 G/17G
17 POWDER, FOR SOLUTION ORAL DAILY PRN
Status: DISCONTINUED | OUTPATIENT
Start: 2023-09-20 | End: 2023-09-23 | Stop reason: HOSPADM

## 2023-09-20 RX ORDER — INSULIN LISPRO 100 [IU]/ML
0-4 INJECTION, SOLUTION INTRAVENOUS; SUBCUTANEOUS NIGHTLY
Status: DISCONTINUED | OUTPATIENT
Start: 2023-09-20 | End: 2023-09-23 | Stop reason: HOSPADM

## 2023-09-20 RX ORDER — INSULIN GLARGINE 100 [IU]/ML
12 INJECTION, SOLUTION SUBCUTANEOUS NIGHTLY
Status: DISCONTINUED | OUTPATIENT
Start: 2023-09-20 | End: 2023-09-23 | Stop reason: HOSPADM

## 2023-09-20 RX ORDER — ACETAMINOPHEN 325 MG/1
650 TABLET ORAL EVERY 6 HOURS PRN
Status: DISCONTINUED | OUTPATIENT
Start: 2023-09-20 | End: 2023-09-23 | Stop reason: HOSPADM

## 2023-09-20 RX ORDER — SODIUM CHLORIDE 0.9 % (FLUSH) 0.9 %
5-40 SYRINGE (ML) INJECTION EVERY 12 HOURS SCHEDULED
Status: DISCONTINUED | OUTPATIENT
Start: 2023-09-20 | End: 2023-09-23 | Stop reason: HOSPADM

## 2023-09-20 RX ORDER — ONDANSETRON 2 MG/ML
4 INJECTION INTRAMUSCULAR; INTRAVENOUS EVERY 6 HOURS PRN
Status: DISCONTINUED | OUTPATIENT
Start: 2023-09-20 | End: 2023-09-23 | Stop reason: HOSPADM

## 2023-09-20 RX ORDER — SODIUM CHLORIDE, SODIUM LACTATE, POTASSIUM CHLORIDE, CALCIUM CHLORIDE 600; 310; 30; 20 MG/100ML; MG/100ML; MG/100ML; MG/100ML
INJECTION, SOLUTION INTRAVENOUS CONTINUOUS
Status: ACTIVE | OUTPATIENT
Start: 2023-09-20 | End: 2023-09-20

## 2023-09-20 RX ORDER — ONDANSETRON 4 MG/1
4 TABLET, ORALLY DISINTEGRATING ORAL EVERY 8 HOURS PRN
Status: DISCONTINUED | OUTPATIENT
Start: 2023-09-20 | End: 2023-09-23 | Stop reason: HOSPADM

## 2023-09-20 RX ADMIN — PERFLUTREN 1.5 ML: 6.52 INJECTION, SUSPENSION INTRAVENOUS at 14:00

## 2023-09-20 RX ADMIN — TETROFOSMIN 30 MILLICURIE: 1.38 INJECTION, POWDER, LYOPHILIZED, FOR SOLUTION INTRAVENOUS at 11:45

## 2023-09-20 RX ADMIN — ALLOPURINOL 300 MG: 100 TABLET ORAL at 13:56

## 2023-09-20 RX ADMIN — SODIUM CHLORIDE, PRESERVATIVE FREE 10 ML: 5 INJECTION INTRAVENOUS at 20:46

## 2023-09-20 RX ADMIN — SODIUM CHLORIDE, POTASSIUM CHLORIDE, SODIUM LACTATE AND CALCIUM CHLORIDE: 600; 310; 30; 20 INJECTION, SOLUTION INTRAVENOUS at 17:59

## 2023-09-20 RX ADMIN — APIXABAN 5 MG: 5 TABLET, FILM COATED ORAL at 20:45

## 2023-09-20 RX ADMIN — SODIUM CHLORIDE, PRESERVATIVE FREE 10 ML: 5 INJECTION INTRAVENOUS at 14:01

## 2023-09-20 RX ADMIN — ACETAMINOPHEN 650 MG: 325 TABLET ORAL at 15:15

## 2023-09-20 RX ADMIN — POTASSIUM CHLORIDE 40 MEQ: 1500 TABLET, EXTENDED RELEASE ORAL at 13:56

## 2023-09-20 RX ADMIN — VALSARTAN 160 MG: 160 TABLET, FILM COATED ORAL at 15:14

## 2023-09-20 RX ADMIN — INSULIN GLARGINE 12 UNITS: 100 INJECTION, SOLUTION SUBCUTANEOUS at 20:45

## 2023-09-20 RX ADMIN — DILTIAZEM HYDROCHLORIDE 120 MG: 120 CAPSULE, COATED, EXTENDED RELEASE ORAL at 13:56

## 2023-09-20 RX ADMIN — SODIUM CHLORIDE, POTASSIUM CHLORIDE, SODIUM LACTATE AND CALCIUM CHLORIDE: 600; 310; 30; 20 INJECTION, SOLUTION INTRAVENOUS at 14:01

## 2023-09-20 RX ADMIN — ATORVASTATIN CALCIUM 20 MG: 20 TABLET, FILM COATED ORAL at 20:45

## 2023-09-20 ASSESSMENT — ENCOUNTER SYMPTOMS
CONSTIPATION: 0
BACK PAIN: 0
NAUSEA: 0
COUGH: 0
SHORTNESS OF BREATH: 1
VOMITING: 0
SINUS PAIN: 0
SINUS PRESSURE: 0
TROUBLE SWALLOWING: 0
VOICE CHANGE: 0
EYES NEGATIVE: 1
ABDOMINAL PAIN: 0
CHEST TIGHTNESS: 0
DIARRHEA: 0
SHORTNESS OF BREATH: 0
GASTROINTESTINAL NEGATIVE: 1
SORE THROAT: 0
COLOR CHANGE: 0
WHEEZING: 0

## 2023-09-20 ASSESSMENT — PAIN DESCRIPTION - LOCATION
LOCATION: HEAD
LOCATION: KNEE

## 2023-09-20 ASSESSMENT — PAIN DESCRIPTION - PAIN TYPE
TYPE: ACUTE PAIN

## 2023-09-20 ASSESSMENT — PAIN DESCRIPTION - ONSET
ONSET: ON-GOING

## 2023-09-20 ASSESSMENT — PAIN SCALES - GENERAL
PAINLEVEL_OUTOF10: 0
PAINLEVEL_OUTOF10: 8
PAINLEVEL_OUTOF10: 3
PAINLEVEL_OUTOF10: 8

## 2023-09-20 ASSESSMENT — PAIN DESCRIPTION - DESCRIPTORS
DESCRIPTORS: ACHING
DESCRIPTORS: DISCOMFORT
DESCRIPTORS: SORE;THROBBING

## 2023-09-20 ASSESSMENT — PAIN DESCRIPTION - FREQUENCY
FREQUENCY: CONTINUOUS

## 2023-09-20 ASSESSMENT — PAIN DESCRIPTION - ORIENTATION
ORIENTATION: LEFT
ORIENTATION: ANTERIOR
ORIENTATION: LEFT

## 2023-09-20 ASSESSMENT — PAIN - FUNCTIONAL ASSESSMENT: PAIN_FUNCTIONAL_ASSESSMENT: 0-10

## 2023-09-20 NOTE — CONSULTS
Reason for Consultation/Chief Complaint: Syncope/fall      History of Present Illness:  Shakila De La Cruz is a 47 y.o. patient whom we were asked to see for syncope. Hx afib and s/p ablation. HTN/lipids/DM. Woke up last night to go to [place]. After going to [place] had episode where he passed out and fell down stairs. He again was having a BM. Became very diaphoretic while going to [place]. Went to [place] to lie down. EMS called. When they arrived he went to open door and fell down stairs. Has hx of syncope felt to be vasovagal.  At times syncope occurs after straining with BM. These continue to happen maybe 2-3x per month. He can usually aquino off by lying down. No sx of chest pain. No sob. Denied palp. No pnd or orthopnea. No recent exertional sob/cp.        Past Medical History:   has a past medical history of Arthritis, Arthritis, Atrial fibrillation, Atrial fibrillation ([address]), Bradycardia, Chest pain, Chronic gout of multiple sites, Chronic pain syndrome, Congestive heart failure ([address]), Coronary artery disease, Coronary artery disease, Current mild episode of major depressive disorder without prior episode ([address]), Depression, Diabetes mellitus ([address]), Diabetes mellitus type 2 in obese ([address]), Dyspnea, Gastroesophageal reflux disease without esophagitis, Generalized osteoarthritis of multiple sites, GERD (gastroesophageal reflux disease), GERD (gastroesophageal reflux disease), Hidradenitis suppurativa, Hx of atrial fibrillation without current medication, Hyperlipidemia, Hypertension, Hypertension, Hypertensive heart disease with chronic diastolic congestive heart failure ([address]), Morbid obesity ([address]), Morbid obesity ([address]), Morbid obesity with BMI of 50.0-59.9, adult ([address]), Non compliance w medication regimen, Onychomycosis of multiple toenails with type 2 diabetes mellitus ([address]), Osteoarthritis of knee, PAF (paroxysmal atrial fibrillation) ([address]), Potassium deficiency, Primary osteoarthritis of both knees, S/P ablation sore throat. Cardiovascular: No loss of consciousness. No hemoptysis, pleuritic pain, or phlebitis. Respiratory: No cough or wheezing, no sputum production. No hematemesis. Gastrointestinal: No abdominal pain, appetite loss, blood in stools. No change in bowel or bladder habits. Genitourinary: No dysuria, trouble voiding, or hematuria. Musculoskeletal:  No gait disturbance, weakness or joint complaints. Integumentary: No rash or pruritis. All other systems reviewed negative as done.      Physical Examination:    Vitals:    09/20/23 0800   BP: 137/74   Pulse: 74   Resp: 14   SpO2: 94%    Weight - Scale: (!) 377 lb (171 kg)         General Appearance:  Alert, cooperative, no distress, appears stated age   Head:  Normocephalic, without obvious abnormality, atraumatic   Eyes:  PERRL, conjunctiva/corneas clear       Nose: Nares normal, no drainage or sinus tenderness   Throat: Lips, mucosa, and tongue normal   Neck: Supple, symmetrical, trachea midline       Lungs:   Clear to auscultation bilaterally, respirations unlabored   Chest Wall:  No tenderness or deformity   Heart:  Regular rate and rhythm, S1, S2 normal, no murmur, rub or gallop   Abdomen:   Soft, non-tender, bowel sounds active all four quadrants           Extremities: Extremities normal, atraumatic, no cyanosis or edema       Skin: Skin color, texture, turgor normal, no rashes or lesions   Pysch: Normal mood and affect   Neurologic: Normal gross motor and sensory exam.         Labs  CBC:   Lab Results   Component Value Date/Time    WBC 7.2 09/20/2023 04:32 AM    RBC 5.20 09/20/2023 04:32 AM    HGB 16.4 09/20/2023 04:32 AM    HCT 49.2 09/20/2023 04:32 AM    MCV 94.5 09/20/2023 04:32 AM    RDW 13.4 09/20/2023 04:32 AM     09/20/2023 04:32 AM     CMP:    Lab Results   Component Value Date/Time     09/20/2023 04:32 AM    K 3.6 09/20/2023 04:32 AM    K 3.4 06/22/2022 01:25 AM    CL 98 09/20/2023 04:32 AM    CO2 29 09/20/2023 04:32 AM    BUN 15

## 2023-09-20 NOTE — DISCHARGE INSTRUCTIONS
agonists - such as needed drain, constrict the blood vessels, which decreases the pooling of blood in the lower extremities. Side effects include: Hypertension or high blood pressure, dizziness and urinary retention. Compression stockings may be ordered to keep the blood from pooling in the feet and legs. Avoid standing in one position or place for long time. If you do not have any choice in standing, exercising your legs as you stand will help the blood flow back to the central organs. Diet changes can help relieve low blood pressure. Refined carbohydrates (such as flour and sugar) can lower blood pressure. Some foods may trigger symptoms. Avoid heavy meals and alcohol as these may trigger symptoms. Neurocardiogenic Syncope can be controlled. The sooner you learn to recognize your symptoms and treat them appropriately, the quicker you will feel better. Medication may be added to control your symptoms if you are unable to adequately hydrate and increase your salt intake. It may take a while to see the effects of medications, so be patient. Sometimes these medications may have to be changed and/or adjusted several times to get the proper control. Often times, even more than one medication may be necessary. Even when you're under good control, you may have occasional bad days when you need to use a lot more salt and fluids than usual.  Take them in stride, this does not mean you're having a relapse. Narcisa [facility]  [address] office at:  [phone]  Darroll Jacobs MD Laurel Chester MD Heather Curling CNP  Pixie Ridges CNP  Rosario Saucedo RN      Permanent Pacemaker (PPM)   Implantable Cardioverter Defibrillator (ICD)  Care Instructions      Your permanent pacemaker/ICD is a sophisticated piece of electronic equipment and both the wound and the device need special care during your recovery period and into the future.   Please use these instructions as guide. If you have any questions please call the office. Wound Care:   Keep the incision site clean and dry for one week. Do not get the incision or bandage wet. You may sponge bathe but DO NOT shower for the first seven days or until after your first follow up office visit. Do not remove the steri strips (the pieces of \"tape\" that cover the incision). These will eventually fall off on their own. DO NOT apply soaps, ointments,  lotion or powder to the incision site. Wear comfortable clothes that will not rub on the incision site. Avoid bra straps or suspenders. At your one week follow up appointment your bandage will be removed and you will be given further instruction on care of the site at that time. Your bra strap may lay directly over the incision. If it does - please do not wear the bra strap on the incisional side for 4 weeks to prevent irritation. When to contact the office:  Changes in how your incision site is healing including:  Increase in swelling and/or tenderness   Increase in redness at the incision site or surrounding the device  Drainage from the incision  If you experience:  Lightheadedness, dizziness or passing out  Increase in fatigue or shortness of breath  Fever (temperature greater than 100 degrees F)  Chills   Prolonged hiccups or chest discomfort  If you have any questions     Activity:   Do not raise your elbow above shoulder level or reach behind your back for 4 weeks after your procedure. DO NOT lift more than 8 pounds with your affected arm for 4 weeks after your procedure. (A gallon of milk is 8 pounds). Avoid excessive pushing, pulling or twisting. DO NOT drive until instructed it is OK by your provider. Wear a sling only as a reminder to limit the activity of your affected arm. It is important to remember to still use your affected arm to maintain mobility but no excessive movements.   No sports activities until approved by your

## 2023-09-20 NOTE — PLAN OF CARE
Problem: Safety - Adult  Goal: Free from fall injury  Outcome: Progressing  Note: Patient free from falls/ injury during duration of shift. Bed alarm on, call light w/in reach, bed in lowest position, non-skid socks on and fall sign posted outside door. Problem: ABCDS Injury Assessment  Goal: Absence of physical injury  Outcome: Progressing  Note: No falls/ signed of injury.

## 2023-09-20 NOTE — H&P
V2.0  History and Physical      Name:  Shakila De La Cruz /Age/Sex: 1970  (47 y.o. male)   MRN & CSN:  [id number] & [id number] Encounter Date/Time: 2023 8:59 AM EDT   Location:  [facility] PCP: Fe Perez MD       Hospital Day: 1    History from:     patient    History of Present Illness:     Chief Complaint: Syncope and fell down the stairs    Shakila De La Cruz is a 47 y.o. male with pmh of A-fib, hypertension, hyperlipidemia, and type 2 diabetes who presents with syncope and collapse and mechanical fall down the stairs. Patient denies hitting his head. He is on Eliquis. Patient states that he does have a history of syncope and has been told in the past that it was vasovagal.  Last night patient woke up in the middle the night to urinate and states that afterwards he syncopized and did fall down the stairs. Called EMS. Patient states he will often his syncope is after straining with bowel movements. He does use stool softeners and does his best not to strain. Thus, this is why he has been told in the past that it is vasovagal.  Labs on this presentation were pertinent for an uptrending troponin. Patient is interested in having a full cardiac work-up and ensuring there is nothing else causing his syncope as this does continue to occur. Patient has been otherwise in his normal state of health. Assessment and Plan:   Shakila De La Cruz is a 47 y.o. male with a pmh of A-fib, hypertension, hyperlipidemia, and type 2 diabetes  who presents with <principal problem not specified>    Syncope and collapse  Patient with a history of of syncope. Patient says typically he has had what he describes was a vasovagal response after having a bowel movement. Last night patient did have syncope and fell down the stairs.   Patient does have uptrending tropes  Follow-up CT head to ensure no bleed as patient is on Eliquis  Telemetry   Echocardiogram.  Cardiology consulted for evaluation for possible Value Date/Time    LABA1C 7.2 09/12/2023 02:14 PM     TSH:   Lab Results   Component Value Date/Time    TSH 1.60 06/19/2018 01:59 AM     Troponin: No results found for: \"TROPONINT\"  Lactic Acid: No results for input(s): \"LACTA\" in the last 72 hours. BNP:   Recent Labs     09/20/23  0432   PROBNP 43     UA:  Lab Results   Component Value Date/Time    NITRU Negative 08/04/2018 04:43 PM    COLORU Straw 08/04/2018 04:43 PM    PHUR 6.5 08/04/2018 04:43 PM    PHUR 6.0 08/04/2018 04:43 PM    WBCUA 3 08/04/2018 04:43 PM    RBCUA 1 08/04/2018 04:43 PM    MUCUS Rare 12/31/2013 11:15 PM    BACTERIA 2+ 03/12/2015 09:00 AM    CLARITYU Clear 08/04/2018 04:43 PM    SPECGRAV 1.010 08/04/2018 04:43 PM    LEUKOCYTESUR Negative 08/04/2018 04:43 PM    UROBILINOGEN 0.2 08/04/2018 04:43 PM    BILIRUBINUR Negative 08/04/2018 04:43 PM    BILIRUBINUR Small 09/28/2011 09:14 PM    BLOODU TRACE-INTACT 08/04/2018 04:43 PM    GLUCOSEU Negative 08/04/2018 04:43 PM    GLUCOSEU Negative 09/28/2011 09:14 PM    KETUA Negative 08/04/2018 04:43 PM    AMORPHOUS Present 09/28/2011 08:46 PM     Urine Cultures: No results found for: \"LABURIN\"  Blood Cultures: No results found for: \"BC\"  No results found for: \"BLOODCULT2\"  Organism:   Lab Results   Component Value Date/Time    ORG Staphylococcus coagulase-negative 04/08/2017 02:00 AM       Imaging/Diagnostics Last 24 Hours   XR CHEST PORTABLE    Result Date: 9/20/2023  Portable chest HISTORY: Short of breath COMPARISON: June 22, 2022. FINDINGS: Cardiomegaly. No definite consolidation. No pleural effusion or pneumothorax. No acute disease. Electronically signed by Samir Garcia DO      Comment: Please note this report has been produced using speech recognition software and may contain errors related to that system including errors in grammar, punctuation, and spelling, as well as words and phrases that may be inappropriate.  If there are any questions or concerns please feel free to contact the dictating

## 2023-09-20 NOTE — ED TRIAGE NOTES
Patient arrived in the ER with c/o sob. Patient states that he woke and had to use bathroom. Patient states well using bathroom he had syncopal episode so he called 911.

## 2023-09-20 NOTE — ED PROVIDER NOTES
[facility] ENCOUNTER          ATTENDING PHYSICIAN NOTE       Date of evaluation: 9/20/2023    Chief Complaint     Shortness of Breath      History of Present Illness     Fariba De La Cruz is a 47 y.o. male who presents to the emergency department complaining shortness of breath. Patient states that he woke up from sleep with a sensation that he could not breathe. When EMS arrived, they found his oxygen saturations to be 91% on room air but did place him on a nonrebreather which he states helped with his symptoms. He states that he felt in his normal state of health during the day yesterday. He did get a steroid injection in his left knee yesterday. He denies any recent fevers or cough. He denies any nausea, vomiting, or diarrhea. He denies any chest pain or pressure. He states he has had symptoms like this happen in the past that he believes has been due to a \"vagal issue. \"  He does report a history of congestive heart failure but denies history of asthma or COPD. He does have a history of atrial fibrillation for which he is on anticoagulation with Eliquis. ASSESSMENT / PLAN  (MEDICAL DECISION MAKING)     INITIAL VITALS: BP: (!) 147/73,  , Pulse: 62, Respirations: 18, SpO2: 96 %      Tan De La Cruz is a 47 y.o. male with history of hypertension, diabetes, and atrial fibrillation on Eliquis presents for shortness of breath. Patient reports acute onset of shortness of breath that woke him up from sleep. On arrival, patient has oxygen saturations in the mid 90s on room air. He is hemodynamically stable. He has clear breath sounds and normal heart sounds. EKG shows no acute abnormalities. Laboratory studies are significant for mild respiratory acidosis with pH of 7.32 and PCO2 of 70. Initial Troponin and BNP are unremarkable but repeat troponin was elevated at 43. Chest x-ray shows no acute abnormalities.   In further discussion with the patient, he states that he has had episodes similar to this that are typically associated with having bowel movements. He states just before he started having the shortness of breath he had a bowel movement and started to feel lightheaded. He was able to walk back to his bed but there he started to feel worse which prompted him to call 911. He states the blood on his knee is from falling when he was going to open the door for the paramedics. With the patient's elevated troponin, there is a possibility of this being back abnormality as the cause of his episode. Patient will need admission to the hospital for further cardiac risk stratification. Is this patient to be included in the SEP-1 core measure? No Exclusion criteria - the patient is NOT to be included for SEP-1 Core Measure due to: Infection is not suspected    Medical Decision Making  Problems Addressed:  Elevated troponin: acute illness or injury  Lightheadedness: acute illness or injury  Shortness of breath: acute illness or injury    Amount and/or Complexity of Data Reviewed  External Data Reviewed: ECG and notes. Labs: ordered. Decision-making details documented in ED Course. Radiology: ordered. Decision-making details documented in ED Course. ECG/medicine tests: ordered and independent interpretation performed. Decision-making details documented in ED Course. Risk  Decision regarding hospitalization. Clinical Impression     1. Shortness of breath    2. Lightheadedness    3. Elevated troponin        Disposition     PATIENT REFERRED TO:  No follow-up provider specified. DISCHARGE MEDICATIONS:  Current Discharge Medication List          DISPOSITION Decision To Admit 09/20/2023 07:04:00 AM        Diagnostic Results and Other Data       RADIOLOGY:  XR CHEST PORTABLE   Final Result      No acute disease.       Electronically signed by Tristan Cast DO          LABS:   Results for orders placed or performed during the hospital encounter of 09/20/23   COVID-19, Rapid

## 2023-09-21 LAB
ANION GAP SERPL CALCULATED.3IONS-SCNC: 14 MMOL/L (ref 3–16)
BASOPHILS # BLD: 0 K/UL (ref 0–0.2)
BASOPHILS NFR BLD: 0.4 %
BUN SERPL-MCNC: 13 MG/DL (ref 7–20)
CALCIUM SERPL-MCNC: 9.4 MG/DL (ref 8.3–10.6)
CHLORIDE SERPL-SCNC: 98 MMOL/L (ref 99–110)
CO2 SERPL-SCNC: 25 MMOL/L (ref 21–32)
CREAT SERPL-MCNC: 0.9 MG/DL (ref 0.9–1.3)
DEPRECATED RDW RBC AUTO: 13 % (ref 12.4–15.4)
EOSINOPHIL # BLD: 0.1 K/UL (ref 0–0.6)
EOSINOPHIL NFR BLD: 0.9 %
EST. AVERAGE GLUCOSE BLD GHB EST-MCNC: 154.2 MG/DL
GFR SERPLBLD CREATININE-BSD FMLA CKD-EPI: >60 ML/MIN/{1.73_M2}
GLUCOSE BLD-MCNC: 173 MG/DL (ref 70–99)
GLUCOSE BLD-MCNC: 179 MG/DL (ref 70–99)
GLUCOSE BLD-MCNC: 194 MG/DL (ref 70–99)
GLUCOSE BLD-MCNC: 213 MG/DL (ref 70–99)
GLUCOSE SERPL-MCNC: 249 MG/DL (ref 70–99)
HBA1C MFR BLD: 7 %
HCT VFR BLD AUTO: 46.4 % (ref 40.5–52.5)
HGB BLD-MCNC: 16 G/DL (ref 13.5–17.5)
LYMPHOCYTES # BLD: 2.1 K/UL (ref 1–5.1)
LYMPHOCYTES NFR BLD: 32 %
MCH RBC QN AUTO: 31.9 PG (ref 26–34)
MCHC RBC AUTO-ENTMCNC: 34.4 G/DL (ref 31–36)
MCV RBC AUTO: 92.6 FL (ref 80–100)
MONOCYTES # BLD: 0.4 K/UL (ref 0–1.3)
MONOCYTES NFR BLD: 6 %
NEUTROPHILS # BLD: 4 K/UL (ref 1.7–7.7)
NEUTROPHILS NFR BLD: 60.7 %
PERFORMED ON: ABNORMAL
PLATELET # BLD AUTO: 225 K/UL (ref 135–450)
PMV BLD AUTO: 9.1 FL (ref 5–10.5)
POTASSIUM SERPL-SCNC: 3.8 MMOL/L (ref 3.5–5.1)
RBC # BLD AUTO: 5.02 M/UL (ref 4.2–5.9)
SODIUM SERPL-SCNC: 137 MMOL/L (ref 136–145)
WBC # BLD AUTO: 6.5 K/UL (ref 4–11)

## 2023-09-21 PROCEDURE — 80048 BASIC METABOLIC PNL TOTAL CA: CPT

## 2023-09-21 PROCEDURE — 6370000000 HC RX 637 (ALT 250 FOR IP): Performed by: STUDENT IN AN ORGANIZED HEALTH CARE EDUCATION/TRAINING PROGRAM

## 2023-09-21 PROCEDURE — 36415 COLL VENOUS BLD VENIPUNCTURE: CPT

## 2023-09-21 PROCEDURE — 1200000000 HC SEMI PRIVATE

## 2023-09-21 PROCEDURE — 94660 CPAP INITIATION&MGMT: CPT

## 2023-09-21 PROCEDURE — 85025 COMPLETE CBC W/AUTO DIFF WBC: CPT

## 2023-09-21 PROCEDURE — A9502 TC99M TETROFOSMIN: HCPCS | Performed by: INTERNAL MEDICINE

## 2023-09-21 PROCEDURE — 2580000003 HC RX 258: Performed by: STUDENT IN AN ORGANIZED HEALTH CARE EDUCATION/TRAINING PROGRAM

## 2023-09-21 PROCEDURE — 6360000002 HC RX W HCPCS: Performed by: INTERNAL MEDICINE

## 2023-09-21 PROCEDURE — 3430000000 HC RX DIAGNOSTIC RADIOPHARMACEUTICAL: Performed by: INTERNAL MEDICINE

## 2023-09-21 RX ORDER — REGADENOSON 0.08 MG/ML
0.4 INJECTION, SOLUTION INTRAVENOUS
Status: COMPLETED | OUTPATIENT
Start: 2023-09-21 | End: 2023-09-21

## 2023-09-21 RX ADMIN — ALLOPURINOL 300 MG: 100 TABLET ORAL at 09:46

## 2023-09-21 RX ADMIN — SODIUM CHLORIDE, PRESERVATIVE FREE 10 ML: 5 INJECTION INTRAVENOUS at 20:26

## 2023-09-21 RX ADMIN — INSULIN GLARGINE 12 UNITS: 100 INJECTION, SOLUTION SUBCUTANEOUS at 20:26

## 2023-09-21 RX ADMIN — ATORVASTATIN CALCIUM 20 MG: 20 TABLET, FILM COATED ORAL at 20:26

## 2023-09-21 RX ADMIN — SODIUM CHLORIDE, PRESERVATIVE FREE 10 ML: 5 INJECTION INTRAVENOUS at 08:41

## 2023-09-21 RX ADMIN — APIXABAN 5 MG: 5 TABLET, FILM COATED ORAL at 20:26

## 2023-09-21 RX ADMIN — VALSARTAN 160 MG: 160 TABLET, FILM COATED ORAL at 09:46

## 2023-09-21 RX ADMIN — REGADENOSON 0.4 MG: 0.08 INJECTION, SOLUTION INTRAVENOUS at 09:52

## 2023-09-21 RX ADMIN — APIXABAN 5 MG: 5 TABLET, FILM COATED ORAL at 09:46

## 2023-09-21 RX ADMIN — TETROFOSMIN 30 MILLICURIE: 1.38 INJECTION, POWDER, LYOPHILIZED, FOR SOLUTION INTRAVENOUS at 07:26

## 2023-09-21 RX ADMIN — ACETAMINOPHEN 650 MG: 325 TABLET ORAL at 08:59

## 2023-09-21 RX ADMIN — DILTIAZEM HYDROCHLORIDE 120 MG: 120 CAPSULE, COATED, EXTENDED RELEASE ORAL at 09:46

## 2023-09-21 RX ADMIN — FUROSEMIDE 80 MG: 80 TABLET ORAL at 20:26

## 2023-09-21 ASSESSMENT — PAIN SCALES - GENERAL
PAINLEVEL_OUTOF10: 7
PAINLEVEL_OUTOF10: 3
PAINLEVEL_OUTOF10: 1
PAINLEVEL_OUTOF10: 8

## 2023-09-21 ASSESSMENT — ENCOUNTER SYMPTOMS
ABDOMINAL PAIN: 0
SINUS PRESSURE: 0
SORE THROAT: 0
COLOR CHANGE: 0
COUGH: 0
SHORTNESS OF BREATH: 0
VOMITING: 0
CONSTIPATION: 0
CHEST TIGHTNESS: 0
TROUBLE SWALLOWING: 0
NAUSEA: 0
VOICE CHANGE: 0
WHEEZING: 0
SINUS PAIN: 0
BACK PAIN: 0
DIARRHEA: 0

## 2023-09-21 ASSESSMENT — PAIN - FUNCTIONAL ASSESSMENT
PAIN_FUNCTIONAL_ASSESSMENT: ACTIVITIES ARE NOT PREVENTED
PAIN_FUNCTIONAL_ASSESSMENT: PREVENTS OR INTERFERES SOME ACTIVE ACTIVITIES AND ADLS
PAIN_FUNCTIONAL_ASSESSMENT: PREVENTS OR INTERFERES SOME ACTIVE ACTIVITIES AND ADLS

## 2023-09-21 ASSESSMENT — PAIN DESCRIPTION - LOCATION
LOCATION: GENERALIZED

## 2023-09-21 ASSESSMENT — PAIN DESCRIPTION - DESCRIPTORS
DESCRIPTORS: ACHING;SORE

## 2023-09-21 ASSESSMENT — PAIN DESCRIPTION - FREQUENCY
FREQUENCY: CONTINUOUS

## 2023-09-21 ASSESSMENT — PAIN DESCRIPTION - PAIN TYPE: TYPE: ACUTE PAIN

## 2023-09-21 ASSESSMENT — PAIN DESCRIPTION - ONSET: ONSET: ON-GOING

## 2023-09-21 NOTE — PLAN OF CARE
Problem: Safety - Adult  Goal: Free from fall injury  Outcome: Progressing   Pain remains free from falls. Pt on standard safety measures.      Problem: ABCDS Injury Assessment  Goal: Absence of physical injury  Outcome: Progressing     Problem: Pain  Goal: Verbalizes/displays adequate comfort level or baseline comfort level  Outcome: Progressing

## 2023-09-21 NOTE — CARE COORDINATION
Case Management Assessment  Initial Evaluation    Date/Time of Evaluation: 9/21/2023 4:42 PM  Assessment Completed by: Adam Biggs RN    If patient is discharged prior to next notation, then this note serves as note for discharge by case management. Patient Name: Emily De La Cruz                   YOB: 1970  Diagnosis: Syncope and collapse [R55]  Shortness of breath [R06.02]  Lightheadedness [R42]  Elevated troponin [R77.8]                   Date / Time: 9/20/2023  4:01 AM    Patient Admission Status: Inpatient   Readmission Risk (Low < 19, Mod (19-27), High > 27): Readmission Risk Score: 7.2    Current PCP: Daron Dick MD  PCP verified by CM? Yes    Chart Reviewed: Yes      History Provided by: Patient  Patient Orientation: Alert and Oriented    Patient Cognition: Alert    Hospitalization in the last 30 days (Readmission):  No    If yes, Readmission Assessment in CM Navigator will be completed. Advance Directives:      Code Status: Full Code   Patient's Primary Decision Maker is: Legal Next of Kin    Primary Decision Maker: Stefany Josep [facility] - [phone]    Discharge Planning:    Patient lives with: Spouse/Significant Other Type of Home: House  Primary Care Giver: Self  Patient Support Systems include: Spouse/Significant Other   Current Financial resources: Medicaid  Current community resources: None  Current services prior to admission: C-pap            Current DME:              Type of Home Care services:  None    ADLS  Prior functional level: Independent in ADLs/IADLs  Current functional level: Independent in ADLs/IADLs    PT AM-PAC:   /24  OT AM-PAC:   /24    Family can provide assistance at DC: Yes  Would you like Case Management to discuss the discharge plan with any other family members/significant others, and if so, who?  No  Plans to Return to Present Housing: Yes  Other Identified Issues/Barriers to RETURNING to current housing: yes  Potential Assistance needed at

## 2023-09-22 ENCOUNTER — ANESTHESIA (OUTPATIENT)
Dept: CARDIAC CATH/INVASIVE PROCEDURES | Age: 53
End: 2023-09-22
Payer: MEDICAID

## 2023-09-22 ENCOUNTER — ANESTHESIA EVENT (OUTPATIENT)
Dept: CARDIAC CATH/INVASIVE PROCEDURES | Age: 53
End: 2023-09-22
Payer: MEDICAID

## 2023-09-22 ENCOUNTER — PROCEDURE VISIT (OUTPATIENT)
Dept: CARDIOLOGY CLINIC | Age: 53
End: 2023-09-22

## 2023-09-22 ENCOUNTER — APPOINTMENT (OUTPATIENT)
Dept: GENERAL RADIOLOGY | Age: 53
DRG: 171 | End: 2023-09-22
Payer: MEDICAID

## 2023-09-22 ENCOUNTER — APPOINTMENT (OUTPATIENT)
Dept: CARDIAC CATH/INVASIVE PROCEDURES | Age: 53
DRG: 171 | End: 2023-09-22
Payer: MEDICAID

## 2023-09-22 DIAGNOSIS — Z95.0 CARDIAC PACEMAKER IN SITU: Primary | ICD-10-CM

## 2023-09-22 LAB
ANION GAP SERPL CALCULATED.3IONS-SCNC: 11 MMOL/L (ref 3–16)
BASOPHILS # BLD: 0 K/UL (ref 0–0.2)
BASOPHILS NFR BLD: 0.7 %
BUN SERPL-MCNC: 13 MG/DL (ref 7–20)
CALCIUM SERPL-MCNC: 9.3 MG/DL (ref 8.3–10.6)
CHLORIDE SERPL-SCNC: 101 MMOL/L (ref 99–110)
CO2 SERPL-SCNC: 26 MMOL/L (ref 21–32)
CREAT SERPL-MCNC: 0.9 MG/DL (ref 0.9–1.3)
DEPRECATED RDW RBC AUTO: 13.4 % (ref 12.4–15.4)
EOSINOPHIL # BLD: 0.1 K/UL (ref 0–0.6)
EOSINOPHIL NFR BLD: 1.3 %
GFR SERPLBLD CREATININE-BSD FMLA CKD-EPI: >60 ML/MIN/{1.73_M2}
GLUCOSE BLD-MCNC: 163 MG/DL (ref 70–99)
GLUCOSE BLD-MCNC: 167 MG/DL (ref 70–99)
GLUCOSE BLD-MCNC: 179 MG/DL (ref 70–99)
GLUCOSE BLD-MCNC: 282 MG/DL (ref 70–99)
GLUCOSE SERPL-MCNC: 188 MG/DL (ref 70–99)
HCT VFR BLD AUTO: 47.9 % (ref 40.5–52.5)
HGB BLD-MCNC: 16.2 G/DL (ref 13.5–17.5)
LYMPHOCYTES # BLD: 2.2 K/UL (ref 1–5.1)
LYMPHOCYTES NFR BLD: 38.6 %
MAGNESIUM SERPL-MCNC: 1.6 MG/DL (ref 1.8–2.4)
MCH RBC QN AUTO: 31.6 PG (ref 26–34)
MCHC RBC AUTO-ENTMCNC: 33.9 G/DL (ref 31–36)
MCV RBC AUTO: 93.4 FL (ref 80–100)
MONOCYTES # BLD: 0.3 K/UL (ref 0–1.3)
MONOCYTES NFR BLD: 5.5 %
NEUTROPHILS # BLD: 3.1 K/UL (ref 1.7–7.7)
NEUTROPHILS NFR BLD: 53.9 %
PERFORMED ON: ABNORMAL
PLATELET # BLD AUTO: 227 K/UL (ref 135–450)
PMV BLD AUTO: 8.9 FL (ref 5–10.5)
POTASSIUM SERPL-SCNC: 3.5 MMOL/L (ref 3.5–5.1)
RBC # BLD AUTO: 5.13 M/UL (ref 4.2–5.9)
SODIUM SERPL-SCNC: 138 MMOL/L (ref 136–145)
WBC # BLD AUTO: 5.8 K/UL (ref 4–11)

## 2023-09-22 PROCEDURE — C1769 GUIDE WIRE: HCPCS

## 2023-09-22 PROCEDURE — 1200000000 HC SEMI PRIVATE

## 2023-09-22 PROCEDURE — C1898 LEAD, PMKR, OTHER THAN TRANS: HCPCS

## 2023-09-22 PROCEDURE — 99223 1ST HOSP IP/OBS HIGH 75: CPT | Performed by: INTERNAL MEDICINE

## 2023-09-22 PROCEDURE — 2500000003 HC RX 250 WO HCPCS

## 2023-09-22 PROCEDURE — 2580000003 HC RX 258: Performed by: ANESTHESIOLOGY

## 2023-09-22 PROCEDURE — 80048 BASIC METABOLIC PNL TOTAL CA: CPT

## 2023-09-22 PROCEDURE — 33208 INSRT HEART PM ATRIAL & VENT: CPT

## 2023-09-22 PROCEDURE — C1785 PMKR, DUAL, RATE-RESP: HCPCS

## 2023-09-22 PROCEDURE — 2580000003 HC RX 258: Performed by: INTERNAL MEDICINE

## 2023-09-22 PROCEDURE — 0JH606Z INSERTION OF PACEMAKER, DUAL CHAMBER INTO CHEST SUBCUTANEOUS TISSUE AND FASCIA, OPEN APPROACH: ICD-10-PCS | Performed by: INTERNAL MEDICINE

## 2023-09-22 PROCEDURE — 33208 INSRT HEART PM ATRIAL & VENT: CPT | Performed by: INTERNAL MEDICINE

## 2023-09-22 PROCEDURE — 2580000003 HC RX 258: Performed by: STUDENT IN AN ORGANIZED HEALTH CARE EDUCATION/TRAINING PROGRAM

## 2023-09-22 PROCEDURE — 7100000001 HC PACU RECOVERY - ADDTL 15 MIN: Performed by: ANESTHESIOLOGY

## 2023-09-22 PROCEDURE — 94660 CPAP INITIATION&MGMT: CPT

## 2023-09-22 PROCEDURE — 6370000000 HC RX 637 (ALT 250 FOR IP): Performed by: INTERNAL MEDICINE

## 2023-09-22 PROCEDURE — 7100000000 HC PACU RECOVERY - FIRST 15 MIN: Performed by: ANESTHESIOLOGY

## 2023-09-22 PROCEDURE — 02HK3JZ INSERTION OF PACEMAKER LEAD INTO RIGHT VENTRICLE, PERCUTANEOUS APPROACH: ICD-10-PCS | Performed by: INTERNAL MEDICINE

## 2023-09-22 PROCEDURE — C1887 CATHETER, GUIDING: HCPCS

## 2023-09-22 PROCEDURE — 02H63JZ INSERTION OF PACEMAKER LEAD INTO RIGHT ATRIUM, PERCUTANEOUS APPROACH: ICD-10-PCS | Performed by: INTERNAL MEDICINE

## 2023-09-22 PROCEDURE — C1894 INTRO/SHEATH, NON-LASER: HCPCS

## 2023-09-22 PROCEDURE — 36415 COLL VENOUS BLD VENIPUNCTURE: CPT

## 2023-09-22 PROCEDURE — 2580000003 HC RX 258

## 2023-09-22 PROCEDURE — 83735 ASSAY OF MAGNESIUM: CPT

## 2023-09-22 PROCEDURE — 2709999900 HC NON-CHARGEABLE SUPPLY

## 2023-09-22 PROCEDURE — 6360000002 HC RX W HCPCS: Performed by: STUDENT IN AN ORGANIZED HEALTH CARE EDUCATION/TRAINING PROGRAM

## 2023-09-22 PROCEDURE — 71045 X-RAY EXAM CHEST 1 VIEW: CPT

## 2023-09-22 PROCEDURE — 6370000000 HC RX 637 (ALT 250 FOR IP): Performed by: STUDENT IN AN ORGANIZED HEALTH CARE EDUCATION/TRAINING PROGRAM

## 2023-09-22 PROCEDURE — 6360000002 HC RX W HCPCS

## 2023-09-22 PROCEDURE — 3700000000 HC ANESTHESIA ATTENDED CARE: Performed by: ANESTHESIOLOGY

## 2023-09-22 PROCEDURE — 85025 COMPLETE CBC W/AUTO DIFF WBC: CPT

## 2023-09-22 PROCEDURE — 99233 SBSQ HOSP IP/OBS HIGH 50: CPT | Performed by: INTERNAL MEDICINE

## 2023-09-22 PROCEDURE — 3700000001 HC ADD 15 MINUTES (ANESTHESIA): Performed by: ANESTHESIOLOGY

## 2023-09-22 RX ORDER — SODIUM CHLORIDE 9 MG/ML
INJECTION, SOLUTION INTRAVENOUS PRN
Status: DISCONTINUED | OUTPATIENT
Start: 2023-09-22 | End: 2023-09-23 | Stop reason: HOSPADM

## 2023-09-22 RX ORDER — PROCHLORPERAZINE EDISYLATE 5 MG/ML
5 INJECTION INTRAMUSCULAR; INTRAVENOUS
Status: DISCONTINUED | OUTPATIENT
Start: 2023-09-22 | End: 2023-09-23 | Stop reason: HOSPADM

## 2023-09-22 RX ORDER — MAGNESIUM SULFATE IN WATER 40 MG/ML
4000 INJECTION, SOLUTION INTRAVENOUS ONCE
Status: COMPLETED | OUTPATIENT
Start: 2023-09-22 | End: 2023-09-22

## 2023-09-22 RX ORDER — ROCURONIUM BROMIDE 10 MG/ML
INJECTION, SOLUTION INTRAVENOUS PRN
Status: DISCONTINUED | OUTPATIENT
Start: 2023-09-22 | End: 2023-09-22 | Stop reason: SDUPTHER

## 2023-09-22 RX ORDER — POTASSIUM CHLORIDE 20 MEQ/1
40 TABLET, EXTENDED RELEASE ORAL ONCE
Status: COMPLETED | OUTPATIENT
Start: 2023-09-22 | End: 2023-09-22

## 2023-09-22 RX ORDER — ONDANSETRON 2 MG/ML
4 INJECTION INTRAMUSCULAR; INTRAVENOUS
Status: DISCONTINUED | OUTPATIENT
Start: 2023-09-22 | End: 2023-09-23 | Stop reason: HOSPADM

## 2023-09-22 RX ORDER — PROPOFOL 10 MG/ML
INJECTION, EMULSION INTRAVENOUS PRN
Status: DISCONTINUED | OUTPATIENT
Start: 2023-09-22 | End: 2023-09-22 | Stop reason: SDUPTHER

## 2023-09-22 RX ORDER — FENTANYL CITRATE 50 UG/ML
INJECTION, SOLUTION INTRAMUSCULAR; INTRAVENOUS PRN
Status: DISCONTINUED | OUTPATIENT
Start: 2023-09-22 | End: 2023-09-22 | Stop reason: SDUPTHER

## 2023-09-22 RX ORDER — LIDOCAINE HYDROCHLORIDE 20 MG/ML
INJECTION, SOLUTION EPIDURAL; INFILTRATION; INTRACAUDAL; PERINEURAL PRN
Status: DISCONTINUED | OUTPATIENT
Start: 2023-09-22 | End: 2023-09-22 | Stop reason: SDUPTHER

## 2023-09-22 RX ORDER — FENTANYL CITRATE 50 UG/ML
50 INJECTION, SOLUTION INTRAMUSCULAR; INTRAVENOUS EVERY 5 MIN PRN
Status: DISCONTINUED | OUTPATIENT
Start: 2023-09-22 | End: 2023-09-23 | Stop reason: HOSPADM

## 2023-09-22 RX ORDER — SODIUM CHLORIDE 0.9 % (FLUSH) 0.9 %
5-40 SYRINGE (ML) INJECTION PRN
Status: DISCONTINUED | OUTPATIENT
Start: 2023-09-22 | End: 2023-09-23 | Stop reason: HOSPADM

## 2023-09-22 RX ORDER — SODIUM CHLORIDE, SODIUM LACTATE, POTASSIUM CHLORIDE, CALCIUM CHLORIDE 600; 310; 30; 20 MG/100ML; MG/100ML; MG/100ML; MG/100ML
INJECTION, SOLUTION INTRAVENOUS CONTINUOUS PRN
Status: DISCONTINUED | OUTPATIENT
Start: 2023-09-22 | End: 2023-09-22 | Stop reason: SDUPTHER

## 2023-09-22 RX ORDER — CEFAZOLIN SODIUM 1 G/3ML
INJECTION, POWDER, FOR SOLUTION INTRAMUSCULAR; INTRAVENOUS PRN
Status: DISCONTINUED | OUTPATIENT
Start: 2023-09-22 | End: 2023-09-22 | Stop reason: SDUPTHER

## 2023-09-22 RX ORDER — DEXAMETHASONE SODIUM PHOSPHATE 4 MG/ML
INJECTION, SOLUTION INTRA-ARTICULAR; INTRALESIONAL; INTRAMUSCULAR; INTRAVENOUS; SOFT TISSUE PRN
Status: DISCONTINUED | OUTPATIENT
Start: 2023-09-22 | End: 2023-09-22 | Stop reason: SDUPTHER

## 2023-09-22 RX ORDER — OXYCODONE HYDROCHLORIDE 5 MG/1
5 TABLET ORAL
Status: DISCONTINUED | OUTPATIENT
Start: 2023-09-22 | End: 2023-09-23 | Stop reason: HOSPADM

## 2023-09-22 RX ORDER — HYDRALAZINE HYDROCHLORIDE 20 MG/ML
10 INJECTION INTRAMUSCULAR; INTRAVENOUS
Status: DISCONTINUED | OUTPATIENT
Start: 2023-09-22 | End: 2023-09-23 | Stop reason: HOSPADM

## 2023-09-22 RX ORDER — MEPERIDINE HYDROCHLORIDE 25 MG/ML
12.5 INJECTION INTRAMUSCULAR; INTRAVENOUS; SUBCUTANEOUS EVERY 5 MIN PRN
Status: DISCONTINUED | OUTPATIENT
Start: 2023-09-22 | End: 2023-09-23 | Stop reason: HOSPADM

## 2023-09-22 RX ORDER — SUCCINYLCHOLINE/SOD CL,ISO/PF 100 MG/5ML
SYRINGE (ML) INTRAVENOUS PRN
Status: DISCONTINUED | OUTPATIENT
Start: 2023-09-22 | End: 2023-09-22 | Stop reason: SDUPTHER

## 2023-09-22 RX ORDER — SODIUM CHLORIDE 0.9 % (FLUSH) 0.9 %
5-40 SYRINGE (ML) INJECTION EVERY 12 HOURS SCHEDULED
Status: DISCONTINUED | OUTPATIENT
Start: 2023-09-22 | End: 2023-09-23 | Stop reason: HOSPADM

## 2023-09-22 RX ORDER — VALSARTAN 320 MG/1
320 TABLET ORAL DAILY
Status: DISCONTINUED | OUTPATIENT
Start: 2023-09-23 | End: 2023-09-23 | Stop reason: HOSPADM

## 2023-09-22 RX ORDER — FENTANYL CITRATE 50 UG/ML
25 INJECTION, SOLUTION INTRAMUSCULAR; INTRAVENOUS EVERY 5 MIN PRN
Status: DISCONTINUED | OUTPATIENT
Start: 2023-09-22 | End: 2023-09-23 | Stop reason: HOSPADM

## 2023-09-22 RX ORDER — AMLODIPINE BESYLATE 5 MG/1
5 TABLET ORAL DAILY
Status: DISCONTINUED | OUTPATIENT
Start: 2023-09-22 | End: 2023-09-23 | Stop reason: HOSPADM

## 2023-09-22 RX ORDER — LABETALOL HYDROCHLORIDE 5 MG/ML
10 INJECTION, SOLUTION INTRAVENOUS
Status: DISCONTINUED | OUTPATIENT
Start: 2023-09-22 | End: 2023-09-23 | Stop reason: HOSPADM

## 2023-09-22 RX ORDER — ONDANSETRON 2 MG/ML
INJECTION INTRAMUSCULAR; INTRAVENOUS PRN
Status: DISCONTINUED | OUTPATIENT
Start: 2023-09-22 | End: 2023-09-22 | Stop reason: SDUPTHER

## 2023-09-22 RX ADMIN — ROCURONIUM BROMIDE 50 MG: 10 INJECTION, SOLUTION INTRAVENOUS at 14:20

## 2023-09-22 RX ADMIN — SODIUM CHLORIDE, PRESERVATIVE FREE 10 ML: 5 INJECTION INTRAVENOUS at 21:38

## 2023-09-22 RX ADMIN — MAGNESIUM SULFATE HEPTAHYDRATE 4000 MG: 40 INJECTION, SOLUTION INTRAVENOUS at 17:33

## 2023-09-22 RX ADMIN — SODIUM CHLORIDE, SODIUM LACTATE, POTASSIUM CHLORIDE, AND CALCIUM CHLORIDE: .6; .31; .03; .02 INJECTION, SOLUTION INTRAVENOUS at 13:59

## 2023-09-22 RX ADMIN — FUROSEMIDE 80 MG: 80 TABLET ORAL at 10:42

## 2023-09-22 RX ADMIN — POTASSIUM CHLORIDE 40 MEQ: 1500 TABLET, EXTENDED RELEASE ORAL at 17:22

## 2023-09-22 RX ADMIN — DEXAMETHASONE SODIUM PHOSPHATE 4 MG: 4 INJECTION, SOLUTION INTRAMUSCULAR; INTRAVENOUS at 14:13

## 2023-09-22 RX ADMIN — LIDOCAINE HYDROCHLORIDE 100 MG: 20 INJECTION, SOLUTION EPIDURAL; INFILTRATION; INTRACAUDAL; PERINEURAL at 14:08

## 2023-09-22 RX ADMIN — FUROSEMIDE 80 MG: 80 TABLET ORAL at 17:22

## 2023-09-22 RX ADMIN — Medication 200 MG: at 14:08

## 2023-09-22 RX ADMIN — FENTANYL CITRATE 50 MCG: 50 INJECTION, SOLUTION INTRAMUSCULAR; INTRAVENOUS at 14:03

## 2023-09-22 RX ADMIN — APIXABAN 5 MG: 5 TABLET, FILM COATED ORAL at 21:37

## 2023-09-22 RX ADMIN — PROPOFOL 220 MG: 10 INJECTION, EMULSION INTRAVENOUS at 14:08

## 2023-09-22 RX ADMIN — CEFAZOLIN SODIUM 3 G: 1 POWDER, FOR SOLUTION INTRAMUSCULAR; INTRAVENOUS at 14:17

## 2023-09-22 RX ADMIN — ONDANSETRON 4 MG: 2 INJECTION INTRAMUSCULAR; INTRAVENOUS at 14:13

## 2023-09-22 RX ADMIN — SODIUM CHLORIDE, PRESERVATIVE FREE 10 ML: 5 INJECTION INTRAVENOUS at 21:39

## 2023-09-22 RX ADMIN — VALSARTAN 160 MG: 160 TABLET, FILM COATED ORAL at 10:42

## 2023-09-22 RX ADMIN — DILTIAZEM HYDROCHLORIDE 120 MG: 120 CAPSULE, COATED, EXTENDED RELEASE ORAL at 10:42

## 2023-09-22 RX ADMIN — AMLODIPINE BESYLATE 5 MG: 5 TABLET ORAL at 17:22

## 2023-09-22 RX ADMIN — SODIUM CHLORIDE, PRESERVATIVE FREE 10 ML: 5 INJECTION INTRAVENOUS at 10:45

## 2023-09-22 RX ADMIN — ATORVASTATIN CALCIUM 20 MG: 20 TABLET, FILM COATED ORAL at 21:37

## 2023-09-22 RX ADMIN — INSULIN GLARGINE 12 UNITS: 100 INJECTION, SOLUTION SUBCUTANEOUS at 21:37

## 2023-09-22 RX ADMIN — ALLOPURINOL 300 MG: 100 TABLET ORAL at 10:42

## 2023-09-22 ASSESSMENT — ENCOUNTER SYMPTOMS
SHORTNESS OF BREATH: 0
VOICE CHANGE: 0
SORE THROAT: 0
SHORTNESS OF BREATH: 1
WHEEZING: 0
DIARRHEA: 0
NAUSEA: 0
ABDOMINAL PAIN: 0
VOMITING: 0
SINUS PRESSURE: 0
BACK PAIN: 0
TROUBLE SWALLOWING: 0
COLOR CHANGE: 0
CONSTIPATION: 0
CHEST TIGHTNESS: 0
SINUS PAIN: 0
COUGH: 0

## 2023-09-22 ASSESSMENT — PAIN SCALES - GENERAL: PAINLEVEL_OUTOF10: 0

## 2023-09-22 ASSESSMENT — LIFESTYLE VARIABLES: SMOKING_STATUS: 1

## 2023-09-22 NOTE — PLAN OF CARE
Problem: Safety - Adult  Goal: Free from fall injury  9/21/2023 2249 by Víctor Fritz RN  Outcome: Progressing  9/21/2023 1845 by Barbra Kawasaki, RN  Outcome: Progressing     Problem: ABCDS Injury Assessment  Goal: Absence of physical injury  9/21/2023 2249 by Víctor Fritz RN  Outcome: Progressing  9/21/2023 1845 by Barbra Kawasaki, RN  Outcome: Progressing     Problem: Discharge Planning  Goal: Discharge to home or other facility with appropriate resources  Outcome: Progressing     Problem: Pain  Goal: Verbalizes/displays adequate comfort level or baseline comfort level  9/21/2023 2249 by Víctor Fritz RN  Outcome: Progressing  9/21/2023 1845 by Barbra Kawasaki, RN  Outcome: Progressing

## 2023-09-22 NOTE — PROCEDURES
[facility]     Electrophysiology Procedure Note       Date of Procedure: 9/22/2023  Patient's Name: Abad De La Cruz  YOB: 1970   Medical Record Number: [id number]  Procedure Performed by: Roosevelt Horvath MD    Procedures performed:  Insertion of MRI compatible right ventricular pacing lead under fluoroscopy. Insertion of MRI compatible right atrial lead under fluoroscopy  Insertion of a MRI compatible dual chamber Pacemaker. Electronic analysis of lead and device. Anesthesia: Local and Monitored Anesthesia Care  Estimated blood loss less than 20 cc    Indication of the procedure:       Abad De La Cruz is a 47 y.o. male with symptomatic bradycardia. The patient is referred for pacemaker implantation due to sick sinus syndrome, leading onto intermittent junctional episodes. Patient also have recurrent episodes of syncope that we are not able to capture through the telemetry. History was consistent with vasovagal symptoms. However, secondary to repeated episodes of junctional rhythm, with intermittent episodes of atrial tachycardia with a rate of 130 to 140 bpm, we are limited in using calcium channel blocker/beta-blockers. Hence, he was recommended to have a dual-chamber pacemaker implantation. We also plan to have a rate drop response with his device so that this could also help him in vasoinhibitor form of syncope. Details of procedure: The patient was brought to the electrophysiology laboratory in stable condition. The patient was in a fasting and non-sedated state. The risks, benefits and alternatives of the procedure were discussed with the patient. The risks including, but not limited to, the risks of vascular injury, bleeding, infection, device malfunction, lead dislodgement, radiation exposure, injury to cardiac and surrounding structures (including pneumothorax), stroke, myocardial infarction and death were discussed in detail.  The patient opted to proceed with the device implantation. Written informed consent was signed and placed in the chart. Prophylactic antibiotic using Ancef 2 g IV was given. The patient was prepped and draped in sterile fashion. A timeout protocol was completed to identify the patient and the procedure being performed. Secondary to morbid obesity and history of active sleep apnea requiring CPAP, anesthesia team was involved for sedation. He was under monitored anesthesia care. The patient was monitored continuously with ECG, pulse oximetry, blood pressure monitoring, and direct observation. An incision was made in the left upper pectoral area in a transverse line roughly 1 cm from the clavicle after administration of lidocaine/bupivicaine combination. Using electrocautery and blunt dissection, a pocket was created. Central venous access into the left extrathoracic subclavian vein was obtained using the modified Seldinger technique. After central venous access was obtained, a sheath was placed in the axillary vein. Using a Medtronic C3 1 5 sheath, a Medtronic [id number] right ventricular lead was advanced into the RV septal position under fluoroscopic guidance and pace mapping was performed. The lead was actively fixated. After confirming appropriate function and no diaphragmatic stimulation at maximum output, the sheath was split and removed. The lead was secured to the underlying tissue using suture material.      A new sheath was advanced over a second previously placed wire into the vein. The atrial lead was advanced to the right atrial appendage and actively fixated under fluoroscopic guidance. After confirming appropriate function and no diaphragmatic stimulation at maximum output, the sheath was split and removed. The lead was secured to the underlying tissue using suture. The leads were then connected to the new pulse generator which was then placed into the pocket.  Antibiotic solution along with saline flush was used to irrigate the

## 2023-09-22 NOTE — CONSULTS
[address] [facility]   Cardiac Electrophysiology Consultation   Date: 9/22/2023  Admit Date:  9/20/2023  Reason for Consultation: Bradycardia and recurrent syncope  Consult Requesting Physician: Jones , *     Chief Complaint   Patient presents with    Shortness of Breath     HPI: Galina De La Cruz is a 47 y.o. gentleman with past medical history significant for atrial fibrillation, status post atrial fibrillation ablation in November 2018, heart failure with preserved LV ejection fraction, morbid obesity, obstructive sleep apnea on CPAP, history of recurrent syncope predominantly when he is using restroom, was admitted to the hospital with 1 other episode of syncope when he was using the restroom. Patient states that typically he was able to appreciate when he had his lightheadedness and about to pass out and sometimes he was able to lie down flat and able to control this. Over the night, patient was monitored over the telemetry. His monitor shows episodes of nonsustained atrial tachycardia and recurrent episodes of junctional rhythm with a rate of 35 bpm.  When he had junctional rhythm, patient has not been symptomatic. Secondary to recurrent junctional rhythm EP was consulted for possible pacemaker implantation. EKG shows normal sinus rhythm    Echocardiogram shows preserved LV ejection fraction. LV thickness is moderately increased. Grade 2 diastolic dysfunction is noted. Nuclear stress test with no reversible perfusion defect. Impression:  Tachybradycardia syndrome  Intermittent junctional rhythm  Recurrent syncope  Heart failure with preserved LV ejection fraction  Obstructive sleep apnea on CPAP    Recommendations:  Patient clearly have evidence of sick sinus syndrome in the form of tachybrady.   He had intermittent junctional rhythm with rate goes down to 35 bpm.  Unfortunately, we do not have any documented syncope while he is in the hospital.  However, his blood pressure is

## 2023-09-22 NOTE — PLAN OF CARE
Problem: Safety - Adult  Goal: Free from fall injury  Outcome: Progressing     Problem: ABCDS Injury Assessment  Goal: Absence of physical injury  Outcome: Progressing     Problem: Discharge Planning  Goal: Discharge to home or other facility with appropriate resources  Outcome: Progressing     Problem: Pain  Goal: Verbalizes/displays adequate comfort level or baseline comfort level  Outcome: Progressing     Problem: Chronic Conditions and Co-morbidities  Goal: Patient's chronic conditions and co-morbidity symptoms are monitored and maintained or improved  Outcome: Progressing     Problem: Skin/Tissue Integrity  Goal: Absence of new skin breakdown  Description: 1. Monitor for areas of redness and/or skin breakdown  2. Assess vascular access sites hourly  3. Every 4-6 hours minimum:  Change oxygen saturation probe site  4. Every 4-6 hours:  If on nasal continuous positive airway pressure, respiratory therapy assess nares and determine need for appliance change or resting period.   Outcome: Progressing

## 2023-09-22 NOTE — ANESTHESIA POSTPROCEDURE EVALUATION
Department of Anesthesiology  Postprocedure Note    Patient: Parviz Parra  MRN: [id number]  YOB: 1970  Date of evaluation: 9/22/2023      Procedure Summary     Date: 09/22/23 Room / Location: [facility] Cath Lab    Anesthesia Start: [facility] Anesthesia Stop: 6488    Procedure: Damico Aurea PACEMAKER W ANES Diagnosis:     Scheduled Providers:  Responsible Provider: Anuel King MD    Anesthesia Type: general, MAC ASA Status: 3          Anesthesia Type: No value filed.     John Phase I: John Score: 9    John Phase II:        Anesthesia Post Evaluation    Patient location during evaluation: PACU  Patient participation: complete - patient participated  Level of consciousness: awake and alert  Airway patency: patent  Nausea & Vomiting: no nausea and no vomiting  Complications: no  Cardiovascular status: hemodynamically stable  Respiratory status: acceptable  Hydration status: euvolemic  Multimodal analgesia pain management approach  Pain management: satisfactory to patient

## 2023-09-22 NOTE — ANESTHESIA PRE PROCEDURE
Department of Anesthesiology  Preprocedure Note       Name:  Shakila De La Cruz   Age:  47 y.o.  :  1970                                          MRN:  [id number]         Date:  2023      Surgeon: * No surgeons listed *    Procedure: * No procedures listed *    Medications prior to admission:   Prior to Admission medications    Medication Sig Start Date End Date Taking? Authorizing Provider   allopurinol (ZYLOPRIM) 300 MG tablet TAKE 1 TABLET BY MOUTH EVERY DAY 23   Jnony Melvin MD   apixaban (ELIQUIS) 5 MG TABS tablet TAKE 1 TABLET BY MOUTH TWICE A DAY 23   Jonny Melvin MD   atorvastatin (LIPITOR) 20 MG tablet TAKE 1 TABLET BY MOUTH EVERY DAY 23   Jonny Ocasio MD   dilTIAZem (CARDIZEM CD) 120 MG extended release capsule Take 1 capsule by mouth daily 9/12/23 1/10/24  Jonny Melvin MD   insulin glargine (BASAGLAR KWIKPEN) 100 UNIT/ML injection pen patient using vials per [facility] inject 20 units at hs  Patient taking differently: nightly patient using vials per [facility] inject 20 units at hs 23   Jonny Ocasio MD   valsartan (DIOVAN) 160 MG tablet Take 1 tablet by mouth daily 23  Rancho Duong MD   tadalafil (CIALIS) 20 MG tablet Take 1 tablet by mouth as needed for Erectile Dysfunction 23  Rancho Duong MD   metFORMIN (GLUCOPHAGE) 500 MG tablet Take one tablet twice per day with meals 23   Rancho Duong MD   ibuprofen (ADVIL;MOTRIN) 600 MG tablet Take 1 tablet by mouth every 8 hours as needed for Pain 23  Rancho Duong MD   Insulin Pen Needle (PEN NEEDLES) 31G X 6 MM MISC 1 each by Does not apply route daily May substitute to meet insur. requirements 23   Ulisses Simental MD   potassium chloride (KLOR-CON M20) 20 MEQ extended release tablet TAKE 1 TABLET BY MOUTH EVERY DAY WITH BREAKFAST 23   Judah Cervantes MD   [facility] Does not apply route Okay effective from 3/27/2023

## 2023-09-23 VITALS
WEIGHT: 315 LBS | HEIGHT: 72 IN | RESPIRATION RATE: 18 BRPM | OXYGEN SATURATION: 95 % | DIASTOLIC BLOOD PRESSURE: 99 MMHG | HEART RATE: 80 BPM | SYSTOLIC BLOOD PRESSURE: 152 MMHG | BODY MASS INDEX: 42.66 KG/M2 | TEMPERATURE: 97.8 F

## 2023-09-23 DIAGNOSIS — I49.5 SSS (SICK SINUS SYNDROME) (HCC): ICD-10-CM

## 2023-09-23 DIAGNOSIS — Z95.0 CARDIAC PACEMAKER IN SITU: Primary | ICD-10-CM

## 2023-09-23 DIAGNOSIS — I48.19 PERSISTENT ATRIAL FIBRILLATION (HCC): ICD-10-CM

## 2023-09-23 LAB
ANION GAP SERPL CALCULATED.3IONS-SCNC: 11 MMOL/L (ref 3–16)
BASOPHILS # BLD: 0.1 K/UL (ref 0–0.2)
BASOPHILS NFR BLD: 0.7 %
BUN SERPL-MCNC: 12 MG/DL (ref 7–20)
CALCIUM SERPL-MCNC: 9.9 MG/DL (ref 8.3–10.6)
CHLORIDE SERPL-SCNC: 96 MMOL/L (ref 99–110)
CO2 SERPL-SCNC: 30 MMOL/L (ref 21–32)
CREAT SERPL-MCNC: 1 MG/DL (ref 0.9–1.3)
DEPRECATED RDW RBC AUTO: 13.2 % (ref 12.4–15.4)
EOSINOPHIL # BLD: 0 K/UL (ref 0–0.6)
EOSINOPHIL NFR BLD: 0.1 %
GFR SERPLBLD CREATININE-BSD FMLA CKD-EPI: >60 ML/MIN/{1.73_M2}
GLUCOSE BLD-MCNC: 162 MG/DL (ref 70–99)
GLUCOSE BLD-MCNC: 164 MG/DL (ref 70–99)
GLUCOSE SERPL-MCNC: 193 MG/DL (ref 70–99)
HCT VFR BLD AUTO: 53.4 % (ref 40.5–52.5)
HGB BLD-MCNC: 18 G/DL (ref 13.5–17.5)
LYMPHOCYTES # BLD: 2.1 K/UL (ref 1–5.1)
LYMPHOCYTES NFR BLD: 19.6 %
MAGNESIUM SERPL-MCNC: 2.1 MG/DL (ref 1.8–2.4)
MCH RBC QN AUTO: 31.4 PG (ref 26–34)
MCHC RBC AUTO-ENTMCNC: 33.7 G/DL (ref 31–36)
MCV RBC AUTO: 93.3 FL (ref 80–100)
MONOCYTES # BLD: 0.6 K/UL (ref 0–1.3)
MONOCYTES NFR BLD: 5.2 %
NEUTROPHILS # BLD: 8 K/UL (ref 1.7–7.7)
NEUTROPHILS NFR BLD: 74.4 %
PERFORMED ON: ABNORMAL
PERFORMED ON: ABNORMAL
PLATELET # BLD AUTO: 275 K/UL (ref 135–450)
PMV BLD AUTO: 9.1 FL (ref 5–10.5)
POTASSIUM SERPL-SCNC: 3.6 MMOL/L (ref 3.5–5.1)
RBC # BLD AUTO: 5.72 M/UL (ref 4.2–5.9)
SODIUM SERPL-SCNC: 137 MMOL/L (ref 136–145)
WBC # BLD AUTO: 10.7 K/UL (ref 4–11)

## 2023-09-23 PROCEDURE — 80048 BASIC METABOLIC PNL TOTAL CA: CPT

## 2023-09-23 PROCEDURE — 6370000000 HC RX 637 (ALT 250 FOR IP): Performed by: NURSE PRACTITIONER

## 2023-09-23 PROCEDURE — 36415 COLL VENOUS BLD VENIPUNCTURE: CPT

## 2023-09-23 PROCEDURE — 99233 SBSQ HOSP IP/OBS HIGH 50: CPT | Performed by: NURSE PRACTITIONER

## 2023-09-23 PROCEDURE — 6370000000 HC RX 637 (ALT 250 FOR IP): Performed by: STUDENT IN AN ORGANIZED HEALTH CARE EDUCATION/TRAINING PROGRAM

## 2023-09-23 PROCEDURE — 6370000000 HC RX 637 (ALT 250 FOR IP): Performed by: INTERNAL MEDICINE

## 2023-09-23 PROCEDURE — 2580000003 HC RX 258: Performed by: STUDENT IN AN ORGANIZED HEALTH CARE EDUCATION/TRAINING PROGRAM

## 2023-09-23 PROCEDURE — 83735 ASSAY OF MAGNESIUM: CPT

## 2023-09-23 PROCEDURE — 93280 PM DEVICE PROGR EVAL DUAL: CPT | Performed by: INTERNAL MEDICINE

## 2023-09-23 PROCEDURE — 85025 COMPLETE CBC W/AUTO DIFF WBC: CPT

## 2023-09-23 RX ORDER — POTASSIUM CHLORIDE 20 MEQ/1
40 TABLET, EXTENDED RELEASE ORAL ONCE
Status: COMPLETED | OUTPATIENT
Start: 2023-09-23 | End: 2023-09-23

## 2023-09-23 RX ORDER — VALSARTAN 320 MG/1
320 TABLET ORAL DAILY
Qty: 30 TABLET | Refills: 1 | Status: CANCELLED | OUTPATIENT
Start: 2023-09-23 | End: 2023-11-22

## 2023-09-23 RX ORDER — AMLODIPINE BESYLATE 10 MG/1
10 TABLET ORAL DAILY
Qty: 30 TABLET | Refills: 1 | Status: SHIPPED | OUTPATIENT
Start: 2023-09-24

## 2023-09-23 RX ADMIN — SODIUM CHLORIDE, PRESERVATIVE FREE 10 ML: 5 INJECTION INTRAVENOUS at 09:30

## 2023-09-23 RX ADMIN — POTASSIUM CHLORIDE 40 MEQ: 1500 TABLET, EXTENDED RELEASE ORAL at 12:32

## 2023-09-23 RX ADMIN — VALSARTAN 320 MG: 320 TABLET ORAL at 09:30

## 2023-09-23 RX ADMIN — ALLOPURINOL 300 MG: 100 TABLET ORAL at 09:30

## 2023-09-23 RX ADMIN — APIXABAN 5 MG: 5 TABLET, FILM COATED ORAL at 09:30

## 2023-09-23 RX ADMIN — AMLODIPINE BESYLATE 5 MG: 5 TABLET ORAL at 09:30

## 2023-09-23 RX ADMIN — FUROSEMIDE 80 MG: 80 TABLET ORAL at 09:30

## 2023-09-23 ASSESSMENT — PAIN SCALES - GENERAL: PAINLEVEL_OUTOF10: 0

## 2023-09-23 NOTE — PLAN OF CARE
Problem: ABCDS Injury Assessment  Goal: Absence of physical injury  9/23/2023 0514 by Alexander Short RN  Outcome: Progressing     Problem: Pain  Goal: Verbalizes/displays adequate comfort level or baseline comfort level  9/23/2023 0514 by Alexander Short RN  Outcome: Progressing  Flowsheets  Taken 9/22/2023 2342  Verbalizes/displays adequate comfort level or baseline comfort level: Encourage patient to monitor pain and request assistance  Taken 9/22/2023 1955  Verbalizes/displays adequate comfort level or baseline comfort level: Encourage patient to monitor pain and request assistance     Problem: Skin/Tissue Integrity  Goal: Absence of new skin breakdown  9/23/2023 0514 by Alexander Short RN  Outcome: Progressing

## 2023-09-25 ENCOUNTER — CARE COORDINATION (OUTPATIENT)
Dept: CASE MANAGEMENT | Age: 53
End: 2023-09-25

## 2023-09-25 ENCOUNTER — APPOINTMENT (OUTPATIENT)
Dept: CT IMAGING | Age: 53
DRG: 422 | End: 2023-09-25
Payer: MEDICAID

## 2023-09-25 ENCOUNTER — APPOINTMENT (OUTPATIENT)
Dept: GENERAL RADIOLOGY | Age: 53
DRG: 422 | End: 2023-09-25
Payer: MEDICAID

## 2023-09-25 ENCOUNTER — TELEPHONE (OUTPATIENT)
Dept: CARDIOLOGY CLINIC | Age: 53
End: 2023-09-25

## 2023-09-25 ENCOUNTER — HOSPITAL ENCOUNTER (INPATIENT)
Age: 53
LOS: 2 days | Discharge: HOME | DRG: 422 | End: 2023-09-27
Attending: EMERGENCY MEDICINE | Admitting: INTERNAL MEDICINE
Payer: MEDICAID

## 2023-09-25 DIAGNOSIS — K92.2 GASTROINTESTINAL HEMORRHAGE, UNSPECIFIED GASTROINTESTINAL HEMORRHAGE TYPE: ICD-10-CM

## 2023-09-25 DIAGNOSIS — I49.5 SSS (SICK SINUS SYNDROME) (HCC): ICD-10-CM

## 2023-09-25 DIAGNOSIS — Z95.0 CARDIAC PACEMAKER IN SITU: Primary | ICD-10-CM

## 2023-09-25 DIAGNOSIS — R55 SYNCOPE AND COLLAPSE: Primary | ICD-10-CM

## 2023-09-25 LAB
ABO + RH BLD: NORMAL
ALBUMIN SERPL-MCNC: 3.4 G/DL (ref 3.4–5)
ALBUMIN SERPL-MCNC: 3.5 G/DL (ref 3.4–5)
ALBUMIN SERPL-MCNC: 3.6 G/DL (ref 3.4–5)
ALP SERPL-CCNC: 104 U/L (ref 40–129)
ALT SERPL-CCNC: 13 U/L (ref 10–40)
ANION GAP SERPL CALCULATED.3IONS-SCNC: 13 MMOL/L (ref 3–16)
ANION GAP SERPL CALCULATED.3IONS-SCNC: 14 MMOL/L (ref 3–16)
ANION GAP SERPL CALCULATED.3IONS-SCNC: 17 MMOL/L (ref 3–16)
APTT BLD: 28.6 SEC (ref 22.7–35.9)
AST SERPL-CCNC: 26 U/L (ref 15–37)
BASE EXCESS BLDV CALC-SCNC: 4.7 MMOL/L (ref -2–3)
BASE EXCESS BLDV CALC-SCNC: 5.7 MMOL/L (ref -2–3)
BASOPHILS # BLD: 0.1 K/UL (ref 0–0.2)
BASOPHILS NFR BLD: 0.5 %
BILIRUB DIRECT SERPL-MCNC: <0.2 MG/DL (ref 0–0.3)
BILIRUB INDIRECT SERPL-MCNC: NORMAL MG/DL (ref 0–1)
BILIRUB SERPL-MCNC: 0.4 MG/DL (ref 0–1)
BILIRUB UR QL STRIP.AUTO: NEGATIVE
BLD GP AB SCN SERPL QL: NORMAL
BUN SERPL-MCNC: 15 MG/DL (ref 7–20)
BUN SERPL-MCNC: 16 MG/DL (ref 7–20)
BUN SERPL-MCNC: 18 MG/DL (ref 7–20)
CALCIUM SERPL-MCNC: 9 MG/DL (ref 8.3–10.6)
CALCIUM SERPL-MCNC: 9 MG/DL (ref 8.3–10.6)
CALCIUM SERPL-MCNC: 9.9 MG/DL (ref 8.3–10.6)
CHLORIDE SERPL-SCNC: 100 MMOL/L (ref 99–110)
CHLORIDE SERPL-SCNC: 91 MMOL/L (ref 99–110)
CHLORIDE SERPL-SCNC: 99 MMOL/L (ref 99–110)
CLARITY UR: CLEAR
CO2 BLDV-SCNC: 37 MMOL/L
CO2 BLDV-SCNC: 37 MMOL/L
CO2 SERPL-SCNC: 24 MMOL/L (ref 21–32)
CO2 SERPL-SCNC: 26 MMOL/L (ref 21–32)
CO2 SERPL-SCNC: 30 MMOL/L (ref 21–32)
COHGB MFR BLDV: 2.5 % (ref 0–1.5)
COHGB MFR BLDV: 2.6 % (ref 0–1.5)
COLOR UR: YELLOW
CREAT SERPL-MCNC: 1 MG/DL (ref 0.9–1.3)
CREAT SERPL-MCNC: 1 MG/DL (ref 0.9–1.3)
CREAT SERPL-MCNC: 1.4 MG/DL (ref 0.9–1.3)
CREAT UR-MCNC: 106.9 MG/DL (ref 39–259)
DEPRECATED RDW RBC AUTO: 13.4 % (ref 12.4–15.4)
DO-HGB MFR BLDV: 67 %
EKG DIAGNOSIS: NORMAL
EKG Q-T INTERVAL: 402 MS
EKG QRS DURATION: 94 MS
EKG QTC CALCULATION (BAZETT): 505 MS
EKG R AXIS: -46 DEGREES
EKG T AXIS: 72 DEGREES
EKG VENTRICULAR RATE: 95 BPM
EOSINOPHIL # BLD: 0.1 K/UL (ref 0–0.6)
EOSINOPHIL NFR BLD: 0.5 %
EPI CELLS #/AREA URNS HPF: NORMAL /HPF (ref 0–5)
GFR SERPLBLD CREATININE-BSD FMLA CKD-EPI: 60 ML/MIN/{1.73_M2}
GFR SERPLBLD CREATININE-BSD FMLA CKD-EPI: >60 ML/MIN/{1.73_M2}
GFR SERPLBLD CREATININE-BSD FMLA CKD-EPI: >60 ML/MIN/{1.73_M2}
GLUCOSE BLD-MCNC: 186 MG/DL (ref 70–99)
GLUCOSE BLD-MCNC: 212 MG/DL (ref 70–99)
GLUCOSE BLD-MCNC: 224 MG/DL (ref 70–99)
GLUCOSE BLD-MCNC: 240 MG/DL (ref 70–99)
GLUCOSE BLD-MCNC: 287 MG/DL (ref 70–99)
GLUCOSE SERPL-MCNC: 246 MG/DL (ref 70–99)
GLUCOSE SERPL-MCNC: 295 MG/DL (ref 70–99)
GLUCOSE SERPL-MCNC: 314 MG/DL (ref 70–99)
GLUCOSE UR STRIP.AUTO-MCNC: 250 MG/DL
HCO3 BLDV-SCNC: 35 MMOL/L (ref 24–28)
HCO3 BLDV-SCNC: 35.2 MMOL/L (ref 24–28)
HCT VFR BLD AUTO: 56.7 % (ref 40.5–52.5)
HGB BLD-MCNC: 19 G/DL (ref 13.5–17.5)
HGB UR QL STRIP.AUTO: ABNORMAL
INR PPP: 1.26 (ref 0.84–1.16)
KETONES UR STRIP.AUTO-MCNC: NEGATIVE MG/DL
LACTATE BLDV-SCNC: 2.7 MMOL/L (ref 0.4–2)
LACTATE BLDV-SCNC: 3.4 MMOL/L (ref 0.4–2)
LACTATE BLDV-SCNC: 4.2 MMOL/L (ref 0.4–2)
LACTATE BLDV-SCNC: 4.6 MMOL/L (ref 0.4–2)
LEUKOCYTE ESTERASE UR QL STRIP.AUTO: NEGATIVE
LIPASE SERPL-CCNC: 34 U/L (ref 13–60)
LYMPHOCYTES # BLD: 5.1 K/UL (ref 1–5.1)
LYMPHOCYTES NFR BLD: 47.2 %
MAGNESIUM SERPL-MCNC: 1.6 MG/DL (ref 1.8–2.4)
MAGNESIUM SERPL-MCNC: 1.8 MG/DL (ref 1.8–2.4)
MAGNESIUM SERPL-MCNC: 1.8 MG/DL (ref 1.8–2.4)
MCH RBC QN AUTO: 32.1 PG (ref 26–34)
MCHC RBC AUTO-ENTMCNC: 33.6 G/DL (ref 31–36)
MCV RBC AUTO: 95.8 FL (ref 80–100)
METHGB MFR BLDV: 0.1 % (ref 0–1.5)
METHGB MFR BLDV: 0.2 % (ref 0–1.5)
MONOCYTES # BLD: 0.1 K/UL (ref 0–1.3)
MONOCYTES NFR BLD: 1 %
NEUTROPHILS # BLD: 5.5 K/UL (ref 1.7–7.7)
NEUTROPHILS NFR BLD: 50.8 %
NITRITE UR QL STRIP.AUTO: NEGATIVE
NT-PROBNP SERPL-MCNC: 54 PG/ML (ref 0–124)
OSMOLALITY UR: 489 MOSM/KG (ref 390–1070)
PCO2 BLDV: 68 MMHG (ref 41–51)
PCO2 BLDV: 71.9 MMHG (ref 41–51)
PERFORMED ON: ABNORMAL
PH BLDV: 7.3 [PH] (ref 7.35–7.45)
PH BLDV: 7.32 [PH] (ref 7.35–7.45)
PH UR STRIP.AUTO: 6 [PH] (ref 5–8)
PHOSPHATE SERPL-MCNC: 2.7 MG/DL (ref 2.5–4.9)
PHOSPHATE SERPL-MCNC: 2.8 MG/DL (ref 2.5–4.9)
PLATELET # BLD AUTO: ABNORMAL K/UL (ref 135–450)
PMV BLD AUTO: ABNORMAL FL (ref 5–10.5)
PO2 BLDV: <30 MMHG (ref 25–40)
PO2 BLDV: <30 MMHG (ref 25–40)
POTASSIUM SERPL-SCNC: 3.3 MMOL/L (ref 3.5–5.1)
POTASSIUM SERPL-SCNC: 3.7 MMOL/L (ref 3.5–5.1)
POTASSIUM SERPL-SCNC: 4.4 MMOL/L (ref 3.5–5.1)
PROT SERPL-MCNC: 7.2 G/DL (ref 6.4–8.2)
PROT UR STRIP.AUTO-MCNC: 100 MG/DL
PROTHROMBIN TIME: 15.8 SEC (ref 11.5–14.8)
RBC # BLD AUTO: 5.92 M/UL (ref 4.2–5.9)
RBC #/AREA URNS HPF: NORMAL /HPF (ref 0–4)
SAO2 % BLDV: 25 %
SAO2 % BLDV: 31 %
SARS-COV-2 RDRP RESP QL NAA+PROBE: NOT DETECTED
SODIUM SERPL-SCNC: 136 MMOL/L (ref 136–145)
SODIUM SERPL-SCNC: 138 MMOL/L (ref 136–145)
SODIUM SERPL-SCNC: 140 MMOL/L (ref 136–145)
SODIUM UR-SCNC: 42 MMOL/L
SP GR UR STRIP.AUTO: 1.02 (ref 1–1.03)
TROPONIN, HIGH SENSITIVITY: 234 NG/L (ref 0–22)
TROPONIN, HIGH SENSITIVITY: 28 NG/L (ref 0–22)
TROPONIN, HIGH SENSITIVITY: 290 NG/L (ref 0–22)
TROPONIN, HIGH SENSITIVITY: 296 NG/L (ref 0–22)
TROPONIN, HIGH SENSITIVITY: 43 NG/L (ref 0–22)
TSH SERPL DL<=0.005 MIU/L-ACNC: 0.44 UIU/ML (ref 0.27–4.2)
UA COMPLETE W REFLEX CULTURE PNL UR: ABNORMAL
UA DIPSTICK W REFLEX MICRO PNL UR: YES
URATE SERPL-MCNC: 6.1 MG/DL (ref 3.5–7.2)
URN SPEC COLLECT METH UR: ABNORMAL
UROBILINOGEN UR STRIP-ACNC: 0.2 E.U./DL
WBC # BLD AUTO: 10.9 K/UL (ref 4–11)
WBC #/AREA URNS HPF: NORMAL /HPF (ref 0–5)

## 2023-09-25 PROCEDURE — 82570 ASSAY OF URINE CREATININE: CPT

## 2023-09-25 PROCEDURE — 6360000004 HC RX CONTRAST MEDICATION: Performed by: STUDENT IN AN ORGANIZED HEALTH CARE EDUCATION/TRAINING PROGRAM

## 2023-09-25 PROCEDURE — 85610 PROTHROMBIN TIME: CPT

## 2023-09-25 PROCEDURE — 96365 THER/PROPH/DIAG IV INF INIT: CPT

## 2023-09-25 PROCEDURE — 83690 ASSAY OF LIPASE: CPT

## 2023-09-25 PROCEDURE — 6370000000 HC RX 637 (ALT 250 FOR IP)

## 2023-09-25 PROCEDURE — 87635 SARS-COV-2 COVID-19 AMP PRB: CPT

## 2023-09-25 PROCEDURE — 82803 BLOOD GASES ANY COMBINATION: CPT

## 2023-09-25 PROCEDURE — 99285 EMERGENCY DEPT VISIT HI MDM: CPT

## 2023-09-25 PROCEDURE — 74177 CT ABD & PELVIS W/CONTRAST: CPT

## 2023-09-25 PROCEDURE — 36415 COLL VENOUS BLD VENIPUNCTURE: CPT

## 2023-09-25 PROCEDURE — 81001 URINALYSIS AUTO W/SCOPE: CPT

## 2023-09-25 PROCEDURE — 83605 ASSAY OF LACTIC ACID: CPT

## 2023-09-25 PROCEDURE — 2580000003 HC RX 258

## 2023-09-25 PROCEDURE — 85025 COMPLETE CBC W/AUTO DIFF WBC: CPT

## 2023-09-25 PROCEDURE — 86850 RBC ANTIBODY SCREEN: CPT

## 2023-09-25 PROCEDURE — 94660 CPAP INITIATION&MGMT: CPT

## 2023-09-25 PROCEDURE — 86901 BLOOD TYPING SEROLOGIC RH(D): CPT

## 2023-09-25 PROCEDURE — 93280 PM DEVICE PROGR EVAL DUAL: CPT | Performed by: INTERNAL MEDICINE

## 2023-09-25 PROCEDURE — 71260 CT THORAX DX C+: CPT

## 2023-09-25 PROCEDURE — 96361 HYDRATE IV INFUSION ADD-ON: CPT

## 2023-09-25 PROCEDURE — 6360000002 HC RX W HCPCS: Performed by: STUDENT IN AN ORGANIZED HEALTH CARE EDUCATION/TRAINING PROGRAM

## 2023-09-25 PROCEDURE — 83935 ASSAY OF URINE OSMOLALITY: CPT

## 2023-09-25 PROCEDURE — 71045 X-RAY EXAM CHEST 1 VIEW: CPT

## 2023-09-25 PROCEDURE — 84550 ASSAY OF BLOOD/URIC ACID: CPT

## 2023-09-25 PROCEDURE — 84300 ASSAY OF URINE SODIUM: CPT

## 2023-09-25 PROCEDURE — 93005 ELECTROCARDIOGRAM TRACING: CPT | Performed by: STUDENT IN AN ORGANIZED HEALTH CARE EDUCATION/TRAINING PROGRAM

## 2023-09-25 PROCEDURE — 2060000000 HC ICU INTERMEDIATE R&B

## 2023-09-25 PROCEDURE — 84443 ASSAY THYROID STIM HORMONE: CPT

## 2023-09-25 PROCEDURE — 80069 RENAL FUNCTION PANEL: CPT

## 2023-09-25 PROCEDURE — 86900 BLOOD TYPING SEROLOGIC ABO: CPT

## 2023-09-25 PROCEDURE — 70450 CT HEAD/BRAIN W/O DYE: CPT

## 2023-09-25 PROCEDURE — 96367 TX/PROPH/DG ADDL SEQ IV INF: CPT

## 2023-09-25 PROCEDURE — 85730 THROMBOPLASTIN TIME PARTIAL: CPT

## 2023-09-25 PROCEDURE — 80076 HEPATIC FUNCTION PANEL: CPT

## 2023-09-25 PROCEDURE — 2700000000 HC OXYGEN THERAPY PER DAY

## 2023-09-25 PROCEDURE — 99255 IP/OBS CONSLTJ NEW/EST HI 80: CPT | Performed by: INTERNAL MEDICINE

## 2023-09-25 PROCEDURE — 83880 ASSAY OF NATRIURETIC PEPTIDE: CPT

## 2023-09-25 PROCEDURE — 2580000003 HC RX 258: Performed by: STUDENT IN AN ORGANIZED HEALTH CARE EDUCATION/TRAINING PROGRAM

## 2023-09-25 PROCEDURE — 84484 ASSAY OF TROPONIN QUANT: CPT

## 2023-09-25 PROCEDURE — 83735 ASSAY OF MAGNESIUM: CPT

## 2023-09-25 PROCEDURE — 94761 N-INVAS EAR/PLS OXIMETRY MLT: CPT

## 2023-09-25 RX ORDER — FUROSEMIDE 80 MG
TABLET ORAL
Qty: 180 TABLET | Refills: 1 | OUTPATIENT
Start: 2023-09-25

## 2023-09-25 RX ORDER — VALSARTAN 160 MG/1
160 TABLET ORAL DAILY
Status: DISCONTINUED | OUTPATIENT
Start: 2023-09-25 | End: 2023-09-25

## 2023-09-25 RX ORDER — MAGNESIUM SULFATE 1 G/100ML
1000 INJECTION INTRAVENOUS ONCE
Status: COMPLETED | OUTPATIENT
Start: 2023-09-26 | End: 2023-09-26

## 2023-09-25 RX ORDER — SODIUM CHLORIDE 0.9 % (FLUSH) 0.9 %
5-40 SYRINGE (ML) INJECTION PRN
Status: DISCONTINUED | OUTPATIENT
Start: 2023-09-25 | End: 2023-09-27 | Stop reason: HOSPADM

## 2023-09-25 RX ORDER — POLYETHYLENE GLYCOL 3350 17 G/17G
17 POWDER, FOR SOLUTION ORAL DAILY PRN
Status: DISCONTINUED | OUTPATIENT
Start: 2023-09-25 | End: 2023-09-27 | Stop reason: HOSPADM

## 2023-09-25 RX ORDER — POTASSIUM CHLORIDE 20 MEQ/1
40 TABLET, EXTENDED RELEASE ORAL ONCE
Status: COMPLETED | OUTPATIENT
Start: 2023-09-25 | End: 2023-09-25

## 2023-09-25 RX ORDER — SODIUM CHLORIDE 9 MG/ML
INJECTION, SOLUTION INTRAVENOUS PRN
Status: DISCONTINUED | OUTPATIENT
Start: 2023-09-25 | End: 2023-09-27 | Stop reason: HOSPADM

## 2023-09-25 RX ORDER — INSULIN GLARGINE 100 [IU]/ML
70 INJECTION, SOLUTION SUBCUTANEOUS NIGHTLY
Status: DISCONTINUED | OUTPATIENT
Start: 2023-09-25 | End: 2023-09-25

## 2023-09-25 RX ORDER — INSULIN LISPRO 100 [IU]/ML
0-4 INJECTION, SOLUTION INTRAVENOUS; SUBCUTANEOUS NIGHTLY
Status: DISCONTINUED | OUTPATIENT
Start: 2023-09-25 | End: 2023-09-27 | Stop reason: HOSPADM

## 2023-09-25 RX ORDER — ONDANSETRON 4 MG/1
4 TABLET, ORALLY DISINTEGRATING ORAL EVERY 8 HOURS PRN
Status: DISCONTINUED | OUTPATIENT
Start: 2023-09-25 | End: 2023-09-25 | Stop reason: ALTCHOICE

## 2023-09-25 RX ORDER — SODIUM CHLORIDE, SODIUM LACTATE, POTASSIUM CHLORIDE, CALCIUM CHLORIDE 600; 310; 30; 20 MG/100ML; MG/100ML; MG/100ML; MG/100ML
INJECTION, SOLUTION INTRAVENOUS CONTINUOUS
Status: DISCONTINUED | OUTPATIENT
Start: 2023-09-25 | End: 2023-09-25

## 2023-09-25 RX ORDER — SODIUM CHLORIDE 0.9 % (FLUSH) 0.9 %
5-40 SYRINGE (ML) INJECTION EVERY 12 HOURS SCHEDULED
Status: DISCONTINUED | OUTPATIENT
Start: 2023-09-25 | End: 2023-09-27 | Stop reason: HOSPADM

## 2023-09-25 RX ORDER — DEXTROSE MONOHYDRATE 100 MG/ML
INJECTION, SOLUTION INTRAVENOUS CONTINUOUS PRN
Status: DISCONTINUED | OUTPATIENT
Start: 2023-09-25 | End: 2023-09-27 | Stop reason: HOSPADM

## 2023-09-25 RX ORDER — AMLODIPINE BESYLATE 10 MG/1
10 TABLET ORAL DAILY
Status: DISCONTINUED | OUTPATIENT
Start: 2023-09-25 | End: 2023-09-25

## 2023-09-25 RX ORDER — ALLOPURINOL 300 MG/1
300 TABLET ORAL DAILY
Status: DISCONTINUED | OUTPATIENT
Start: 2023-09-25 | End: 2023-09-27 | Stop reason: HOSPADM

## 2023-09-25 RX ORDER — PROCHLORPERAZINE EDISYLATE 5 MG/ML
10 INJECTION INTRAMUSCULAR; INTRAVENOUS EVERY 6 HOURS PRN
Status: DISCONTINUED | OUTPATIENT
Start: 2023-09-25 | End: 2023-09-26

## 2023-09-25 RX ORDER — FLUTICASONE PROPIONATE 50 MCG
1 SPRAY, SUSPENSION (ML) NASAL DAILY
Status: DISCONTINUED | OUTPATIENT
Start: 2023-09-25 | End: 2023-09-27 | Stop reason: HOSPADM

## 2023-09-25 RX ORDER — ACETAMINOPHEN 325 MG/1
650 TABLET ORAL EVERY 6 HOURS PRN
Status: DISCONTINUED | OUTPATIENT
Start: 2023-09-25 | End: 2023-09-27 | Stop reason: HOSPADM

## 2023-09-25 RX ORDER — ACETAMINOPHEN 650 MG/1
650 SUPPOSITORY RECTAL EVERY 6 HOURS PRN
Status: DISCONTINUED | OUTPATIENT
Start: 2023-09-25 | End: 2023-09-27 | Stop reason: HOSPADM

## 2023-09-25 RX ORDER — OXYMETAZOLINE HYDROCHLORIDE 0.05 G/100ML
2 SPRAY NASAL 2 TIMES DAILY PRN
Status: CANCELLED | OUTPATIENT
Start: 2023-09-25 | End: 2023-09-28

## 2023-09-25 RX ORDER — INSULIN LISPRO 100 [IU]/ML
0-4 INJECTION, SOLUTION INTRAVENOUS; SUBCUTANEOUS
Status: DISCONTINUED | OUTPATIENT
Start: 2023-09-25 | End: 2023-09-27 | Stop reason: HOSPADM

## 2023-09-25 RX ORDER — POTASSIUM CHLORIDE 20 MEQ/1
20 TABLET, EXTENDED RELEASE ORAL
Status: DISCONTINUED | OUTPATIENT
Start: 2023-09-25 | End: 2023-09-25

## 2023-09-25 RX ORDER — ONDANSETRON 2 MG/ML
4 INJECTION INTRAMUSCULAR; INTRAVENOUS EVERY 6 HOURS PRN
Status: DISCONTINUED | OUTPATIENT
Start: 2023-09-25 | End: 2023-09-25 | Stop reason: ALTCHOICE

## 2023-09-25 RX ORDER — ATORVASTATIN CALCIUM 20 MG/1
20 TABLET, FILM COATED ORAL DAILY
Status: DISCONTINUED | OUTPATIENT
Start: 2023-09-26 | End: 2023-09-27 | Stop reason: HOSPADM

## 2023-09-25 RX ORDER — SODIUM CHLORIDE, SODIUM LACTATE, POTASSIUM CHLORIDE, AND CALCIUM CHLORIDE .6; .31; .03; .02 G/100ML; G/100ML; G/100ML; G/100ML
500 INJECTION, SOLUTION INTRAVENOUS ONCE
Status: COMPLETED | OUTPATIENT
Start: 2023-09-25 | End: 2023-09-25

## 2023-09-25 RX ORDER — INSULIN GLARGINE 100 [IU]/ML
12 INJECTION, SOLUTION SUBCUTANEOUS NIGHTLY
Status: DISCONTINUED | OUTPATIENT
Start: 2023-09-25 | End: 2023-09-26

## 2023-09-25 RX ADMIN — INSULIN GLARGINE 12 UNITS: 100 INJECTION, SOLUTION SUBCUTANEOUS at 20:45

## 2023-09-25 RX ADMIN — POTASSIUM CHLORIDE 20 MEQ: 1500 TABLET, EXTENDED RELEASE ORAL at 13:25

## 2023-09-25 RX ADMIN — INSULIN LISPRO 1 UNITS: 100 INJECTION, SOLUTION INTRAVENOUS; SUBCUTANEOUS at 18:00

## 2023-09-25 RX ADMIN — ACETAMINOPHEN 650 MG: 325 TABLET ORAL at 13:39

## 2023-09-25 RX ADMIN — SODIUM CHLORIDE, SODIUM LACTATE, POTASSIUM CHLORIDE, AND CALCIUM CHLORIDE 500 ML: .6; .31; .03; .02 INJECTION, SOLUTION INTRAVENOUS at 02:01

## 2023-09-25 RX ADMIN — CEFEPIME 1000 MG: 1 INJECTION, POWDER, FOR SOLUTION INTRAMUSCULAR; INTRAVENOUS at 03:00

## 2023-09-25 RX ADMIN — SODIUM CHLORIDE, PRESERVATIVE FREE 10 ML: 5 INJECTION INTRAVENOUS at 12:06

## 2023-09-25 RX ADMIN — ALLOPURINOL 300 MG: 300 TABLET ORAL at 13:26

## 2023-09-25 RX ADMIN — SODIUM CHLORIDE, POTASSIUM CHLORIDE, SODIUM LACTATE AND CALCIUM CHLORIDE 500 ML: 600; 310; 30; 20 INJECTION, SOLUTION INTRAVENOUS at 09:22

## 2023-09-25 RX ADMIN — FLUTICASONE PROPIONATE 1 SPRAY: 50 SPRAY, METERED NASAL at 18:54

## 2023-09-25 RX ADMIN — SODIUM CHLORIDE, PRESERVATIVE FREE 10 ML: 5 INJECTION INTRAVENOUS at 20:45

## 2023-09-25 RX ADMIN — METOPROLOL TARTRATE 25 MG: 25 TABLET, FILM COATED ORAL at 20:01

## 2023-09-25 RX ADMIN — IOPAMIDOL 75 ML: 755 INJECTION, SOLUTION INTRAVENOUS at 03:55

## 2023-09-25 RX ADMIN — SALINE NASAL SPRAY 1 SPRAY: 1.5 SOLUTION NASAL at 13:25

## 2023-09-25 RX ADMIN — METOPROLOL TARTRATE 25 MG: 25 TABLET, FILM COATED ORAL at 13:26

## 2023-09-25 RX ADMIN — APIXABAN 5 MG: 5 TABLET, FILM COATED ORAL at 13:27

## 2023-09-25 RX ADMIN — POTASSIUM CHLORIDE 40 MEQ: 1500 TABLET, EXTENDED RELEASE ORAL at 16:12

## 2023-09-25 RX ADMIN — VANCOMYCIN HYDROCHLORIDE 2500 MG: 10 INJECTION, POWDER, LYOPHILIZED, FOR SOLUTION INTRAVENOUS at 04:13

## 2023-09-25 ASSESSMENT — PAIN SCALES - GENERAL
PAINLEVEL_OUTOF10: 5
PAINLEVEL_OUTOF10: 3

## 2023-09-25 ASSESSMENT — PAIN - FUNCTIONAL ASSESSMENT
PAIN_FUNCTIONAL_ASSESSMENT: 0-10
PAIN_FUNCTIONAL_ASSESSMENT: ACTIVITIES ARE NOT PREVENTED

## 2023-09-25 ASSESSMENT — ENCOUNTER SYMPTOMS
SORE THROAT: 0
ABDOMINAL PAIN: 1
SHORTNESS OF BREATH: 1
CONSTIPATION: 1
DIARRHEA: 0

## 2023-09-25 ASSESSMENT — PAIN DESCRIPTION - DESCRIPTORS: DESCRIPTORS: ACHING

## 2023-09-25 ASSESSMENT — PAIN DESCRIPTION - ORIENTATION: ORIENTATION: MID

## 2023-09-25 ASSESSMENT — PAIN DESCRIPTION - LOCATION: LOCATION: HEAD

## 2023-09-25 NOTE — CARE COORDINATION
Case Management Assessment  Initial Evaluation    Date/Time of Evaluation: 9/25/2023 12:46 PM  Assessment Completed by: Esteban Muñoz RN    If patient is discharged prior to next notation, then this note serves as note for discharge by case management. Patient Name: Dorota De La Cruz                   YOB: 1970  Diagnosis: Syncope and collapse [R55]  Gastrointestinal hemorrhage, unspecified gastrointestinal hemorrhage type [K92.2]                   Date / Time: 9/25/2023 12:23 AM    Patient Admission Status: Inpatient   Readmission Risk (Low < 19, Mod (19-27), High > 27): Readmission Risk Score: 10.8    Current PCP: Naila Jones MD  PCP verified by CM? Yes Griselda Ruffin MD)    Chart Reviewed: Yes      History Provided by: Patient, Medical Record  Patient Orientation: Alert and Oriented    Patient Cognition: Alert    Hospitalization in the last 30 days (Readmission):  Yes    If yes, Readmission Assessment in  Navigator will be completed.   Readmission Assessment  Number of Days since last admission?: 1-7 days (9/23/23)  Previous Disposition: Home with Family  Who is being Interviewed: Patient  What was the patient's/caregiver's perception as to why they think they needed to return back to the hospital?: Other (Comment) (repeat syncopal episode)  Did you visit your Primary Care Physician after you left the hospital, before you returned this time?: No  Why weren't you able to visit your PCP?: Other (Comment) (admitted prior to appointment being made)  Did you see a specialist, such as Cardiac, Pulmonary, Orthopedic Physician, etc. after you left the hospital?: No  Who advised the patient to return to the hospital?: Self-referral  Does the patient report anything that got in the way of taking their medications?: No  In our efforts to provide the best possible care to you and others like you, can you think of anything that we could have done to help you after you left the Plan: [address] [facility] [place] / Product Type: *No Product type* /     Does insurance require precert for SNF: Yes    Potential assistance Purchasing Medications: No  Meds-to-Beds request:        [id number] [place] - P [phone] - F [phone]  [address] [facility] [address]  Phone: [phone] Fax: [phone]    [address] [facility], [address] [facility]. Jeanmarie Elmore [phone] - X [address] [facility]. [facility] [address]  Phone: [phone] Fax: [phone]    Jose Eduardobrandon Ranulfo [id number] [place], [address] [facility] [address] [phone] Madi [facility] [phone]  [address] [facility] [address]  Phone: [phone] Fax: [phone]      Factors facilitating achievement of predicted outcomes: Family support, Cooperative, Pleasant, Good insight into deficits, and Has needed Durable Medical Equipment at home    Barriers to discharge: Medication managment    Additional Case Management Notes: Patient is from home with family, patient had prolonged syncopal episode and was admitted. Patient not active with [address] [place] [address], has home CPAP prior to admission. Patient plans for return home once medically stable for DC. CM will continue to follow for further DC planning. COVID Result:    Lab Results   Component Value Date/Time    COVID19 Not Detected 09/25/2023 01:54 AM    COVID19 NOT DETECTED 06/22/2022 01:33 AM    COVID19 NOT DETECTED 01/30/2021 12:00 PM       The Plan for Transition of Care is related to the following treatment goals of Syncope and collapse [R55]  Gastrointestinal hemorrhage, unspecified gastrointestinal hemorrhage type [E17.1]    IF APPLICABLE: The Patient and/or patient representative Darryl De La Cruz and his family were provided with a choice of provider and agrees with the discharge plan.  Freedom of choice list with basic dialogue that supports the patient's individualized plan of care/goals and shares the quality data associated with the

## 2023-09-25 NOTE — CARE COORDINATION
No outbound call to pt. Pt currently in [facility].  ER. CTN will continue to follow for d/c disposition and f/u call per protocol. MATTHEW Marie, RN  Care Transition Coordinator  Contact Number:  [phone]

## 2023-09-25 NOTE — PROGRESS NOTES
Patient admitted to room 4319 from ED. Patient oriented to room, call light, bed rails, phone, lights and bathroom. Patient instructed about the schedule of the day including: vital sign frequency, lab draws, possible tests, frequency of MD and staff rounds, including RN/MD rounding together at bedside, daily weights, and I &O's. Patient instructed about prescribed diet, how to use 8MENU, and television. Bed alarm in place, patient aware of placement and reason. Telemetry box 4319 in place, patient aware of placement and reason. Bed locked, in lowest position, side rails up 2/4, call light within reach. Will continue to monitor.

## 2023-09-25 NOTE — H&P
Internal Medicine Note    Patient Name: Jorge De La Cruz   Admit Date: 9/25/2023   Code:Prior  PCP: Jani Mims MD   Attending: Waylon Odonnell MD    Chief Complaint: Chest Pain, Shortness of Breath, and Loss of Consciousness (Pt was trying to make a bowel movement when he passed out.)       Subjective   HPI:   Jorge De La Cruz is a 47 y.o. male, presented with syncope. Patient has a PMHx of SSS with pacemaker placement, A-fib, DM2, CAD, syncope a week prior. He had a BM at home and had lost consciousness for about 10 minutes. His family mentions that this is a recurrent thing starting 4 years ago. Usually it lasts 2-5 minutes but they were concerned over the longer duration of LOC this time around. They endorse LOC which usually occurs if he hasn't had a BM for three days and feels constipated. Laxatives have not been useful per him. He mentions he sometimes has palpitations as well prior to LOC. He denies fever, chills, headaches, urinary changes, blood in either urine or stool. At the ED, /94, , SAO2 93 on 5L. Labs pertinent for K 3.3, creat 1.4, LA 4.6, trops 28. CT abd no acute changes, CTPA no evidence of PE, CT head normal.    He will be admitted to [facility] to be further managed and treated for his syncope. Past Medical Hx:      Diagnosis Date    Arthritis     Arthritis 8/2/2019    Atrial fibrillation     Atrial fibrillation (HCC)     Bradycardia     Chest pain 6/22/2022    Chronic gout of multiple sites 8/30/2018    Last Assessment & Plan:  Condition: stable  Diagnosis based upon a face-to-face evaluation, medication and chart review, physical exam, and ROS.    Requested patient to continue regular follow-up with their PCP/specialist.  Follow up in: one year    Chronic pain syndrome 7/29/2022    Congestive heart failure (HCC)     Coronary artery disease     Coronary artery disease 8/24/2022    Current mild episode of major depressive disorder without prior episode rhonchi. Abdominal:      General: Abdomen is flat. There is distension. Tenderness: There is no guarding or rebound. Musculoskeletal:         General: No swelling, deformity or signs of injury. Normal range of motion. Cervical back: Normal range of motion. No rigidity. Skin:     General: Skin is warm. Coloration: Skin is not jaundiced. Findings: No bruising or erythema. Neurological:      General: No focal deficit present. Mental Status: He is alert and oriented to person, place, and time. Mental status is at baseline. Psychiatric:         Mood and Affect: Mood normal.         Behavior: Behavior normal.         Thought Content: Thought content normal.         Judgment: Judgment normal.           Labs:   Recent Labs     09/22/23  1025 09/23/23  0900 09/25/23  0042    137 138   K 3.5 3.6 3.3*   BUN 13 12 18   CREATININE 0.9 1.0 1.4*    96* 91*   CO2 26 30 30   GLUCOSE 188* 193* 314*   CALCIUM 9.3 9.9 9.9   MG 1.60* 2.10 1.80     Recent Labs     09/22/23  1025 09/23/23  0900 09/25/23  0042   WBC 5.8 10.7 10.9   HGB 16.2 18.0* 19.0*   HCT 47.9 53.4* 56.7*    275 see below   MCV 93.4 93.3 95.8     No results for input(s): \"CHOLTOT\", \"TRIG\", \"HDL\", \"CHOLHDL\", \"LDL\" in the last 72 hours. Invalid input(s): \"LIPIDCOMM\", \"VLDCHOL\"  No results for input(s): \"PTT\", \"INR\" in the last 72 hours. Invalid input(s): \"PT\"  No results for input(s): \"CKTOTAL\", \"CKMB\", \"CKMBINDEX\", \"TROPONINI\" in the last 72 hours. No results for input(s): \"BNP\" in the last 72 hours. No results for input(s): \"NTPROBNP\" in the last 72 hours. No results for input(s): \"TSH\" in the last 72 hours. Radiology:  CT ABDOMEN PELVIS W IV CONTRAST Additional Contrast? None   Final Result      1. No evidence for acute abnormality in the abdomen or pelvis      Electronically signed by Craig West MD      CT CHEST PULMONARY EMBOLISM W CONTRAST   Final Result      1.  Limited CTPA due to respiratory

## 2023-09-25 NOTE — PLAN OF CARE
[facility] Hospitalist brief note  Consult received. Case reviewed with ER physician  76-year-old male status post dual-chamber pacemaker for sick sinus syndrome on 9/22/2023 syncopal episode. Full note to follow.     Virginie Boone MD    Thanks  Geovanna Bailey MD

## 2023-09-25 NOTE — TELEPHONE ENCOUNTER
Requested Prescriptions     Pending Prescriptions Disp Refills    furosemide (LASIX) 80 MG tablet [Pharmacy Med Name: FUROSEMIDE 80 MG TABLET] 180 tablet 1     Sig: TAKE 1 TABLET BY [address] Visit:  9/12/2023     Next Clinic Appointment:  9/24/2023

## 2023-09-25 NOTE — PROGRESS NOTES
4 Eyes Skin Assessment     NAME:  Jorge De La Cruz  YOB: 1970  MEDICAL RECORD NUMBER:  [id number]    The patient is being assessed for  Admission    I agree that at least one RN has performed a thorough Head to Toe Skin Assessment on the patient. ALL assessment sites listed below have been assessed. Areas assessed by both nurses:    Head, Face, Ears, Shoulders, Back, Chest, Arms, Elbows, Hands, Sacrum. Buttock, Coccyx, Ischium, Legs. Feet and Heels, and Under Medical Devices         Does the Patient have a Wound?  No noted wound(s)       Jayesh Prevention initiated by RN: No  Wound Care Orders initiated by RN: No    Pressure Injury (Stage 3,4, Unstageable, DTI, NWPT, and Complex wounds) if present, place Wound referral order by RN under : No    New Ostomies, if present place, Ostomy referral order under : No     Nurse 1 eSignature: Electronically signed by Santosh Richardson RN on 9/25/23 at 7:28 PM EDT    **SHARE this note so that the co-signing nurse can place an eSignature**    Nurse 2 eSignature: Electronically signed by Adia Conn RN on 9/25/23 at 7:29 PM EDT

## 2023-09-25 NOTE — ED PROVIDER NOTES
ED Attending Attestation Note     Date of evaluation: 9/25/2023    This patient was seen by the resident. I have seen and examined the patient, agree with the workup, evaluation, management and diagnosis. The care plan has been discussed. I have reviewed the ECG and concur with the resident's interpretation. My assessment reveals a 79-year-old male appears older than his stated age sitting in bed in some distress. The patient has warm and well-perfused extremities. He is in A-fib without significant tachycardia although is very mildly tachycardic. The patient has a complicated cardiac history including multiple prior vasovagal episodes as well as diagnosed sick sinus syndrome status post very recent pacemaker placement. I did speak with [employer] rep who had performed of the interrogation and the patient has had an atrial dysrhythmia since approximately 1 hour prior to his arrival here in the emergency department and also has had several 100 episodes of rate drop response. Is this patient to be included in the SEP-1 core measure? No Exclusion criteria - the patient is NOT to be included for SEP-1 Core Measure due to: Infection is not suspected     Due to the immediate potential for life-threatening deterioration due to acute kidney injury, acute hypoxic respiratory failure, I spent 35 minutes providing direct bedside critical care. This time is excluding time spent supervising residents and time spent performing separately billed procedures       Biju Krishnamurthy MD  09/25/23 6469    Addendum:    The patient was turned over to me awaiting results of CT pulmonary angiogram and CT abdomen and pelvis these have been performed and show no evidence of any pulmonary embolism no pneumonia and no acute intra-abdominal abnormalities.   The patient will require admission to the hospital for further monitoring of this possible GI bleed and further work-up of his syncopal episode in addition to treatment of

## 2023-09-25 NOTE — PLAN OF CARE
Called [employer] for device interrogation at 09/25/2023. Information will be faxed by the local representative.

## 2023-09-26 ENCOUNTER — APPOINTMENT (OUTPATIENT)
Dept: CT IMAGING | Age: 53
DRG: 422 | End: 2023-09-26
Payer: MEDICAID

## 2023-09-26 LAB
ALBUMIN SERPL-MCNC: 3.3 G/DL (ref 3.4–5)
ANION GAP SERPL CALCULATED.3IONS-SCNC: 8 MMOL/L (ref 3–16)
BASOPHILS # BLD: 0.1 K/UL (ref 0–0.2)
BASOPHILS NFR BLD: 1 %
BUN SERPL-MCNC: 11 MG/DL (ref 7–20)
CALCIUM SERPL-MCNC: 9.3 MG/DL (ref 8.3–10.6)
CHLORIDE SERPL-SCNC: 103 MMOL/L (ref 99–110)
CO2 SERPL-SCNC: 28 MMOL/L (ref 21–32)
CREAT SERPL-MCNC: 0.8 MG/DL (ref 0.9–1.3)
DEPRECATED RDW RBC AUTO: 13.5 % (ref 12.4–15.4)
EOSINOPHIL # BLD: 0.1 K/UL (ref 0–0.6)
EOSINOPHIL NFR BLD: 1.5 %
GFR SERPLBLD CREATININE-BSD FMLA CKD-EPI: >60 ML/MIN/{1.73_M2}
GLUCOSE BLD-MCNC: 139 MG/DL (ref 70–99)
GLUCOSE BLD-MCNC: 188 MG/DL (ref 70–99)
GLUCOSE BLD-MCNC: 209 MG/DL (ref 70–99)
GLUCOSE SERPL-MCNC: 215 MG/DL (ref 70–99)
HCT VFR BLD AUTO: 46.1 % (ref 40.5–52.5)
HGB BLD-MCNC: 15.4 G/DL (ref 13.5–17.5)
LACTATE BLDV-SCNC: 1 MMOL/L (ref 0.4–2)
LYMPHOCYTES # BLD: 2.6 K/UL (ref 1–5.1)
LYMPHOCYTES NFR BLD: 30.3 %
MCH RBC QN AUTO: 31.4 PG (ref 26–34)
MCHC RBC AUTO-ENTMCNC: 33.4 G/DL (ref 31–36)
MCV RBC AUTO: 93.8 FL (ref 80–100)
MONOCYTES # BLD: 0.5 K/UL (ref 0–1.3)
MONOCYTES NFR BLD: 5.4 %
NEUTROPHILS # BLD: 5.4 K/UL (ref 1.7–7.7)
NEUTROPHILS NFR BLD: 61.8 %
PERFORMED ON: ABNORMAL
PHOSPHATE SERPL-MCNC: 2.5 MG/DL (ref 2.5–4.9)
PLATELET # BLD AUTO: 207 K/UL (ref 135–450)
PMV BLD AUTO: 9.3 FL (ref 5–10.5)
POTASSIUM SERPL-SCNC: 4.2 MMOL/L (ref 3.5–5.1)
RBC # BLD AUTO: 4.91 M/UL (ref 4.2–5.9)
SODIUM SERPL-SCNC: 139 MMOL/L (ref 136–145)
TROPONIN, HIGH SENSITIVITY: 184 NG/L (ref 0–22)
WBC # BLD AUTO: 8.7 K/UL (ref 4–11)

## 2023-09-26 PROCEDURE — 80069 RENAL FUNCTION PANEL: CPT

## 2023-09-26 PROCEDURE — 6370000000 HC RX 637 (ALT 250 FOR IP): Performed by: INTERNAL MEDICINE

## 2023-09-26 PROCEDURE — 6360000002 HC RX W HCPCS

## 2023-09-26 PROCEDURE — 84484 ASSAY OF TROPONIN QUANT: CPT

## 2023-09-26 PROCEDURE — 2580000003 HC RX 258

## 2023-09-26 PROCEDURE — 6370000000 HC RX 637 (ALT 250 FOR IP)

## 2023-09-26 PROCEDURE — 99233 SBSQ HOSP IP/OBS HIGH 50: CPT | Performed by: INTERNAL MEDICINE

## 2023-09-26 PROCEDURE — 36415 COLL VENOUS BLD VENIPUNCTURE: CPT

## 2023-09-26 PROCEDURE — 85025 COMPLETE CBC W/AUTO DIFF WBC: CPT

## 2023-09-26 PROCEDURE — 2060000000 HC ICU INTERMEDIATE R&B

## 2023-09-26 RX ORDER — POLYMYXIN B SULFATE AND TRIMETHOPRIM 1; 10000 MG/ML; [USP'U]/ML
1 SOLUTION OPHTHALMIC
Status: DISCONTINUED | OUTPATIENT
Start: 2023-09-26 | End: 2023-09-27 | Stop reason: HOSPADM

## 2023-09-26 RX ORDER — HYDRALAZINE HYDROCHLORIDE 20 MG/ML
5 INJECTION INTRAMUSCULAR; INTRAVENOUS EVERY 6 HOURS PRN
Status: DISCONTINUED | OUTPATIENT
Start: 2023-09-26 | End: 2023-09-26

## 2023-09-26 RX ORDER — VALSARTAN 160 MG/1
160 TABLET ORAL DAILY
Status: DISCONTINUED | OUTPATIENT
Start: 2023-09-26 | End: 2023-09-27 | Stop reason: HOSPADM

## 2023-09-26 RX ORDER — FUROSEMIDE 80 MG
80 TABLET ORAL DAILY
Status: DISCONTINUED | OUTPATIENT
Start: 2023-09-27 | End: 2023-09-26

## 2023-09-26 RX ORDER — INSULIN GLARGINE 100 [IU]/ML
15 INJECTION, SOLUTION SUBCUTANEOUS NIGHTLY
Status: DISCONTINUED | OUTPATIENT
Start: 2023-09-26 | End: 2023-09-27 | Stop reason: HOSPADM

## 2023-09-26 RX ORDER — LABETALOL HYDROCHLORIDE 5 MG/ML
5 INJECTION, SOLUTION INTRAVENOUS EVERY 6 HOURS PRN
Status: DISCONTINUED | OUTPATIENT
Start: 2023-09-26 | End: 2023-09-26

## 2023-09-26 RX ORDER — METOPROLOL TARTRATE 50 MG/1
50 TABLET, FILM COATED ORAL 2 TIMES DAILY
Status: DISCONTINUED | OUTPATIENT
Start: 2023-09-26 | End: 2023-09-27 | Stop reason: HOSPADM

## 2023-09-26 RX ORDER — LINEZOLID 2 MG/ML
600 INJECTION, SOLUTION INTRAVENOUS EVERY 12 HOURS
Status: DISCONTINUED | OUTPATIENT
Start: 2023-09-26 | End: 2023-09-27 | Stop reason: HOSPADM

## 2023-09-26 RX ORDER — LABETALOL HYDROCHLORIDE 5 MG/ML
5 INJECTION, SOLUTION INTRAVENOUS EVERY 6 HOURS PRN
Status: DISCONTINUED | OUTPATIENT
Start: 2023-09-26 | End: 2023-09-27 | Stop reason: HOSPADM

## 2023-09-26 RX ORDER — POLYETHYLENE GLYCOL 3350 17 G/17G
17 POWDER, FOR SOLUTION ORAL DAILY
Status: DISCONTINUED | OUTPATIENT
Start: 2023-09-26 | End: 2023-09-27 | Stop reason: HOSPADM

## 2023-09-26 RX ORDER — FUROSEMIDE 40 MG/1
40 TABLET ORAL DAILY
Status: DISCONTINUED | OUTPATIENT
Start: 2023-09-27 | End: 2023-09-27 | Stop reason: HOSPADM

## 2023-09-26 RX ADMIN — LINEZOLID 600 MG: 600 INJECTION, SOLUTION INTRAVENOUS at 15:20

## 2023-09-26 RX ADMIN — POLYETHYLENE GLYCOL 3350 17 G: 17 POWDER, FOR SOLUTION ORAL at 15:01

## 2023-09-26 RX ADMIN — DEXTRAN 70, AND HYPROMELLOSE 2910 1 DROP: 1; 3 SOLUTION/ DROPS OPHTHALMIC at 13:48

## 2023-09-26 RX ADMIN — SODIUM CHLORIDE 25 ML: 9 INJECTION, SOLUTION INTRAVENOUS at 15:13

## 2023-09-26 RX ADMIN — PIPERACILLIN AND TAZOBACTAM 3375 MG: 3; .375 INJECTION, POWDER, LYOPHILIZED, FOR SOLUTION INTRAVENOUS at 18:49

## 2023-09-26 RX ADMIN — INSULIN GLARGINE 15 UNITS: 100 INJECTION, SOLUTION SUBCUTANEOUS at 21:30

## 2023-09-26 RX ADMIN — FLUTICASONE PROPIONATE 1 SPRAY: 50 SPRAY, METERED NASAL at 07:59

## 2023-09-26 RX ADMIN — POLYMYXIN B SULFATE AND TRIMETHOPRIM SULFATE 1 DROP: 10000; 1 SOLUTION/ DROPS OPHTHALMIC at 08:37

## 2023-09-26 RX ADMIN — POLYMYXIN B SULFATE AND TRIMETHOPRIM SULFATE 1 DROP: 10000; 1 SOLUTION/ DROPS OPHTHALMIC at 21:09

## 2023-09-26 RX ADMIN — ALLOPURINOL 300 MG: 300 TABLET ORAL at 15:01

## 2023-09-26 RX ADMIN — SODIUM CHLORIDE 25 ML: 9 INJECTION, SOLUTION INTRAVENOUS at 18:49

## 2023-09-26 RX ADMIN — DEXTRAN 70, AND HYPROMELLOSE 2910 1 DROP: 1; 3 SOLUTION/ DROPS OPHTHALMIC at 18:38

## 2023-09-26 RX ADMIN — ACETAMINOPHEN 650 MG: 325 TABLET ORAL at 16:15

## 2023-09-26 RX ADMIN — METOPROLOL TARTRATE 50 MG: 50 TABLET, FILM COATED ORAL at 21:26

## 2023-09-26 RX ADMIN — DEXTRAN 70, AND HYPROMELLOSE 2910 1 DROP: 1; 3 SOLUTION/ DROPS OPHTHALMIC at 08:38

## 2023-09-26 RX ADMIN — POLYMYXIN B SULFATE AND TRIMETHOPRIM SULFATE 1 DROP: 10000; 1 SOLUTION/ DROPS OPHTHALMIC at 13:47

## 2023-09-26 RX ADMIN — ACETAMINOPHEN 650 MG: 325 TABLET ORAL at 03:34

## 2023-09-26 RX ADMIN — METOPROLOL TARTRATE 25 MG: 25 TABLET, FILM COATED ORAL at 08:36

## 2023-09-26 RX ADMIN — PIPERACILLIN AND TAZOBACTAM 4500 MG: 4; .5 INJECTION, POWDER, LYOPHILIZED, FOR SOLUTION INTRAVENOUS at 15:14

## 2023-09-26 RX ADMIN — INSULIN LISPRO 1 UNITS: 100 INJECTION, SOLUTION INTRAVENOUS; SUBCUTANEOUS at 18:43

## 2023-09-26 RX ADMIN — SODIUM CHLORIDE, PRESERVATIVE FREE 10 ML: 5 INJECTION INTRAVENOUS at 15:27

## 2023-09-26 RX ADMIN — INSULIN LISPRO 1 UNITS: 100 INJECTION, SOLUTION INTRAVENOUS; SUBCUTANEOUS at 08:36

## 2023-09-26 RX ADMIN — VALSARTAN 160 MG: 160 TABLET, FILM COATED ORAL at 15:01

## 2023-09-26 RX ADMIN — POLYMYXIN B SULFATE AND TRIMETHOPRIM SULFATE 1 DROP: 10000; 1 SOLUTION/ DROPS OPHTHALMIC at 03:28

## 2023-09-26 RX ADMIN — POLYMYXIN B SULFATE AND TRIMETHOPRIM SULFATE 1 DROP: 10000; 1 SOLUTION/ DROPS OPHTHALMIC at 18:37

## 2023-09-26 RX ADMIN — APIXABAN 5 MG: 5 TABLET, FILM COATED ORAL at 21:25

## 2023-09-26 RX ADMIN — MAGNESIUM SULFATE HEPTAHYDRATE 1000 MG: 1 INJECTION, SOLUTION INTRAVENOUS at 00:02

## 2023-09-26 RX ADMIN — ATORVASTATIN CALCIUM 20 MG: 20 TABLET, FILM COATED ORAL at 15:01

## 2023-09-26 ASSESSMENT — PAIN DESCRIPTION - LOCATION
LOCATION: EYE

## 2023-09-26 ASSESSMENT — PAIN DESCRIPTION - ONSET: ONSET: GRADUAL

## 2023-09-26 ASSESSMENT — PAIN - FUNCTIONAL ASSESSMENT
PAIN_FUNCTIONAL_ASSESSMENT: ACTIVITIES ARE NOT PREVENTED

## 2023-09-26 ASSESSMENT — PAIN SCALES - GENERAL
PAINLEVEL_OUTOF10: 0
PAINLEVEL_OUTOF10: 3
PAINLEVEL_OUTOF10: 3
PAINLEVEL_OUTOF10: 0
PAINLEVEL_OUTOF10: 2
PAINLEVEL_OUTOF10: 0

## 2023-09-26 ASSESSMENT — PAIN DESCRIPTION - FREQUENCY: FREQUENCY: INTERMITTENT

## 2023-09-26 ASSESSMENT — PAIN DESCRIPTION - DESCRIPTORS
DESCRIPTORS: ACHING

## 2023-09-26 ASSESSMENT — PAIN DESCRIPTION - ORIENTATION
ORIENTATION: LEFT
ORIENTATION: RIGHT
ORIENTATION: LEFT

## 2023-09-26 ASSESSMENT — PAIN DESCRIPTION - PAIN TYPE: TYPE: ACUTE PAIN

## 2023-09-26 NOTE — PROGRESS NOTES
RV, mild valvular disease. Nuc stress 09/2023: normal with apical artifact. Patient presented to the ED on 9/25 with another episode of syncope while having in the bathroom having a bowel movement (like the rest of his prior syncopal episodes). Patient reports no rest or exertional ischemic or congestive symptoms, denies palpitations. Orthostatic vital signs were not checked at the ED, patient was normotensive. He was admitted for syncope. He received LR 1 liter and his lasix 80 bid was held. Cardiology was consulted. Patient today reports no complaints. His Cr improved from 1.4 to 1.0. HS troponin 43 increased to 290s x 2. ECG c/w AF with CVR with PVCs, nonspecific changes, cannot exclude ischemia. Interval history:  Patient was scheduled for coronary CTA however it was canceled due to intermittent arrhythmia (PAF). BP is severely elevated this morning 200/130 mmHg. High-sensitivity troponin down to 184 from 234. Creatinine back to baseline 0.8. Assessment and Plan:   1. Syncope likely due to dehydration (from lasix) and vasovagal hypersensitivity. 2. EDGAR, due to diuretics, no normal Cr.   3. Elevated troponin. I cannot exclude severe underlying CAD not detected by dustin. 4. PAF s/p ablation, now with recurrent episode not in RVR. On Eliquis. 5. Chronic HFpEF. Well compensated and normotensive. 6. SSS s/p pacer. Interrogation did not suggest any arrhythmias or pacer malfunction.            -Will change lasix to 40 mg daily starting tomorrow.   -Resume Eliquis 5 mg p.o. twice daily  - Will increase Lopressor to 50 mg p.o. twice daily for better rhythm control  - Agree with restarting valsartan 160 mg p.o. daily. Patient may be discharged to home (if ok with primary team) tomorrow am on the above meds and follow up with Dr Katherine Barrera in 1-2 weeks with a BMP prior to visit. Please call me with any questions.                     I have personally reviewed the reports and images of labs,

## 2023-09-26 NOTE — PROGRESS NOTES
MD advised to recheck BP, new monitor used due to old monitor showing error on reading. BP showing 194/149, recheck showed 202/136. MD notified and at bedside.

## 2023-09-26 NOTE — PLAN OF CARE
Problem: Discharge Planning  Goal: Discharge to home or other facility with appropriate resources  Outcome: Progressing  Flowsheets (Taken 9/26/2023 0513)  Discharge to home or other facility with appropriate resources:   Identify barriers to discharge with patient and caregiver   Arrange for needed discharge resources and transportation as appropriate   Identify discharge learning needs (meds, wound care, etc)     Problem: Pain  Goal: Verbalizes/displays adequate comfort level or baseline comfort level  Outcome: Progressing  Flowsheets (Taken 9/26/2023 0513)  Verbalizes/displays adequate comfort level or baseline comfort level:   Encourage patient to monitor pain and request assistance   Assess pain using appropriate pain scale   Administer analgesics based on type and severity of pain and evaluate response     Problem: Chronic Conditions and Co-morbidities  Goal: Patient's chronic conditions and co-morbidity symptoms are monitored and maintained or improved  Outcome: Progressing  Flowsheets (Taken 9/26/2023 0513)  Care Plan - Patient's Chronic Conditions and Co-Morbidity Symptoms are Monitored and Maintained or Improved: Monitor and assess patient's chronic conditions and comorbid symptoms for stability, deterioration, or improvement     Problem: Safety - Adult  Goal: Free from fall injury  Outcome: Progressing  Flowsheets (Taken 9/26/2023 0513)  Free From Fall Injury: Instruct family/caregiver on patient safety  Note: Pt is a Fall Risk. See [place] Fall Risk Score. Pt bed in low position and side rails up. Call light and belongings in reach. Pt encouraged to call for assistance. Will continue with hourly rounds for PO intake, pain needs, toileting, and repositioning as needed.

## 2023-09-27 ENCOUNTER — TELEPHONE (OUTPATIENT)
Dept: CARDIOLOGY CLINIC | Age: 53
End: 2023-09-27

## 2023-09-27 ENCOUNTER — APPOINTMENT (OUTPATIENT)
Dept: CT IMAGING | Age: 53
DRG: 422 | End: 2023-09-27
Payer: MEDICAID

## 2023-09-27 VITALS
HEIGHT: 72 IN | WEIGHT: 315 LBS | RESPIRATION RATE: 16 BRPM | HEART RATE: 66 BPM | OXYGEN SATURATION: 97 % | SYSTOLIC BLOOD PRESSURE: 166 MMHG | TEMPERATURE: 98.4 F | BODY MASS INDEX: 42.66 KG/M2 | DIASTOLIC BLOOD PRESSURE: 97 MMHG

## 2023-09-27 LAB
ALBUMIN SERPL-MCNC: 3.5 G/DL (ref 3.4–5)
ANION GAP SERPL CALCULATED.3IONS-SCNC: 8 MMOL/L (ref 3–16)
BASOPHILS # BLD: 0 K/UL (ref 0–0.2)
BASOPHILS NFR BLD: 0.2 %
BUN SERPL-MCNC: 9 MG/DL (ref 7–20)
CALCIUM SERPL-MCNC: 9.7 MG/DL (ref 8.3–10.6)
CHLORIDE SERPL-SCNC: 103 MMOL/L (ref 99–110)
CO2 SERPL-SCNC: 27 MMOL/L (ref 21–32)
CREAT SERPL-MCNC: 0.8 MG/DL (ref 0.9–1.3)
DEPRECATED RDW RBC AUTO: 13.1 % (ref 12.4–15.4)
EOSINOPHIL # BLD: 0.1 K/UL (ref 0–0.6)
EOSINOPHIL NFR BLD: 1.3 %
ERYTHROCYTE [SEDIMENTATION RATE] IN BLOOD BY WESTERGREN METHOD: 34 MM/HR (ref 0–20)
GFR SERPLBLD CREATININE-BSD FMLA CKD-EPI: >60 ML/MIN/{1.73_M2}
GLUCOSE BLD-MCNC: 159 MG/DL (ref 70–99)
GLUCOSE BLD-MCNC: 207 MG/DL (ref 70–99)
GLUCOSE SERPL-MCNC: 198 MG/DL (ref 70–99)
HCT VFR BLD AUTO: 47.1 % (ref 40.5–52.5)
HGB BLD-MCNC: 15.8 G/DL (ref 13.5–17.5)
LYMPHOCYTES # BLD: 1.9 K/UL (ref 1–5.1)
LYMPHOCYTES NFR BLD: 30.4 %
MCH RBC QN AUTO: 31.8 PG (ref 26–34)
MCHC RBC AUTO-ENTMCNC: 33.7 G/DL (ref 31–36)
MCV RBC AUTO: 94.5 FL (ref 80–100)
MONOCYTES # BLD: 0.4 K/UL (ref 0–1.3)
MONOCYTES NFR BLD: 6.3 %
NEUTROPHILS # BLD: 3.8 K/UL (ref 1.7–7.7)
NEUTROPHILS NFR BLD: 61.8 %
PERFORMED ON: ABNORMAL
PERFORMED ON: ABNORMAL
PHOSPHATE SERPL-MCNC: 2.9 MG/DL (ref 2.5–4.9)
PLATELET # BLD AUTO: 207 K/UL (ref 135–450)
PMV BLD AUTO: 9.2 FL (ref 5–10.5)
POTASSIUM SERPL-SCNC: 3.9 MMOL/L (ref 3.5–5.1)
RBC # BLD AUTO: 4.98 M/UL (ref 4.2–5.9)
SODIUM SERPL-SCNC: 138 MMOL/L (ref 136–145)
WBC # BLD AUTO: 6.2 K/UL (ref 4–11)

## 2023-09-27 PROCEDURE — 85025 COMPLETE CBC W/AUTO DIFF WBC: CPT

## 2023-09-27 PROCEDURE — 6360000002 HC RX W HCPCS

## 2023-09-27 PROCEDURE — 70482 CT ORBIT/EAR/FOSSA W/O&W/DYE: CPT

## 2023-09-27 PROCEDURE — 87641 MR-STAPH DNA AMP PROBE: CPT

## 2023-09-27 PROCEDURE — 99452 NTRPROF PH1/NTRNET/EHR RFRL: CPT | Performed by: INTERNAL MEDICINE

## 2023-09-27 PROCEDURE — 6360000004 HC RX CONTRAST MEDICATION: Performed by: INTERNAL MEDICINE

## 2023-09-27 PROCEDURE — 86038 ANTINUCLEAR ANTIBODIES: CPT

## 2023-09-27 PROCEDURE — 99254 IP/OBS CNSLTJ NEW/EST MOD 60: CPT | Performed by: INTERNAL MEDICINE

## 2023-09-27 PROCEDURE — 85652 RBC SED RATE AUTOMATED: CPT

## 2023-09-27 PROCEDURE — 80069 RENAL FUNCTION PANEL: CPT

## 2023-09-27 PROCEDURE — 6370000000 HC RX 637 (ALT 250 FOR IP)

## 2023-09-27 PROCEDURE — 6370000000 HC RX 637 (ALT 250 FOR IP): Performed by: INTERNAL MEDICINE

## 2023-09-27 PROCEDURE — 2580000003 HC RX 258

## 2023-09-27 PROCEDURE — 36415 COLL VENOUS BLD VENIPUNCTURE: CPT

## 2023-09-27 PROCEDURE — 94660 CPAP INITIATION&MGMT: CPT

## 2023-09-27 RX ORDER — FUROSEMIDE 40 MG/1
40 TABLET ORAL DAILY
Qty: 30 TABLET | Refills: 0 | Status: SHIPPED | OUTPATIENT
Start: 2023-09-28 | End: 2023-10-05

## 2023-09-27 RX ORDER — LINEZOLID 600 MG/1
600 TABLET, FILM COATED ORAL 2 TIMES DAILY
Qty: 20 TABLET | Refills: 0 | Status: SHIPPED | OUTPATIENT
Start: 2023-09-27 | End: 2023-10-07

## 2023-09-27 RX ORDER — PREDNISONE 50 MG/1
50 TABLET ORAL DAILY
Qty: 5 TABLET | Refills: 0 | Status: SHIPPED | OUTPATIENT
Start: 2023-09-27 | End: 2023-10-02

## 2023-09-27 RX ORDER — FLUTICASONE PROPIONATE 50 MCG
1 SPRAY, SUSPENSION (ML) NASAL DAILY
Qty: 16 G | Refills: 3 | Status: SHIPPED | OUTPATIENT
Start: 2023-09-28

## 2023-09-27 RX ORDER — AMLODIPINE BESYLATE 10 MG/1
10 TABLET ORAL DAILY
Status: DISCONTINUED | OUTPATIENT
Start: 2023-09-27 | End: 2023-09-27 | Stop reason: HOSPADM

## 2023-09-27 RX ORDER — METOPROLOL TARTRATE 50 MG/1
50 TABLET, FILM COATED ORAL 2 TIMES DAILY
Qty: 60 TABLET | Refills: 0 | Status: SHIPPED | OUTPATIENT
Start: 2023-09-27 | End: 2023-10-27

## 2023-09-27 RX ORDER — AMOXICILLIN AND CLAVULANATE POTASSIUM 500; 125 MG/1; MG/1
1 TABLET, FILM COATED ORAL 3 TIMES DAILY
Qty: 30 TABLET | Refills: 0 | Status: SHIPPED | OUTPATIENT
Start: 2023-09-27 | End: 2023-10-07

## 2023-09-27 RX ADMIN — PREDNISONE 80 MG: 50 TABLET ORAL at 17:28

## 2023-09-27 RX ADMIN — VALSARTAN 160 MG: 160 TABLET, FILM COATED ORAL at 08:25

## 2023-09-27 RX ADMIN — FUROSEMIDE 40 MG: 40 TABLET ORAL at 08:25

## 2023-09-27 RX ADMIN — PIPERACILLIN AND TAZOBACTAM 3375 MG: 3; .375 INJECTION, POWDER, LYOPHILIZED, FOR SOLUTION INTRAVENOUS at 11:17

## 2023-09-27 RX ADMIN — ALLOPURINOL 300 MG: 300 TABLET ORAL at 08:26

## 2023-09-27 RX ADMIN — ACETAMINOPHEN 650 MG: 325 TABLET ORAL at 11:30

## 2023-09-27 RX ADMIN — LINEZOLID 600 MG: 600 INJECTION, SOLUTION INTRAVENOUS at 04:11

## 2023-09-27 RX ADMIN — IOPAMIDOL 75 ML: 755 INJECTION, SOLUTION INTRAVENOUS at 10:38

## 2023-09-27 RX ADMIN — METOPROLOL TARTRATE 50 MG: 50 TABLET, FILM COATED ORAL at 08:25

## 2023-09-27 RX ADMIN — SODIUM CHLORIDE, PRESERVATIVE FREE 10 ML: 5 INJECTION INTRAVENOUS at 08:26

## 2023-09-27 RX ADMIN — POLYMYXIN B SULFATE AND TRIMETHOPRIM SULFATE 1 DROP: 10000; 1 SOLUTION/ DROPS OPHTHALMIC at 17:36

## 2023-09-27 RX ADMIN — SODIUM CHLORIDE: 9 INJECTION, SOLUTION INTRAVENOUS at 04:10

## 2023-09-27 RX ADMIN — APIXABAN 5 MG: 5 TABLET, FILM COATED ORAL at 08:25

## 2023-09-27 RX ADMIN — POLYMYXIN B SULFATE AND TRIMETHOPRIM SULFATE 1 DROP: 10000; 1 SOLUTION/ DROPS OPHTHALMIC at 08:31

## 2023-09-27 RX ADMIN — POLYETHYLENE GLYCOL 3350 17 G: 17 POWDER, FOR SOLUTION ORAL at 08:26

## 2023-09-27 RX ADMIN — POLYMYXIN B SULFATE AND TRIMETHOPRIM SULFATE 1 DROP: 10000; 1 SOLUTION/ DROPS OPHTHALMIC at 00:03

## 2023-09-27 RX ADMIN — INSULIN LISPRO 1 UNITS: 100 INJECTION, SOLUTION INTRAVENOUS; SUBCUTANEOUS at 11:33

## 2023-09-27 RX ADMIN — AMLODIPINE BESYLATE 10 MG: 10 TABLET ORAL at 09:17

## 2023-09-27 RX ADMIN — PIPERACILLIN AND TAZOBACTAM 3375 MG: 3; .375 INJECTION, POWDER, LYOPHILIZED, FOR SOLUTION INTRAVENOUS at 04:07

## 2023-09-27 RX ADMIN — POLYMYXIN B SULFATE AND TRIMETHOPRIM SULFATE 1 DROP: 10000; 1 SOLUTION/ DROPS OPHTHALMIC at 04:01

## 2023-09-27 RX ADMIN — ATORVASTATIN CALCIUM 20 MG: 20 TABLET, FILM COATED ORAL at 08:25

## 2023-09-27 RX ADMIN — POLYMYXIN B SULFATE AND TRIMETHOPRIM SULFATE 1 DROP: 10000; 1 SOLUTION/ DROPS OPHTHALMIC at 11:33

## 2023-09-27 RX ADMIN — LABETALOL HYDROCHLORIDE 5 MG: 5 INJECTION, SOLUTION INTRAVENOUS at 12:30

## 2023-09-27 ASSESSMENT — PAIN SCALES - GENERAL: PAINLEVEL_OUTOF10: 0

## 2023-09-27 NOTE — PROGRESS NOTES
Internal Medicine Progress Note    Date: 9/27/2023  Patient: Mariely Mora Day: 2      CC: Chest Pain, Shortness of Breath, and Loss of Consciousness (Pt was trying to make a bowel movement when he passed out.)      Interval Hx   NAEO. Hypertensive 165/105. Other VS WNL. I/O 710/1200 (3 unmeasured UOP). RFP: 138/3.9  103/27  9/0.8 < 198, Ca 9.7, Phos 2.9  CBC: 6.2 > 15.8/47.1 < 207    Weight decreased 10 pounds admission. Pacemaker report from [employer] showed no arrhythmias or bradycardia that could precipitate a syncopal event. Report from [facility] from 2018 and 2021 were significant for diagnosis of uveitis and iritis. Work-up recommended was autoimmune panel. No evidence of these tests being performed was seen. Patient now on scheduled GlycoLax daily with instruction to double dose every day without a bowel movement de-escalate dose after passing a bowel movement. Patient seen at bedside. Stated that headache and pressure sensation on the right side have improved. Looks forward to discharge. Requested a work note explaining that he cannot lift with the left arm due to his previous pacemaker placement surgery. No other complaints or concerns from patient at this time. Afternoon update: Infectious disease consulted for review of antibiotic regimen for dispo. Per ID CT of orbits ordered. Read was significant for proptosis of right eye but negative otherwise. ID recs for discharge in assessment and plan. Paged by nurse for patient complaining of worsened right-sided headache and blood pressure of 217/150. Upon recheck blood pressure was at baseline but patient endorsed \"200 out of 10\" eye pain and headache. Labetalol and Tylenol administered. Denied jaw claudication and vision loss.       HPI: Patient is a 23-year-old male with history significant for sick sinus syndrome status post pacemaker placement, atrial fibrillation, HTN, type 2 diabetes, HLD, ophthalmology f/u    Will discuss with attending physician Austyn Morales MD.    _____________________  Frnak Weller,    Internal Medicine Resident, PGY-1  9/27/2023, 4:07 PM

## 2023-09-27 NOTE — TELEPHONE ENCOUNTER
Pt is s/p a [employer] DC PPM by Dr. Jennifer Venegas on 09.22.2023. He remains IP at [facility]. Today's 1 week wound/device check needs r/s and pt also needs the appropriate implant follow up appts made. Please reach out to pt. Thanks.

## 2023-09-27 NOTE — PLAN OF CARE
Problem: Discharge Planning  Goal: Discharge to home or other facility with appropriate resources  Outcome: Progressing     Problem: Pain  Goal: Verbalizes/displays adequate comfort level or baseline comfort level  9/27/2023 1432 by Renetta Aguirre RN  Outcome: Progressing     Problem: Chronic Conditions and Co-morbidities  Goal: Patient's chronic conditions and co-morbidity symptoms are monitored and maintained or improved  9/27/2023 1432 by Renetta Aguirre RN  Outcome: Progressing     Problem: Safety - Adult  Goal: Free from fall injury  9/27/2023 1432 by Renetta Aguirre RN  Outcome: Progressing  Note: No falls noted thus far this shift, bed in lowest position, alarm on, non-skid socks on, call light within reach, hourly checks, safety maintained, will continue to monitor.

## 2023-09-27 NOTE — DISCHARGE INSTR - COC
Continuity of Care Form    Patient Name: Arya De La Cruz   :  1970  MRN:  [id number]    Admit date:  2023  Discharge date:  ***    Code Status Order: Full Code   Advance Directives:     Admitting Physician:  Diane Nolan MD  PCP: Caity Jaramillo MD    Discharging Nurse: [facility] Unit/Room#: 1176/0373-63  Discharging Unit Phone Number: ***    Emergency Contact:   Extended Emergency Contact Information  Primary Emergency Contact: Miranda Vanegas [facility] of [address] [address] Phone: [phone]  Work Phone: [phone]  Relation: Parent    Past Surgical History:  Past Surgical History:   Procedure Laterality Date    ABLATION OF DYSRHYTHMIC FOCUS      COLONOSCOPY  2021    COLONOSCOPY POLYPECTOMY SNARE/COLD BIOPSY performed by Jyotsna Martinez MD at [address] [facility] Left 6/10/2021    LEFT MIDDLE FINGER TRIGGER RELEASE performed by Lizy Aponte MD at [address] [facility]       Immunization History:   Immunization History   Administered Date(s) Administered    COVID-19, MODERNA BLUE border, Primary or Immunocompromised, (age 12y+), IM, 100 mcg/0.5mL 03/15/2021, 2021    Influenza Virus Vaccine 2014, 2015, 11/15/2017    Influenza, AFLURIA (age 1 yrs+), FLUZONE, (age 10 mo+), MDV, 0.5mL 2017, 2017, 11/15/2017    Influenza, FLUARIX, FLULAVAL, FLUZONE (age 10 mo+) AND AFLURIA, (age 1 y+), PF, 0.5mL 2020    Influenza, FLUCELVAX, (age 10 mo+), MDCK, PF, 0.5mL 2023    Influenza, Quadv, 6 mo and older, IM, PF (Flulaval, Fluarix) 2018    Influenza, Triv, 3 Years and older, IM, PF (Afluria 5yrs and older) 2016    Pneumococcal Vaccine 09/15/2017    Pneumococcal, PPSV23, PNEUMOVAX 21, (age 2y+), SC/IM, 0.5mL 09/15/2017    TDaP, ADACEL (age 6y-58y), BOOSTRIX (age 10y+), IM, 0.5mL 2018       Active Problems:  Patient Active Problem List   Diagnosis Code    Obstructive sleep apnea G47.33 Discharging to Facility/ Agency   Name:   Address:  Phone:  Fax:    Dialysis Facility (if applicable)   Name:  Address:  Dialysis Schedule:  Phone:  Fax:    / signature: {Esignature:331512861}    PHYSICIAN SECTION    Prognosis: {Prognosis:8263923638}    Condition at Discharge: [address] Patient Condition:306257915}    Rehab Potential (if transferring to Rehab): {Prognosis:4747608375}    Recommended Labs or Other Treatments After Discharge: ***    Physician Certification: I certify the above information and transfer of Luis A ESCALERA Jono  is necessary for the continuing treatment of the diagnosis listed and that he requires {Admit to Appropriate Level of Care:22638} for {GREATER/LESS:455972407} 30 days.      Update Admission H&P: {CHP DME Changes in UDXPF:402511825}    PHYSICIAN SIGNATURE:  {Esignature:612184937}

## 2023-09-27 NOTE — PROGRESS NOTES
Physician Progress Note      Nathanael CLOUD #:                  [id number]  :                       1970  ADMIT DATE:       2023 12:23 AM  DISCH DATE:  Peter Glover  PROVIDER #:        Darrel Valentine MD          QUERY TEXT:    Pt admitted with Syncope. Per Cardiology consultant: \"? Syncope; acute on   chronic: Suspect most likely 2/2 dehydration and vasovagal.\"   If possible,   please document in progress notes and discharge summary:    The medical record reflects the following:  Risk Factors: 48 y/o with hx of constipation and On diuretics  Clinical Indicators: Card consult note: \"Syncope; acute on chronic-? Suspect   most likely 2/2 dehydration and vasovagal  - Very low?suspicion for cardiogenic involvement at this time-? Patient has   dual-chamber pacemaker in place, device interrogation shows no pauses,   possible arrhythmias that could explain his symptoms. \"   Creat 1.4  Treatment: LR bolus, Held Lasix, card consult  Options provided:  -- Syncope likely due to dehydration and vasovagal confirmed present on   admission  -- Syncope likely due to other, please specify likely cause, please specify   likely cause. -- Other - I will add my own diagnosis  -- Disagree - Not applicable / Not valid  -- Disagree - Clinically unable to determine / Unknown  -- Refer to Clinical Documentation Reviewer    PROVIDER RESPONSE TEXT:    The diagnosis of Syncope likely due to dehydration and vasovagal was confirmed   as present on admission.     Query created by: Gillermina Brittle on 2023 5:59 PM      Electronically signed by:  Darrel Valentine MD 2023 9:33 PM

## 2023-09-27 NOTE — DISCHARGE SUMMARY
INTERNAL MEDICINE DEPARTMENT AT [address] [place] [address]  DISCHARGE SUMMARY    Patient ID: Fariba De La Cruz                                             Discharge Date: 9/27/2023   Patient's PCP: Galo Aguirre MD                                          Discharge Physician: Siva Doty DO  Admit Date: 9/25/2023   Admitting Physician: Claudine Mills MD    PROBLEMS DURING HOSPITALIZATION:  Present on Admission:   Syncope and collapse      DISCHARGE DIAGNOSES:  #Preseptal cellulitis  On HD 0 patient started complaining of right eye pressure and pain. By HD 1 swelling had worsened with accompanying pain on extraocular eye movement. -- MRSA probe obtained  -- Transitioned IV abx to PO Augmentin and linezolid  -- Prednisone 80 mg inpatient  -- Plan for 5 days prednisone 50 mg  -- Urged pt to follow up with ophthalmology as soon as possible  -- Called [facility] and requested urgent appt for pt     #Situational syncope  #Constipation  #Hematochezia  4-year history of recurrent syncope related to defecation. Seems to only occur w/ constipation and significant straining. Discharged 9/23 for same issue. Suspect noncardiac as PPM was functioning WNL w/ no causative arrhythmias recorded. C/o constipation on admission. On HD 0 pt had blood-streaked BM. Internal hemorrhoids on 2021 colonoscopy. Polyps resected during that scope biopsy positive for hyperplastic and tubular adenoma. F/u colonoscopy recommended in 2024.  -- Orthostats were negative  -- Restart Lasix 40 mg with dosing schedule reduced to daily  -- Pt education on physical counterpressure techniques  -- Optimize bowel regimen to avoid significant straining: daily propylene glycol     #Sick sinus syndrome s/p PPM  #Atrial fibrillation s/p ablation  #Ventricular trigeminy on EKG  #RBBB on EKG  S/p PPM 9/22 with Dr. Niko Arizmendi. Has had poor follow up w/ Cardiology over past few years - has presented to establish in the past but then did not go to f/u appts.    -- eye: Left conjunctiva is not injected. Chemosis present. No exudate or hemorrhage. Pupils: Pupils are equal, round, and reactive to light. Comments: Swelling of right periorbit. Tender to palpation. Cardiovascular:      Rate and Rhythm: Normal rate and regular rhythm. Heart sounds: Normal heart sounds. No murmur heard. Pulmonary:      Breath sounds: Normal breath sounds. No wheezing, rhonchi or rales. Abdominal:      General: There is no distension. Palpations: Abdomen is soft. Tenderness: There is no abdominal tenderness. There is no guarding. Musculoskeletal:      Right lower leg: No edema. Left lower leg: No edema. Skin:     General: Skin is warm and dry. Neurological:      General: No focal deficit present. Mental Status: He is alert and oriented to person, place, and time. Mental status is at baseline.    Psychiatric:         Mood and Affect: Mood normal.         Behavior: Behavior normal.          Consults: cardiology and ID  Significant Diagnostic Studies: CT scan head, abdomen, CTA chest  Treatments: IV hydration, antibiotics: Zosyn and linezolid, and cardiac meds: metoprolol  Disposition: home  Discharged Condition: Stable  Follow Up: Primary Care Physician in one week    DISCHARGE MEDICATION:     Medication List        CONTINUE taking these medications      Accu-Chek FastClix Lancets Misc  20 Sticks by Does not apply route 2 times daily     Alcohol Swabs Pads  1 Container by Does not apply route three times daily     Blood Glucose Monitor System w/Device Kit  1 Units by Does not apply route daily     Blood Pressure Kit  Please take your blood pressure on a daily basis     Handicap Placard Misc  by Does not apply route Okay effective from 3/27/2023 through 3/27/2028     Insulin Syringe-Needle U-100 31G X 5/16\" 1 ML Misc  Commonly known as: BD Insulin Syringe U/F  USE AS DIRECTED 4 TIMES A DAY     Pen Needles 31G X 6 MM Misc  1 each by Does not apply route daily

## 2023-09-27 NOTE — PROGRESS NOTES
Pt discharged per order. Instructions gone over, pt verbalized understanding. IV and tele removed. All belongings taken with pt. Family here to pick pt up.

## 2023-09-27 NOTE — CARE COORDINATION
CM following for discharge planning. Pt is from home with no services. Pt uses CPAP at night. ID consulted, MRSA swab pending, CT orbits r/o cellulitis pending. IV abx. IV abx vs po abx at discharge to be determined. Addendum: No IV abx at d/c. Pt to discharge home. No CM needs.      Umer Sánchez RN, BSN, [facility]  Case Management Department  [phone]

## 2023-09-27 NOTE — DISCHARGE INSTRUCTIONS
-Please take 40 mg of lasix daily   -please start taking 50 mg of lopressor twice daily  -please follow up with your PCP, [facility] and Cardiologist  -Don't lift heavy load from the left arm for 30 days   -Please start taking Miralax daily and avoid bearing/ straining when constipated   -please take Augmentin three times a day for 10 days   -Please take zyvox 600 mg twice daily for 10 days

## 2023-09-27 NOTE — PLAN OF CARE
Problem: Chronic Conditions and Co-morbidities  Goal: Patient's chronic conditions and co-morbidity symptoms are monitored and maintained or improved  Outcome: Progressing  Flowsheets (Taken 9/27/2023 0308)  Care Plan - Patient's Chronic Conditions and Co-Morbidity Symptoms are Monitored and Maintained or Improved:   Monitor and assess patient's chronic conditions and comorbid symptoms for stability, deterioration, or improvement   Collaborate with multidisciplinary team to address chronic and comorbid conditions and prevent exacerbation or deterioration   Update acute care plan with appropriate goals if chronic or comorbid symptoms are exacerbated and prevent overall improvement and discharge  Note: Monitoring pt's blood pressure. Blood pressure medications given per MD order. Pacemaker site intact. Will continue to monitor. Problem: Pain  Goal: Verbalizes/displays adequate comfort level or baseline comfort level  Outcome: Progressing  Flowsheets (Taken 9/27/2023 0308)  Verbalizes/displays adequate comfort level or baseline comfort level:   Encourage patient to monitor pain and request assistance   Assess pain using appropriate pain scale   Administer analgesics based on type and severity of pain and evaluate response   Implement non-pharmacological measures as appropriate and evaluate response  Note: Pt denies pain at this time. Will continue to monitor. Problem: Safety - Adult  Goal: Free from fall injury  Outcome: Progressing  Flowsheets (Taken 9/27/2023 0308)  Free From Fall Injury: Instruct family/caregiver on patient safety  Note: Fall precautions are in place. Bed alarm is on and in lowest position. Pt is using call light appropriately. Will continue to monitor.

## 2023-09-27 NOTE — PROGRESS NOTES
MRI was reviewed. Patient's BP elevated today. Patient is a 48 yo man, follows with Dr Lola Cast, with h/o PAF s/p ablation 11/2018, SSS s/p MDT MRI compatible dual chamber pacer 9/22/23, morbid obesity, LEONA, DM, HTN, recurrent syncopes (during vagal maneuvres). Echo 09/2023: mod LVH, EF 60%, grade II diastolic, normal RV, mild valvular disease. Nuc stress 09/2023: normal with apical artifact. Patient presented to the ED on 9/25 with another episode of syncope while having in the bathroom having a bowel movement (like the rest of his prior syncopal episodes). Patient reports no rest or exertional ischemic or congestive symptoms, denies palpitations. Orthostatic vital signs were not checked at the ED, patient was normotensive. He was admitted for syncope. He received LR 1 liter and his lasix 80 bid was held. Cardiology was consulted. Patient today reports no complaints. His Cr improved from 1.4 to 1.0. HS troponin 43 increased to 290s x 2. ECG c/w AF with CVR with PVCs, nonspecific changes, cannot exclude ischemia. Interval history:  Patient was scheduled for coronary CTA however it was canceled due to intermittent arrhythmia (PAF). BP is severely elevated this morning 200/130 mmHg. High-sensitivity troponin down to 184 from 234. Creatinine back to baseline 0.8. Assessment and Plan:   1. Syncope likely due to dehydration (from lasix) and vasovagal hypersensitivity. 2. EDGAR, due to diuretics, no normal Cr.   3. Elevated troponin. I cannot exclude severe underlying CAD not detected by dustin. 4. PAF s/p ablation, now with recurrent episode not in RVR. On Eliquis. 5. Chronic HFpEF. Well compensated and normotensive. 6. SSS s/p pacer. Interrogation did not suggest any arrhythmias or pacer malfunction. 7. Severe HTN.             -Continue lasix 40 daily, will start amlodipine 5 daily for hypertension.   -Resumed Eliquis 5 mg p.o. twice daily  - Continue Lopressor to 50 mg p.o. twice daily

## 2023-09-28 ENCOUNTER — CARE COORDINATION (OUTPATIENT)
Dept: CASE MANAGEMENT | Age: 53
End: 2023-09-28

## 2023-09-28 LAB
ANA SER QL IA: NEGATIVE
MRSA DNA SPEC QL NAA+PROBE: NORMAL

## 2023-09-28 NOTE — CARE COORDINATION
[address] [facility],[address] Transitions Initial Follow Up Call    Call within 2 business days of discharge: Yes    Patient:  Lukas Davalos  Patient :  1970  MRN:  [id number]   Reason for Admission:  syncope / collapse  Discharge Date:  23  RARS: 15      Transitions of Care Initial Call    Was this an external facility discharge? no    Discharge Facility: [address] [place] [address] to be reviewed by the provider   Additional needs identified to be addressed with provider:    yes - hfu needed     AMB CC Provider Discharge Needs: none            1ST  CTC attempt to reach Pt regarding recent hospital discharge. CTC unable to leave voice recording with call back number requesting a call back / call goes directly to voicemail prompt but voicemail is not setup. Follow up appointments:    Future Appointments   Date Time Provider [address]   2023  1:30 PM Reinaldo Schumacher DEVICE CHECK [place] Card [facility]   10/4/2023 10:20 AM Shira Mueller MD [facility]   2023  1:15 PM Jonny Arvizu MD [facility]       Scott Newsome BSN, RN  Care Transition Coordinator  Contact Number:  [phone]

## 2023-09-29 ENCOUNTER — NURSE ONLY (OUTPATIENT)
Dept: CARDIOLOGY CLINIC | Age: 53
End: 2023-09-29

## 2023-09-29 ENCOUNTER — CARE COORDINATION (OUTPATIENT)
Dept: CASE MANAGEMENT | Age: 53
End: 2023-09-29

## 2023-09-29 ENCOUNTER — TELEPHONE (OUTPATIENT)
Dept: INTERNAL MEDICINE CLINIC | Age: 53
End: 2023-09-29

## 2023-09-29 DIAGNOSIS — Z95.0 CARDIAC PACEMAKER IN SITU: Primary | ICD-10-CM

## 2023-09-29 NOTE — CARE COORDINATION
[address] [facility],[address] Transitions Initial Follow Up Call    Call within 2 business days of discharge: Yes    Patient:  Randi Billy  Patient :  1970  MRN:  [id number]   Reason for Admission:  syncope / collapse  Discharge Date:  23  RARS: 15      Transitions of Care Initial Call    Was this an external facility discharge? no    [facility] to be reviewed by the provider   Additional needs identified to be addressed with provider:    no    AMB CC Provider Discharge Needs: none            2ND CTC attempt to reach Pt regarding recent hospital discharge. CTC left voice recording with call back number requesting a call back. CT episode closed / unable to reach. HFU with pcp 10/5. Follow up appointments:    Future Appointments   Date Time Provider [address]   10/4/2023 10:20 AM Pablito Hoyos MD [facility]   10/5/2023  2:45 PM Margaret Tejada MD [facility]   10/18/2023  1:45 PM SCHEDULE, [address] Card [facility]   10/18/2023  2:00 PM TATO B[facility] - [facility] Card [facility]   2023  1:15 PM Daniyal Theodor Lefort, MD [facility]       MATTHEW Morales, RN  Care Transition Coordinator  Contact Number:  [phone]

## 2023-09-29 NOTE — PROGRESS NOTES
Patient comes in for a 1 week wound and programming evaluation s/p [employer] DC PPM by Dr. Crystal Vo on 09.22.2023. [employer]iel appears active. Discussed remote monitoring, frequencies and communications. Literature, CARLOS brochure and AVS provided today. Dressing removed from left chest device site. Incision is well approximated, CDI, SS remain. Reviewed post op wound care and restrictions. Pt informed to call office if he develops any fever, chills, increased swelling, redness or drainage from site. Pt voiced an understanding. Interrogation available in [employer] and will be uploaded in [employer] under Cardiology tab once reviewed by EPMD.    Patient will follow up in 1 month w/EPNP and device. We will continue to monitor remotely.

## 2023-09-29 NOTE — TELEPHONE ENCOUNTER
Patient wad discharged Ziyox and Augmentin for possible orbital infection with CT orbits negative for infection, Patient will finish course of Augmentin and will D/C Ziyox, Patient was called and updated had an understanding through teachback method.       Lidia Godinez MD   Internal Medicine, PGY-3

## 2023-10-01 NOTE — PROGRESS NOTES
Physician Progress Note      PATIENTZebedee Angelucci  [facility] #:                  [id number]  :                       1970  ADMIT DATE:       2023 12:23 AM  DISCH DATE:        2023 6:34 PM  RESPONDING  PROVIDER #:        Chacha Umanzor MD          QUERY TEXT:    Patient admitted with Dehydration. Noted documentation of NSTEMI. In order to   support the diagnosis of NSTEMI, please include additional clinical indicators   in your documentation. Or please document if the diagnosis of NSTEMI has   been ruled out after further study. The medical record reflects the following:  Risk Factors: Dehydration and has HTN Urgency  Clinical Indicators:  PN: \"NSTEMI (resolved): #Hypertensive urgency  Troponin 43 > 296 > 290. Stress test performed on 23 noncontributory due   to poor diagnostic study. Previous stress test 20 was negative for   ischemia. Echo also performed  showing grade 2 diastolic dysfunction   otherwise unremarkable. \" Per Card consult: \"Elevated troponin. I cannot   exclude severe underlying CAD not detected by dustin. \"  stress test: Small   apical defect, predominantly fixed, likely artifact. Otherwise no evidence of   ischemia. Treatment:  valsartan, Labetolol 5 mg Q6H PRN, Lipitor, trend troponins, Card   consult,  Options provided:  -- NSTEMI present as evidenced by, Please document evidence. -- NSTEMI was ruled out. Demand ischemia due to HTN urgency  -- NSTEMI was ruled out. NSTEMI type 2 due to HTN urgency  -- Other - I will add my own diagnosis  -- Disagree - Not applicable / Not valid  -- Disagree - Clinically unable to determine / Unknown  -- Refer to Clinical Documentation Reviewer    PROVIDER RESPONSE TEXT:    NSTEMI was ruled out. Demand ischemia due to HTN urgency.     Query created by: Sheryl Pires on 2023 9:05 AM      Electronically signed by:  Chacha Umanzor MD 10/1/2023 7:35 PM

## 2023-10-04 ENCOUNTER — OFFICE VISIT (OUTPATIENT)
Dept: SLEEP MEDICINE | Age: 53
End: 2023-10-04
Payer: MEDICAID

## 2023-10-04 VITALS
BODY MASS INDEX: 42.66 KG/M2 | OXYGEN SATURATION: 97 % | HEIGHT: 72 IN | TEMPERATURE: 97.8 F | WEIGHT: 315 LBS | SYSTOLIC BLOOD PRESSURE: 126 MMHG | DIASTOLIC BLOOD PRESSURE: 80 MMHG | RESPIRATION RATE: 18 BRPM | HEART RATE: 104 BPM

## 2023-10-04 DIAGNOSIS — Z86.79 HISTORY OF ATRIAL FIBRILLATION: ICD-10-CM

## 2023-10-04 DIAGNOSIS — I10 ESSENTIAL HYPERTENSION: ICD-10-CM

## 2023-10-04 DIAGNOSIS — G47.33 OSA TREATED WITH BIPAP: Primary | ICD-10-CM

## 2023-10-04 DIAGNOSIS — Z99.89 DEPENDENCE ON OTHER ENABLING MACHINES AND DEVICES: ICD-10-CM

## 2023-10-04 DIAGNOSIS — E66.01 CLASS 3 SEVERE OBESITY DUE TO EXCESS CALORIES WITH SERIOUS COMORBIDITY AND BODY MASS INDEX (BMI) OF 45.0 TO 49.9 IN ADULT (HCC): ICD-10-CM

## 2023-10-04 PROCEDURE — 3079F DIAST BP 80-89 MM HG: CPT | Performed by: PSYCHIATRY & NEUROLOGY

## 2023-10-04 PROCEDURE — G8417 CALC BMI ABV UP PARAM F/U: HCPCS | Performed by: PSYCHIATRY & NEUROLOGY

## 2023-10-04 PROCEDURE — 1111F DSCHRG MED/CURRENT MED MERGE: CPT | Performed by: PSYCHIATRY & NEUROLOGY

## 2023-10-04 PROCEDURE — G8427 DOCREV CUR MEDS BY ELIG CLIN: HCPCS | Performed by: PSYCHIATRY & NEUROLOGY

## 2023-10-04 PROCEDURE — G8484 FLU IMMUNIZE NO ADMIN: HCPCS | Performed by: PSYCHIATRY & NEUROLOGY

## 2023-10-04 PROCEDURE — 3017F COLORECTAL CA SCREEN DOC REV: CPT | Performed by: PSYCHIATRY & NEUROLOGY

## 2023-10-04 PROCEDURE — 99214 OFFICE O/P EST MOD 30 MIN: CPT | Performed by: PSYCHIATRY & NEUROLOGY

## 2023-10-04 PROCEDURE — 1036F TOBACCO NON-USER: CPT | Performed by: PSYCHIATRY & NEUROLOGY

## 2023-10-04 PROCEDURE — 3074F SYST BP LT 130 MM HG: CPT | Performed by: PSYCHIATRY & NEUROLOGY

## 2023-10-04 ASSESSMENT — SLEEP AND FATIGUE QUESTIONNAIRES
HOW LIKELY ARE YOU TO NOD OFF OR FALL ASLEEP WHILE LYING DOWN TO REST IN THE AFTERNOON WHEN CIRCUMSTANCES PERMIT: 2
HOW LIKELY ARE YOU TO NOD OFF OR FALL ASLEEP WHILE SITTING AND READING: 1
HOW LIKELY ARE YOU TO NOD OFF OR FALL ASLEEP WHEN YOU ARE A PASSENGER IN A CAR FOR AN HOUR WITHOUT A BREAK: 2
HOW LIKELY ARE YOU TO NOD OFF OR FALL ASLEEP WHILE SITTING QUIETLY AFTER LUNCH WITHOUT ALCOHOL: 1
HOW LIKELY ARE YOU TO NOD OFF OR FALL ASLEEP WHILE SITTING INACTIVE IN A PUBLIC PLACE: 1
HOW LIKELY ARE YOU TO NOD OFF OR FALL ASLEEP WHILE WATCHING TV: 2
HOW LIKELY ARE YOU TO NOD OFF OR FALL ASLEEP IN A CAR, WHILE STOPPED FOR A FEW MINUTES IN TRAFFIC: 1
ESS TOTAL SCORE: 11
HOW LIKELY ARE YOU TO NOD OFF OR FALL ASLEEP WHILE SITTING AND TALKING TO SOMEONE: 1

## 2023-10-04 NOTE — PROGRESS NOTES
daily 9/12/23 11/11/23 Yes Jonny Melvin MD   metFORMIN (GLUCOPHAGE) 500 MG tablet Take one tablet twice per day with meals 9/12/23  Yes Hamilton Black MD   Handicap Placard MISC by Does not apply route Okay effective from 3/27/2023 through 3/27/2028 3/27/23  Yes Tanvir Graves MD   Blood Pressure KIT Please take your blood pressure on a daily basis 3/27/23  Yes Tanvir Graves MD   sennosides-docusate sodium (SENOKOT-S) 8.6-50 MG tablet Take 1 tablet by mouth 2 times daily as needed for Constipation 1/19/23  Yes Rafi Carbajal, DO   Multiple Vitamins-Minerals (THERAPEUTIC-M/LUTEIN) TABS TAKE 1 TABLET BY MOUTH EVERY DAY 1/19/23  Yes Janusz Carbajal, DO   insulin lispro, 1 Unit Dial, 100 UNIT/ML SOPN INJECT 7 UNITS INTO THE SKIN 3 TIMES DAILY BEFORE MEALS 6/22/22  Yes Dnailo Marion MD   Insulin Syringe-Needle U-100 (BD INSULIN SYRINGE U/F) 31G X 5/16\" 1 ML MISC USE AS DIRECTED 4 TIMES A DAY 12/10/21  Yes Shira Gerber MD   blood glucose monitor strips by Other route 4 times daily (after meals and at bedtime) 54/23/35  Yes Logan Castro MD   Alcohol Swabs PADS 1 Container by Does not apply route three times daily 10/1/19  Yes Marcia Vieyra MD   ACCU-CHEK FASTCLIX LANCETS MISC 20 Sticks by Does not apply route 2 times daily 10/1/19  Yes Marcia Vieyra MD   Blood Glucose Monitoring Suppl (BLOOD GLUCOSE MONITOR SYSTEM) W/DEVICE KIT 1 Units by Does not apply route daily 4/17/17  Yes Torsten Stokes MD   Insulin Pen Needle (PEN NEEDLES) 31G X 6 MM MISC 1 each by Does not apply route daily May substitute to meet insur. requirements 6/28/23   Namrata Palomares MD       Allergies as of 10/04/2023    (No Known Allergies)       Patient Active Problem List   Diagnosis    Obstructive sleep apnea    Shortness of breath    Class 3 severe obesity due to excess calories with serious comorbidity and body mass index (BMI) of 50.0 to 59.9 in adult [facility])    Paroxysmal atrial fibrillation (HCC)    Persistent

## 2023-10-05 ENCOUNTER — OFFICE VISIT (OUTPATIENT)
Dept: INTERNAL MEDICINE CLINIC | Age: 53
End: 2023-10-05
Payer: MEDICAID

## 2023-10-05 VITALS
HEART RATE: 84 BPM | OXYGEN SATURATION: 97 % | SYSTOLIC BLOOD PRESSURE: 122 MMHG | WEIGHT: 315 LBS | RESPIRATION RATE: 18 BRPM | DIASTOLIC BLOOD PRESSURE: 87 MMHG | BODY MASS INDEX: 50.18 KG/M2 | TEMPERATURE: 98.1 F

## 2023-10-05 DIAGNOSIS — L03.213 PRESEPTAL CELLULITIS: ICD-10-CM

## 2023-10-05 DIAGNOSIS — Z23 NEEDS FLU SHOT: ICD-10-CM

## 2023-10-05 DIAGNOSIS — I10 ESSENTIAL HYPERTENSION: ICD-10-CM

## 2023-10-05 DIAGNOSIS — E78.2 HYPERLIPIDEMIA, MIXED: ICD-10-CM

## 2023-10-05 DIAGNOSIS — R55 SYNCOPE AND COLLAPSE: Primary | ICD-10-CM

## 2023-10-05 PROCEDURE — 6360000002 HC RX W HCPCS: Performed by: INTERNAL MEDICINE

## 2023-10-05 PROCEDURE — 90674 CCIIV4 VAC NO PRSV 0.5 ML IM: CPT | Performed by: INTERNAL MEDICINE

## 2023-10-05 PROCEDURE — G0008 ADMIN INFLUENZA VIRUS VAC: HCPCS | Performed by: INTERNAL MEDICINE

## 2023-10-05 PROCEDURE — 99213 OFFICE O/P EST LOW 20 MIN: CPT | Performed by: INTERNAL MEDICINE

## 2023-10-05 RX ORDER — FUROSEMIDE 40 MG/1
40 TABLET ORAL DAILY
Qty: 30 TABLET | Refills: 0 | Status: SHIPPED | OUTPATIENT
Start: 2023-10-05

## 2023-10-05 RX ADMIN — INFLUENZA A VIRUS A/GEORGIA/12/2022 CVR-167 (H1N1) ANTIGEN (MDCK CELL DERIVED, PROPIOLACTONE INACTIVATED), INFLUENZA A VIRUS A/DARWIN/11/2021 (H3N2) ANTIGEN (MDCK CELL DERIVED, PROPIOLACTONE INACTIVATED),INFLUENZA B VIRUS B/SINGAPORE/WUH4618/2021 ANTIGEN (MDCK CELL DERIVED, PROPIOLACTONE INACTIVATED),INFLUENZA B VIRUS B/SINGAPORE/INFTT-16-0610/2016 ANTIGEN (MDCK CELL DERIVED, PROPIOLACTONE INACTIVATED) 0.5 ML: 15; 15; 15; 15 INJECTION, SUSPENSION INTRAMUSCULAR at 15:31

## 2023-10-05 ASSESSMENT — ENCOUNTER SYMPTOMS
COUGH: 0
DIARRHEA: 0
COLOR CHANGE: 0
ABDOMINAL DISTENTION: 0
RHINORRHEA: 0
ABDOMINAL PAIN: 0
NAUSEA: 0
VOMITING: 0
BLOOD IN STOOL: 0
CHEST TIGHTNESS: 0
PHOTOPHOBIA: 0
SHORTNESS OF BREATH: 0
BACK PAIN: 0
CONSTIPATION: 0

## 2023-10-05 NOTE — TELEPHONE ENCOUNTER
Requested Prescriptions     Pending Prescriptions Disp Refills    furosemide (LASIX) 40 MG tablet [Pharmacy Med Name: FUROSEMIDE 40 MG TABLET] 30 tablet 0     Sig: TAKE 1 TABLET BY MOUTH EVERY DAY       Last Clinic Visit:  9/12/2023     Next Clinic Appointment:  10/5/2023

## 2023-10-05 NOTE — PATIENT INSTRUCTIONS
Thank you for visiting the [address] [employer] [facility] [employer]! We discussed your recent episode of syncope and collapse. We3 mentioned that it usually occurs when you are having a bowel movement. Your discussed that the Mirilax is helping. If this stops helping, you can try adding Colace. Otherwise, it appears you are stable on your current medication regimen. Please continue taking your medications as prescribed. We commend your efforts to modify your lifestyle for your health. Please continue your dietary changes; I have added instructions for the Mediterranean diet, which has been shown to reduce the risk of heart disease. You may start light exercise such as walking as soon as you feel ready. Start slow and stay consistent for at least 2 weeks before the habit starts to solidify. Please see us back in the clinic in one month to make sure the issues that led to your hospitalization are well-managed.

## 2023-10-05 NOTE — PROGRESS NOTES
[facility]    Elaine De La Cruz is a 46 y.o. male, here for evaluation of the following concerns: Follow-up of vasovagal syncope & collapse  SSS s/p pacemaker (9/20/23)  Afib  No more syncopal or presyncopal events since his hospitalization 9/25-9/27/23. His syncope was triggered by bowel movements. He is following up with cardiology and taking his medications as prescribed. Follow-up of preseptal cellulitis  He has not missed any Augmentin doses and will finish his course in the next several days. Zyvox discontinued after negative MRSA probe. No more HA, eye pain, no visual disturbance, fever, chills, N/V/D/C.     HTN  /87 in the clinic today. BP readings normally in the 120-130's range. Endorses medication compliance. T2DM  Last HbA1c 7.0% on 9/20/23, down from 7.2%. BG range - 130's to low 200's. Endorses compliance with medications. He has been eating a diet low in salt, avoiding fried floods, using an air frier. Has is excited to exercise once he regains his strength. HLD  Has not had a fasting lipid panel in 2.5 years. Endorses compliance with Lipitor. Healthcare maintenance  Patient requests a flu shot this visit. Review of Systems   Constitutional:  Negative for appetite change, chills, diaphoresis and fatigue. HENT:  Negative for congestion, ear pain, rhinorrhea and tinnitus. Eyes:  Negative for photophobia and visual disturbance. Respiratory:  Negative for cough, chest tightness and shortness of breath. Cardiovascular:  Negative for chest pain, palpitations and leg swelling. Gastrointestinal:  Negative for abdominal distention, abdominal pain, blood in stool, constipation, diarrhea, nausea and vomiting. Genitourinary:  Negative for difficulty urinating, dysuria and hematuria. Musculoskeletal:  Negative for arthralgias, back pain, joint swelling and myalgias. Skin:  Negative for color change, pallor and rash.

## 2023-10-19 RX ORDER — METOPROLOL TARTRATE 50 MG/1
50 TABLET, FILM COATED ORAL 2 TIMES DAILY
Qty: 180 TABLET | Refills: 0 | Status: SHIPPED | OUTPATIENT
Start: 2023-10-19 | End: 2024-01-17

## 2023-10-19 RX ORDER — METOPROLOL TARTRATE 50 MG/1
50 TABLET, FILM COATED ORAL 2 TIMES DAILY
Qty: 60 TABLET | Refills: 0 | Status: SHIPPED | OUTPATIENT
Start: 2023-10-19 | End: 2023-10-19

## 2023-10-19 NOTE — PROGRESS NOTES
Refill of metoprolol tartrate 50mg BID sent to [facility] pharmacy store #[id number]

## 2023-10-20 PROBLEM — R79.89 ELEVATED TROPONIN: Status: RESOLVED | Noted: 2023-09-20 | Resolved: 2023-10-20

## 2023-10-30 RX ORDER — INSULIN GLARGINE 100 [IU]/ML
20 INJECTION, SOLUTION SUBCUTANEOUS NIGHTLY
Qty: 5 ADJUSTABLE DOSE PRE-FILLED PEN SYRINGE | Refills: 3 | Status: SHIPPED | OUTPATIENT
Start: 2023-10-30 | End: 2023-12-29

## 2023-10-30 NOTE — PROGRESS NOTES
Notified that patients insurance doesn't cover [address] and will need prior authorization. Will change [email or web address] to [employer]. Will prescribe to the pharmacy in addition will discuss with the patient.      Manjula Mcintosh MD   Internal Medicine, PGY-3

## 2023-12-10 DIAGNOSIS — I48.0 PAROXYSMAL A-FIB (HCC): ICD-10-CM

## 2023-12-11 RX ORDER — FUROSEMIDE 40 MG/1
40 TABLET ORAL DAILY
Qty: 30 TABLET | Refills: 0 | Status: SHIPPED | OUTPATIENT
Start: 2023-12-11 | End: 2023-12-26 | Stop reason: SDUPTHER

## 2023-12-11 NOTE — TELEPHONE ENCOUNTER
Requested Prescriptions     Pending Prescriptions Disp Refills    furosemide (LASIX) 40 MG tablet [Pharmacy Med Name: FUROSEMIDE 40 MG TABLET] 30 tablet 0     Sig: TAKE 1 TABLET BY MOUTH EVERY DAY       Last Clinic Visit:  10/5/2023     Next Clinic Appointment:  12/26/2023

## 2023-12-11 NOTE — TELEPHONE ENCOUNTER
Pt needs all meds refillled, out of amlodipine x5 days.  Pharmacy is [address] [facility]    [id number] [phone]

## 2023-12-11 NOTE — TELEPHONE ENCOUNTER
Requested Prescriptions     Pending Prescriptions Disp Refills    metFORMIN (GLUCOPHAGE) 500 MG tablet [Pharmacy Med Name: METFORMIN  MG TABLET] 180 tablet 0     Sig: TAKE ONE TABLET TWICE PER DAY WITH MEALS    apixaban (ELIQUIS) 5 MG TABS tablet [Pharmacy Med Name: ELIQUIS 5 MG TABLET] 60 tablet 1     Sig: TAKE 1 TABLET BY MOUTH TWICE A DAY       Last Clinic Visit:  10/5/2023     Next Clinic Appointment:  12/10/2023

## 2023-12-12 RX ORDER — AMLODIPINE BESYLATE 10 MG/1
10 TABLET ORAL DAILY
Qty: 30 TABLET | Refills: 1 | Status: SHIPPED | OUTPATIENT
Start: 2023-12-12 | End: 2023-12-14 | Stop reason: SDUPTHER

## 2023-12-12 RX ORDER — AMLODIPINE BESYLATE 10 MG/1
10 TABLET ORAL DAILY
Qty: 30 TABLET | Refills: 1 | OUTPATIENT
Start: 2023-12-12

## 2023-12-12 NOTE — TELEPHONE ENCOUNTER
Requested Prescriptions     Pending Prescriptions Disp Refills    amLODIPine (NORVASC) 10 MG tablet 30 tablet 1     Sig: Take 1 tablet by mouth daily       Last Clinic Visit:  10/5/2023     Next Clinic Appointment:  12/26/2023

## 2023-12-12 NOTE — TELEPHONE ENCOUNTER
Requested Prescriptions     Pending Prescriptions Disp Refills    amLODIPine (NORVASC) 10 MG tablet 30 tablet 1     Sig: Take 1 tablet by mouth daily       Last Clinic Visit:  10/5/2023     Next Clinic Appointment:  12/12/2023

## 2023-12-14 RX ORDER — AMLODIPINE BESYLATE 10 MG/1
10 TABLET ORAL DAILY
Qty: 30 TABLET | Refills: 1 | Status: SHIPPED | OUTPATIENT
Start: 2023-12-14 | End: 2023-12-26 | Stop reason: SDUPTHER

## 2023-12-14 NOTE — TELEPHONE ENCOUNTER
PT STATED AMLODIPINE SHOULD HAVE GONE TO [facility] THAT'S LIST[facility] ON HIS PROFILE AND [facility]. PT STATED HE HAS BEEN OUT OF MEDICATION FOR 5 DAYS. PLEASE SEND TO CORRECT PHARM. PT CAN BE REACHED [phone]

## 2023-12-22 DIAGNOSIS — I10 ESSENTIAL HYPERTENSION: ICD-10-CM

## 2023-12-22 RX ORDER — VALSARTAN 160 MG/1
160 TABLET ORAL DAILY
Qty: 30 TABLET | Refills: 1 | Status: SHIPPED | OUTPATIENT
Start: 2023-12-22 | End: 2023-12-26 | Stop reason: SDUPTHER

## 2023-12-22 NOTE — TELEPHONE ENCOUNTER
Requested Prescriptions     Pending Prescriptions Disp Refills    valsartan (DIOVAN) 160 MG tablet 30 tablet 1     Sig: Take 1 tablet by mouth daily       Last Clinic Visit:  10/5/2023     Next Clinic Appointment:  12/26/2023

## 2023-12-25 ASSESSMENT — PATIENT HEALTH QUESTIONNAIRE - PHQ9
1. LITTLE INTEREST OR PLEASURE IN DOING THINGS: NOT AT ALL
2. FEELING DOWN, DEPRESSED OR HOPELESS: NOT AT ALL
SUM OF ALL RESPONSES TO PHQ9 QUESTIONS 1 & 2: 0

## 2023-12-26 ENCOUNTER — OFFICE VISIT (OUTPATIENT)
Dept: INTERNAL MEDICINE CLINIC | Age: 53
End: 2023-12-26
Payer: MEDICAID

## 2023-12-26 VITALS
DIASTOLIC BLOOD PRESSURE: 79 MMHG | RESPIRATION RATE: 18 BRPM | HEART RATE: 75 BPM | WEIGHT: 315 LBS | TEMPERATURE: 97.6 F | SYSTOLIC BLOOD PRESSURE: 114 MMHG | BODY MASS INDEX: 50.07 KG/M2 | OXYGEN SATURATION: 96 %

## 2023-12-26 DIAGNOSIS — N52.9 ERECTILE DYSFUNCTION, UNSPECIFIED ERECTILE DYSFUNCTION TYPE: ICD-10-CM

## 2023-12-26 DIAGNOSIS — E11.21 TYPE 2 DIABETES MELLITUS WITH DIABETIC NEPHROPATHY, WITH LONG-TERM CURRENT USE OF INSULIN (HCC): ICD-10-CM

## 2023-12-26 DIAGNOSIS — Z79.4 TYPE 2 DIABETES MELLITUS WITH DIABETIC NEPHROPATHY, WITH LONG-TERM CURRENT USE OF INSULIN (HCC): ICD-10-CM

## 2023-12-26 DIAGNOSIS — R55 SYNCOPE AND COLLAPSE: ICD-10-CM

## 2023-12-26 DIAGNOSIS — E78.2 HYPERLIPIDEMIA, MIXED: ICD-10-CM

## 2023-12-26 DIAGNOSIS — G56.02 CARPAL TUNNEL SYNDROME OF LEFT WRIST: Primary | ICD-10-CM

## 2023-12-26 DIAGNOSIS — K59.00 CONSTIPATION, UNSPECIFIED CONSTIPATION TYPE: ICD-10-CM

## 2023-12-26 DIAGNOSIS — I10 ESSENTIAL HYPERTENSION: ICD-10-CM

## 2023-12-26 DIAGNOSIS — E66.01 CLASS 3 SEVERE OBESITY DUE TO EXCESS CALORIES WITH SERIOUS COMORBIDITY AND BODY MASS INDEX (BMI) OF 50.0 TO 59.9 IN ADULT (HCC): ICD-10-CM

## 2023-12-26 DIAGNOSIS — I48.0 PAROXYSMAL A-FIB (HCC): ICD-10-CM

## 2023-12-26 LAB — HBA1C MFR BLD: 7.6 %

## 2023-12-26 PROCEDURE — 83036 HEMOGLOBIN GLYCOSYLATED A1C: CPT

## 2023-12-26 PROCEDURE — 99213 OFFICE O/P EST LOW 20 MIN: CPT | Performed by: STUDENT IN AN ORGANIZED HEALTH CARE EDUCATION/TRAINING PROGRAM

## 2023-12-26 RX ORDER — GABAPENTIN 300 MG/1
300 CAPSULE ORAL NIGHTLY
Qty: 30 CAPSULE | Refills: 0 | Status: SHIPPED | OUTPATIENT
Start: 2023-12-26 | End: 2024-06-23

## 2023-12-26 RX ORDER — LANCETS
20 EACH MISCELLANEOUS 2 TIMES DAILY
Qty: 20 EACH | Refills: 3 | Status: SHIPPED | OUTPATIENT
Start: 2023-12-26

## 2023-12-26 RX ORDER — GLUCOSAMINE HCL/CHONDROITIN SU 500-400 MG
CAPSULE ORAL
Qty: 60 STRIP | Refills: 2 | Status: SHIPPED | OUTPATIENT
Start: 2023-12-26

## 2023-12-26 RX ORDER — TADALAFIL 10 MG/1
10 TABLET ORAL DAILY PRN
Qty: 30 TABLET | Refills: 3 | Status: SHIPPED | OUTPATIENT
Start: 2023-12-26 | End: 2023-12-26

## 2023-12-26 RX ORDER — PEN NEEDLE, DIABETIC 31 G X1/4"
1 NEEDLE, DISPOSABLE MISCELLANEOUS DAILY
Qty: 100 EACH | Refills: 5 | Status: SHIPPED | OUTPATIENT
Start: 2023-12-26

## 2023-12-26 RX ORDER — TADALAFIL 20 MG/1
20 TABLET ORAL DAILY PRN
Qty: 30 TABLET | Refills: 2 | Status: SHIPPED | OUTPATIENT
Start: 2023-12-26

## 2023-12-26 RX ORDER — SENNA AND DOCUSATE SODIUM 50; 8.6 MG/1; MG/1
1 TABLET, FILM COATED ORAL 2 TIMES DAILY PRN
Qty: 20 TABLET | Refills: 0 | Status: SHIPPED | OUTPATIENT
Start: 2023-12-26

## 2023-12-26 RX ORDER — ALLOPURINOL 300 MG/1
TABLET ORAL
Qty: 90 TABLET | Refills: 1 | Status: SHIPPED | OUTPATIENT
Start: 2023-12-26

## 2023-12-26 RX ORDER — VALSARTAN 160 MG/1
160 TABLET ORAL DAILY
Qty: 30 TABLET | Refills: 1 | Status: SHIPPED | OUTPATIENT
Start: 2023-12-26 | End: 2024-02-24

## 2023-12-26 RX ORDER — ATORVASTATIN CALCIUM 20 MG/1
TABLET, FILM COATED ORAL
Qty: 30 TABLET | Refills: 5 | Status: SHIPPED | OUTPATIENT
Start: 2023-12-26

## 2023-12-26 RX ORDER — AMLODIPINE BESYLATE 10 MG/1
10 TABLET ORAL DAILY
Qty: 30 TABLET | Refills: 1 | Status: SHIPPED | OUTPATIENT
Start: 2023-12-26

## 2023-12-26 RX ORDER — FUROSEMIDE 40 MG/1
40 TABLET ORAL DAILY
Qty: 30 TABLET | Refills: 0 | Status: SHIPPED | OUTPATIENT
Start: 2023-12-26

## 2023-12-26 RX ORDER — TADALAFIL 10 MG/1
20 TABLET ORAL DAILY PRN
Qty: 30 TABLET | Refills: 3 | Status: SHIPPED | OUTPATIENT
Start: 2023-12-26 | End: 2023-12-26

## 2023-12-26 RX ORDER — METOPROLOL TARTRATE 50 MG/1
50 TABLET, FILM COATED ORAL 2 TIMES DAILY
Qty: 180 TABLET | Refills: 0 | Status: SHIPPED | OUTPATIENT
Start: 2023-12-26 | End: 2024-03-25

## 2023-12-26 RX ORDER — INSULIN GLARGINE 100 [IU]/ML
20 INJECTION, SOLUTION SUBCUTANEOUS NIGHTLY
Qty: 5 ADJUSTABLE DOSE PRE-FILLED PEN SYRINGE | Refills: 3 | Status: SHIPPED | OUTPATIENT
Start: 2023-12-26 | End: 2024-02-24

## 2023-12-26 RX ORDER — INSULIN LISPRO 100 [IU]/ML
INJECTION, SOLUTION INTRAVENOUS; SUBCUTANEOUS
Qty: 5 ADJUSTABLE DOSE PRE-FILLED PEN SYRINGE | Refills: 1 | Status: SHIPPED | OUTPATIENT
Start: 2023-12-26

## 2023-12-26 NOTE — PROGRESS NOTES
wheezing, rhonchi or rales. Abdominal:      General: There is distension. Palpations: Abdomen is soft. Tenderness: There is no abdominal tenderness. Musculoskeletal:      Right lower leg: No edema. Left lower leg: No edema. Comments: L wrist in splint, no erythema/edema, Phalen's+   Neurological:      General: No focal deficit present. Mental Status: He is alert. ASSESSMENT/PLAN:     1. Carpal tunnel syndrome of left wrist  -     gabapentin (NEURONTIN) 300 MG capsule; Take 1 capsule by mouth nightly for 180 days. Intended supply: 90 days, Disp-30 capsule, R-0Normal  - There is scheduled orthopedic appointment on 1/8  2. Essential hypertension  -     amLODIPine (NORVASC) 10 MG tablet; Take 1 tablet by mouth daily, Disp-30 tablet, R-1Normal  -     valsartan (DIOVAN) 160 MG tablet; Take 1 tablet by mouth daily, Disp-30 tablet, R-1Normal  3. Paroxysmal A-fib (HCC)  -     apixaban (ELIQUIS) 5 MG TABS tablet; TAKE 1 TABLET BY MOUTH TWICE A DAY, Disp-60 tablet, R-1DX Code Needed  . Normal  -     metoprolol tartrate (LOPRESSOR) 50 MG tablet; Take 1 tablet by mouth 2 times daily, Disp-180 tablet, R-0Normal  4. Type 2 diabetes mellitus with diabetic nephropathy, with long-term current use of insulin (HCC)  - A1C increased due to dietary noncompliance - discussed diet with patient, recheck on follow up visit in three months  -     POCT glycosylated hemoglobin (Hb A1C)  -     metFORMIN (GLUCOPHAGE) 500 MG tablet; TAKE ONE TABLET TWICE PER DAY WITH MEALS, Disp-180 tablet, R-0Print  -     Insulin Pen Needle (PEN NEEDLES) 31G X 6 MM MISC; DAILY Starting Tue 12/26/2023, Disp-100 each, R-5, NormalMay substitute to meet insur. requirements  -     Accu-Chek FastClix Lancets MISC; 2 TIMES DAILY Starting Tue 12/26/2023, Disp-20 each, R-3, Normal  -     blood glucose monitor strips; by Other route 4 times daily (after meals and at bedtime), Other, 4 TIMES DAILY AFTER MEALS AND BEFORE BEDTIME Starting

## 2023-12-26 NOTE — PATIENT INSTRUCTIONS
-Your medications are refilled and sent to your pharmacy.  -For your carpal tunnel pain, we will prescribe gabapentin 300 mg daily.  -We will prescribe Cialis once nightly as needed.  -Your A1C has increased from prior - please continue to take your insulin as prescribed, monitor blood glucose, and monitor intake of carbs. -Continue to take your medications as prescribed and continue follow up with specialist providers.  -Continue taking your medications as prescribed.  -Call with questions or concerns.   -RTC for follow up in three months

## 2023-12-27 ENCOUNTER — TELEPHONE (OUTPATIENT)
Dept: PULMONOLOGY | Age: 53
End: 2023-12-27

## 2023-12-27 PROCEDURE — 93296 REM INTERROG EVL PM/IDS: CPT | Performed by: INTERNAL MEDICINE

## 2023-12-27 PROCEDURE — 93294 REM INTERROG EVL PM/LDLS PM: CPT | Performed by: INTERNAL MEDICINE

## 2023-12-27 NOTE — TELEPHONE ENCOUNTER
Pt left message with after hours team stating he has questions regarding his CPAP.  Please return call

## 2023-12-28 NOTE — TELEPHONE ENCOUNTER
Cayden Sommer's call is wanting to Know if Dr. Marjorie Holloway found out if he qualified for a new CPAP machine an if so please send order to his  E Elijah Morrissey let him know if he qualifies and a new order was sent.

## 2024-01-04 DIAGNOSIS — I10 ESSENTIAL HYPERTENSION: ICD-10-CM

## 2024-01-04 RX ORDER — VALSARTAN 160 MG/1
160 TABLET ORAL DAILY
Qty: 30 TABLET | Refills: 1 | Status: SHIPPED | OUTPATIENT
Start: 2024-01-04 | End: 2024-03-04

## 2024-01-04 NOTE — TELEPHONE ENCOUNTER
Requested Prescriptions     Pending Prescriptions Disp Refills    valsartan (DIOVAN) 160 MG tablet 30 tablet 1     Sig: Take 1 tablet by mouth daily       Last Clinic Visit:  12/26/2023     Next Clinic Appointment:  3/15/2024

## 2024-01-08 ENCOUNTER — OFFICE VISIT (OUTPATIENT)
Dept: ORTHOPEDIC SURGERY | Age: 54
End: 2024-01-08
Payer: MEDICAID

## 2024-01-08 VITALS — WEIGHT: 315 LBS | BODY MASS INDEX: 42.66 KG/M2 | HEIGHT: 72 IN

## 2024-01-08 DIAGNOSIS — M79.645 PAIN OF LEFT THUMB: Primary | ICD-10-CM

## 2024-01-08 DIAGNOSIS — M65.4 DE QUERVAIN'S TENOSYNOVITIS: ICD-10-CM

## 2024-01-08 PROCEDURE — 3017F COLORECTAL CA SCREEN DOC REV: CPT | Performed by: PHYSICIAN ASSISTANT

## 2024-01-08 PROCEDURE — G8417 CALC BMI ABV UP PARAM F/U: HCPCS | Performed by: PHYSICIAN ASSISTANT

## 2024-01-08 PROCEDURE — G8482 FLU IMMUNIZE ORDER/ADMIN: HCPCS | Performed by: PHYSICIAN ASSISTANT

## 2024-01-08 PROCEDURE — G8428 CUR MEDS NOT DOCUMENT: HCPCS | Performed by: PHYSICIAN ASSISTANT

## 2024-01-08 PROCEDURE — 1036F TOBACCO NON-USER: CPT | Performed by: PHYSICIAN ASSISTANT

## 2024-01-08 PROCEDURE — L3908 WHO COCK-UP NONMOLDE PRE OTS: HCPCS | Performed by: PHYSICIAN ASSISTANT

## 2024-01-08 PROCEDURE — 99203 OFFICE O/P NEW LOW 30 MIN: CPT | Performed by: PHYSICIAN ASSISTANT

## 2024-01-08 RX ORDER — FUROSEMIDE 40 MG/1
40 TABLET ORAL DAILY
Qty: 90 TABLET | Refills: 1 | Status: SHIPPED | OUTPATIENT
Start: 2024-01-08

## 2024-01-08 RX ORDER — FUROSEMIDE 40 MG/1
40 TABLET ORAL DAILY
Qty: 90 TABLET | Refills: 1 | OUTPATIENT
Start: 2024-01-08

## 2024-01-08 NOTE — TELEPHONE ENCOUNTER
Asking for 90 day fill of Furosemide.  Requested Prescriptions     Pending Prescriptions Disp Refills    furosemide (LASIX) 40 MG tablet 90 tablet 1     Sig: Take 1 tablet by mouth daily       Last Clinic Visit:  12/26/2023     Next Clinic Appointment:  3/15/2024

## 2024-01-08 NOTE — PROGRESS NOTES
alternate course of therapy including possibly injection or surgical treatment.       Mr. Kemal De La Cruz has been given a full verbal list of instructions and precautions related to his present condition.  I have asked him to followup with me in the office at the prescribed time. He is also specifically requested to call or return to the office sooner if his symptoms change or worsen prior to the next scheduled appointment.

## 2024-01-30 ENCOUNTER — TELEPHONE (OUTPATIENT)
Dept: PULMONOLOGY | Age: 54
End: 2024-01-30

## 2024-01-30 NOTE — TELEPHONE ENCOUNTER
Pt called in with [employer] on the line  Stating pt needs to have a verbal from the doctor to have electricity turned back on  Due to his CPAP (download attached)  Ok per Dr Howard to give verbal     Pt called back  they were able to get [employer] rep on phone and cornell took verbal to turn electric back on

## 2024-02-14 ENCOUNTER — TELEPHONE (OUTPATIENT)
Dept: ORTHOPEDIC SURGERY | Age: 54
End: 2024-02-14

## 2024-02-14 DIAGNOSIS — I10 ESSENTIAL HYPERTENSION: ICD-10-CM

## 2024-02-14 RX ORDER — AMLODIPINE BESYLATE 10 MG/1
10 TABLET ORAL DAILY
Qty: 90 TABLET | Refills: 1 | Status: SHIPPED | OUTPATIENT
Start: 2024-02-14

## 2024-02-14 NOTE — TELEPHONE ENCOUNTER
General Question     Subject: JARRED FOR SX  Patient and /or Facility Request: Kemal De La Cruz  Contact Number: [phone]    PT CLLED TO REQ JARRED FOR SX NOT ANOTHER FOLLOW UP APPT HE TRIED TO WEAR THE BRACE AS PRESCRIBED WITH NO RELIEF ADV HE DOESN'T WANT ANOTHER FOLLOW UP APPT WANTS TO HAVE SX HAS APPT JARRED FOR 2/19 HE THOUGHT WAS FOR SX I ADV IT WASN'T A SX APPT  PLEASE CLL PT TO ADV

## 2024-02-14 NOTE — TELEPHONE ENCOUNTER
ASKING TO CHANGE AMLODIPINE TO 90 DAY FILL    Requested Prescriptions     Pending Prescriptions Disp Refills    amLODIPine (NORVASC) 10 MG tablet 90 tablet 0     Sig: Take 1 tablet by mouth daily       Last Clinic Visit:  12/26/2023     Next Clinic Appointment:  3/15/2024

## 2024-02-14 NOTE — TELEPHONE ENCOUNTER
Left a vm for the patient letting him know that he will need to come in for a follow up appointment so they can go over the surgery and sign surgery consent.  I let him know he can call the office back if need be.

## 2024-02-19 ENCOUNTER — OFFICE VISIT (OUTPATIENT)
Dept: ORTHOPEDIC SURGERY | Age: 54
End: 2024-02-19
Payer: MEDICAID

## 2024-02-19 VITALS — BODY MASS INDEX: 42.66 KG/M2 | WEIGHT: 315 LBS | HEIGHT: 72 IN

## 2024-02-19 DIAGNOSIS — M65.4 DE QUERVAIN'S TENOSYNOVITIS: Primary | ICD-10-CM

## 2024-02-19 PROCEDURE — G8427 DOCREV CUR MEDS BY ELIG CLIN: HCPCS | Performed by: PHYSICIAN ASSISTANT

## 2024-02-19 PROCEDURE — G8482 FLU IMMUNIZE ORDER/ADMIN: HCPCS | Performed by: PHYSICIAN ASSISTANT

## 2024-02-19 PROCEDURE — 99214 OFFICE O/P EST MOD 30 MIN: CPT | Performed by: PHYSICIAN ASSISTANT

## 2024-02-19 PROCEDURE — 1036F TOBACCO NON-USER: CPT | Performed by: PHYSICIAN ASSISTANT

## 2024-02-19 PROCEDURE — G8417 CALC BMI ABV UP PARAM F/U: HCPCS | Performed by: PHYSICIAN ASSISTANT

## 2024-02-19 PROCEDURE — 3017F COLORECTAL CA SCREEN DOC REV: CPT | Performed by: PHYSICIAN ASSISTANT

## 2024-02-19 NOTE — PROGRESS NOTES
Mr. Kemal De La Cruz returns today in follow-up of his previously treated  left DeQuarvain's Tendonitis.  He was last seen by me in January, 2024 at which time he was treated with conservative measures and splinting of the left wrist.  He experienced no relief of his initial symptoms.  He  has noticed symptom persistence over the last several weeks.  He returns today with unchanged symptoms of left Radial Wrist pain and swelling, requesting further treatment.  Due to the dynamic and progressive nature of DeQuarvain's Tendonitis, It is clinically necessary to carefully re-evaluate Mr. Kemal De La Cruz today, prior to proceeding with any further treatment or intervention, to assure the clinical appropriateness of any previously considered treatment, at this time.      I have today reviewed with Kemal De La Cruz the clinically relevant, past medical history, medications, allergies,  family history, social history, and Review Of Systems & I have documented any details relevant to today's presenting complaints in my history above.  Mr. Kemal De La Cruz's self-reported past medical history, medications, allergies,  family history, social history, and Review Of Systems have been scanned into the chart under the \"Media\" tab.      Physical Exam:  Vitals  Height: 182.9 cm (6')  Weight - Scale: (!) 168.3 kg (371 lb)  Mr. Kemal De La Cruz appears well, he is in no apparent distress, he demonstrates appropriate mood & affect.      Skin: Normal in appearance, Normal Color, and Free of Lesions Bilaterally   Digital range of motion is Full bilaterally.  Pain does accompany left thumb motion.  Wrist range of motion is limited by pain on the Left, normal on the Right and is accompanied by mild pain in dorsiflexion greater than palmar flexion.  There is no evidence of gross joint instability bilaterally.  Sensation is subjectively normal bilaterally  Vascular examination reveals normal, good capillary refill, and good color

## 2024-02-20 ENCOUNTER — TELEPHONE (OUTPATIENT)
Dept: ORTHOPEDIC SURGERY | Age: 54
End: 2024-02-20

## 2024-02-20 ENCOUNTER — PREP FOR PROCEDURE (OUTPATIENT)
Dept: ORTHOPEDIC SURGERY | Age: 54
End: 2024-02-20

## 2024-02-20 DIAGNOSIS — M65.4 DE QUERVAIN'S TENOSYNOVITIS: ICD-10-CM

## 2024-02-22 ENCOUNTER — TELEPHONE (OUTPATIENT)
Dept: ORTHOPEDIC SURGERY | Age: 54
End: 2024-02-22

## 2024-02-22 DIAGNOSIS — I10 ESSENTIAL HYPERTENSION: ICD-10-CM

## 2024-02-22 RX ORDER — VALSARTAN 160 MG/1
160 TABLET ORAL DAILY
Qty: 30 TABLET | Refills: 2 | Status: SHIPPED | OUTPATIENT
Start: 2024-02-22 | End: 2024-05-22

## 2024-02-22 NOTE — TELEPHONE ENCOUNTER
Auth: NPR  Date: 3/14/2024  Reference # [id number]  Spoke with: NONE  Type of SX: OUTPATIENT  Location: [facility]  CPT: 52639   DX: M65.4  SX area: L HAND  Insurance: KARL

## 2024-03-04 ENCOUNTER — TELEPHONE (OUTPATIENT)
Dept: CARDIOLOGY CLINIC | Age: 54
End: 2024-03-04

## 2024-03-04 NOTE — TELEPHONE ENCOUNTER
CARDIAC CLEARANCE     What type of procedure are you having?  Tendonitis    Which physician is performing your procedure?  Dr. Bedolla    When is your procedure scheduled for?  3/14/24    Where are you having this [facility]    Are you taking Blood Thinners?   If so what? (Name/dose/frequesncy)    Eliquis   5mg   1 tablet twice daily   Does the surgeon want you to stop your blood thinner?  If so for how long?    Unknown     Phone Number and Contact Name for Physicians office:  [phone]    Fax number to send information:  [phone]

## 2024-03-05 ENCOUNTER — TELEPHONE (OUTPATIENT)
Dept: ORTHOPEDIC SURGERY | Age: 54
End: 2024-03-05

## 2024-03-05 NOTE — TELEPHONE ENCOUNTER
General Question     Subject: SURGERY TIME   Patient and /or Facility Request: Kemal De La Cruz   Contact Number: [phone]     PATIENT CALLING REQUESTING A CALL BACK FROM SOMEONE IN DR MELODY PIÑA OFFICE WANTING TO KNOW THE TIME OF SCHEDULED SURGERY ON 3/17/24 AND HIS ARRIVAL TIME     PLEASE CALL THE PATIENT BACK AT THE ABOVE NUMBER

## 2024-03-07 ENCOUNTER — OFFICE VISIT (OUTPATIENT)
Dept: INTERNAL MEDICINE CLINIC | Age: 54
End: 2024-03-07
Payer: MEDICAID

## 2024-03-07 VITALS
BODY MASS INDEX: 42.66 KG/M2 | DIASTOLIC BLOOD PRESSURE: 89 MMHG | WEIGHT: 315 LBS | OXYGEN SATURATION: 97 % | TEMPERATURE: 97.7 F | SYSTOLIC BLOOD PRESSURE: 135 MMHG | RESPIRATION RATE: 18 BRPM | HEART RATE: 87 BPM | HEIGHT: 72 IN

## 2024-03-07 DIAGNOSIS — G56.02 CARPAL TUNNEL SYNDROME OF LEFT WRIST: ICD-10-CM

## 2024-03-07 DIAGNOSIS — I10 ESSENTIAL HYPERTENSION: Primary | ICD-10-CM

## 2024-03-07 PROCEDURE — 99213 OFFICE O/P EST LOW 20 MIN: CPT

## 2024-03-07 RX ORDER — GABAPENTIN 300 MG/1
300 CAPSULE ORAL NIGHTLY
Qty: 90 CAPSULE | Refills: 1 | Status: SHIPPED | OUTPATIENT
Start: 2024-03-07 | End: 2024-09-03

## 2024-03-07 RX ORDER — FLUTICASONE PROPIONATE 50 MCG
SPRAY, SUSPENSION (ML) NASAL
Qty: 1 EACH | Refills: 1 | Status: SHIPPED | OUTPATIENT
Start: 2024-03-07

## 2024-03-07 NOTE — TELEPHONE ENCOUNTER
Requested Prescriptions     Pending Prescriptions Disp Refills    fluticasone (FLONASE) 50 MCG/ACT nasal spray [Pharmacy Med Name: FLUTICASONE PROP 50 MCG SPRAY]  1     Sig: SPRAY 1 SPRAY INTO EACH NOSTRIL EVERY DAY       Last Clinic Visit:  12/26/2023     Next Clinic Appointment:  3/7/2024

## 2024-03-07 NOTE — PROGRESS NOTES
Outpatient Clinic Established Patient Note    Patient: Kemal De La Cruz  : 1970 (53 y.o.)  Date: 3/7/2024    CC: Pre-op Visit     HPI:      Mr. Virgen is a 53-year-old male with a past medical history of hypertension, A-fib, type 2 diabetes, and syncope in the past who presents today for preop clearance for the surgical DeQuervain's tendonitis release.  He states he has had surgery in the past with anesthesia and has not had any issues.  States his orthopedic surgeon would like him to be cleared by his cardiologist as well.  He denies any fever, chills, nausea, vomiting, abdominal pain, diarrhea, shortness of breath, or chest pain.      Assessment & Plan:      [facility]  Preoperative Consultation      Kemal De La Cruz  YOB: 1970    Date of Service:  3/7/2024    Chief Complaint   Patient presents with    H&P     Wrist surgery on 3-14-24 states he is suppose to have cardiac lydia   . [facility] surgeon wants cardiac to also decide if Eliquis can be held.        This patient presents to the office today for a preoperative consultation at the request of surgeon, Dr. Zbigniew Bedolla MD    Planned anesthesia: General  Known anesthesia problems: None  Bleeding risk: None  Personal or FH of DVT/PE: None  Patient objection to receiving blood products: No    Past Medical History:   Diagnosis Date    Arthritis     Arthritis 2019    Atrial fibrillation     Atrial fibrillation (HCC)     Bradycardia     Chest pain 2022    Chronic gout of multiple sites 2018    Last Assessment & Plan:  Condition: stable  Diagnosis based upon a face-to-face evaluation, medication and chart review, physical exam, and ROS.   Requested patient to continue regular follow-up with their PCP/specialist.  Follow up in: one year    Chronic pain syndrome 2022    Congestive heart failure (HCC)     Coronary artery disease     Coronary artery disease 2022    Current mild

## 2024-03-08 ENCOUNTER — OFFICE VISIT (OUTPATIENT)
Dept: CARDIOLOGY CLINIC | Age: 54
End: 2024-03-08
Payer: MEDICAID

## 2024-03-08 VITALS
WEIGHT: 315 LBS | DIASTOLIC BLOOD PRESSURE: 70 MMHG | BODY MASS INDEX: 49.91 KG/M2 | SYSTOLIC BLOOD PRESSURE: 130 MMHG | HEART RATE: 65 BPM

## 2024-03-08 DIAGNOSIS — I48.0 PAROXYSMAL ATRIAL FIBRILLATION (HCC): ICD-10-CM

## 2024-03-08 DIAGNOSIS — Z01.818 PRE-OP EXAMINATION: Primary | ICD-10-CM

## 2024-03-08 DIAGNOSIS — E78.5 HYPERLIPIDEMIA, UNSPECIFIED HYPERLIPIDEMIA TYPE: ICD-10-CM

## 2024-03-08 DIAGNOSIS — I10 ESSENTIAL HYPERTENSION: ICD-10-CM

## 2024-03-08 DIAGNOSIS — Z86.79 S/P ABLATION OF ATRIAL FIBRILLATION: ICD-10-CM

## 2024-03-08 DIAGNOSIS — Z98.890 S/P ABLATION OF ATRIAL FIBRILLATION: ICD-10-CM

## 2024-03-08 DIAGNOSIS — Z95.0 PACEMAKER: ICD-10-CM

## 2024-03-08 LAB
ALBUMIN SERPL-MCNC: 4.5 G/DL (ref 3.4–5)
ANION GAP SERPL CALCULATED.3IONS-SCNC: 9 MMOL/L (ref 3–16)
BUN SERPL-MCNC: 12 MG/DL (ref 7–20)
CALCIUM SERPL-MCNC: 10 MG/DL (ref 8.3–10.6)
CHLORIDE SERPL-SCNC: 103 MMOL/L (ref 99–110)
CO2 SERPL-SCNC: 30 MMOL/L (ref 21–32)
CREAT SERPL-MCNC: 1 MG/DL (ref 0.9–1.3)
GFR SERPLBLD CREATININE-BSD FMLA CKD-EPI: >60 ML/MIN/{1.73_M2}
GLUCOSE SERPL-MCNC: 160 MG/DL (ref 70–99)
PHOSPHATE SERPL-MCNC: 3.2 MG/DL (ref 2.5–4.9)
POTASSIUM SERPL-SCNC: 3.8 MMOL/L (ref 3.5–5.1)
SODIUM SERPL-SCNC: 142 MMOL/L (ref 136–145)

## 2024-03-08 PROCEDURE — 99214 OFFICE O/P EST MOD 30 MIN: CPT | Performed by: INTERNAL MEDICINE

## 2024-03-08 PROCEDURE — 3075F SYST BP GE 130 - 139MM HG: CPT | Performed by: INTERNAL MEDICINE

## 2024-03-08 PROCEDURE — 1036F TOBACCO NON-USER: CPT | Performed by: INTERNAL MEDICINE

## 2024-03-08 PROCEDURE — 93000 ELECTROCARDIOGRAM COMPLETE: CPT | Performed by: INTERNAL MEDICINE

## 2024-03-08 PROCEDURE — G8482 FLU IMMUNIZE ORDER/ADMIN: HCPCS | Performed by: INTERNAL MEDICINE

## 2024-03-08 PROCEDURE — G8417 CALC BMI ABV UP PARAM F/U: HCPCS | Performed by: INTERNAL MEDICINE

## 2024-03-08 PROCEDURE — 3017F COLORECTAL CA SCREEN DOC REV: CPT | Performed by: INTERNAL MEDICINE

## 2024-03-08 PROCEDURE — 3078F DIAST BP <80 MM HG: CPT | Performed by: INTERNAL MEDICINE

## 2024-03-08 PROCEDURE — G8427 DOCREV CUR MEDS BY ELIG CLIN: HCPCS | Performed by: INTERNAL MEDICINE

## 2024-03-08 ASSESSMENT — ENCOUNTER SYMPTOMS
CHOKING: 0
COUGH: 0
CHEST TIGHTNESS: 0
SHORTNESS OF BREATH: 0
ABDOMINAL DISTENTION: 0
APNEA: 0

## 2024-03-08 NOTE — PROGRESS NOTES
Subjective:      Patient ID: Kemal De La Cruz is a 53 y.o. male.    HPI  Referred again pre op left dorsal compartment release.  Also with hx of afib and s/p ablation/htn/lipids.   Active but limited by knees.  No exertional cp/sob.  No pnd or orthopnea.  No edema. Afib under good control. No hx CAD. No hx mi.      Past Medical History:   Diagnosis Date    Arthritis     Arthritis 8/2/2019    Atrial fibrillation     Atrial fibrillation (HCC)     Bradycardia     Chest pain 6/22/2022    Chronic gout of multiple sites 8/30/2018    Last Assessment & Plan:  Condition: stable  Diagnosis based upon a face-to-face evaluation, medication and chart review, physical exam, and ROS.   Requested patient to continue regular follow-up with their PCP/specialist.  Follow up in: one year    Chronic pain syndrome 7/29/2022    Congestive heart failure (HCC)     Coronary artery disease     Coronary artery disease 8/24/2022    Current mild episode of major depressive disorder without prior episode ([facility]) 8/30/2018    Overview:  Depression screening completed this visit - see results. Member has no thoughts, plans or intentions to harm himself / herself or others.     Last Assessment & Plan:  Condition: stable  Diagnosis based upon a face-to-face evaluation, medication and chart review, physical exam, and ROS.   Requested patient to continue regular follow-up with their PCP/specialist.  Follow up in: one year    Depression     Diabetes mellitus (HCC)     Diabetes mellitus type 2 in obese (HCC) 1/1/2014    Dyspnea     Gastroesophageal reflux disease without esophagitis 8/30/2018    Overview:  Asymptomatic on PPI / H2 blocker.   Last Assessment & Plan:  Condition: stable  Diagnosis based upon a face-to-face evaluation, medication and chart review, physical exam, and ROS.   Requested patient to continue regular follow-up with their PCP/specialist.  Follow up in: one year    Generalized osteoarthritis of multiple sites 8/30/2018    Last Assessment

## 2024-03-11 ENCOUNTER — OFFICE VISIT (OUTPATIENT)
Dept: CARDIOLOGY CLINIC | Age: 54
End: 2024-03-11
Payer: MEDICAID

## 2024-03-11 ENCOUNTER — TELEPHONE (OUTPATIENT)
Dept: INTERNAL MEDICINE CLINIC | Age: 54
End: 2024-03-11

## 2024-03-11 ENCOUNTER — NURSE ONLY (OUTPATIENT)
Dept: CARDIOLOGY CLINIC | Age: 54
End: 2024-03-11
Payer: MEDICAID

## 2024-03-11 VITALS
DIASTOLIC BLOOD PRESSURE: 78 MMHG | WEIGHT: 315 LBS | SYSTOLIC BLOOD PRESSURE: 130 MMHG | HEART RATE: 63 BPM | BODY MASS INDEX: 49.91 KG/M2

## 2024-03-11 DIAGNOSIS — Z79.01 ON CONTINUOUS ORAL ANTICOAGULATION: ICD-10-CM

## 2024-03-11 DIAGNOSIS — I10 ESSENTIAL HYPERTENSION: ICD-10-CM

## 2024-03-11 DIAGNOSIS — I48.0 PAROXYSMAL ATRIAL FIBRILLATION (HCC): ICD-10-CM

## 2024-03-11 DIAGNOSIS — Z95.0 PACEMAKER: ICD-10-CM

## 2024-03-11 DIAGNOSIS — I10 BENIGN ESSENTIAL HTN: ICD-10-CM

## 2024-03-11 DIAGNOSIS — I47.19 ATRIAL TACHYCARDIA: ICD-10-CM

## 2024-03-11 DIAGNOSIS — Z95.0 CARDIAC PACEMAKER IN SITU: Primary | ICD-10-CM

## 2024-03-11 DIAGNOSIS — I49.5 SSS (SICK SINUS SYNDROME) (HCC): Primary | ICD-10-CM

## 2024-03-11 DIAGNOSIS — I49.5 SSS (SICK SINUS SYNDROME) (HCC): ICD-10-CM

## 2024-03-11 PROCEDURE — 3075F SYST BP GE 130 - 139MM HG: CPT

## 2024-03-11 PROCEDURE — 3017F COLORECTAL CA SCREEN DOC REV: CPT

## 2024-03-11 PROCEDURE — 3078F DIAST BP <80 MM HG: CPT

## 2024-03-11 PROCEDURE — G8417 CALC BMI ABV UP PARAM F/U: HCPCS

## 2024-03-11 PROCEDURE — 93280 PM DEVICE PROGR EVAL DUAL: CPT | Performed by: INTERNAL MEDICINE

## 2024-03-11 PROCEDURE — 93000 ELECTROCARDIOGRAM COMPLETE: CPT

## 2024-03-11 PROCEDURE — 99214 OFFICE O/P EST MOD 30 MIN: CPT

## 2024-03-11 PROCEDURE — 1036F TOBACCO NON-USER: CPT

## 2024-03-11 PROCEDURE — G8427 DOCREV CUR MEDS BY ELIG CLIN: HCPCS

## 2024-03-11 PROCEDURE — G8482 FLU IMMUNIZE ORDER/ADMIN: HCPCS

## 2024-03-11 RX ORDER — BLOOD PRESSURE TEST KIT
KIT MISCELLANEOUS
Qty: 1 KIT | Refills: 0 | Status: SHIPPED | OUTPATIENT
Start: 2024-03-11

## 2024-03-11 NOTE — PROGRESS NOTES
Jono is a 53 y.o. man with h/o HTN, HLD, DM, morbid obesity, depression, LEONA on CPAP, pAF, s/p RFA/PVI of AF (11/13/2018, Dr. Oseguera), recurrent syncope while using restroom, noted to have nonsustained atrial tachycardia w/ junctional rhythm, s/p dual ch MDT PPM (9/20/2023, Dr. Menard), Echo (9/20/2023) EF 55-60%, grade II DD.  [id number] 2. TSH 0.44 (9/25/2023).     SSS  - S/p dual-chamber MDT PPM (9/20/2023, Dr. Menard)  - Device check today shows 49.8% AP, 2.7% , AF burden of 5.7%, 3 episodes of NSVT EGMs appear to be AT, all other parameters stable  -  Rate drop response episodes were >9,999, therefore rate drop response turned off.    pAF  - In NSR, HR 63- had 1 episode of AF on device check with burden of 5.7%, pt asymptomatic  - S/p RFA/PVI of AF (11/13/2018, Dr. Oseguera)   - On Eliquis 5 mg BID, no s/s of bleeding or dark tarry stools, continue  - On Lopressor 50 mg BID, continue  - Reviewed recent labs  - Reviewed risk factors, pathophysiology, treatment options and lifestyle modification for atrial fibrillation: Blood pressure control, blood sugar control, healthy diet, minimal alcohol intake, no smoking, activity and exercise, manage stress sleep apnea evaluation and symptoms of a stroke.  - LEONA on CPAP  - F/u with Dr. Menard in 6 months  - ECG ordered and results personally reviewed     HTN  - BP Controlled in office today  - On amlodipine 10 mg daily  - On valsartan 160 mg daily  - Monitor BP at home and keep log  - Follows with Dr. Rodriguez      EF of 55-60%  No tobacco use    All questions and concerns were addressed to the patient/family. Alternatives to my treatment were discussed. The note was completed using EMR. Every effort was made to ensure accuracy; however, inadvertent computerized transcription errors may be present.     Patient received education regarding their diagnosis, treatment and medications while in the office today.      Sabrina Wellington [facility]      I  have spent 36

## 2024-03-11 NOTE — TELEPHONE ENCOUNTER
RAYMNOD LOW FROM [facility]  PH  [phone] STATED A NEW ORDER FOR BP CUFF NEED TO BE SENT TO [employer], PREVIOUS ORDER HAS . [employer] [phone]

## 2024-03-13 ENCOUNTER — ANESTHESIA EVENT (OUTPATIENT)
Dept: OPERATING ROOM | Age: 54
End: 2024-03-13
Payer: MEDICAID

## 2024-03-14 ENCOUNTER — ANESTHESIA (OUTPATIENT)
Dept: OPERATING ROOM | Age: 54
End: 2024-03-14
Payer: MEDICAID

## 2024-03-14 ENCOUNTER — TELEPHONE (OUTPATIENT)
Dept: ORTHOPEDIC SURGERY | Age: 54
End: 2024-03-14

## 2024-03-14 ENCOUNTER — HOSPITAL ENCOUNTER (OUTPATIENT)
Age: 54
Setting detail: OUTPATIENT SURGERY
Discharge: HOME | End: 2024-03-14
Attending: ORTHOPAEDIC SURGERY | Admitting: ORTHOPAEDIC SURGERY
Payer: MEDICAID

## 2024-03-14 VITALS
HEIGHT: 72 IN | DIASTOLIC BLOOD PRESSURE: 98 MMHG | TEMPERATURE: 97.6 F | BODY MASS INDEX: 42.66 KG/M2 | HEART RATE: 68 BPM | RESPIRATION RATE: 18 BRPM | OXYGEN SATURATION: 98 % | SYSTOLIC BLOOD PRESSURE: 150 MMHG | WEIGHT: 315 LBS

## 2024-03-14 LAB
GLUCOSE BLD-MCNC: 140 MG/DL (ref 70–99)
GLUCOSE BLD-MCNC: 149 MG/DL (ref 70–99)
PERFORMED ON: ABNORMAL
PERFORMED ON: ABNORMAL

## 2024-03-14 PROCEDURE — 3700000000 HC ANESTHESIA ATTENDED CARE: Performed by: ORTHOPAEDIC SURGERY

## 2024-03-14 PROCEDURE — 2580000003 HC RX 258: Performed by: ANESTHESIOLOGY

## 2024-03-14 PROCEDURE — 6360000002 HC RX W HCPCS: Performed by: NURSE ANESTHETIST, CERTIFIED REGISTERED

## 2024-03-14 PROCEDURE — 7100000000 HC PACU RECOVERY - FIRST 15 MIN: Performed by: ORTHOPAEDIC SURGERY

## 2024-03-14 PROCEDURE — 2500000003 HC RX 250 WO HCPCS: Performed by: ORTHOPAEDIC SURGERY

## 2024-03-14 PROCEDURE — 2580000003 HC RX 258: Performed by: ORTHOPAEDIC SURGERY

## 2024-03-14 PROCEDURE — 7100000010 HC PHASE II RECOVERY - FIRST 15 MIN: Performed by: ORTHOPAEDIC SURGERY

## 2024-03-14 PROCEDURE — A4217 STERILE WATER/SALINE, 500 ML: HCPCS | Performed by: ORTHOPAEDIC SURGERY

## 2024-03-14 PROCEDURE — 6360000002 HC RX W HCPCS: Performed by: ORTHOPAEDIC SURGERY

## 2024-03-14 PROCEDURE — 3600000005 HC SURGERY LEVEL 5 BASE: Performed by: ORTHOPAEDIC SURGERY

## 2024-03-14 PROCEDURE — 7100000011 HC PHASE II RECOVERY - ADDTL 15 MIN: Performed by: ORTHOPAEDIC SURGERY

## 2024-03-14 PROCEDURE — 2709999900 HC NON-CHARGEABLE SUPPLY: Performed by: ORTHOPAEDIC SURGERY

## 2024-03-14 PROCEDURE — 3600000015 HC SURGERY LEVEL 5 ADDTL 15MIN: Performed by: ORTHOPAEDIC SURGERY

## 2024-03-14 PROCEDURE — 2500000003 HC RX 250 WO HCPCS: Performed by: NURSE ANESTHETIST, CERTIFIED REGISTERED

## 2024-03-14 PROCEDURE — 3700000001 HC ADD 15 MINUTES (ANESTHESIA): Performed by: ORTHOPAEDIC SURGERY

## 2024-03-14 PROCEDURE — 7100000001 HC PACU RECOVERY - ADDTL 15 MIN: Performed by: ORTHOPAEDIC SURGERY

## 2024-03-14 RX ORDER — MEPERIDINE HYDROCHLORIDE 25 MG/ML
12.5 INJECTION INTRAMUSCULAR; INTRAVENOUS; SUBCUTANEOUS
Status: DISCONTINUED | OUTPATIENT
Start: 2024-03-14 | End: 2024-03-14 | Stop reason: HOSPADM

## 2024-03-14 RX ORDER — SODIUM CHLORIDE 9 MG/ML
INJECTION, SOLUTION INTRAVENOUS PRN
Status: DISCONTINUED | OUTPATIENT
Start: 2024-03-14 | End: 2024-03-14 | Stop reason: HOSPADM

## 2024-03-14 RX ORDER — KETAMINE HCL IN NACL, ISO-OSM 100MG/10ML
SYRINGE (ML) INJECTION PRN
Status: DISCONTINUED | OUTPATIENT
Start: 2024-03-14 | End: 2024-03-14 | Stop reason: SDUPTHER

## 2024-03-14 RX ORDER — LIDOCAINE HYDROCHLORIDE 20 MG/ML
INJECTION, SOLUTION EPIDURAL; INFILTRATION; INTRACAUDAL; PERINEURAL PRN
Status: DISCONTINUED | OUTPATIENT
Start: 2024-03-14 | End: 2024-03-14 | Stop reason: SDUPTHER

## 2024-03-14 RX ORDER — PROPOFOL 10 MG/ML
INJECTION, EMULSION INTRAVENOUS PRN
Status: DISCONTINUED | OUTPATIENT
Start: 2024-03-14 | End: 2024-03-14 | Stop reason: SDUPTHER

## 2024-03-14 RX ORDER — HALOPERIDOL 5 MG/ML
1 INJECTION INTRAMUSCULAR
Status: DISCONTINUED | OUTPATIENT
Start: 2024-03-14 | End: 2024-03-14 | Stop reason: HOSPADM

## 2024-03-14 RX ORDER — SODIUM CHLORIDE 0.9 % (FLUSH) 0.9 %
5-40 SYRINGE (ML) INJECTION EVERY 12 HOURS SCHEDULED
Status: DISCONTINUED | OUTPATIENT
Start: 2024-03-14 | End: 2024-03-14 | Stop reason: HOSPADM

## 2024-03-14 RX ORDER — GLYCOPYRROLATE 0.2 MG/ML
INJECTION INTRAMUSCULAR; INTRAVENOUS PRN
Status: DISCONTINUED | OUTPATIENT
Start: 2024-03-14 | End: 2024-03-14 | Stop reason: SDUPTHER

## 2024-03-14 RX ORDER — MAGNESIUM HYDROXIDE 1200 MG/15ML
LIQUID ORAL CONTINUOUS PRN
Status: COMPLETED | OUTPATIENT
Start: 2024-03-14 | End: 2024-03-14

## 2024-03-14 RX ORDER — SODIUM CHLORIDE 0.9 % (FLUSH) 0.9 %
5-40 SYRINGE (ML) INJECTION PRN
Status: DISCONTINUED | OUTPATIENT
Start: 2024-03-14 | End: 2024-03-14 | Stop reason: HOSPADM

## 2024-03-14 RX ORDER — LORAZEPAM 2 MG/ML
0.5 INJECTION INTRAMUSCULAR
Status: DISCONTINUED | OUTPATIENT
Start: 2024-03-14 | End: 2024-03-14 | Stop reason: HOSPADM

## 2024-03-14 RX ORDER — NALOXONE HYDROCHLORIDE 0.4 MG/ML
INJECTION, SOLUTION INTRAMUSCULAR; INTRAVENOUS; SUBCUTANEOUS PRN
Status: DISCONTINUED | OUTPATIENT
Start: 2024-03-14 | End: 2024-03-14 | Stop reason: HOSPADM

## 2024-03-14 RX ORDER — ONDANSETRON 2 MG/ML
4 INJECTION INTRAMUSCULAR; INTRAVENOUS
Status: DISCONTINUED | OUTPATIENT
Start: 2024-03-14 | End: 2024-03-14 | Stop reason: HOSPADM

## 2024-03-14 RX ORDER — FENTANYL CITRATE 0.05 MG/ML
50 INJECTION, SOLUTION INTRAMUSCULAR; INTRAVENOUS EVERY 5 MIN PRN
Status: DISCONTINUED | OUTPATIENT
Start: 2024-03-14 | End: 2024-03-14 | Stop reason: HOSPADM

## 2024-03-14 RX ORDER — DIPHENHYDRAMINE HYDROCHLORIDE 50 MG/ML
12.5 INJECTION INTRAMUSCULAR; INTRAVENOUS
Status: DISCONTINUED | OUTPATIENT
Start: 2024-03-14 | End: 2024-03-14 | Stop reason: HOSPADM

## 2024-03-14 RX ADMIN — GLYCOPYRROLATE 0.2 MG: 0.2 INJECTION, SOLUTION INTRAMUSCULAR; INTRAVENOUS at 12:54

## 2024-03-14 RX ADMIN — PROPOFOL 50 MG: 10 INJECTION, EMULSION INTRAVENOUS at 13:01

## 2024-03-14 RX ADMIN — Medication 25 MG: at 13:01

## 2024-03-14 RX ADMIN — LIDOCAINE HYDROCHLORIDE 80 MG: 20 INJECTION, SOLUTION EPIDURAL; INFILTRATION; INTRACAUDAL; PERINEURAL at 12:57

## 2024-03-14 RX ADMIN — PROPOFOL 100 MG: 10 INJECTION, EMULSION INTRAVENOUS at 12:57

## 2024-03-14 RX ADMIN — SODIUM CHLORIDE: 9 INJECTION, SOLUTION INTRAVENOUS at 12:37

## 2024-03-14 RX ADMIN — Medication 25 MG: at 12:57

## 2024-03-14 RX ADMIN — PROPOFOL 120 MCG/KG/MIN: 10 INJECTION, EMULSION INTRAVENOUS at 12:59

## 2024-03-14 ASSESSMENT — PAIN - FUNCTIONAL ASSESSMENT: PAIN_FUNCTIONAL_ASSESSMENT: NONE - DENIES PAIN

## 2024-03-14 ASSESSMENT — PAIN SCALES - GENERAL
PAINLEVEL_OUTOF10: 0

## 2024-03-14 ASSESSMENT — ENCOUNTER SYMPTOMS: SHORTNESS OF BREATH: 0

## 2024-03-14 ASSESSMENT — LIFESTYLE VARIABLES: SMOKING_STATUS: 1

## 2024-03-14 NOTE — TELEPHONE ENCOUNTER
Surgery and/or Procedure Scheduling     Contact Name: Pt ASKING WHAT DOOR TO GO IN AND WHAT TIME TO BE AT PROCEDURE TODAY  Surgical/Procedure Request: L 1ST DORSAL  Patient Contact Number: [phone]      PLEASE CALL TO LET Pt KNOW WHERE HE GOES TODAY FOR PROCEDURE AND WHAT TIME HE SHOULD BE THERE.

## 2024-03-14 NOTE — ANESTHESIA PRE PROCEDURE
patient.      Plan discussed with CRNA.    Attending anesthesiologist reviewed and agrees with Pre Eval content                Yemi Browne MD   3/14/2024    This pre-anesthesia assessment may be used as a history and physical.    DOS STAFF ADDENDUM:    Pt seen and examined, chart reviewed (including anesthesia, drug and allergy history).  No interval changes to history and physical examination.  Anesthetic plan, risks, benefits, alternatives, and personnel involved discussed with patient.  Patient verbalized an understanding and agrees to proceed.      Yemi Browne MD  March 14, 2024  12:27 PM

## 2024-03-14 NOTE — PROGRESS NOTES
[facility] Pre-Admission Testing Electronic Communication Worksheet for OR/ENDO Procedures        Patient: Kemal De La Cruz    DOS: 3/14/24    Arrival Time: 1200    Surgery Time: 1330    Meds to Bed:  [] YES    [x]  NO    Transportation Confirmed: [x] YES    []  NO    History and Physical:  [] YES    []  NO  [] N/A  If yes, please list doctor or Urgent Care and date of H&P: Patient to see PCP Dr. Jakob Ferrer for pre-op. [phone] Patient will call PAT office to confirm date and time    Additional Clearance(Cardiac, Pulmonary, etc):  [] YES    []  NO  Patient will call Cardiologist Dr. Aleyda Sierra  [phone] with [facility] to verify clearance and medications to hold for surgery.     Pre-Admission Testing Visit:  [] YES    [x]  NO If no, do labs/testing need to be done DOS?  [] YES    []  NO  UNKNOWN    Medication Reconciliation Complete:  [x] YES    []  NO        Additional Notes:  Patient weight 371lbs            Interview Complete: [x] YES    []  NO          Keyona Sarmiento, RN  2:22 PM  
 Follow Up Prior to Surgery    DOS: 3/14/24  :1970          Diana Edward:     Patient weights 371lbs                            Pacemaker in place                                 Surgeon's Name: Graeme  
 Follow Up Prior to Surgery    DOS: 3/14/24  :1970      History and Physical:  Patient to see PCP Dr. Jakob Ferrer for pre-op. [phone] Patient will call PAT office to confirm date and time  Called the PCP office, voice message left regarding H&P. Dr Bedolla office notified      Cardiac Clearance:  Patient will call Cardiologist Dr. Aleyda Sierra  [phone] with [facility] to verify clearance and medications to hold for surgery.     3/7 Update; Pt Cancelled appt. with Cardiologist, see encounter notes. Dr Sierra Office ( [facility]) called ( [phone]). She confirmed patient cancelled, the office has tried to reach pt. To reschedule with-out success. Dr Bedolla office notified     3/7 Update: See Letters for cardiac clearance   Dr Bedolla office confirmed that the patient's H&P to be done 3/7/24 per Dr Connor Lawton     Cardio and PCP clear in [facility] for 3/14 surgery     
Pt is alert and oriented and denies pain at this time, vital signs stable on room air. Will transfer to phase 2   
Pt received into room 2 from PACU. Pt awake, alert, and oriented. Dressing c/d/I. Elevated with pillow. Denies pain. PO provided. Family brought to room.   
Verbal and written discharge instructions were given to the patient and family, patient and family verbalize understanding of discharge instructions including medications orders for home and possible side effects associated with them. Patient instructed and verbalizes understanding to call the doctor listed if they should have any complications or worsening symptoms. Verbalizes understanding about follow-up appointments. D/C IV patient tolerated well, no signs of bleeding. Patient discharged home with all belongings. Out via wheelchair.  Pt provided with shower covers for dressing.   
or glasses, they will be removed, please bring a case for them.     If you have a living will and a durable power of  for healthcare, please bring in a copy.     As part of our patient safety program to minimize surgical site infections, we ask you to do the following:    Please notify your surgeon if you develop any illness between         now and the day of your surgery.    This includes a cough, cold, fever, sore throat, nausea,         or vomiting, and diarrhea, etc.   Please notify your surgeon if you experience dizziness, shortness         of breath or blurred vision between now and the time of your surgery.      Do not shave your operative site 96 hours prior to surgery.   For face and neck surgery, men may use an electric razor 48 hours   prior to surgery.    You may shower the night before surgery or the morning of   your surgery with an antibacterial soap.    You will need to bring a photo ID and insurance card     If you use a C-pap or Bi-pap machine, please bring your machine with you to the hospital     Our goal is to provide you with excellent care, therefore, visitors will be limited to so that we may focus on providing this care for you.          Please contact your surgeon office, if you have any further questions.                 [facility] phone number:  [phone]     [facility] fax number:  [phone]    Please note these are generalized instructions for all surgical cases, you may be provided with more specific instructions according to your surgery.

## 2024-03-14 NOTE — TELEPHONE ENCOUNTER
Left a vm for the patient I let him know that he just goes in the front doors of the [facility] hospital at [place] and needs to be there at 12.

## 2024-03-14 NOTE — H&P
Pre-operative Update of H&P:    I  have seen & examined Mr. Kemal De La Cruz related solely to his hand and upper extremity conditions, prior to the scheduled procedure on the date of his surgery.  The indications for the planned surgical procedure & and his upper-extremity condition are unchanged.

## 2024-03-14 NOTE — ANESTHESIA POSTPROCEDURE EVALUATION
Department of Anesthesiology  Postprocedure Note    Patient: Kemal De La Cruz  MRN: [id number]  YOB: 1970  Date of evaluation: 3/14/2024    Procedure Summary       Date: 03/14/24 Room / Location: [address]    Anesthesia Start: 1253 Anesthesia Stop: 1310    Procedure: LEFT FIRST DORSAL COMPARTMENT (DEQUERVAIN'S) RELEASE (Left: Hand) Diagnosis:       De Quervain's tenosynovitis      (De Quervain's tenosynovitis [M65.4])    Surgeons: Zbigniew Bedolla MD Responsible Provider: Yemi Browne MD    Anesthesia Type: MAC ASA Status: 3            Anesthesia Type: No value filed.    John Phase I: John Score: 10    John Phase II: John Score: 10    Anesthesia Post Evaluation    Patient location during evaluation: PACU  Patient participation: complete - patient participated  Level of consciousness: awake and alert  Pain score: 3  Airway patency: patent  Nausea & Vomiting: no nausea and no vomiting  Cardiovascular status: blood pressure returned to baseline  Respiratory status: acceptable  Hydration status: euvolemic  Pain management: adequate    No notable events documented.

## 2024-03-14 NOTE — DISCHARGE INSTRUCTIONS
Post-Operative Instructions    Tendonitis Release:    Keep hand elevated with fingers above eye-level to control swelling.  Keep hand and bandage clean and dry.  Do not change or unwrap bandage.  Please leave bandage in place until your follow-up appointment.  Maintain finger motion by fully straightening and fully bending fingers at least once an hour (while awake).  This may cause some discomfort, but will not damage surgery.  You may use your operated hand for lightweight tasks (e.g. writing, eating, dressing, etc.).  NO LIFTING, CARRYING OR HEAVY USE.    Most Patients do not have \"Serious Pain\" after this procedure and thus most patients do not require prescription pain medication.  You may take over the counter medication (Tylenol, Advil, Aleve, etc.) as needed.  If you are unable to tolerate the discomfort after your surgery and the OTC medications do not provide some relief, you may contact our office to discuss other options..    Please call the office at [phone]  in 24 - 48 hours to schedule a follow up appointment for 7 - 10  days after surgery.  Please call the office at [phone]  if you have any questions or problems.           Zbigniew Bedolla MD

## 2024-03-21 ENCOUNTER — TELEPHONE (OUTPATIENT)
Dept: ORTHOPEDIC SURGERY | Age: 54
End: 2024-03-21

## 2024-03-21 NOTE — TELEPHONE ENCOUNTER
General Question     Subject: LATER APPT FOR 3-   Patient and /or Facility Request: Kemal De La Cruz   Contact Number: [phone]       PATIENT CALLED IN TO SEE IF HE CAN GET LATER TIME FOR HIS POST OP APPT TOMORROW 3- WITH JON. PATIENT LOOKING FOR SOMETHING AFTER NOON...    PLEASE ADVISE

## 2024-03-21 NOTE — TELEPHONE ENCOUNTER
Spoke with the patient.  He said her just talk to his boss and he will be able to make the 1015 appointment.

## 2024-03-22 ENCOUNTER — OFFICE VISIT (OUTPATIENT)
Dept: ORTHOPEDIC SURGERY | Age: 54
End: 2024-03-22

## 2024-03-22 VITALS — BODY MASS INDEX: 42.66 KG/M2 | HEIGHT: 72 IN | RESPIRATION RATE: 16 BRPM | WEIGHT: 315 LBS

## 2024-03-22 DIAGNOSIS — M65.4 DE QUERVAIN'S TENOSYNOVITIS: Primary | ICD-10-CM

## 2024-03-22 PROCEDURE — 99024 POSTOP FOLLOW-UP VISIT: CPT | Performed by: ORTHOPAEDIC SURGERY

## 2024-03-22 NOTE — PROGRESS NOTES
Mr. Kemal De La Cruz returns today in follow-up of his recent left First Dorsal Compartment (DeQuarvain's) Release done approximately 8 days ago.  He has done well noting mild discomfort and no other reported complications.    He notes pre-operative symptoms to be significantly improved at this time.    Physical Exam:  Bandage intact and well cared for   Skin incision is healing well, without erythema, drainage or sign of infection.  Digital range of motion is without significant limitation.  Wrist range of motion is without significant limitation.  Sensation is normal in the Whole Hand.  Vascular examination reveals normal, good capillary refill, and good color.  Swelling is mild.  There is little residual discomfort at the First Dorsal Compartment.    Impression:  Mr. Kemal De La Cruz is doing well after recent left DeQuarvain's Release.    Plan:  Mr. Kemal De La Cruz is instructed in work on Active & Passive range of motion of the digits, wrist, & elbow.  These modalities were specifically demonstrated to him today.  We discussed the appropriateness of gradual resumption of use of the operated hand and the return to normal use as comfort allows.  He is given instructions regarding management of the fresh surgical incision and progressive use of desensitization and tissue massage techniques.  We discussed the appropriate expectations and timeline for symptom improvement.    He is provided a written patient instruction sheet titled: Postoperative Instructions After DeQuarvain's Release.    I have asked Mr. Kemal De La Cruz to follow-up with me or contact me by telephone over the next 2-4 weeks if his symptoms have not fully resolved or if he has not regained full & painless return of function.       He is also specifically instructed to return to the office or call for an appointment sooner if his symptoms are changing or worsening prior to that time.

## 2024-03-22 NOTE — PATIENT INSTRUCTIONS
Postoperative Instructions After Tendonitis Release    Dr. Zbigniew Bedolla          After bandages are removed one week from surgery, you may chose to wear a small bandage over the incision if you wish, though you do not need to.  Keep incision dry until sutures have fully dissolved  or it has been 14 days since your surgery. Thereafter, you may wash with mild soap and water and shower normally.   Once your stiches have fully disappeared & skin appears normal, you should begin gently massaging the incision with Vitamin E (may use Vitamin E lotion or contents of Vitamin E capsule).   Work hard on motion of the fingers and wrist, straightening each finger fully and bending each finger fully, bending wrist forward and bending wrist backwards. Do not be concerned if you experience discomfort.  This will not damage the surgery.  You may begin using the hand as it feels comfortable beginning 12-14 days from the day of surgery. You may not feel entirely comfortable gripping or lifting heavy objects for several weeks.  You may expect to see some skin “peel” off around the incision.  You may be left with a small area of “pink baby skin”. This is quite normal.    Thank you for choosing [facility] for your Hand and Upper Extremity needs.  If we can be of any further assistance to you, please do not hesitate to contact us.    Office Phone Number:  [phone]  or  [phone]

## 2024-03-28 ENCOUNTER — TELEPHONE (OUTPATIENT)
Dept: ORTHOPEDIC SURGERY | Age: 54
End: 2024-03-28

## 2024-03-28 NOTE — TELEPHONE ENCOUNTER
General Question     Subject: PRE AUTHORIZATION REQUEST-GEL  Patient and /or Facility Request: JAMAL SPAULDING   Contact Number: [phone]    PATIENT CALLED TO SPEAK WITH CLINICAL TO REQUEST PRIOR AUTHORIZATION FOR GEL INJECTION. INSURANCE ON CHART IS CONFIRMED.     PLEASE ADVISE

## 2024-03-29 NOTE — TELEPHONE ENCOUNTER
Best for patient to schedule office visit to been seen to resubmit for gel injections.     Last visit was in September which we submitted for gel and it was denied.   EUFLEXXA  (SERIES OF 3) LEFT KNEE .  DENIED.     DOES NOT MEET CLINICAL POLICY.   PER FAX FROM [facility].  (DENIAL WITH RATIONALE & RIGHTS SCANNED UNDER MEDIA TAB)       NOTE:  MUST SHOW DRUG IS WORKING. IMPROVED PAIN OR FUNCTIONING, DECREASED USE OF PAIN RELIEVERS OR STEROID INJECTIONS, MUST NOT HAVE A KNEE REPLACEMENT PLANNED WITHIN NEXT 6 MONTHS.  Will advise patient of scheduling an appt

## 2024-04-16 ENCOUNTER — OFFICE VISIT (OUTPATIENT)
Dept: ORTHOPEDIC SURGERY | Age: 54
End: 2024-04-16
Payer: MEDICAID

## 2024-04-16 VITALS — BODY MASS INDEX: 42.66 KG/M2 | WEIGHT: 315 LBS | HEIGHT: 72 IN

## 2024-04-16 DIAGNOSIS — M17.12 PRIMARY OSTEOARTHRITIS OF LEFT KNEE: Primary | ICD-10-CM

## 2024-04-16 DIAGNOSIS — G89.29 CHRONIC PAIN OF LEFT KNEE: ICD-10-CM

## 2024-04-16 DIAGNOSIS — M25.562 CHRONIC PAIN OF LEFT KNEE: ICD-10-CM

## 2024-04-16 PROCEDURE — 20610 DRAIN/INJ JOINT/BURSA W/O US: CPT | Performed by: PHYSICIAN ASSISTANT

## 2024-04-16 RX ORDER — METHYLPREDNISOLONE ACETATE 40 MG/ML
40 INJECTION, SUSPENSION INTRA-ARTICULAR; INTRALESIONAL; INTRAMUSCULAR; SOFT TISSUE ONCE
Status: COMPLETED | OUTPATIENT
Start: 2024-04-16 | End: 2024-04-16

## 2024-04-16 RX ORDER — LIDOCAINE HYDROCHLORIDE 10 MG/ML
1 INJECTION, SOLUTION EPIDURAL; INFILTRATION; INTRACAUDAL; PERINEURAL ONCE
Status: COMPLETED | OUTPATIENT
Start: 2024-04-16 | End: 2024-04-16

## 2024-04-16 RX ADMIN — LIDOCAINE HYDROCHLORIDE 1 ML: 10 INJECTION, SOLUTION EPIDURAL; INFILTRATION; INTRACAUDAL; PERINEURAL at 10:27

## 2024-04-16 RX ADMIN — METHYLPREDNISOLONE ACETATE 40 MG: 40 INJECTION, SUSPENSION INTRA-ARTICULAR; INTRALESIONAL; INTRAMUSCULAR; SOFT TISSUE at 10:27

## 2024-04-17 NOTE — PROGRESS NOTES
Chief Complaint    Knee Pain (F/u l knee, kendell inj)      History of Present Illness:  Kemal De La Cruz is a 53 y.o. male who presents for evaluation and treatment of his left knee pain.  This patient is known to [facility] and is being treated conservatively for the arthritis in his left knee.  This conservative treatment includes: rest, ice, elevation, bracing, home exercises, activity modification, OTC medications as needed, corticosteroid injections and visco supplementation injections.  He notes that viscosupplementation injections have given him excellent relief in the past.  He notes 6+ months of relief with these injections.  The patient denies any new injury. The patient denies any numbness, paresthesias, or weakness.          Physical exam:  Inspection: Both knees without erythema, ecchymosis, discoloration, abrasion, contusions, deformity or signs of infection.  Palpation: There is tenderness to palpation to the joint line of the left knee.  There is patellofemoral crepitus palpable in both knees.  No tenderness to palpation to any other osseous or soft tissue structures of the knee.  Range of motion: Grossly intact  Neurovascular: Patient is neurovascularly intact, equally bilaterally.  2+ dorsal pedal pulses.      Imaging:  X-rays obtained and reviewed in office: AP and merchant view of both knees the lateral of the left.     Impression: No fractures, dislocations, visible tumors, or signs of acute trauma.  Patient exhibits decreased joint space in the medial and lateral femoral-tibial compartments bilaterally, left worse than right.  Patient has decreased joint spacing in the patellofemoral joints bilaterally, left worse than right.  Patella is riding appropriately in the trochlear groove with no subluxation or tilt noted.  No osteophytes or bone growths noted. No loose bodies or foreign bodies.      Procedures:    After informed consent was provided, the patient was seated on the exam table with

## 2024-04-18 DIAGNOSIS — E11.21 TYPE 2 DIABETES MELLITUS WITH DIABETIC NEPHROPATHY, WITH LONG-TERM CURRENT USE OF INSULIN (HCC): ICD-10-CM

## 2024-04-18 DIAGNOSIS — Z79.4 TYPE 2 DIABETES MELLITUS WITH DIABETIC NEPHROPATHY, WITH LONG-TERM CURRENT USE OF INSULIN (HCC): ICD-10-CM

## 2024-04-18 RX ORDER — GLUCOSAMINE HCL/CHONDROITIN SU 500-400 MG
CAPSULE ORAL
Qty: 60 STRIP | Refills: 3 | Status: SHIPPED | OUTPATIENT
Start: 2024-04-18

## 2024-04-19 NOTE — TELEPHONE ENCOUNTER
[facility]  [facility]  Gastroenterology Progress note    Interval History:      Pt remains intubated/sedated.  No signs of GI bleeding.  Receiving lactulose/rectal tube in place.      Vital Signs:      /69   Pulse 96   Temp 97.6  F (36.4  C) (Axillary)   Resp 14   Wt 111.3 kg (245 lb 6 oz)   SpO2 100%   BMI 33.28 kg/m    Temp (24hrs), Av  F (36.7  C), Min:97.4  F (36.3  C), Max:98.6  F (37  C)    Patient Vitals for the past 72 hrs:   Weight   24 0444 111.3 kg (245 lb 6 oz)   24 1045 108.2 kg (238 lb 8.6 oz)       Intake/Output Summary (Last 24 hours) at 2024 1019  Last data filed at 2024 0800  Gross per 24 hour   Intake 1850.16 ml   Output 940 ml   Net 910.16 ml         Constitutional: NAD  Cardiovascular: RRR, normal S1, S2   Respiratory: CTAB  Abdomen: soft, non-tender, nondistended    Additional Comments:  ROS, FH, SH: See initial GI consult for details.    Laboratory Data:  Recent Labs   Lab Test 24  0046 242 24  1516 24  0619 24  1503   WBC 11.1* 13.0*  --  10.7   < > 3.9*   HGB 7.9* 8.0* 8.0* 7.9*   < > 6.7*   MCV 94 93  --  96   < > 102*    146*  --  121*   < > 117*   INR 2.57*  --   --  2.46*  --  2.16*    < > = values in this interval not displayed.     Recent Labs   Lab Test 24  1722 24  1516    136 136   POTASSIUM 4.0 4.0 4.2   CHLORIDE 105 104 104   CO2 23 22 21*   BUN 17.1 13.9 13.4   CR 1.14 0.98 0.99   ANIONGAP 11 10 11   MIGUELINA 8.9 9.0 9.3     Recent Labs   Lab Test 2418/24  1230 24  1027 24  0619 24  0536 24  1044 23  0538 23  0535 23  0549 23  0536 23  1514 23  0724 23  1646 23  1537 23  1424 23  1224 23  1147   ALBUMIN 2.8*  --  2.9* 2.8*   < > 3.1*   < > 2.9* 2.6*   < > 3.1*   < >  --   --  2.4*   < > 2.3*   BILITOTAL 11.3*  --  10.3*  Refill multivitamin  Last ov 4/28/22 [place]  Next appt 8/4/22 10.1*   < > 7.6*   < > 7.1* 7.1*   < > 8.4*   < >  --   --  21.7*   < > 10.4*   DBIL  --   --   --   --   --  3.59*  --  3.19* 3.34*   < >  --   --   --   --   --   --  7.66*   ALT 46  --  45 43   < > 48   < > 36 36   < > 49   < >  --   --  69   < > 82*   AST 96*  --  93* 89*   < > 97*   < > 141* 146*   < > 222*   < >  --    < >  --    < > 331*   ALKPHOS 118  --  127 127   < > 225*   < > 269* 263*   < > 330*   < >  --   --  182*   < > 200*   PROTEIN  --  30*  --   --   --   --   --   --   --   --  100*  --  30*  --   --    < >  --    LIPASE  --   --   --   --   --   --   --   --   --   --   --   --   --   --  237*  --  272*    < > = values in this interval not displayed.         Assessment:  35 yo male with history of alcoholic liver disease who presents for worsening encephalopathy.  The patient was seen by myself in outpatient liver clinic on 4/8/24.  He reported that he was sober at that time but patient's parents are raising question of ongoing alcohol use on this admission.  Recommendation at clinic visit to continue Cipro 500 mg daily for SBP prophylaxis, Xifaxan 550 mg BID and Lactulose 30 mL BID for encephalopathy, Lasix 20 mg BID, Protonix 40 mg BID.  Labs in clinic showed acute anemia with hemoglobin of 5.9.  I asked him to report to the ED on 4/9/24 which he did.  He was transfused 1 unit PRBC and repeat hemoglobin was 7.5.  He was discharged home from the ED.  Previous hospitalization from 3/12/24-3/23/24 with elevated MELD-Na score to 32.  EGD on 3/16 showed portal hypertensive gastropathy, small varices with no stigmata of bleeding.  CT 3/18 with distended gallbladder with wall thickening.  US 3/18 with distended gallbladder with wall thickening.  No biliary duct dilation.  HIDA 3/20 negative for cholecystitis and biliary dyskinesia.  Albumin 2.8.  AST 96.  Ammonia 90.  Total bilirubin 11.3.  WBC 11.1.  Hemoglobin 7.9.  MCV 94.  INR 2.57.  MDF on admission 70.7, 82.8 today.  Steroids not given due to  concern for GI bleed and/or infectious process.  MELD 17.  MELD-Na 17.6.    CT C/A/P with small consolidations in the lower lobes favoring atelectasis.  Tiny round glass opacities in the left lobe may be mild infectious in nature.  Left femoral central venous catheter in place with expansion of the left external iliac vein, likely thrombus.  There is also low density content in left external iliac artery, also suspicious for thrombus.  Blood in the subcutaneous space of the left groin and in the left hemoperitoneum.  Marked distention of the gallbladder.  Cirrhosis with stigmata of portal hypertension including intraabdominal venous collateral vessels, recanalized umbilical vein, and small volume ascites.  Mild wall thickening of the ascending and proximal transverse colon likely portal colopathy.  Small bilateral pleural effusions and diffuse body wall edema.  US shows no definite thrombus in the left iliac veins and arteries.  Patient is mechanically vented and sedated for altered mental status.  Low urine output concern for decreased renal function.  IV antibiotics given.  Plan:  -  Appreciate ICU cares.  -  Lactulose 20 mL TID through NGT.    -  Low threshold for upper endoscopy, currently contraindicated due to severity of encephalopathy.  -  Empiric ceftriaxone.  Continue rifaximin.  -  Monitor H/H; transfuse prn.  -  Pantoprazole BID.  -  Trend MELD labs.    ADDENDUM:  Case discussed with Dr. Vogt.  Will start methylprednisolone 40 mg daily for alcoholic hepatitis.    Total Time Spent: 40 minutes    GENET Hamlin  [facility]  Office:  [phone] call if needed after 5PM  Cell:  [phone], not available after 5PM at this number

## 2024-04-24 ENCOUNTER — TELEPHONE (OUTPATIENT)
Dept: ORTHOPEDIC SURGERY | Age: 54
End: 2024-04-24

## 2024-05-16 ENCOUNTER — OFFICE VISIT (OUTPATIENT)
Dept: ORTHOPEDIC SURGERY | Age: 54
End: 2024-05-16

## 2024-05-16 DIAGNOSIS — M25.562 ACUTE PAIN OF LEFT KNEE: ICD-10-CM

## 2024-05-16 DIAGNOSIS — M17.12 PRIMARY OSTEOARTHRITIS OF LEFT KNEE: Primary | ICD-10-CM

## 2024-05-16 RX ORDER — CELECOXIB 200 MG/1
200 CAPSULE ORAL DAILY
Qty: 30 CAPSULE | Refills: 0 | Status: SHIPPED | OUTPATIENT
Start: 2024-05-16 | End: 2024-06-15

## 2024-05-16 RX ORDER — HYALURONATE SODIUM 10 MG/ML
20 SYRINGE (ML) INTRAARTICULAR ONCE
Status: SHIPPED | OUTPATIENT
Start: 2024-05-16

## 2024-05-16 RX ORDER — LIDOCAINE HYDROCHLORIDE 10 MG/ML
3 INJECTION, SOLUTION EPIDURAL; INFILTRATION; INTRACAUDAL; PERINEURAL ONCE
Status: SHIPPED | OUTPATIENT
Start: 2024-05-16

## 2024-05-16 NOTE — PROGRESS NOTES
Chief Complaint    Knee Pain (1st euflexxa inj )      History of Present Illness:  Kemal De La Cruz is a 53 y.o. male who presents for a Euflexxa injection in the left knee.  This patient is known to [facility] orth[facility] and is being treated conservatively for the arthritis in their knee.  This conservative treatment includes: rest, ice, elevation, home exercises, activity modification, OTC medications as needed, corticosteroid injections and visco supplementation injections. The patient denies any new injury. The patient denies any numbness, paresthesias, or weakness.              Physical exam:  Inspection: Both knees without erythema, ecchymosis, discoloration, abrasion, contusions, deformity or signs of infection.  Palpation: There is tenderness to palpation to the joint line of the left knee.  There is patellofemoral crepitus palpable in the left knee.  No tenderness to palpation to any other osseous or soft tissue structures of the knee.  Range of motion: Grossly intact  Neurovascular: Patient is neurovascularly intact, equally bilaterally.  2+ dorsal pedal pulses.      Procedures:    VISCO SUPPLEMENTATION  INJECTION: Left KNEE     PROCEDURE: Risks, benefits, and alternatives to the injections were discussed in detail with the patient. The risks discussed included but are not limited to infection, skin reactions, hot swollen joints, and anaphylaxis.      After informed consent was provided, the patient sat on the exam table. The anteriolateral aspect of the left knee was prepped with Chlora-prep. The skin and subcutaneous tissues were anesthetized with ethyl chloride spray and 1% lidocaine injected with a 20-gauge needle.  The needle was left in place and the syringe was detached from the needle.  The Euflexxa syringe was attached to the 20-gauge needle and 2 mL of the Euflexxa was injected into the knee without resistance.The needle was withdrawn and the puncture site sealed with a Band-Aid. The patient tolerated

## 2024-05-23 ENCOUNTER — OFFICE VISIT (OUTPATIENT)
Dept: ORTHOPEDIC SURGERY | Age: 54
End: 2024-05-23
Payer: MEDICAID

## 2024-05-23 VITALS — WEIGHT: 315 LBS | HEIGHT: 72 IN | BODY MASS INDEX: 42.66 KG/M2

## 2024-05-23 DIAGNOSIS — M25.562 ACUTE PAIN OF LEFT KNEE: ICD-10-CM

## 2024-05-23 DIAGNOSIS — M17.12 PRIMARY OSTEOARTHRITIS OF LEFT KNEE: Primary | ICD-10-CM

## 2024-05-23 PROCEDURE — 20610 DRAIN/INJ JOINT/BURSA W/O US: CPT | Performed by: PHYSICIAN ASSISTANT

## 2024-05-23 RX ORDER — LIDOCAINE HYDROCHLORIDE 10 MG/ML
3 INJECTION, SOLUTION EPIDURAL; INFILTRATION; INTRACAUDAL; PERINEURAL ONCE
Status: COMPLETED | OUTPATIENT
Start: 2024-05-23 | End: 2024-05-23

## 2024-05-23 RX ORDER — HYALURONATE SODIUM 10 MG/ML
20 SYRINGE (ML) INTRAARTICULAR ONCE
Status: COMPLETED | OUTPATIENT
Start: 2024-05-23 | End: 2024-05-23

## 2024-05-23 RX ADMIN — LIDOCAINE HYDROCHLORIDE 3 ML: 10 INJECTION, SOLUTION EPIDURAL; INFILTRATION; INTRACAUDAL; PERINEURAL at 09:50

## 2024-05-23 RX ADMIN — Medication 20 MG: at 09:50

## 2024-05-23 NOTE — PROGRESS NOTES
Chief Complaint    Knee Pain (2nd euflexxa inj )        History of Present Illness:  Kemal De La Cruz is a 53 y.o. male who presents for a Euflexxa injection in the left knee.  This patient is known to [facility] orth[facility] and is being treated conservatively for the arthritis in their knee.  This conservative treatment includes: rest, ice, elevation, home exercises, activity modification, OTC medications as needed, corticosteroid injections and visco supplementation injections. The patient denies any new injury. The patient denies any numbness, paresthesias, or weakness.               Physical exam:  Inspection: Both knees without erythema, ecchymosis, discoloration, abrasion, contusions, deformity or signs of infection.  Palpation: There is tenderness to palpation to the joint line of the left knee.  There is patellofemoral crepitus palpable in the left knee.  No tenderness to palpation to any other osseous or soft tissue structures of the knee.  Range of motion: Grossly intact  Neurovascular: Patient is neurovascularly intact, equally bilaterally.  2+ dorsal pedal pulses.        Procedures:     VISCO SUPPLEMENTATION  INJECTION: Left KNEE     PROCEDURE: Risks, benefits, and alternatives to the injections were discussed in detail with the patient. The risks discussed included but are not limited to infection, skin reactions, hot swollen joints, and anaphylaxis.      After informed consent was provided, the patient sat on the exam table. The anteriolateral aspect of the left knee was prepped with Chlora-prep. The skin and subcutaneous tissues were anesthetized with ethyl chloride spray and 1% lidocaine injected with a 20-gauge needle.  The needle was left in place and the syringe was detached from the needle.  The Euflexxa syringe was attached to the 20-gauge needle and 2 mL of the Euflexxa was injected into the knee without resistance.The needle was withdrawn and the puncture site sealed with a Band-Aid. The patient

## 2024-05-24 DIAGNOSIS — I10 ESSENTIAL HYPERTENSION: ICD-10-CM

## 2024-05-24 RX ORDER — VALSARTAN 160 MG/1
160 TABLET ORAL DAILY
Qty: 90 TABLET | Refills: 1 | Status: SHIPPED | OUTPATIENT
Start: 2024-05-24

## 2024-05-24 NOTE — TELEPHONE ENCOUNTER
Requested Prescriptions     Pending Prescriptions Disp Refills    valsartan (DIOVAN) 160 MG tablet [Pharmacy Med Name: VALSARTAN 160 MG TABLET] 90 tablet      Sig: TAKE 1 TABLET BY MOUTH EVERY DAY       Last Clinic Visit:  3/7/2024     Next Clinic Appointment:  6/13/2024

## 2024-05-30 ENCOUNTER — OFFICE VISIT (OUTPATIENT)
Dept: ORTHOPEDIC SURGERY | Age: 54
End: 2024-05-30
Payer: MEDICAID

## 2024-05-30 DIAGNOSIS — M25.562 ACUTE PAIN OF LEFT KNEE: ICD-10-CM

## 2024-05-30 DIAGNOSIS — M17.12 PRIMARY OSTEOARTHRITIS OF LEFT KNEE: Primary | ICD-10-CM

## 2024-05-30 PROCEDURE — 20610 DRAIN/INJ JOINT/BURSA W/O US: CPT | Performed by: PHYSICIAN ASSISTANT

## 2024-05-30 RX ORDER — HYALURONATE SODIUM 10 MG/ML
20 SYRINGE (ML) INTRAARTICULAR ONCE
Status: COMPLETED | OUTPATIENT
Start: 2024-05-30 | End: 2024-05-30

## 2024-05-30 RX ORDER — LIDOCAINE HYDROCHLORIDE 10 MG/ML
3 INJECTION, SOLUTION EPIDURAL; INFILTRATION; INTRACAUDAL; PERINEURAL ONCE
Status: COMPLETED | OUTPATIENT
Start: 2024-05-30 | End: 2024-05-30

## 2024-05-30 RX ADMIN — LIDOCAINE HYDROCHLORIDE 3 ML: 10 INJECTION, SOLUTION EPIDURAL; INFILTRATION; INTRACAUDAL; PERINEURAL at 10:07

## 2024-05-30 RX ADMIN — Medication 20 MG: at 10:07

## 2024-05-30 NOTE — PROGRESS NOTES
Chief Complaint    Knee Pain (3rd inj euflexxa)      History of Present Illness:  Kemal De La Cruz is a 53 y.o. male who presents for his third Euflexxa injection.  This patient is known to [facility] orth[facility] and is being treated conservatively for the arthritis in their knee.  This conservative treatment includes: rest, ice, elevation, home exercises, activity modification, OTC medications as needed, corticosteroid injections and visco supplementation injections.  He notes at this time that he feels that he is suffering a gout flare.  He is on allopurinol but has not seen his original provider for a prescription of colchicine.  The patient denies any new injury. The patient denies any numbness, paresthesias, or weakness.              Physical exam:  Inspection: Both knees without erythema, ecchymosis, discoloration, abrasion, contusions, deformity or signs of infection.  Palpation: There is tenderness to palpation to the joint line of the left knee.  There is patellofemoral crepitus palpable in the left knee.  No tenderness to palpation to any other osseous or soft tissue structures of the knee.  Range of motion: Grossly intact  Neurovascular: Patient is neurovascularly intact, equally bilaterally.        Procedures:    VISCO SUPPLEMENTATION  INJECTION: Left KNEE     PROCEDURE: Risks, benefits, and alternatives to the injections were discussed in detail with the patient. The risks discussed included but are not limited to infection, skin reactions, hot swollen joints, and anaphylaxis.      After informed consent was provided, the patient sat on the exam table. The anteriolateral aspect of the left knee was prepped with Chlora-prep. The skin and subcutaneous tissues were anesthetized with ethyl chloride spray and 1% lidocaine injected with a 20-gauge needle.  The needle was left in place and the syringe was detached from the needle.  The Euflexxa syringe was attached to the 20-gauge needle and 2 mL of the Euflexxa was

## 2024-06-03 DIAGNOSIS — I48.0 PAROXYSMAL A-FIB (HCC): ICD-10-CM

## 2024-06-04 RX ORDER — METOPROLOL TARTRATE 50 MG/1
50 TABLET, FILM COATED ORAL 2 TIMES DAILY
Qty: 180 TABLET | Refills: 0 | Status: SHIPPED | OUTPATIENT
Start: 2024-06-04

## 2024-06-04 RX ORDER — FUROSEMIDE 80 MG
80 TABLET ORAL 2 TIMES DAILY
Qty: 60 TABLET | OUTPATIENT
Start: 2024-06-04

## 2024-06-04 RX ORDER — FUROSEMIDE 40 MG/1
40 TABLET ORAL DAILY
Qty: 90 TABLET | Refills: 1 | OUTPATIENT
Start: 2024-06-04

## 2024-06-04 NOTE — TELEPHONE ENCOUNTER
Requested Prescriptions     Pending Prescriptions Disp Refills    metoprolol tartrate (LOPRESSOR) 50 MG tablet [Pharmacy Med Name: METOPROLOL TARTRATE 50 MG TAB] 180 tablet 0     Sig: TAKE 1 TABLET BY MOUTH TWICE A DAY       Last Clinic Visit:  3/7/2024     Next Clinic Appointment:  6/13/2024

## 2024-06-13 ENCOUNTER — OFFICE VISIT (OUTPATIENT)
Dept: INTERNAL MEDICINE CLINIC | Age: 54
End: 2024-06-13
Payer: MEDICAID

## 2024-06-13 VITALS
HEIGHT: 72 IN | TEMPERATURE: 97.3 F | OXYGEN SATURATION: 95 % | DIASTOLIC BLOOD PRESSURE: 81 MMHG | WEIGHT: 315 LBS | SYSTOLIC BLOOD PRESSURE: 117 MMHG | BODY MASS INDEX: 42.66 KG/M2 | HEART RATE: 62 BPM | RESPIRATION RATE: 20 BRPM

## 2024-06-13 DIAGNOSIS — G47.33 OBSTRUCTIVE SLEEP APNEA: ICD-10-CM

## 2024-06-13 DIAGNOSIS — E78.2 HYPERLIPIDEMIA, MIXED: ICD-10-CM

## 2024-06-13 DIAGNOSIS — E66.01 CLASS 3 SEVERE OBESITY DUE TO EXCESS CALORIES WITH SERIOUS COMORBIDITY AND BODY MASS INDEX (BMI) OF 50.0 TO 59.9 IN ADULT (HCC): ICD-10-CM

## 2024-06-13 DIAGNOSIS — I10 ESSENTIAL HYPERTENSION: ICD-10-CM

## 2024-06-13 DIAGNOSIS — Z79.4 TYPE 2 DIABETES MELLITUS WITH DIABETIC NEPHROPATHY, WITH LONG-TERM CURRENT USE OF INSULIN (HCC): ICD-10-CM

## 2024-06-13 DIAGNOSIS — E11.21 TYPE 2 DIABETES MELLITUS WITH DIABETIC NEPHROPATHY, WITH LONG-TERM CURRENT USE OF INSULIN (HCC): ICD-10-CM

## 2024-06-13 DIAGNOSIS — Z00.00 HEALTHCARE MAINTENANCE: Primary | ICD-10-CM

## 2024-06-13 DIAGNOSIS — I48.19 PERSISTENT ATRIAL FIBRILLATION (HCC): ICD-10-CM

## 2024-06-13 PROBLEM — L02.31 ABSCESS OF GLUTEAL REGION: Status: RESOLVED | Noted: 2019-08-02 | Resolved: 2024-06-13

## 2024-06-13 LAB — HBA1C MFR BLD: 6.4 %

## 2024-06-13 PROCEDURE — 99213 OFFICE O/P EST LOW 20 MIN: CPT | Performed by: STUDENT IN AN ORGANIZED HEALTH CARE EDUCATION/TRAINING PROGRAM

## 2024-06-13 PROCEDURE — 83036 HEMOGLOBIN GLYCOSYLATED A1C: CPT

## 2024-06-13 RX ORDER — SEMAGLUTIDE 1.34 MG/ML
0.5 INJECTION, SOLUTION SUBCUTANEOUS
Qty: 1 ADJUSTABLE DOSE PRE-FILLED PEN SYRINGE | Refills: 1 | Status: SHIPPED | OUTPATIENT
Start: 2024-06-13

## 2024-06-13 ASSESSMENT — ENCOUNTER SYMPTOMS
SHORTNESS OF BREATH: 0
ABDOMINAL DISTENTION: 0

## 2024-06-13 NOTE — PROGRESS NOTES
[facility]    Kemal De La Cruz is a 53 y.o. male, here for evaluation of the following concerns: Follow-up    HPI    53-year-old male with PMH of hypertension, atrial fibrillation, DM2, syncope, carpal tunnel syndrome, ED.  Who presents for presents for regular follow-up.  He states that he has been doing okay.  He continues with all his medications as prescribed.  For DM he checks his sugars every day in the middle of the day and they usually run around 120s.  He is a .  He has been having no issues with syncope, chest pain shortness of breath.  He states that he was on Ozempic in the past but stopped it due to he heard side effects of suicidal ideation.  But he believes he would like to try it again and he would benefit from it.  Review of Systems   Constitutional:  Negative for activity change.   Respiratory:  Negative for shortness of breath.    Cardiovascular:  Negative for chest pain.   Gastrointestinal:  Negative for abdominal distention.   Genitourinary:  Negative for difficulty urinating.   Neurological:  Negative for dizziness and light-headedness.       MEDICATIONS:  Prior to Visit Medications    Medication Sig Taking? Authorizing Provider   Semaglutide, 1 MG/DOSE, (OZEMPIC, 1 MG/DOSE,) 4 MG/3ML SOPN sc injection Inject 0.5 mg into the skin every 7 days Yes Jakob Ferrer MD   metoprolol tartrate (LOPRESSOR) 50 MG tablet TAKE 1 TABLET BY MOUTH TWICE A DAY Yes Dave Dias MD   valsartan (DIOVAN) 160 MG tablet TAKE 1 TABLET BY MOUTH EVERY DAY Yes Eboni Aguilar MD   celecoxib (CELEBREX) 200 MG capsule Take 1 capsule by mouth daily Yes Adelso Matute PA-C   fluticasone (FLONASE) 50 MCG/ACT nasal spray SPRAY 1 SPRAY INTO EACH NOSTRIL EVERY DAY Yes Tyler Fay MD   gabapentin (NEURONTIN) 300 MG capsule Take 1 capsule by mouth nightly for 180 days. Intended supply: 90 days Yes Jose Lawton, DO   amLODIPine

## 2024-06-13 NOTE — ASSESSMENT & PLAN NOTE
Patient states he was on Ozempic last year and has lost 16 pounds with that.  He would like to try it again.

## 2024-06-13 NOTE — ASSESSMENT & PLAN NOTE
Advised pt to get advised patient to get diabetic eye exam this year  He is due for hep C, pneumonia, shingles vaccine.  He can get this at his pharmacy at any time

## 2024-06-13 NOTE — PATIENT INSTRUCTIONS
Please see an eye doctor to complete your diabetic retinal exam  Semaglutide was sent to your pharmacy, we will see if we can get this approved and follow-up in 5 weeks after a starting medications  You can get your labs  You are due for Shingles, Pneumonia and Hepatitis B vaccine, you can get those whenever you like

## 2024-06-19 ENCOUNTER — TELEPHONE (OUTPATIENT)
Dept: INTERNAL MEDICINE CLINIC | Age: 54
End: 2024-06-19

## 2024-06-19 NOTE — TELEPHONE ENCOUNTER
PT STATED HE STILL WAITING TO HEAR ABOUT OZEMPIC RX AND IF P.A HAS BEEN DONE? PT CAN BE REACHED [phone]   
Adjustment disorder, unspecified type

## 2024-07-13 PROBLEM — Z00.00 HEALTHCARE MAINTENANCE: Status: RESOLVED | Noted: 2022-10-19 | Resolved: 2024-07-13

## 2024-07-30 ENCOUNTER — TELEPHONE (OUTPATIENT)
Dept: PULMONOLOGY | Age: 54
End: 2024-07-30

## 2024-07-30 NOTE — TELEPHONE ENCOUNTER
[facility] complaint.     Order was placed with [facility] for a new machine on 12/29/23 and was never fufilled. Pt called us today to figure out what was going on. I informed him we sent the order 12/29/23 and got a successful fax confirmation. He is going to FU with [facility] and call us back if there are any issues.

## 2024-08-13 ENCOUNTER — TELEPHONE (OUTPATIENT)
Dept: INTERNAL MEDICINE CLINIC | Age: 54
End: 2024-08-13

## 2024-08-14 ENCOUNTER — OFFICE VISIT (OUTPATIENT)
Dept: INTERNAL MEDICINE CLINIC | Age: 54
End: 2024-08-14
Payer: MEDICAID

## 2024-08-14 VITALS
WEIGHT: 315 LBS | TEMPERATURE: 97.5 F | RESPIRATION RATE: 20 BRPM | OXYGEN SATURATION: 95 % | HEART RATE: 64 BPM | BODY MASS INDEX: 42.66 KG/M2 | DIASTOLIC BLOOD PRESSURE: 92 MMHG | SYSTOLIC BLOOD PRESSURE: 134 MMHG | HEIGHT: 72 IN

## 2024-08-14 DIAGNOSIS — E78.2 HYPERLIPIDEMIA, MIXED: ICD-10-CM

## 2024-08-14 DIAGNOSIS — I48.0 PAROXYSMAL A-FIB (HCC): ICD-10-CM

## 2024-08-14 DIAGNOSIS — I10 ESSENTIAL HYPERTENSION: ICD-10-CM

## 2024-08-14 DIAGNOSIS — G56.02 CARPAL TUNNEL SYNDROME OF LEFT WRIST: ICD-10-CM

## 2024-08-14 PROCEDURE — 99213 OFFICE O/P EST LOW 20 MIN: CPT

## 2024-08-14 RX ORDER — ALLOPURINOL 300 MG/1
TABLET ORAL
Qty: 90 TABLET | Refills: 1 | Status: SHIPPED | OUTPATIENT
Start: 2024-08-14

## 2024-08-14 RX ORDER — FUROSEMIDE 40 MG/1
40 TABLET ORAL DAILY
Qty: 90 TABLET | Refills: 1 | Status: SHIPPED | OUTPATIENT
Start: 2024-08-14

## 2024-08-14 RX ORDER — GABAPENTIN 300 MG/1
300 CAPSULE ORAL NIGHTLY
Qty: 90 CAPSULE | Refills: 1 | Status: SHIPPED | OUTPATIENT
Start: 2024-08-14 | End: 2025-02-10

## 2024-08-14 RX ORDER — ATORVASTATIN CALCIUM 20 MG/1
TABLET, FILM COATED ORAL
Qty: 30 TABLET | Refills: 5 | Status: SHIPPED | OUTPATIENT
Start: 2024-08-14

## 2024-08-14 RX ORDER — AMLODIPINE BESYLATE 10 MG/1
10 TABLET ORAL DAILY
Qty: 90 TABLET | Refills: 1 | Status: SHIPPED | OUTPATIENT
Start: 2024-08-14

## 2024-08-14 ASSESSMENT — ENCOUNTER SYMPTOMS
EYE DISCHARGE: 0
ALLERGIC/IMMUNOLOGIC NEGATIVE: 1
EYE PAIN: 0
SHORTNESS OF BREATH: 0
WHEEZING: 0
COUGH: 0

## 2024-08-14 NOTE — PATIENT INSTRUCTIONS
Please get the microalbumin /creatinine lab   Continue with the same medication  Continue monitoring your blood sugar and blood pressure at home

## 2024-08-14 NOTE — PROGRESS NOTES
The [facility]    Kemal De La Cruz is a 53 y.o. male, with a MHx significant for hypertension, atrial fibrillation,DM 2, carpal tunnel syndrome who presents to the clinic for follow up ,filling insulin DM assessment form and refill medication.    History  Patient states he is doing well he doesn't have any new symptom.he denies shortness of breath ,chest pain,palpitations ,increased thirst,increased urinary frequency and increased appetite.  He mentioned that he need refill on his medication and also complete his insulin DM form for his work.    Review of Systems   Constitutional:  Negative for activity change, appetite change and fatigue.   HENT: Negative.     Eyes:  Negative for pain and discharge.   Respiratory:  Negative for cough, shortness of breath and wheezing.    Cardiovascular:  Negative for chest pain, palpitations and leg swelling.   Endocrine: Negative.  Negative for polydipsia, polyphagia and polyuria.   Genitourinary: Negative.    Musculoskeletal: Negative.    Skin: Negative.    Allergic/Immunologic: Negative.    Neurological: Negative.    Hematological: Negative.    Psychiatric/Behavioral: Negative.         MEDICATION:  Prior to Visit Medications    Medication Sig Taking? Authorizing Provider   furosemide (LASIX) 40 MG tablet Take 1 tablet by mouth daily Yes Aylin Mcdonnell MD   atorvastatin (LIPITOR) 20 MG tablet TAKE 1 TABLET BY MOUTH EVERY DAY Yes Aylin Mcdonnell MD   apixaban (ELIQUIS) 5 MG TABS tablet TAKE 1 TABLET BY MOUTH TWICE A DAY Yes Alyin Mcdonnell MD   amLODIPine (NORVASC) 10 MG tablet Take 1 tablet by mouth daily Yes Aylin Mcdonnell MD   allopurinol (ZYLOPRIM) 300 MG tablet TAKE 1 TABLET BY MOUTH EVERY DAY Yes Aylin Mcdonnell MD   gabapentin (NEURONTIN) 300 MG capsule Take 1 capsule by mouth nightly for 180 days. Intended supply: 90 days Yes Aylin Mcdonnell MD   metoprolol tartrate (LOPRESSOR)

## 2024-08-15 ENCOUNTER — TELEPHONE (OUTPATIENT)
Dept: INTERNAL MEDICINE CLINIC | Age: 54
End: 2024-08-15

## 2024-08-15 RX ORDER — FUROSEMIDE 40 MG
40 TABLET ORAL DAILY
Qty: 90 TABLET | Refills: 1 | OUTPATIENT
Start: 2024-08-15

## 2024-08-15 NOTE — TELEPHONE ENCOUNTER
PT STATED HE WAS TOLD  THAT THE [employer]ER WORK  WAS NOT FILLED OUT  BY A REAL DR , AND THE  DR'S NUMBER WAS NOT ANY GOOD, AND THERE WAS NOT A STAMPED SEAL ON IT.  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 PT STATED HE NEED TO KNOW WHAT TO DO ? PT CAN BE REACHED [phone].  PT CALLED BACK WITH PH NUMBER TO [employer] IF QUESTION FOR THEM.   [phone]

## 2024-08-20 ENCOUNTER — TELEPHONE (OUTPATIENT)
Dept: CARDIOLOGY CLINIC | Age: 54
End: 2024-08-20

## 2024-08-20 ENCOUNTER — OFFICE VISIT (OUTPATIENT)
Dept: INTERNAL MEDICINE CLINIC | Age: 54
End: 2024-08-20
Payer: MEDICAID

## 2024-08-20 VITALS
SYSTOLIC BLOOD PRESSURE: 145 MMHG | OXYGEN SATURATION: 95 % | WEIGHT: 315 LBS | BODY MASS INDEX: 42.66 KG/M2 | RESPIRATION RATE: 20 BRPM | TEMPERATURE: 97.2 F | HEART RATE: 62 BPM | HEIGHT: 72 IN | DIASTOLIC BLOOD PRESSURE: 91 MMHG

## 2024-08-20 DIAGNOSIS — I10 ESSENTIAL HYPERTENSION: ICD-10-CM

## 2024-08-20 DIAGNOSIS — Z79.4 TYPE 2 DIABETES MELLITUS WITH DIABETIC NEPHROPATHY, WITH LONG-TERM CURRENT USE OF INSULIN (HCC): ICD-10-CM

## 2024-08-20 DIAGNOSIS — E11.21 TYPE 2 DIABETES MELLITUS WITH DIABETIC NEPHROPATHY, WITH LONG-TERM CURRENT USE OF INSULIN (HCC): ICD-10-CM

## 2024-08-20 LAB
CREAT UR-MCNC: 40.5 MG/DL (ref 39–259)
MICROALBUMIN UR DL<=1MG/L-MCNC: 25.7 MG/DL
MICROALBUMIN/CREAT UR: 634.6 MG/G (ref 0–30)

## 2024-08-20 PROCEDURE — 99213 OFFICE O/P EST LOW 20 MIN: CPT

## 2024-08-20 RX ORDER — SEMAGLUTIDE 1.34 MG/ML
0.5 INJECTION, SOLUTION SUBCUTANEOUS
Qty: 1 ADJUSTABLE DOSE PRE-FILLED PEN SYRINGE | Refills: 1 | Status: SHIPPED | OUTPATIENT
Start: 2024-08-20 | End: 2024-08-20 | Stop reason: ALTCHOICE

## 2024-08-20 NOTE — PATIENT INSTRUCTIONS
We have faxed over your paperwork.    Follow up with your [facility] about when the ozempic will be ready. We will do the pre-authorization paperwork for you so it might take several days to fill it.   After you start taking your Ozempic, if your sugars before meals are low, around 110 or below, decrease your mealtime insulin to 5 units instead of 7.  Call and let us know if you have to make this change.  You can head over to the lab now and do your urine test.

## 2024-08-20 NOTE — PROGRESS NOTES
The [facility]    Kemal De La Cruz is a 53 y.o. male, here for evaluation of the following concerns described below:    HPI  Patient is here to get some paperwork redone.  He is a  and there are some forms that need to fill out about his medical conditions.  The patient was here last week, the forms were filled out but the physician forgot to put his NPI number.  Due to the missing NPI number, the forms were not valid for his work.  Today he needs the form we filled out with that missing information added.      Patient would like to see if we can get his Ozempic approved.  He used to use it years ago but stopped because he heard there was a potential that it could cause suicidal thoughts.  Patient has never experienced suicidal thoughts on this medication but was worried about developing them so he stopped it.  He reports when he was on it he had increased bowel movements that were soft but not quite diarrhea.    Review of Systems - A 10 point review of systems was conducted and significant findings noted in HPI.    MEDICATIONS:  Prior to Visit Medications    Medication Sig Taking? Authorizing Provider   furosemide (LASIX) 40 MG tablet Take 1 tablet by mouth daily Yes Aylin Mcdonnell MD   atorvastatin (LIPITOR) 20 MG tablet TAKE 1 TABLET BY MOUTH EVERY DAY Yes Aylin Mcdonnell MD   apixaban (ELIQUIS) 5 MG TABS tablet TAKE 1 TABLET BY MOUTH TWICE A DAY Yes Aylin Mcdonnell MD   amLODIPine (NORVASC) 10 MG tablet Take 1 tablet by mouth daily Yes Aylin Mcdonnell MD   allopurinol (ZYLOPRIM) 300 MG tablet TAKE 1 TABLET BY MOUTH EVERY DAY Yes Aylin Mcdonnell MD   gabapentin (NEURONTIN) 300 MG capsule Take 1 capsule by mouth nightly for 180 days. Intended supply: 90 days Yes Aylin Mcdonnell MD   metoprolol tartrate (LOPRESSOR) 50 MG tablet TAKE 1 TABLET BY MOUTH TWICE A DAY Yes Dave Dias MD   valsartan

## 2024-08-20 NOTE — TELEPHONE ENCOUNTER
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     Fax # [phone]

## 2024-08-21 NOTE — TELEPHONE ENCOUNTER
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 Last saw JN 3/11/24, s/p dual chamber PPM (9/2023)

## 2024-08-21 NOTE — TELEPHONE ENCOUNTER
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

## 2024-08-21 NOTE — TELEPHONE ENCOUNTER
Attempted to call pt to see who to address the letter to. No answer and no VM has been set up. Will go ahead and fax letter to number provided.

## 2024-08-30 RX ORDER — FLUTICASONE PROPIONATE 50 MCG
SPRAY, SUSPENSION (ML) NASAL
Qty: 16 G | Refills: 1 | Status: SHIPPED | OUTPATIENT
Start: 2024-08-30 | End: 2024-10-28

## 2024-08-30 NOTE — TELEPHONE ENCOUNTER
Requested Prescriptions     Pending Prescriptions Disp Refills    fluticasone (FLONASE) 50 MCG/ACT nasal spray [Pharmacy Med Name: FLUTICASONE PROP 50 MCG SPRAY]  1     Sig: SPRAY 1 SPRAY INTO EACH NOSTRIL EVERY DAY       Last Clinic Visit:  8/20/2024     Next Clinic Appointment:  9/17/2024

## 2024-09-17 ENCOUNTER — OFFICE VISIT (OUTPATIENT)
Dept: INTERNAL MEDICINE CLINIC | Age: 54
End: 2024-09-17
Payer: MEDICAID

## 2024-09-17 VITALS
OXYGEN SATURATION: 97 % | DIASTOLIC BLOOD PRESSURE: 91 MMHG | RESPIRATION RATE: 16 BRPM | TEMPERATURE: 97.1 F | HEART RATE: 61 BPM | WEIGHT: 315 LBS | BODY MASS INDEX: 42.66 KG/M2 | HEIGHT: 72 IN | SYSTOLIC BLOOD PRESSURE: 140 MMHG

## 2024-09-17 DIAGNOSIS — E11.9 DIABETES MELLITUS TYPE 2, INSULIN DEPENDENT (HCC): ICD-10-CM

## 2024-09-17 DIAGNOSIS — E78.2 HYPERLIPIDEMIA, MIXED: ICD-10-CM

## 2024-09-17 DIAGNOSIS — E11.21 TYPE 2 DIABETES MELLITUS WITH DIABETIC NEPHROPATHY, WITH LONG-TERM CURRENT USE OF INSULIN (HCC): ICD-10-CM

## 2024-09-17 DIAGNOSIS — I48.0 PAROXYSMAL ATRIAL FIBRILLATION (HCC): ICD-10-CM

## 2024-09-17 DIAGNOSIS — I10 ESSENTIAL HYPERTENSION: Primary | ICD-10-CM

## 2024-09-17 DIAGNOSIS — Z79.4 TYPE 2 DIABETES MELLITUS WITH DIABETIC NEPHROPATHY, WITH LONG-TERM CURRENT USE OF INSULIN (HCC): ICD-10-CM

## 2024-09-17 DIAGNOSIS — G47.33 OBSTRUCTIVE SLEEP APNEA: ICD-10-CM

## 2024-09-17 DIAGNOSIS — Z79.4 DIABETES MELLITUS TYPE 2, INSULIN DEPENDENT (HCC): ICD-10-CM

## 2024-09-17 LAB — HBA1C MFR BLD: 7 %

## 2024-09-17 PROCEDURE — 99213 OFFICE O/P EST LOW 20 MIN: CPT

## 2024-09-17 PROCEDURE — 83036 HEMOGLOBIN GLYCOSYLATED A1C: CPT

## 2024-09-17 RX ORDER — SYRINGE-NEEDLE,INSULIN,0.5 ML 27GX1/2"
SYRINGE, EMPTY DISPOSABLE MISCELLANEOUS
Qty: 100 EACH | Refills: 5 | Status: SHIPPED | OUTPATIENT
Start: 2024-09-17

## 2024-09-17 RX ORDER — DULAGLUTIDE 1.5 MG/.5ML
1.5 INJECTION, SOLUTION SUBCUTANEOUS WEEKLY
Qty: 1 ADJUSTABLE DOSE PRE-FILLED PEN SYRINGE | Refills: 2 | Status: SHIPPED | OUTPATIENT
Start: 2024-09-17

## 2024-09-17 RX ORDER — BLOOD SUGAR DIAGNOSTIC
1 STRIP MISCELLANEOUS DAILY
Qty: 100 EACH | Refills: 5 | Status: SHIPPED | OUTPATIENT
Start: 2024-09-17

## 2024-09-17 RX ORDER — INSULIN GLARGINE 100 [IU]/ML
10 INJECTION, SOLUTION SUBCUTANEOUS NIGHTLY
Qty: 5 ADJUSTABLE DOSE PRE-FILLED PEN SYRINGE | Refills: 3 | Status: SHIPPED | OUTPATIENT
Start: 2024-09-17 | End: 2024-11-16

## 2024-10-07 ENCOUNTER — OFFICE VISIT (OUTPATIENT)
Dept: SLEEP MEDICINE | Age: 54
End: 2024-10-07
Payer: MEDICAID

## 2024-10-07 VITALS
HEART RATE: 73 BPM | SYSTOLIC BLOOD PRESSURE: 120 MMHG | BODY MASS INDEX: 42.66 KG/M2 | WEIGHT: 315 LBS | DIASTOLIC BLOOD PRESSURE: 70 MMHG | RESPIRATION RATE: 18 BRPM | HEIGHT: 72 IN | OXYGEN SATURATION: 98 % | TEMPERATURE: 97.9 F

## 2024-10-07 DIAGNOSIS — Z99.89 DEPENDENCE ON OTHER ENABLING MACHINES AND DEVICES: ICD-10-CM

## 2024-10-07 DIAGNOSIS — I48.19 PERSISTENT ATRIAL FIBRILLATION (HCC): ICD-10-CM

## 2024-10-07 DIAGNOSIS — I10 ESSENTIAL HYPERTENSION: ICD-10-CM

## 2024-10-07 DIAGNOSIS — G47.33 OSA TREATED WITH BIPAP: Primary | ICD-10-CM

## 2024-10-07 DIAGNOSIS — E66.813 CLASS 3 SEVERE OBESITY DUE TO EXCESS CALORIES WITH SERIOUS COMORBIDITY AND BODY MASS INDEX (BMI) OF 50.0 TO 59.9 IN ADULT: ICD-10-CM

## 2024-10-07 DIAGNOSIS — E66.01 CLASS 3 SEVERE OBESITY DUE TO EXCESS CALORIES WITH SERIOUS COMORBIDITY AND BODY MASS INDEX (BMI) OF 50.0 TO 59.9 IN ADULT: ICD-10-CM

## 2024-10-07 PROCEDURE — G8427 DOCREV CUR MEDS BY ELIG CLIN: HCPCS | Performed by: PSYCHIATRY & NEUROLOGY

## 2024-10-07 PROCEDURE — 1036F TOBACCO NON-USER: CPT | Performed by: PSYCHIATRY & NEUROLOGY

## 2024-10-07 PROCEDURE — 3074F SYST BP LT 130 MM HG: CPT | Performed by: PSYCHIATRY & NEUROLOGY

## 2024-10-07 PROCEDURE — 99214 OFFICE O/P EST MOD 30 MIN: CPT | Performed by: PSYCHIATRY & NEUROLOGY

## 2024-10-07 PROCEDURE — 3017F COLORECTAL CA SCREEN DOC REV: CPT | Performed by: PSYCHIATRY & NEUROLOGY

## 2024-10-07 PROCEDURE — G8417 CALC BMI ABV UP PARAM F/U: HCPCS | Performed by: PSYCHIATRY & NEUROLOGY

## 2024-10-07 PROCEDURE — 3078F DIAST BP <80 MM HG: CPT | Performed by: PSYCHIATRY & NEUROLOGY

## 2024-10-07 PROCEDURE — G8484 FLU IMMUNIZE NO ADMIN: HCPCS | Performed by: PSYCHIATRY & NEUROLOGY

## 2024-10-07 ASSESSMENT — SLEEP AND FATIGUE QUESTIONNAIRES
HOW LIKELY ARE YOU TO NOD OFF OR FALL ASLEEP WHILE LYING DOWN TO REST IN THE AFTERNOON WHEN CIRCUMSTANCES PERMIT: HIGH CHANCE OF DOZING
HOW LIKELY ARE YOU TO NOD OFF OR FALL ASLEEP WHILE SITTING AND READING: WOULD NEVER DOZE
HOW LIKELY ARE YOU TO NOD OFF OR FALL ASLEEP WHILE SITTING AND TALKING TO SOMEONE: WOULD NEVER DOZE
HOW LIKELY ARE YOU TO NOD OFF OR FALL ASLEEP WHILE SITTING INACTIVE IN A PUBLIC PLACE: MODERATE CHANCE OF DOZING
HOW LIKELY ARE YOU TO NOD OFF OR FALL ASLEEP WHILE SITTING QUIETLY AFTER LUNCH WITHOUT ALCOHOL: WOULD NEVER DOZE
ESS TOTAL SCORE: 7
HOW LIKELY ARE YOU TO NOD OFF OR FALL ASLEEP WHEN YOU ARE A PASSENGER IN A CAR FOR AN HOUR WITHOUT A BREAK: WOULD NEVER DOZE
HOW LIKELY ARE YOU TO NOD OFF OR FALL ASLEEP IN A CAR, WHILE STOPPED FOR A FEW MINUTES IN TRAFFIC: WOULD NEVER DOZE
HOW LIKELY ARE YOU TO NOD OFF OR FALL ASLEEP WHILE WATCHING TV: MODERATE CHANCE OF DOZING

## 2024-10-07 NOTE — PROGRESS NOTES
Kemal De La Cruz         : 1970  [x] [facility]     [] [facility]      [] [facility]     [][facility]    [] [facility]  [] [facility]  [] [facility]   [] [facility] [] Other:  Diagnosis: [x] LEONA (G47.33) [] CSA (G47.31) [] Apnea (G47.30)   Length of Need: [] 12 Months [x] 99 Months [] Other:    Machine (WILFRED!):  [x] ResMed AirSense     Auto [] Other:         Humidifier: [x] Heated ()        [x] Water chamber replacement ()/ 1 per 6 months        Mask:  Please always start with the mask the patient used during the titraion    [x] Full Face () /1 per 3 months    [x] Patient Choice - Size and fit mask    [] Dispense:     [x] Headgear () / 1 per 3 months    [x] Interface Replacement ()/1 per month            Tubing: [x] Heated ()/1 per 3 months    [] Standard ()/1 per 3 months [] Other:           Filters: [x] Non-disposable ()/1 per 6 months     [x] Ultra-Fine, Disposable ()/2 per month        Miscellaneous: [x] Chin Strap ()/ 1 per 6 months [] O2 bleed-in:       LPM   [] Oximetry on CPAP/Bilevel []  Other:          Start Order Date: 10/07/24    MEDICAL JUSTIFICATION:  I, the undersigned, certify that the above prescribed supplies are medically necessary for this patient’s wellbeing.  In my opinion, the supplies are both reasonable and necessary in reference to accepted standards of medicalpractice in treatment of this patient’s condition.    Chance Howard MD      NPI: [id number]       Order Signed Date: 10/07/24    Electronically signed by Chance Howard MD on 10/7/2024 at 10:47 AM

## 2024-10-07 NOTE — PROGRESS NOTES
MD Lin   celecoxib (CELEBREX) 200 MG capsule Take 1 capsule by mouth daily 5/16/24 9/17/24  Adelso Matute PA-C       Allergies as of 10/07/2024    (No Known Allergies)       Patient Active Problem List   Diagnosis    Obstructive sleep apnea    Shortness of breath    Class 3 severe obesity due to excess calories with serious comorbidity and body mass index (BMI) of 50.0 to 59.9 in adult    Paroxysmal atrial fibrillation (HCC)    Persistent atrial fibrillation    Hyperlipidemia, mixed    Type 2 diabetes mellitus with diabetic nephropathy, with long-term current use of insulin (HCC)    Essential hypertension    Erectile dysfunction    Syncope and collapse    Cardiac pacemaker in situ    Preseptal cellulitis    De Quervain's tenosynovitis       Past Medical History:   Diagnosis Date    Abscess of gluteal region 08/02/2019    Arthritis     Arthritis 08/02/2019    Atrial fibrillation     Atrial fibrillation (HCC)     Bradycardia     Chest pain 06/22/2022    Chronic gout of multiple sites 08/30/2018    Last Assessment & Plan:  Condition: stable  Diagnosis based upon a face-to-face evaluation, medication and chart review, physical exam, and ROS.   Requested patient to continue regular follow-up with their PCP/specialist.  Follow up in: one year    Chronic pain syndrome 07/29/2022    Congestive heart failure (HCC)     Coronary artery disease     Coronary artery disease 08/24/2022    Current mild episode of major depressive disorder without prior episode (HCC) 08/30/2018    Overview:  Depression screening completed this visit - see results. Member has no thoughts, plans or intentions to harm himself / herself or others.     Last Assessment & Plan:  Condition: stable  Diagnosis based upon a face-to-face evaluation, medication and chart review, physical exam, and ROS.   Requested patient to continue regular follow-up with their PCP/specialist.  Follow up in: one year    Depression     Diabetes mellitus (HCC)

## 2024-10-10 ENCOUNTER — TELEPHONE (OUTPATIENT)
Dept: PULMONOLOGY | Age: 54
End: 2024-10-10

## 2024-10-10 NOTE — TELEPHONE ENCOUNTER
so I just opened the Rx for the first time and realized that it is for supplies only and doesn't even have pressure settings on it.  But anyway… I'm wondering if the provider would be willing to change the Rx to be for a new machine, as it looks like he called on 9/19 and requested the order to be changed to a new machine.

## 2024-10-10 NOTE — TELEPHONE ENCOUNTER
PER DELROY WITH [facility]:    Hello:)  this is for Chance Howard. Patient's prior authorization was denied due to not having a reason listed in the recent notes for ordering a new machine. It needs to list a problem or malfunction with the machine (cannot be due to age) in order for them to approve. Kemal KAMARA 1970

## 2024-10-24 NOTE — PROGRESS NOTES
Kemal De La Cruz         : 1970  [x] [facility]     [] [facility]      [] [facility]     [facility]    [] [facility]  [] [facility]  [] [facility]   [] [facility] [] Other:  Diagnosis: [x] LEONA (G47.33) [] CSA (G47.31) [] Apnea (G47.30)   Length of Need: [] 12 Months [x] 99 Months [] Other:    Machine (WILFRED!):  [x] ResMed AirSense     Auto [] Other:     []  CPAP () [] Bilevel ()   Mode: [] Auto [] Spontaneous    Mode: [] Auto [] Spontaneous                     New machine     14/10 cm     Comfort Settings:       - Ramp Pressure: 5 cmH2O                                        - Ramp time: 15 min                                     -  Flex/EPR - 3 full time  If the order is for BiLevel:                                    - For ResMed Bilevel (TiMax-4 sec   TiMin- 0.2 sec)     Humidifier: [x] Heated ()        [x] Water chamber replacement ()/ 1 per 6 months        Mask:  Please always start with the mask the patient used during the titraion    [x] Full Face () /1 per 3 months    [x] Patient Choice - Size and fit mask    [] Dispense:     [x] Headgear () / 1 per 3 months    [x] Interface Replacement ()/1 per month            Tubing: [x] Heated ()/1 per 3 months    [] Standard ()/1 per 3 months [] Other:           Filters: [x] Non-disposable ()/1 per 6 months     [x] Ultra-Fine, Disposable ()/2 per month        Miscellaneous: [x] Chin Strap ()/ 1 per 6 months [] O2 bleed-in:       LPM   [] Oximetry on CPAP/Bilevel []  Other:          Start Order Date: 10/24/24    MEDICAL JUSTIFICATION:  I, the undersigned, certify that the above prescribed supplies are medically necessary for this patient’s wellbeing.  In my opinion, the supplies are both reasonable and necessary in reference to accepted standards of medicalpractice in treatment of this patient’s condition.    Chance Howard MD      NPI: [id number]       Order Signed Date:

## 2024-10-24 NOTE — TELEPHONE ENCOUNTER
I ordered a new machine, but I am not sure the patient will be able to get new machine, and why would need a new machine? its not in my note we need new machine.

## 2024-10-28 RX ORDER — FLUTICASONE PROPIONATE 50 MCG
2 SPRAY, SUSPENSION (ML) NASAL DAILY
Qty: 24 EACH | Refills: 2 | Status: SHIPPED | OUTPATIENT
Start: 2024-10-28

## 2024-10-28 NOTE — TELEPHONE ENCOUNTER
Requested Prescriptions     Pending Prescriptions Disp Refills    fluticasone (FLONASE) 50 MCG/ACT nasal spray [Pharmacy Med Name: FLUTICASONE PROP 50 MCG SPRAY] 16 g 2     Sig: SPRAY 1 SPRAY INTO EACH NOSTRIL EVERY DAY       Last Clinic Visit:  9/17/2024     Next Clinic Appointment:  Visit date not found

## 2024-11-19 DIAGNOSIS — I48.0 PAROXYSMAL A-FIB (HCC): ICD-10-CM

## 2024-11-19 RX ORDER — METOPROLOL TARTRATE 50 MG
50 TABLET ORAL 2 TIMES DAILY
Qty: 180 TABLET | Refills: 0 | Status: SHIPPED | OUTPATIENT
Start: 2024-11-19 | End: 2025-01-13 | Stop reason: SDUPTHER

## 2024-11-19 NOTE — TELEPHONE ENCOUNTER
Requested Prescriptions     Pending Prescriptions Disp Refills    metoprolol tartrate (LOPRESSOR) 50 MG tablet 180 tablet 0     Sig: Take 1 tablet by mouth 2 times daily       Last Clinic Visit:  9/17/2024     Next Clinic Appointment:  Visit date not found

## 2024-12-01 DIAGNOSIS — I10 ESSENTIAL HYPERTENSION: ICD-10-CM

## 2024-12-02 RX ORDER — VALSARTAN 160 MG/1
160 TABLET ORAL DAILY
Qty: 90 TABLET | Refills: 1 | Status: SHIPPED | OUTPATIENT
Start: 2024-12-02

## 2024-12-02 NOTE — TELEPHONE ENCOUNTER
Requested Prescriptions     Pending Prescriptions Disp Refills    valsartan (DIOVAN) 160 MG tablet [Pharmacy Med Name: VALSARTAN 160 MG TABLET] 90 tablet 1     Sig: TAKE 1 TABLET BY MOUTH EVERY DAY       Last Clinic Visit:  9/17/2024     Next Clinic Appointment:  Visit date not found

## 2024-12-09 RX ORDER — DULAGLUTIDE 1.5 MG/.5ML
INJECTION, SOLUTION SUBCUTANEOUS
Qty: 4 ADJUSTABLE DOSE PRE-FILLED PEN SYRINGE | Refills: 2 | Status: SHIPPED | OUTPATIENT
Start: 2024-12-09 | End: 2025-01-13 | Stop reason: SDUPTHER

## 2024-12-09 NOTE — TELEPHONE ENCOUNTER
Requested Prescriptions     Pending Prescriptions Disp Refills    TRULICITY 1.5 MG/0.5ML SC injection [Pharmacy Med Name: TRULICITY 1.5 MG/0.5 ML PEN] 4 Adjustable Dose Pre-filled Pen Syringe 2     Sig: INJECT 0.5 ML SUBCUTANEOUSLY ONE TIME PER WEEK       Last Clinic Visit:  9/17/2024     Next Clinic Appointment:  Visit date not found

## 2025-01-01 NOTE — TELEPHONE ENCOUNTER
Airway    Performed by: Bran Oglesby DO  Authorized by: Kira Palumbo MD    Final Airway Type:  Endotracheal airway  Final Endotracheal Airway*:  ETT  ETT Size (mm)*:  7.0  Cuff*:  Regular  Technique Used for Successful ETT Placement:  Video laryngoscopy  Devices/Methods Used in Placement*:  Mask, Bag Valve, Oral ETT and Stylet  Intubation Procedure*:  ETCO2, Preoxygenation, Atraumatic, Dentition Unchanged and Pharynx Clear  Insertion Site:  Oral  Blade Type*:  Video Laryngoscope  Blade Size*:  4  Cuff Volume (mL):  8  Measured from*:  Lips  Secured at (cm)*:  21  Placement Verified by: auscultation, capnometry and palpation of cuff    Glottic View*:  1 - full view of glottis  Attempts*:  1  Ventilation Between Attempts:  None  Number of Other Approaches Attempted:  0   Patient Identified, Procedure confirmed, Emergency equipment available and Safety protocols followed  Location:  OR  Urgency:  Elective  Difficult Airway: No    Indications for Airway Management:  Anesthesia  Spontaneous Ventilation: absent    Sedation Level:  Anesthetized  Mask Difficulty Assessment:  1 - vent by mask  Start Time: 1/1/2025 8:38 AM       Patient called and stated a PA is needed for his LYRICA.        Please advise

## 2025-01-03 ENCOUNTER — TELEPHONE (OUTPATIENT)
Dept: INTERNAL MEDICINE CLINIC | Age: 55
End: 2025-01-03

## 2025-01-03 RX ORDER — INSULIN LISPRO 100 [IU]/ML
7 INJECTION, SOLUTION INTRAVENOUS; SUBCUTANEOUS 3 TIMES DAILY
Qty: 6.3 ML | Refills: 2 | Status: SHIPPED | OUTPATIENT
Start: 2025-01-03 | End: 2025-01-31 | Stop reason: ALTCHOICE

## 2025-01-13 DIAGNOSIS — N52.9 ERECTILE DYSFUNCTION, UNSPECIFIED ERECTILE DYSFUNCTION TYPE: ICD-10-CM

## 2025-01-13 DIAGNOSIS — I48.0 PAROXYSMAL A-FIB (HCC): ICD-10-CM

## 2025-01-13 RX ORDER — TADALAFIL 20 MG/1
20 TABLET ORAL DAILY PRN
Qty: 30 TABLET | Refills: 2 | Status: SHIPPED | OUTPATIENT
Start: 2025-01-13 | End: 2025-01-14 | Stop reason: SDUPTHER

## 2025-01-13 RX ORDER — DULAGLUTIDE 1.5 MG/.5ML
1.5 INJECTION, SOLUTION SUBCUTANEOUS WEEKLY
Qty: 4 ADJUSTABLE DOSE PRE-FILLED PEN SYRINGE | Refills: 2 | Status: SHIPPED | OUTPATIENT
Start: 2025-01-13 | End: 2025-01-31 | Stop reason: DRUGHIGH

## 2025-01-13 RX ORDER — METOPROLOL TARTRATE 50 MG
50 TABLET ORAL 2 TIMES DAILY
Qty: 180 TABLET | Refills: 0 | Status: SHIPPED | OUTPATIENT
Start: 2025-01-13 | End: 2025-01-31 | Stop reason: SDUPTHER

## 2025-01-13 NOTE — TELEPHONE ENCOUNTER
Requested Prescriptions     Pending Prescriptions Disp Refills    metoprolol tartrate (LOPRESSOR) 50 MG tablet 180 tablet 0     Sig: Take 1 tablet by mouth 2 times daily    tadalafil (CIALIS) 20 MG tablet 30 tablet 2     Sig: Take 1 tablet by mouth daily as needed for Erectile Dysfunction       Last Clinic Visit:  9/17/2024     Next Clinic Appointment:  Visit date not found

## 2025-01-14 ENCOUNTER — HOSPITAL ENCOUNTER (EMERGENCY)
Age: 55
Discharge: HOME | End: 2025-01-14
Attending: EMERGENCY MEDICINE
Payer: MEDICAID

## 2025-01-14 ENCOUNTER — APPOINTMENT (OUTPATIENT)
Dept: CT IMAGING | Age: 55
End: 2025-01-14
Payer: MEDICAID

## 2025-01-14 VITALS
SYSTOLIC BLOOD PRESSURE: 146 MMHG | OXYGEN SATURATION: 97 % | HEIGHT: 72 IN | WEIGHT: 315 LBS | BODY MASS INDEX: 42.66 KG/M2 | HEART RATE: 61 BPM | TEMPERATURE: 97.7 F | DIASTOLIC BLOOD PRESSURE: 83 MMHG | RESPIRATION RATE: 18 BRPM

## 2025-01-14 DIAGNOSIS — M79.89 LEFT ARM SWELLING: Primary | ICD-10-CM

## 2025-01-14 DIAGNOSIS — N52.9 ERECTILE DYSFUNCTION, UNSPECIFIED ERECTILE DYSFUNCTION TYPE: ICD-10-CM

## 2025-01-14 LAB
ANION GAP SERPL CALCULATED.3IONS-SCNC: 11 MMOL/L (ref 3–16)
BASOPHILS # BLD: 0 K/UL (ref 0–0.2)
BASOPHILS NFR BLD: 0.7 %
BUN SERPL-MCNC: 12 MG/DL (ref 7–20)
CALCIUM SERPL-MCNC: 9.7 MG/DL (ref 8.3–10.6)
CHLORIDE SERPL-SCNC: 102 MMOL/L (ref 99–110)
CO2 SERPL-SCNC: 27 MMOL/L (ref 21–32)
CREAT SERPL-MCNC: 1.1 MG/DL (ref 0.9–1.3)
DEPRECATED RDW RBC AUTO: 13.8 % (ref 12.4–15.4)
EOSINOPHIL # BLD: 0 K/UL (ref 0–0.6)
EOSINOPHIL NFR BLD: 0.8 %
GFR SERPLBLD CREATININE-BSD FMLA CKD-EPI: 79 ML/MIN/{1.73_M2}
GLUCOSE SERPL-MCNC: 176 MG/DL (ref 70–99)
HCT VFR BLD AUTO: 52.9 % (ref 40.5–52.5)
HGB BLD-MCNC: 17.5 G/DL (ref 13.5–17.5)
LYMPHOCYTES # BLD: 2.2 K/UL (ref 1–5.1)
LYMPHOCYTES NFR BLD: 37.4 %
MCH RBC QN AUTO: 31.5 PG (ref 26–34)
MCHC RBC AUTO-ENTMCNC: 33.1 G/DL (ref 31–36)
MCV RBC AUTO: 95 FL (ref 80–100)
MONOCYTES # BLD: 0.3 K/UL (ref 0–1.3)
MONOCYTES NFR BLD: 5.7 %
NEUTROPHILS # BLD: 3.2 K/UL (ref 1.7–7.7)
NEUTROPHILS NFR BLD: 55.4 %
PLATELET # BLD AUTO: 221 K/UL (ref 135–450)
PMV BLD AUTO: 9.7 FL (ref 5–10.5)
POTASSIUM SERPL-SCNC: 3.8 MMOL/L (ref 3.5–5.1)
RBC # BLD AUTO: 5.57 M/UL (ref 4.2–5.9)
SODIUM SERPL-SCNC: 140 MMOL/L (ref 136–145)
WBC # BLD AUTO: 5.8 K/UL (ref 4–11)

## 2025-01-14 PROCEDURE — 71275 CT ANGIOGRAPHY CHEST: CPT

## 2025-01-14 PROCEDURE — 36415 COLL VENOUS BLD VENIPUNCTURE: CPT

## 2025-01-14 PROCEDURE — 99285 EMERGENCY DEPT VISIT HI MDM: CPT

## 2025-01-14 PROCEDURE — 80048 BASIC METABOLIC PNL TOTAL CA: CPT

## 2025-01-14 PROCEDURE — 6360000004 HC RX CONTRAST MEDICATION: Performed by: EMERGENCY MEDICINE

## 2025-01-14 PROCEDURE — 85025 COMPLETE CBC W/AUTO DIFF WBC: CPT

## 2025-01-14 RX ORDER — TADALAFIL 20 MG/1
20 TABLET ORAL DAILY PRN
Qty: 30 TABLET | Refills: 2 | Status: SHIPPED | OUTPATIENT
Start: 2025-01-14

## 2025-01-14 RX ORDER — IOPAMIDOL 755 MG/ML
100 INJECTION, SOLUTION INTRAVASCULAR
Status: COMPLETED | OUTPATIENT
Start: 2025-01-14 | End: 2025-01-14

## 2025-01-14 RX ADMIN — IOPAMIDOL 100 ML: 755 INJECTION, SOLUTION INTRAVENOUS at 20:33

## 2025-01-14 ASSESSMENT — PAIN - FUNCTIONAL ASSESSMENT: PAIN_FUNCTIONAL_ASSESSMENT: 0-10

## 2025-01-14 ASSESSMENT — PAIN DESCRIPTION - DESCRIPTORS: DESCRIPTORS: TIGHTNESS

## 2025-01-14 ASSESSMENT — PAIN DESCRIPTION - ORIENTATION: ORIENTATION: LEFT

## 2025-01-14 ASSESSMENT — PAIN DESCRIPTION - LOCATION: LOCATION: ARM

## 2025-01-14 ASSESSMENT — PAIN SCALES - GENERAL: PAINLEVEL_OUTOF10: 7

## 2025-01-14 NOTE — TELEPHONE ENCOUNTER
Requested Prescriptions     Pending Prescriptions Disp Refills    tadalafil (CIALIS) 20 MG tablet 30 tablet 2     Sig: Take 1 tablet by mouth daily as needed for Erectile Dysfunction       Last Clinic Visit:  9/17/2024     Next Clinic Appointment:  Visit date not found

## 2025-01-14 NOTE — TELEPHONE ENCOUNTER
Patient called asking for refill on Cialis. Patient would like refill sent [address] [place] [address] Phone [phone]      Patient can be reached at [phone]

## 2025-01-15 ENCOUNTER — OFFICE VISIT (OUTPATIENT)
Dept: INTERNAL MEDICINE CLINIC | Age: 55
End: 2025-01-15
Payer: MEDICAID

## 2025-01-15 VITALS
HEIGHT: 72 IN | OXYGEN SATURATION: 97 % | TEMPERATURE: 97.3 F | SYSTOLIC BLOOD PRESSURE: 142 MMHG | WEIGHT: 315 LBS | BODY MASS INDEX: 42.66 KG/M2 | RESPIRATION RATE: 16 BRPM | HEART RATE: 70 BPM | DIASTOLIC BLOOD PRESSURE: 88 MMHG

## 2025-01-15 DIAGNOSIS — L08.9 SOFT TISSUE INFECTION: ICD-10-CM

## 2025-01-15 DIAGNOSIS — H10.31 ACUTE CONJUNCTIVITIS OF RIGHT EYE, UNSPECIFIED ACUTE CONJUNCTIVITIS TYPE: ICD-10-CM

## 2025-01-15 DIAGNOSIS — Z79.4 TYPE 2 DIABETES MELLITUS WITH DIABETIC NEPHROPATHY, WITH LONG-TERM CURRENT USE OF INSULIN (HCC): ICD-10-CM

## 2025-01-15 DIAGNOSIS — L08.9 SOFT TISSUE INFECTION: Primary | ICD-10-CM

## 2025-01-15 DIAGNOSIS — E11.21 TYPE 2 DIABETES MELLITUS WITH DIABETIC NEPHROPATHY, WITH LONG-TERM CURRENT USE OF INSULIN (HCC): ICD-10-CM

## 2025-01-15 LAB — HBA1C MFR BLD: 6.5 %

## 2025-01-15 PROCEDURE — 83036 HEMOGLOBIN GLYCOSYLATED A1C: CPT

## 2025-01-15 PROCEDURE — 99213 OFFICE O/P EST LOW 20 MIN: CPT

## 2025-01-15 RX ORDER — DOXYCYCLINE HYCLATE 100 MG
100 TABLET ORAL 2 TIMES DAILY
Qty: 14 TABLET | Refills: 0 | Status: SHIPPED | OUTPATIENT
Start: 2025-01-15 | End: 2025-01-22

## 2025-01-15 RX ORDER — CIPROFLOXACIN HYDROCHLORIDE 3.5 MG/ML
1 SOLUTION/ DROPS TOPICAL
Qty: 5 ML | Refills: 0 | Status: SHIPPED | OUTPATIENT
Start: 2025-01-15 | End: 2025-01-23

## 2025-01-15 RX ORDER — IBUPROFEN 600 MG/1
600 TABLET, FILM COATED ORAL 3 TIMES DAILY PRN
Qty: 30 TABLET | Refills: 0 | Status: SHIPPED | OUTPATIENT
Start: 2025-01-15

## 2025-01-15 RX ORDER — FUROSEMIDE 40 MG/1
40 TABLET ORAL DAILY
Qty: 90 TABLET | Refills: 1 | Status: SHIPPED | OUTPATIENT
Start: 2025-01-15 | End: 2025-01-31 | Stop reason: SDUPTHER

## 2025-01-15 SDOH — ECONOMIC STABILITY: FOOD INSECURITY: WITHIN THE PAST 12 MONTHS, YOU WORRIED THAT YOUR FOOD WOULD RUN OUT BEFORE YOU GOT MONEY TO BUY MORE.: PATIENT DECLINED

## 2025-01-15 SDOH — ECONOMIC STABILITY: FOOD INSECURITY: WITHIN THE PAST 12 MONTHS, YOU WORRIED THAT YOUR FOOD WOULD RUN OUT BEFORE YOU GOT MONEY TO BUY MORE.: NEVER TRUE

## 2025-01-15 SDOH — ECONOMIC STABILITY: FOOD INSECURITY: WITHIN THE PAST 12 MONTHS, THE FOOD YOU BOUGHT JUST DIDN'T LAST AND YOU DIDN'T HAVE MONEY TO GET MORE.: NEVER TRUE

## 2025-01-15 SDOH — ECONOMIC STABILITY: FOOD INSECURITY: WITHIN THE PAST 12 MONTHS, THE FOOD YOU BOUGHT JUST DIDN'T LAST AND YOU DIDN'T HAVE MONEY TO GET MORE.: PATIENT DECLINED

## 2025-01-15 ASSESSMENT — ENCOUNTER SYMPTOMS
EYE DISCHARGE: 1
EYE REDNESS: 1
SHORTNESS OF BREATH: 0
EYE PAIN: 1

## 2025-01-15 ASSESSMENT — PATIENT HEALTH QUESTIONNAIRE - PHQ9
SUM OF ALL RESPONSES TO PHQ9 QUESTIONS 1 & 2: 0
1. LITTLE INTEREST OR PLEASURE IN DOING THINGS: NOT AT ALL
SUM OF ALL RESPONSES TO PHQ QUESTIONS 1-9: 0
SUM OF ALL RESPONSES TO PHQ QUESTIONS 1-9: 0
2. FEELING DOWN, DEPRESSED OR HOPELESS: NOT AT ALL
SUM OF ALL RESPONSES TO PHQ QUESTIONS 1-9: 0
SUM OF ALL RESPONSES TO PHQ QUESTIONS 1-9: 0

## 2025-01-15 NOTE — ED PROVIDER NOTES
THE [facility]  EMERGENCY DEPARTMENT ENCOUNTER          ATTENDING PHYSICIAN NOTE       Date of evaluation: 1/14/2025    Chief Complaint     Arm Swelling and Neck Pain (Pt. Presents to Ed with c/o left arm pain and swelling since this AM. Also c/o left sided neck tightness for past three days. )      History of Present Illness     Kemal De La Cruz is a 54 y.o. male who presents to the emergency department with a complaint of left upper extremity pain and swelling since earlier this morning.  The patient reports the symptoms have been ongoing over the course the past several days.  He had his have a left sided pacemaker that is in place and has been in place for some time.  Denies any fevers chills nausea vomiting.  He states he has an occasional tingling type feeling in that arm.  Otherwise denies any pain.  Denies any shortness of breath or chest pain.  Denies any recent trauma.    ASSESSMENT / PLAN  (MEDICAL DECISION MAKING)     INITIAL VITALS: BP: (!) 146/83, Temp: 97.7 °F (36.5 °C), Pulse: 61, Respirations: 18, SpO2: 97 %      Kemal De La Cruz is a 54 y.o. male who presented to the Emergency Department with concerns for left upper extremity swelling.  On physical examination the patient did have left upper extremity swelling as compared to the right.  There is no evidence of any overlying skin changes that would be suggestive of a cellulitic type process he had no localized areas of induration that would be concerning for abscess.  He had full range of motion in all joints in his swelling did not appear to be confined to the joints.  I was concerned for more central process such as a stenosis or thrombosis related to the left sided pacemaker that he has in place.  I performed a CT angio CT venous of the chest and upper arm after consultation with radiology to identify the appropriate timing and order to ascertain whether or not the patient had central thrombosis or stenosis.  The patient has CBC and a basic

## 2025-01-15 NOTE — ASSESSMENT & PLAN NOTE
Stable. A1c checked today as patient due for check and in setting of infection but A1c was 6.5 (at goal)   - Continue home management

## 2025-01-15 NOTE — PROGRESS NOTES
soft tissue infection.    Dispo: Pt has been staffed with Dr. Connell  _______________  Keron Harris DO, 1/15/2025 12:17 PM   PGY-2

## 2025-01-15 NOTE — ASSESSMENT & PLAN NOTE
1 day of rapid onset R eye swelling, discharge, erythema. Refer to image on file. Associated chills, vision loss due to swelling. Differential diagnosis includes blepharoconjunctivitis vs preseptal cellulitis vs orbital cellulitis. Will cover broadly for now.  - Referral   - I have personally called [facility] to have patient seep ASAP; will be calling patient to arrange an appointment  - Rx   - Augmentin BID x7 days   - Doxy 100 mg BID x 7 days  - Cipro eye drops  - Ibuprofen for pain management   - F/u Friday 1/17 to assess improvement

## 2025-01-15 NOTE — PATIENT INSTRUCTIONS
You were seen today for eye swelling and arm swelling  You appear to have an infection for which we will give you antibiotics   - Take Augmentin as instructed for 7 days  - Take Doxycycline as instructed twice a day for 7 days  - Apply antibiotic drops to Right eye  - Please get labwork done today   - Follow up with the Eye institute ASAP once you hear back from them    We will follow up with you on Friday 1/17 to ensure your symptoms are improving

## 2025-01-15 NOTE — ASSESSMENT & PLAN NOTE
Hx of recurrent abscesses and MRSA. Now with Left arm swelling x1 week. Physical exam with 1-2cm cyst vs abscess to L elbow with surrounding erythema. No active drainage.   - LUE duplex pending  - Rx   - Doxycycline 100 mg BID x7 days

## 2025-01-15 NOTE — TELEPHONE ENCOUNTER
Requested Prescriptions     Pending Prescriptions Disp Refills    furosemide (LASIX) 40 MG tablet [Pharmacy Med Name: FUROSEMIDE 40 MG TABLET] 90 tablet 1     Sig: TAKE 1 TABLET BY MOUTH EVERY DAY       Last Clinic Visit:  9/17/2024     Next Clinic Appointment:  Visit date not found

## 2025-01-15 NOTE — DISCHARGE INSTRUCTIONS
Call to schedule your venous duplex study of the left upper arm tomorrow.  Return to the Emergency Department if you develop chest pain shortness of breath fevers chills nausea vomiting or any other new concerning symptoms.

## 2025-01-16 LAB
BASOPHILS # BLD: 0 K/UL (ref 0–0.2)
BASOPHILS NFR BLD: 0.4 %
CRP SERPL-MCNC: 16.3 MG/L (ref 0–5.1)
DEPRECATED RDW RBC AUTO: 13.5 % (ref 12.4–15.4)
EOSINOPHIL # BLD: 0 K/UL (ref 0–0.6)
EOSINOPHIL NFR BLD: 0.4 %
ERYTHROCYTE [SEDIMENTATION RATE] IN BLOOD BY WESTERGREN METHOD: 10 MM/HR (ref 0–20)
HCT VFR BLD AUTO: 51.4 % (ref 40.5–52.5)
HGB BLD-MCNC: 16.7 G/DL (ref 13.5–17.5)
LYMPHOCYTES # BLD: 1.7 K/UL (ref 1–5.1)
LYMPHOCYTES NFR BLD: 21.2 %
MCH RBC QN AUTO: 30.9 PG (ref 26–34)
MCHC RBC AUTO-ENTMCNC: 32.5 G/DL (ref 31–36)
MCV RBC AUTO: 95.3 FL (ref 80–100)
MONOCYTES # BLD: 0.4 K/UL (ref 0–1.3)
MONOCYTES NFR BLD: 5.5 %
NEUTROPHILS # BLD: 5.8 K/UL (ref 1.7–7.7)
NEUTROPHILS NFR BLD: 72.5 %
PLATELET # BLD AUTO: 198 K/UL (ref 135–450)
PMV BLD AUTO: 9.7 FL (ref 5–10.5)
PROCALCITONIN SERPL IA-MCNC: 0.06 NG/ML (ref 0–0.15)
RBC # BLD AUTO: 5.4 M/UL (ref 4.2–5.9)
WBC # BLD AUTO: 8 K/UL (ref 4–11)

## 2025-01-17 ENCOUNTER — OFFICE VISIT (OUTPATIENT)
Dept: INTERNAL MEDICINE CLINIC | Age: 55
End: 2025-01-17
Payer: MEDICAID

## 2025-01-17 VITALS
TEMPERATURE: 97 F | HEIGHT: 72 IN | HEART RATE: 60 BPM | RESPIRATION RATE: 20 BRPM | DIASTOLIC BLOOD PRESSURE: 75 MMHG | SYSTOLIC BLOOD PRESSURE: 115 MMHG | BODY MASS INDEX: 42.66 KG/M2 | WEIGHT: 315 LBS | OXYGEN SATURATION: 97 %

## 2025-01-17 DIAGNOSIS — L08.9 SOFT TISSUE INFECTION: ICD-10-CM

## 2025-01-17 DIAGNOSIS — H10.31 ACUTE CONJUNCTIVITIS OF RIGHT EYE, UNSPECIFIED ACUTE CONJUNCTIVITIS TYPE: Primary | ICD-10-CM

## 2025-01-17 PROCEDURE — 99213 OFFICE O/P EST LOW 20 MIN: CPT

## 2025-01-17 RX ORDER — ERYTHROMYCIN 5 MG/G
OINTMENT OPHTHALMIC
Qty: 1 G | Refills: 0 | Status: SHIPPED | OUTPATIENT
Start: 2025-01-17 | End: 2025-01-27

## 2025-01-17 NOTE — ASSESSMENT & PLAN NOTE
1 day of rapid onset R eye swelling, discharge, erythema on 1/15. Refer to images on file. Associated chills, vision loss due to swelling. Differential diagnosis includes bacterial vs viral conjunctivitis and concern for  preseptal cellulitis vs orbital cellulitis.Covering broadly with doxy and Augmentin, cipro drops. Seen ophtho at [facility] and now receiving hourly steroid eye drops. Has improvement in pressure /swelling.   - F/u with eye institute today at 10:45   - Cont doxy + augmentin   - Cont cipro eye drops  - F/u in 2 weeks to reassess symptoms

## 2025-01-17 NOTE — PATIENT INSTRUCTIONS
You were seen today for follow up on eye swelling  - Continue the antibiotics as prescribed  - I have also prescribed some eye ointment for the infection  - Go to [facility] as scheduled today  - Duplex study of Left arm (# [phone]) was scheduled for Monday Jan 20 at 9am at [facility]. Please show up at 8:30am. Call that number if you have to change the appointment.    Please reach out to us if you have any questions   Go to the emergency department if you have worsening vision loss, pain, shortness of breath, worsening arm swelling, numbness of fingers

## 2025-01-17 NOTE — PROGRESS NOTES
Outpatient Clinic Established Patient Note    Patient: Kemal De La Cruz  : 1970 (54 y.o.)  Date: 2025    CC: F/u Right eye pain swelling, Left arm swelling     HPI: Kemal De La Cruz is a 54 y.o. year old male with a medical history significant for DM2, HTN, LEONA (on cpap), HFpEF, recurrent abscesses who presents today with acute concern of R eye pain / swelling and Left arm swelling.     Seen in clinic 2 days ago for arm swelling and unrelated R eye swelling / discharge, and pain. At that time patient was started on relatively broad coverage doxy + Augmentin along with cipro drops to cover for both mrsa given his history of mrsa abscesses (concern for possible abscess to L elbow), as well as sinusitis. Seen ophtho at [facility] [place] on 1/15 and now receiving hourly steroid eye drops. Has improvement in pressure /swelling. Has another appt today at 10:45. Vision still slightly blurry in R eye. Overall symptoms are improving. Still has to get duplex study of left arm.       Home Meds:  Prior to Visit Medications    Medication Sig Taking? Authorizing Provider   erythromycin (ROMYCIN) 5 MG/GM ophthalmic ointment Apply around infective eye as instructed Yes Keron Harris,    furosemide (LASIX) 40 MG tablet TAKE 1 TABLET BY MOUTH EVERY DAY Yes Keron Harris DO   amoxicillin-clavulanate (AUGMENTIN) 875-125 MG per tablet Take 1 tablet by mouth 2 times daily for 7 days Yes Keron Harris DO   doxycycline hyclate (VIBRA-TABS) 100 MG tablet Take 1 tablet by mouth 2 times daily for 7 days Yes Keron Harris DO   ciprofloxacin (CILOXAN) 0.3 % ophthalmic solution Place 1 drop into the right eye every 2 hours for 8 days Yes Keron Harris DO   ibuprofen (ADVIL;MOTRIN) 600 MG tablet Take 1 tablet by mouth 3 times daily as needed for Pain Yes Keron Harris DO   tadalafil (CIALIS) 20 MG tablet Take 1 tablet by mouth daily as needed for Erectile Dysfunction Yes Jose Lawton, DO   dulaglutide

## 2025-01-17 NOTE — ASSESSMENT & PLAN NOTE
Hx of recurrent abscesses and MRSA. Now with Left arm swelling x1 week. Physical exam with 1-2cm cyst vs abscess to L elbow with surrounding erythema. No active drainage. Lower concern for dvt, patient is on eliquis with no hx of dvt.  - HALEY duplex - I Have called and scheduled patient for   - cont Doxycycline 100 mg BID x7 days

## 2025-01-20 ENCOUNTER — HOSPITAL ENCOUNTER (OUTPATIENT)
Dept: VASCULAR LAB | Age: 55
Discharge: HOME | End: 2025-01-22
Attending: EMERGENCY MEDICINE
Payer: MEDICAID

## 2025-01-20 DIAGNOSIS — M79.89 LEFT ARM SWELLING: ICD-10-CM

## 2025-01-20 PROCEDURE — 93971 EXTREMITY STUDY: CPT

## 2025-01-21 PROCEDURE — 93971 EXTREMITY STUDY: CPT | Performed by: SURGERY

## 2025-01-27 RX ORDER — FLUTICASONE PROPIONATE 50 MCG
SPRAY, SUSPENSION (ML) NASAL
Qty: 48 EACH | Refills: 1 | Status: SHIPPED | OUTPATIENT
Start: 2025-01-27 | End: 2025-01-31 | Stop reason: SDUPTHER

## 2025-01-27 NOTE — TELEPHONE ENCOUNTER
Requested Prescriptions     Pending Prescriptions Disp Refills    fluticasone (FLONASE) 50 MCG/ACT nasal spray [Pharmacy Med Name: FLUTICASONE PROP 50 MCG SPRAY]  1     Sig: SPRAY 2 SPRAYS BY NASAL ROUTE DAILY       Last Clinic Visit:  1/17/2025     Next Clinic Appointment:  1/31/2025

## 2025-01-30 ENCOUNTER — OFFICE VISIT (OUTPATIENT)
Dept: ORTHOPEDIC SURGERY | Age: 55
End: 2025-01-30

## 2025-01-30 DIAGNOSIS — M17.12 PRIMARY OSTEOARTHRITIS OF LEFT KNEE: Primary | ICD-10-CM

## 2025-01-30 DIAGNOSIS — M25.562 ACUTE PAIN OF LEFT KNEE: ICD-10-CM

## 2025-01-30 RX ORDER — METHYLPREDNISOLONE ACETATE 40 MG/ML
40 INJECTION, SUSPENSION INTRA-ARTICULAR; INTRALESIONAL; INTRAMUSCULAR; SOFT TISSUE ONCE
Status: COMPLETED | OUTPATIENT
Start: 2025-01-30 | End: 2025-01-30

## 2025-01-30 RX ORDER — LIDOCAINE HYDROCHLORIDE 10 MG/ML
1 INJECTION, SOLUTION INFILTRATION; PERINEURAL ONCE
Status: COMPLETED | OUTPATIENT
Start: 2025-01-30 | End: 2025-01-30

## 2025-01-30 RX ADMIN — LIDOCAINE HYDROCHLORIDE 1 ML: 10 INJECTION, SOLUTION INFILTRATION; PERINEURAL at 11:22

## 2025-01-30 RX ADMIN — METHYLPREDNISOLONE ACETATE 40 MG: 40 INJECTION, SUSPENSION INTRA-ARTICULAR; INTRALESIONAL; INTRAMUSCULAR; SOFT TISSUE at 11:22

## 2025-01-30 NOTE — PROGRESS NOTES
Chief Complaint    Knee Pain (F/u l knee, req cortisone )      History of Present Illness:  Kemal De La Cruz is a 54 y.o. male who presents for a corticosteroid injection in the left knee.  This patient is known to [facility] orthopedics and is being treated conservatively for their arthritis.  This conservative treatment includes: Rest, ice, activity modification, home exercises, stretches, and over-the-counter pain medication.  Patient gets excellent relief from corticosteroid and viscosupplementation injections.  He gets better relief from the hyaluronic acid injections.  He states that his knee pain has gotten worse from its baseline since I last saw him in May 2024.  The patient denies any new injury. The patient denies any numbness, paresthesias, or weakness.          Physical exam:  Inspection: Both knees without erythema, ecchymosis, discoloration, abrasion, contusions, deformity or signs of infection.  Palpation:  There is tenderness to palpation to the joint line of both knees. There is patellofemoral crepitus palpable in both knees.  No tenderness to palpation to any other osseous or soft tissue structures of the knee.  Range of motion: Grossly intact  Neurovascular: Patient is neurovascularly intact, equally bilaterally.  2+ dorsal pedal pulses.      Procedures:    After informed consent was provided, the patient was seated on the exam table with the left knee flexed to 90 degrees. The anterolateral aspect of the knee adjacent to the joint line was prepped with Chlora-prep. The skin and subcutaneous tissues were anesthetized with ethyl chloride spray. A 22-gauge needle was inserted into the left knee and 2 ml of 40 mg/ml DepoMedrol was injected. The needle was withdrawn and the puncture site sealed with a Band-Aid. The patient tolerated the procedure well. Post injection instructions and precautions  given and any problems to notify us.  (Conducted by Adelso Hernandez PA-C)      Assessment:  Encounter Diagnoses

## 2025-01-31 ENCOUNTER — OFFICE VISIT (OUTPATIENT)
Dept: INTERNAL MEDICINE CLINIC | Age: 55
End: 2025-01-31
Payer: MEDICAID

## 2025-01-31 VITALS
HEART RATE: 65 BPM | HEIGHT: 72 IN | SYSTOLIC BLOOD PRESSURE: 131 MMHG | WEIGHT: 315 LBS | TEMPERATURE: 97.9 F | DIASTOLIC BLOOD PRESSURE: 83 MMHG | BODY MASS INDEX: 42.66 KG/M2 | OXYGEN SATURATION: 93 % | RESPIRATION RATE: 20 BRPM

## 2025-01-31 DIAGNOSIS — K59.00 CONSTIPATION, UNSPECIFIED CONSTIPATION TYPE: ICD-10-CM

## 2025-01-31 DIAGNOSIS — E11.21 TYPE 2 DIABETES MELLITUS WITH DIABETIC NEPHROPATHY, WITH LONG-TERM CURRENT USE OF INSULIN (HCC): ICD-10-CM

## 2025-01-31 DIAGNOSIS — N52.9 ERECTILE DYSFUNCTION, UNSPECIFIED ERECTILE DYSFUNCTION TYPE: ICD-10-CM

## 2025-01-31 DIAGNOSIS — E78.2 HYPERLIPIDEMIA, MIXED: ICD-10-CM

## 2025-01-31 DIAGNOSIS — I87.2 VENOUS INSUFFICIENCY: Primary | ICD-10-CM

## 2025-01-31 DIAGNOSIS — Z79.4 TYPE 2 DIABETES MELLITUS WITH DIABETIC NEPHROPATHY, WITH LONG-TERM CURRENT USE OF INSULIN (HCC): ICD-10-CM

## 2025-01-31 DIAGNOSIS — I48.0 PAROXYSMAL A-FIB (HCC): ICD-10-CM

## 2025-01-31 DIAGNOSIS — G56.02 CARPAL TUNNEL SYNDROME OF LEFT WRIST: ICD-10-CM

## 2025-01-31 DIAGNOSIS — I10 ESSENTIAL HYPERTENSION: ICD-10-CM

## 2025-01-31 PROCEDURE — 99213 OFFICE O/P EST LOW 20 MIN: CPT

## 2025-01-31 RX ORDER — VALSARTAN 160 MG/1
160 TABLET ORAL DAILY
Qty: 90 TABLET | Refills: 1 | Status: SHIPPED | OUTPATIENT
Start: 2025-01-31

## 2025-01-31 RX ORDER — GLUCOSAMINE HCL/CHONDROITIN SU 500-400 MG
CAPSULE ORAL
Qty: 60 STRIP | Refills: 3 | Status: SHIPPED | OUTPATIENT
Start: 2025-01-31

## 2025-01-31 RX ORDER — ATORVASTATIN CALCIUM 20 MG/1
TABLET, FILM COATED ORAL
Qty: 30 TABLET | Refills: 5 | Status: SHIPPED | OUTPATIENT
Start: 2025-01-31

## 2025-01-31 RX ORDER — BLOOD PRESSURE TEST KIT
1 KIT MISCELLANEOUS
Qty: 100 EACH | Refills: 5 | Status: SHIPPED | OUTPATIENT
Start: 2025-01-31

## 2025-01-31 RX ORDER — LANCETS
20 EACH MISCELLANEOUS 2 TIMES DAILY
Qty: 20 EACH | Refills: 3 | Status: SHIPPED | OUTPATIENT
Start: 2025-01-31

## 2025-01-31 RX ORDER — GABAPENTIN 300 MG/1
300 CAPSULE ORAL NIGHTLY
Qty: 180 CAPSULE | Refills: 0 | Status: SHIPPED | OUTPATIENT
Start: 2025-01-31 | End: 2025-07-30

## 2025-01-31 RX ORDER — FLUTICASONE PROPIONATE 50 MCG
2 SPRAY, SUSPENSION (ML) NASAL DAILY
Qty: 48 EACH | Refills: 1 | Status: SHIPPED | OUTPATIENT
Start: 2025-01-31

## 2025-01-31 RX ORDER — ALLOPURINOL 300 MG/1
TABLET ORAL
Qty: 90 TABLET | Refills: 1 | Status: SHIPPED | OUTPATIENT
Start: 2025-01-31

## 2025-01-31 RX ORDER — AMLODIPINE BESYLATE 10 MG/1
10 TABLET ORAL DAILY
Qty: 90 TABLET | Refills: 1 | Status: SHIPPED | OUTPATIENT
Start: 2025-01-31

## 2025-01-31 RX ORDER — TADALAFIL 20 MG/1
20 TABLET ORAL DAILY PRN
Qty: 30 TABLET | Refills: 2 | Status: SHIPPED | OUTPATIENT
Start: 2025-01-31

## 2025-01-31 RX ORDER — SENNA AND DOCUSATE SODIUM 50; 8.6 MG/1; MG/1
1 TABLET, FILM COATED ORAL 2 TIMES DAILY PRN
Qty: 20 TABLET | Refills: 0 | Status: SHIPPED | OUTPATIENT
Start: 2025-01-31

## 2025-01-31 RX ORDER — FUROSEMIDE 40 MG/1
40 TABLET ORAL DAILY
Qty: 90 TABLET | Refills: 1 | Status: SHIPPED | OUTPATIENT
Start: 2025-01-31

## 2025-01-31 RX ORDER — METOPROLOL TARTRATE 50 MG
50 TABLET ORAL 2 TIMES DAILY
Qty: 180 TABLET | Refills: 0 | Status: SHIPPED | OUTPATIENT
Start: 2025-01-31

## 2025-01-31 ASSESSMENT — ENCOUNTER SYMPTOMS
RESPIRATORY NEGATIVE: 1
GASTROINTESTINAL NEGATIVE: 1

## 2025-01-31 NOTE — ASSESSMENT & PLAN NOTE
New, not at goal (unstable), DVT, subclavian syndrome, and arterial etiology ruled out  > 2 cm difference of circumference at rest, 10 cm below, and 10 cm above olecranon   -Compression sleeve ordered, form filled out for compression level of IV  - Continue to monitor

## 2025-01-31 NOTE — ASSESSMENT & PLAN NOTE
Chronic, at goal (stable), continue current treatment plan  Currently in regular rhythm  - Continue anticoagulation with Eliquis 5 mg twice daily and rate control with Lopressor 50 mg twice daily

## 2025-01-31 NOTE — ASSESSMENT & PLAN NOTE
Chronic, unclear control given the last lipid panel was in 2021, patient needs an updated lipid panel  -Not going to order lipid panel on this focused problem visit, especially considering the patient just had labs done in the ER but 2 weeks ago  - Please consider ordering LFTs along with lipid panel at next routine follow up

## 2025-01-31 NOTE — PROGRESS NOTES
The [facility]    Kemal De La Cruz is a 54 y.o. male with a complex medical history, here for focus follow up evaluation for the following issues along with medication refills:    HPI  Upper Extremity Swelling:  Patient had noticed LUE swelling around 1/14 and presented to the ED where CTA/CTV chest ruled out thrombosis or stenosis at the arterial and venous level.  Infection was also ruled out, the patient did have a slightly elevated CRP at 15.  Regardless, venous duplex study was ordered.   Patient was seen quickly after ED visit for follow-up in the clinic, and then subsequently have the venous duplex done, which ruled out any DVT or significant alteration of flow, which was found to be pulsatile in the venous system.   Patient still complaining of left arm swelling, does feel a little bit of pressure in his hand, but otherwise denies any symptoms.  No redness, pain, numbness, weakness.  No chest pain, shortness of breath, palpitations, lower extremity swelling.    Conjunctivitis:  Resolved, therapy completed.    Diabetes:  Patient continues to use long-acting and short acting insulin, although his last A1c was 6.5.  He is requesting an increase in his Trulicity dosage.  He denies any hypoglycemia symptoms.  Course appears to be improving.    Review of Systems   Constitutional: Negative.    Respiratory: Negative.     Cardiovascular:         LUE swelling   Gastrointestinal: Negative.    Genitourinary: Negative.    Neurological: Negative.        MEDICATIONS:  Prior to Visit Medications    Medication Sig Taking? Authorizing Provider   Dulaglutide 3 MG/0.5ML SOAJ Inject 3 mg into the skin every 7 days Yes Korey Mejia, DO   Accu-Chek FastClix Lancets MISC 20 Sticks by Does not apply route 2 times daily Yes Korey Mejia, DO   Alcohol Swabs PADS 1 Container by Does not apply route three times daily Yes Korey Mejia, DO   allopurinol (ZYLOPRIM) 300 MG tablet

## 2025-01-31 NOTE — ASSESSMENT & PLAN NOTE
Chronic issue, at goal.  No need to continue insulin  A1c 6.5 on 1/15/2025  - Discontinue Lantus and lispro  - Increase Trulicity from 1.5 mg to 3 mg weekly  - Continue metformin 500 mg twice daily  - Recheck A1c in 3 months  - If A1c still under 7 at that time, can consider discontinuing metformin

## 2025-01-31 NOTE — PATIENT INSTRUCTIONS
Please go get your left arm compression sleeve and keep it on during the day.  You can take it off when you go into the water or take a shower.  Try sleeping on it if you can tolerate it, but you can certainly take breaks.  Increase your Trulicity dose after you have completed the rest of the medication that you already have.  You no longer need to take insulin given that your A1c is still well-controlled with the other medications.

## 2025-01-31 NOTE — ASSESSMENT & PLAN NOTE
Chronic, at goal (stable), continue current treatment plan  /83 in office today  - Continue Lopressor 50 mg twice daily, valsartan 160 mg daily, amlodipine 10 mg daily

## 2025-03-04 ENCOUNTER — OFFICE VISIT (OUTPATIENT)
Dept: SLEEP MEDICINE | Age: 55
End: 2025-03-04
Payer: MEDICAID

## 2025-03-04 VITALS
TEMPERATURE: 97.8 F | WEIGHT: 315 LBS | BODY MASS INDEX: 38.36 KG/M2 | HEIGHT: 76 IN | DIASTOLIC BLOOD PRESSURE: 84 MMHG | SYSTOLIC BLOOD PRESSURE: 130 MMHG | HEART RATE: 62 BPM | RESPIRATION RATE: 18 BRPM | OXYGEN SATURATION: 96 %

## 2025-03-04 DIAGNOSIS — Z99.89 DEPENDENCE ON OTHER ENABLING MACHINES AND DEVICES: ICD-10-CM

## 2025-03-04 DIAGNOSIS — E66.01 CLASS 3 SEVERE OBESITY DUE TO EXCESS CALORIES WITH SERIOUS COMORBIDITY AND BODY MASS INDEX (BMI) OF 45.0 TO 49.9 IN ADULT: ICD-10-CM

## 2025-03-04 DIAGNOSIS — G47.33 OSA ON CPAP: Primary | ICD-10-CM

## 2025-03-04 DIAGNOSIS — I48.19 PERSISTENT ATRIAL FIBRILLATION (HCC): ICD-10-CM

## 2025-03-04 DIAGNOSIS — E66.813 CLASS 3 SEVERE OBESITY DUE TO EXCESS CALORIES WITH SERIOUS COMORBIDITY AND BODY MASS INDEX (BMI) OF 45.0 TO 49.9 IN ADULT: ICD-10-CM

## 2025-03-04 DIAGNOSIS — I10 ESSENTIAL HYPERTENSION: ICD-10-CM

## 2025-03-04 PROCEDURE — 3079F DIAST BP 80-89 MM HG: CPT | Performed by: PSYCHIATRY & NEUROLOGY

## 2025-03-04 PROCEDURE — 3075F SYST BP GE 130 - 139MM HG: CPT | Performed by: PSYCHIATRY & NEUROLOGY

## 2025-03-04 PROCEDURE — 3017F COLORECTAL CA SCREEN DOC REV: CPT | Performed by: PSYCHIATRY & NEUROLOGY

## 2025-03-04 PROCEDURE — G2211 COMPLEX E/M VISIT ADD ON: HCPCS | Performed by: PSYCHIATRY & NEUROLOGY

## 2025-03-04 PROCEDURE — 99214 OFFICE O/P EST MOD 30 MIN: CPT | Performed by: PSYCHIATRY & NEUROLOGY

## 2025-03-04 PROCEDURE — G8417 CALC BMI ABV UP PARAM F/U: HCPCS | Performed by: PSYCHIATRY & NEUROLOGY

## 2025-03-04 PROCEDURE — G8427 DOCREV CUR MEDS BY ELIG CLIN: HCPCS | Performed by: PSYCHIATRY & NEUROLOGY

## 2025-03-04 PROCEDURE — 1036F TOBACCO NON-USER: CPT | Performed by: PSYCHIATRY & NEUROLOGY

## 2025-03-04 NOTE — PROGRESS NOTES
thoughts, plans or intentions to harm himself / herself or others.     Last Assessment & Plan:  Condition: stable  Diagnosis based upon a face-to-face evaluation, medication and chart review, physical exam, and ROS.   Requested patient to continue regular follow-up with their PCP/specialist.  Follow up in: one year    Depression     Diabetes mellitus (HCC)     Diabetes mellitus type 2 in obese 01/01/2014    Dyspnea     Gastroesophageal reflux disease without esophagitis 08/30/2018    Overview:  Asymptomatic on PPI / H2 blocker.   Last Assessment & Plan:  Condition: stable  Diagnosis based upon a face-to-face evaluation, medication and chart review, physical exam, and ROS.   Requested patient to continue regular follow-up with their PCP/specialist.  Follow up in: one year    Generalized osteoarthritis of multiple sites 08/30/2018    Last Assessment & Plan:  Condition: stable  Diagnosis based upon a face-to-face evaluation, medication and chart review, physical exam, and ROS.   Requested patient to continue regular follow-up with their PCP/specialist.  Follow up in: one year    GERD (gastroesophageal reflux disease)     GERD (gastroesophageal reflux disease)     Hidradenitis suppurativa 10/05/2022    Hx of atrial fibrillation without current medication     Hyperlipidemia     Hypertension     Hypertension     Hypertensive heart disease with chronic diastolic congestive heart failure (HCC) 08/30/2018    Overview:  Echo reported in 2014 at  shows LVH, Diastolic Dysfunction,   Last Assessment & Plan:  Condition: stable  Diagnosis based upon a face-to-face evaluation, medication and chart review, physical exam, and ROS.   Requested patient to continue regular follow-up with their PCP/specialist.  Follow up in: one year    Morbid obesity     Morbid obesity     Morbid obesity with BMI of 50.0-59.9, adult 08/24/2022    Non compliance w medication regimen     Onychomycosis of multiple toenails with type 2 diabetes mellitus

## 2025-03-05 DIAGNOSIS — Z79.4 TYPE 2 DIABETES MELLITUS WITH DIABETIC NEPHROPATHY, WITH LONG-TERM CURRENT USE OF INSULIN (HCC): ICD-10-CM

## 2025-03-05 DIAGNOSIS — E11.21 TYPE 2 DIABETES MELLITUS WITH DIABETIC NEPHROPATHY, WITH LONG-TERM CURRENT USE OF INSULIN (HCC): ICD-10-CM

## 2025-03-05 RX ORDER — DULAGLUTIDE 3 MG/.5ML
INJECTION, SOLUTION SUBCUTANEOUS
Qty: 2 ML | Refills: 2 | Status: SHIPPED | OUTPATIENT
Start: 2025-03-05

## 2025-03-05 NOTE — TELEPHONE ENCOUNTER
Requested Prescriptions     Pending Prescriptions Disp Refills    TRULICITY 3 MG/0.5ML SOAJ [Pharmacy Med Name: TRULICITY 3 MG/0.5 ML PEN]       Sig: INJECT 3 MG INTO THE SKIN EVERY 7 DAYS       Last Clinic Visit:  1/31/2025     Next Clinic Appointment:  Visit date not found

## 2025-03-06 ENCOUNTER — OFFICE VISIT (OUTPATIENT)
Dept: ORTHOPEDIC SURGERY | Age: 55
End: 2025-03-06

## 2025-03-06 VITALS — BODY MASS INDEX: 38.36 KG/M2 | WEIGHT: 315 LBS | HEIGHT: 76 IN

## 2025-03-06 DIAGNOSIS — M17.12 PRIMARY OSTEOARTHRITIS OF LEFT KNEE: Primary | ICD-10-CM

## 2025-03-06 RX ORDER — HYALURONATE SODIUM 10 MG/ML
20 SYRINGE (ML) INTRAARTICULAR ONCE
Status: COMPLETED | OUTPATIENT
Start: 2025-03-06 | End: 2025-03-06

## 2025-03-06 RX ORDER — LIDOCAINE HYDROCHLORIDE 10 MG/ML
3 INJECTION, SOLUTION INFILTRATION; PERINEURAL ONCE
Status: COMPLETED | OUTPATIENT
Start: 2025-03-06 | End: 2025-03-06

## 2025-03-06 RX ADMIN — LIDOCAINE HYDROCHLORIDE 3 ML: 10 INJECTION, SOLUTION INFILTRATION; PERINEURAL at 10:44

## 2025-03-06 RX ADMIN — Medication 20 MG: at 10:43

## 2025-03-06 NOTE — PROGRESS NOTES
Chief Complaint    Knee Pain (#1 euflexxa inj)      History of Present Illness:  Kemal De La Cruz is a 54 y.o. male who presents for his third Euflexxa injection.  This patient is known to [facility] orthopedics and is being treated conservatively for the arthritis in their knee.  This conservative treatment includes: rest, ice, elevation, home exercises, activity modification, OTC medications as needed, corticosteroid injections and visco supplementation injections.  He is still on allopurinol for gout.  The patient feels that his baseline osteoarthritic symptoms have returned and would like to continue to utilize hyaluronic acid.  The patient denies any new injury. The patient denies any numbness, paresthesias, or weakness.              Physical exam:  Inspection: Both knees without erythema, ecchymosis, discoloration, abrasion, contusions, deformity or signs of infection.  Palpation: There is tenderness to palpation to the joint line of the left knee.  There is patellofemoral crepitus palpable in the left knee.  No tenderness to palpation to any other osseous or soft tissue structures of the knee.  Range of motion: Grossly intact  Neurovascular: Patient is neurovascularly intact, equally bilaterally.        Procedures:    VISCO SUPPLEMENTATION  INJECTION: Left KNEE     PROCEDURE: Risks, benefits, and alternatives to the injections were discussed in detail with the patient. The risks discussed included but are not limited to infection, skin reactions, hot swollen joints, and anaphylaxis.      After informed consent was provided, the patient sat comfortably in a chair. The anteriolateral aspect of the left knee was prepped with Chlora-prep. The skin and subcutaneous tissues were anesthetized with ethyl chloride spray and 1% lidocaine injected with a 20-gauge needle.  The needle was left in place and the syringe was detached from the needle.  The Euflexxa syringe was attached to the 20-gauge needle and 2 mL of the Euflexxa

## 2025-03-13 ENCOUNTER — OFFICE VISIT (OUTPATIENT)
Dept: ORTHOPEDIC SURGERY | Age: 55
End: 2025-03-13

## 2025-03-13 DIAGNOSIS — M25.562 ACUTE PAIN OF LEFT KNEE: ICD-10-CM

## 2025-03-13 DIAGNOSIS — M17.12 PRIMARY OSTEOARTHRITIS OF LEFT KNEE: Primary | ICD-10-CM

## 2025-03-13 RX ORDER — HYALURONATE SODIUM 10 MG/ML
20 SYRINGE (ML) INTRAARTICULAR ONCE
Status: COMPLETED | OUTPATIENT
Start: 2025-03-13 | End: 2025-03-13

## 2025-03-13 RX ORDER — LIDOCAINE HYDROCHLORIDE 10 MG/ML
3 INJECTION, SOLUTION INFILTRATION; PERINEURAL ONCE
Status: COMPLETED | OUTPATIENT
Start: 2025-03-13 | End: 2025-03-13

## 2025-03-13 RX ADMIN — Medication 20 MG: at 10:35

## 2025-03-13 RX ADMIN — LIDOCAINE HYDROCHLORIDE 3 ML: 10 INJECTION, SOLUTION INFILTRATION; PERINEURAL at 10:35

## 2025-03-14 NOTE — PROGRESS NOTES
Chief Complaint    Knee Pain (2nd euflexxa l knee)      History of Present Illness:  Kemal De La Cruz is a 54 y.o. male who presents for his third Euflexxa injection.  This patient is known to [facility] orthopedics and is being treated conservatively for the arthritis in their knee.  This conservative treatment includes: rest, ice, elevation, home exercises, activity modification, OTC medications as needed, corticosteroid injections and visco supplementation injections.  He is still on allopurinol for gout.  The patient feels that his baseline osteoarthritic symptoms have returned and would like to continue to utilize hyaluronic acid.  Recently he has been feeling that his left knee has been unstable and giving way with prolonged durations of ambulating.  The patient denies any new injury. The patient denies any numbness, paresthesias, or weakness.              Physical exam:  Inspection: Both knees without erythema, ecchymosis, discoloration, abrasion, contusions, deformity or signs of infection.  Palpation: There is tenderness to palpation to the joint line of the left knee.  There is patellofemoral crepitus palpable in the left knee.  No tenderness to palpation to any other osseous or soft tissue structures of the knee.  Range of motion: Grossly intact  Neurovascular: Patient is neurovascularly intact, equally bilaterally.        Procedures:    VISCO SUPPLEMENTATION  INJECTION: Left KNEE     PROCEDURE: Risks, benefits, and alternatives to the injections were discussed in detail with the patient. The risks discussed included but are not limited to infection, skin reactions, hot swollen joints, and anaphylaxis.      After informed consent was provided, the patient sat comfortably in a chair. The anteriolateral aspect of the left knee was prepped with Chlora-prep. The skin and subcutaneous tissues were anesthetized with ethyl chloride spray and 1% lidocaine injected with a 20-gauge needle.  The needle was left in place

## 2025-03-18 RX ORDER — ERYTHROMYCIN 5 MG/G
OINTMENT OPHTHALMIC
Qty: 3.5 G | OUTPATIENT
Start: 2025-03-18

## 2025-03-18 NOTE — TELEPHONE ENCOUNTER
Requested Prescriptions     Pending Prescriptions Disp Refills    erythromycin (ROMYCIN) 5 MG/GM ophthalmic ointment [Pharmacy Med Name: ERYTHROMYCIN 0.5% EYE OINTMENT] 3.5 g      Sig: APPLY AROUND INFECTIVE EYE AS INSTRUCTED       Last Clinic Visit:  1/31/2025     Next Clinic Appointment:  Visit date not found

## 2025-03-18 NOTE — TELEPHONE ENCOUNTER
Called patient regarding refill request.  Patient reports that this medication was requested unintentionally and he is asymptomatic since completing treatment.

## 2025-03-20 ENCOUNTER — OFFICE VISIT (OUTPATIENT)
Dept: ORTHOPEDIC SURGERY | Age: 55
End: 2025-03-20

## 2025-03-20 VITALS — HEIGHT: 76 IN | BODY MASS INDEX: 38.36 KG/M2 | WEIGHT: 315 LBS

## 2025-03-20 DIAGNOSIS — M17.12 PRIMARY OSTEOARTHRITIS OF LEFT KNEE: Primary | ICD-10-CM

## 2025-03-20 DIAGNOSIS — M25.562 ACUTE PAIN OF LEFT KNEE: ICD-10-CM

## 2025-03-20 RX ORDER — LIDOCAINE HYDROCHLORIDE 10 MG/ML
3 INJECTION, SOLUTION INFILTRATION; PERINEURAL ONCE
Status: COMPLETED | OUTPATIENT
Start: 2025-03-20 | End: 2025-03-20

## 2025-03-20 RX ADMIN — LIDOCAINE HYDROCHLORIDE 3 ML: 10 INJECTION, SOLUTION INFILTRATION; PERINEURAL at 10:17

## 2025-03-20 NOTE — PROGRESS NOTES
left in place and the syringe was detached from the needle.  The Euflexxa syringe was attached to the 20-gauge needle and 2 mL of the Euflexxa was injected into the knee without resistance.The needle was withdrawn and the puncture site sealed with a Band-Aid. The patient tolerated the procedure well.  Patient was educated on postinjection precautions.         Assessment: Kemal De La Cruz is a 54 y.o. male who presents for a viscosupplementation injection.  This patient is being treated conservatively for their arthritis.    Encounter Diagnoses   Name Primary?    Primary osteoarthritis of left knee Yes    Acute pain of left knee          Treatment plan:  Conservative OA protocol  Hinge knee brace  Observe postinjection precautions  Rest   Ice 20 minutes ever 1-2 hours PRN  Utilize medications as prescribed  Activity modification  Lightweight exercise/low impact exercise  Appropriate diet/weight loss  Follow-up with original provider for evaluation of possible gout flare                Adelso Hernandez PA-C  03/20/25 1:10 PM  Physician Assistant - Certified         This dictation was performed with a verbal recognition program (DRAGON) and it was checked for errors.  It is possible that there are still dictated errors within this office note.  If so, please bring any errors to my attention for an addendum.  All efforts were made to ensure that this office note is accurate.

## 2025-03-24 NOTE — TELEPHONE ENCOUNTER
March 24, 2025      [facility]  [address] [place] [address]  [place] [address]  Phone: [phone]  Fax: [phone]       Patient: Lonnie Bates   YOB: 2020  Date of Visit: 03/24/2025    To Whom It May Concern:    Vita Bates  was at [facility] on 03/24/2025. The patient may return to work/school on 03/25/2025 with no restrictions. If you have any questions or concerns, or if I can be of further assistance, please do not hesitate to contact me.    Sincerely,    NIDHI YI MD.      ----- Message from TATO Amos CNP sent at 9/23/2023 11:43 AM EDT -----  Can we get him an appmt with [facility] on Wednesday for a wound check? He has a new PPM. He will need his other f/u appmts as well. He will need to be called. He is leaving for [place] on Thursday.    Thx

## 2025-04-11 DIAGNOSIS — Z79.4 TYPE 2 DIABETES MELLITUS WITH DIABETIC NEPHROPATHY, WITH LONG-TERM CURRENT USE OF INSULIN (HCC): ICD-10-CM

## 2025-04-11 DIAGNOSIS — E11.21 TYPE 2 DIABETES MELLITUS WITH DIABETIC NEPHROPATHY, WITH LONG-TERM CURRENT USE OF INSULIN (HCC): ICD-10-CM

## 2025-04-11 NOTE — TELEPHONE ENCOUNTER
Requested Prescriptions     Pending Prescriptions Disp Refills    metFORMIN (GLUCOPHAGE) 500 MG tablet [Pharmacy Med Name: METFORMIN  MG TABLET] 180 tablet 0     Sig: TAKE ONE TABLET TWICE PER DAY WITH MEALS       Last Clinic Visit:  1/31/2025     Next Clinic Appointment:  Visit date not found

## 2025-04-28 DIAGNOSIS — I48.0 PAROXYSMAL A-FIB (HCC): ICD-10-CM

## 2025-04-28 RX ORDER — APIXABAN 5 MG/1
5 TABLET, FILM COATED ORAL 2 TIMES DAILY
Qty: 60 TABLET | Refills: 1 | Status: SHIPPED | OUTPATIENT
Start: 2025-04-28

## 2025-04-28 NOTE — TELEPHONE ENCOUNTER
Requested Prescriptions     Pending Prescriptions Disp Refills    ELIQUIS 5 MG TABS tablet [Pharmacy Med Name: ELIQUIS 5 MG TABLET] 60 tablet 1     Sig: TAKE 1 TABLET BY MOUTH TWICE A DAY       Last Clinic Visit:  1/31/2025     Next Clinic Appointment:  Visit date not found

## 2025-06-05 DIAGNOSIS — Z79.4 TYPE 2 DIABETES MELLITUS WITH DIABETIC NEPHROPATHY, WITH LONG-TERM CURRENT USE OF INSULIN (HCC): ICD-10-CM

## 2025-06-05 DIAGNOSIS — E11.21 TYPE 2 DIABETES MELLITUS WITH DIABETIC NEPHROPATHY, WITH LONG-TERM CURRENT USE OF INSULIN (HCC): ICD-10-CM

## 2025-06-05 RX ORDER — DULAGLUTIDE 3 MG/.5ML
INJECTION, SOLUTION SUBCUTANEOUS
Qty: 2 ML | Refills: 2 | Status: SHIPPED | OUTPATIENT
Start: 2025-06-05

## 2025-06-05 RX ORDER — DULAGLUTIDE 3 MG/.5ML
INJECTION, SOLUTION SUBCUTANEOUS
Qty: 2 ML | Refills: 2 | OUTPATIENT
Start: 2025-06-05

## 2025-06-05 NOTE — TELEPHONE ENCOUNTER
Requested drug refills are authorized, however, the patient needs further evaluation and/or laboratory testing before further refills are given. Ask him to make an appointment for this.

## 2025-06-05 NOTE — TELEPHONE ENCOUNTER
Requested Prescriptions     Pending Prescriptions Disp Refills    TRULICITY 3 MG/0.5ML SOAJ [Pharmacy Med Name: TRULICITY 3 MG/0.5 ML PEN]  2     Sig: INJECT 3 MG INTO THE SKIN EVERY 7 DAYS       Last Clinic Visit:  1/31/2025     Next Clinic Appointment:  Visit date not found

## 2025-06-17 ENCOUNTER — OFFICE VISIT (OUTPATIENT)
Dept: ORTHOPEDIC SURGERY | Age: 55
End: 2025-06-17

## 2025-06-17 DIAGNOSIS — M17.0 PRIMARY OSTEOARTHRITIS OF BOTH KNEES: Primary | ICD-10-CM

## 2025-06-17 DIAGNOSIS — M25.561 ACUTE PAIN OF RIGHT KNEE: ICD-10-CM

## 2025-06-17 RX ORDER — LIDOCAINE HYDROCHLORIDE 10 MG/ML
2 INJECTION, SOLUTION INFILTRATION; PERINEURAL ONCE
Status: COMPLETED | OUTPATIENT
Start: 2025-06-17 | End: 2025-06-17

## 2025-06-17 RX ORDER — METHYLPREDNISOLONE ACETATE 40 MG/ML
80 INJECTION, SUSPENSION INTRA-ARTICULAR; INTRALESIONAL; INTRAMUSCULAR; SOFT TISSUE ONCE
Status: COMPLETED | OUTPATIENT
Start: 2025-06-17 | End: 2025-06-17

## 2025-06-17 RX ADMIN — LIDOCAINE HYDROCHLORIDE 4 ML: 10 INJECTION, SOLUTION INFILTRATION; PERINEURAL at 14:34

## 2025-06-17 RX ADMIN — METHYLPREDNISOLONE ACETATE 80 MG: 40 INJECTION, SUSPENSION INTRA-ARTICULAR; INTRALESIONAL; INTRAMUSCULAR; SOFT TISSUE at 15:12

## 2025-06-17 NOTE — PROGRESS NOTES
Chief Complaint    Follow-up (Right knee pain)      History of Present Illness:  Kemal De La Cruz is a 54 y.o. male who presents for a follow-up on his right knee..  This patient is known to [facility] orthopedics and is being treated conservatively for their arthritis in both knees.  This conservative treatment includes: Rest, ice, activity modification, home exercises, stretches, and over-the-counter pain medication.  Cortisone and hyaluronic acid helps reduce the patient's symptoms and allows him to conduct ADLs with little to no restrictions.  He has noticed more increased pain in the right knee over the past several days.  He rates pain 10/10 described as achy and occasionally sharp.  It is worse with prolonged standing, ambulating and stairs.  The patient denies any new injury. The patient denies any numbness, paresthesias, or weakness.          Physical exam:  Inspection: Both knees without erythema, ecchymosis, discoloration, abrasion, contusions, deformity or signs of infection.  Palpation: There is tenderness to palpation to the joint line of both knees. There is patellofemoral crepitus palpable in both knees.  No tenderness to palpation to any other osseous or soft tissue structures of the knee.  Range of motion: Grossly intact  Neurovascular: Patient is neurovascularly intact, equally bilaterally.  2+ dorsal pedal pulses.      Procedures:  After informed consent was provided, the patient was seated on the exam table with the right knee flexed to 90 degrees. The anterolateral aspect of the knee adjacent to the joint line was prepped with Chlora-prep. The skin and subcutaneous tissues were anesthetized with ethyl chloride spray. A 22-gauge needle was inserted into the right knee and 2 ml of 40 mg/ml DepoMedrol was injected. The needle was withdrawn and the puncture site sealed with a Band-Aid. The patient tolerated the procedure well. Post injection instructions and precautions given and any problems to notify

## 2025-06-18 DIAGNOSIS — E78.2 HYPERLIPIDEMIA, MIXED: ICD-10-CM

## 2025-06-18 RX ORDER — ATORVASTATIN CALCIUM 20 MG/1
20 TABLET, FILM COATED ORAL DAILY
Qty: 90 TABLET | Refills: 1 | OUTPATIENT
Start: 2025-06-18

## 2025-06-18 RX ORDER — ATORVASTATIN CALCIUM 20 MG/1
TABLET, FILM COATED ORAL
Qty: 30 TABLET | Refills: 0 | Status: SHIPPED | OUTPATIENT
Start: 2025-06-18

## 2025-06-18 NOTE — TELEPHONE ENCOUNTER
Requested Prescriptions     Pending Prescriptions Disp Refills    atorvastatin (LIPITOR) 20 MG tablet 30 tablet 5     Sig: TAKE 1 TABLET BY MOUTH EVERY DAY       Last Clinic Visit:  1/31/2025     Next Clinic Appointment:  Visit date not found

## 2025-06-26 DIAGNOSIS — I48.0 PAROXYSMAL A-FIB (HCC): ICD-10-CM

## 2025-06-26 RX ORDER — APIXABAN 5 MG/1
5 TABLET, FILM COATED ORAL 2 TIMES DAILY
Qty: 60 TABLET | Refills: 1 | OUTPATIENT
Start: 2025-06-26

## 2025-07-02 ENCOUNTER — OFFICE VISIT (OUTPATIENT)
Dept: INTERNAL MEDICINE CLINIC | Age: 55
End: 2025-07-02
Payer: MEDICAID

## 2025-07-02 VITALS
WEIGHT: 315 LBS | TEMPERATURE: 98.4 F | BODY MASS INDEX: 42.66 KG/M2 | HEIGHT: 72 IN | OXYGEN SATURATION: 94 % | SYSTOLIC BLOOD PRESSURE: 130 MMHG | RESPIRATION RATE: 22 BRPM | HEART RATE: 59 BPM | DIASTOLIC BLOOD PRESSURE: 82 MMHG

## 2025-07-02 DIAGNOSIS — Z79.4 TYPE 2 DIABETES MELLITUS WITH DIABETIC NEPHROPATHY, WITH LONG-TERM CURRENT USE OF INSULIN (HCC): ICD-10-CM

## 2025-07-02 DIAGNOSIS — L02.31 ABSCESS OF GLUTEAL REGION: Primary | ICD-10-CM

## 2025-07-02 DIAGNOSIS — E11.21 TYPE 2 DIABETES MELLITUS WITH DIABETIC NEPHROPATHY, WITH LONG-TERM CURRENT USE OF INSULIN (HCC): ICD-10-CM

## 2025-07-02 PROCEDURE — 99213 OFFICE O/P EST LOW 20 MIN: CPT

## 2025-07-02 RX ORDER — SULFAMETHOXAZOLE AND TRIMETHOPRIM 800; 160 MG/1; MG/1
1 TABLET ORAL 2 TIMES DAILY
Qty: 14 TABLET | Refills: 0 | Status: SHIPPED | OUTPATIENT
Start: 2025-07-02 | End: 2025-07-09

## 2025-07-02 ASSESSMENT — ENCOUNTER SYMPTOMS
RESPIRATORY NEGATIVE: 1
EYES NEGATIVE: 1
GASTROINTESTINAL NEGATIVE: 1
ALLERGIC/IMMUNOLOGIC NEGATIVE: 1

## 2025-07-02 NOTE — PROGRESS NOTES
The [facility]    Kemal De La Cruz is a 54 y.o. male, here for evaluation of the following concerns:    Diabetes      Mr. De La Cruz states he feels well.  He states that about 2 weeks ago he started developing his gluteal abscesses again.  He also has a right abscess too.  He denies to have any trauma and denies to have any fevers or chills.  He was treated with doxycycline and Bactrim in the past as well.  Patient lost over 20 pounds since his last visit.    Review of Systems   Constitutional: Negative.    HENT: Negative.     Eyes: Negative.    Respiratory: Negative.     Cardiovascular: Negative.    Gastrointestinal: Negative.    Endocrine: Negative.    Genitourinary: Negative.    Musculoskeletal: Negative.    Skin: Negative.    Allergic/Immunologic: Negative.    Neurological: Negative.    Hematological: Negative.    Psychiatric/Behavioral: Negative.         MEDICATIONS:  Prior to Visit Medications    Medication Sig Taking? Authorizing Provider   Dulaglutide 4.5 MG/0.5ML SOAJ Inject 4.5 mg into the skin once a week Yes Shaista Bravo MD   sulfamethoxazole-trimethoprim (BACTRIM DS;SEPTRA DS) 800-160 MG per tablet Take 1 tablet by mouth 2 times daily for 7 days Yes Shaista Bravo MD   apixaban (ELIQUIS) 5 MG TABS tablet Take 1 tablet by mouth 2 times daily Yes PATRIZIA Connell MD   atorvastatin (LIPITOR) 20 MG tablet TAKE 1 TABLET BY MOUTH EVERY DAY Yes Kings Alvarez MD   metFORMIN (GLUCOPHAGE) 500 MG tablet TAKE ONE TABLET TWICE PER DAY WITH MEALS Yes Carmine Munguia MD   Accu-Chek FastClix Lancets MISC 20 Sticks by Does not apply route 2 times daily Yes Korey Mejia,    Alcohol Swabs PADS 1 Container by Does not apply route three times daily Yes Korey Mejia DO   allopurinol (ZYLOPRIM) 300 MG tablet TAKE 1 TABLET BY MOUTH EVERY DAY Yes Korey Mejia DO   amLODIPine (NORVASC) 10 MG tablet Take 1 tablet by mouth daily Yes Korey Mejia,

## 2025-07-02 NOTE — ASSESSMENT & PLAN NOTE
A1c 6.5 last time.  Will recheck it.  He does not check his sugars at home.  Will increase his Trulicity from 3.0 to 4.5.   - Emphasized the importance of checking his sugars at home for better sugar control  
Recurrent problem.  He also has a right thoracic wall abscess.  His lesions are tender and erythemic. Will start him on antibiotics and he needs to see surgery to have it drained.  
WDL

## 2025-07-02 NOTE — PATIENT INSTRUCTIONS
Thank you for visiting the [facility] outpatient clinic!.  I increased your Trulicity to 4.5 mg weekly.  Please see surgery as he will need your abscess drained.  Please take Bactrim twice daily for 7 days.

## 2025-07-03 LAB
EST. AVERAGE GLUCOSE BLD GHB EST-MCNC: 102.5 MG/DL
HBA1C MFR BLD: 5.2 %

## 2025-07-07 ENCOUNTER — OFFICE VISIT (OUTPATIENT)
Dept: INTERNAL MEDICINE CLINIC | Age: 55
End: 2025-07-07
Payer: MEDICAID

## 2025-07-07 VITALS
WEIGHT: 315 LBS | RESPIRATION RATE: 18 BRPM | SYSTOLIC BLOOD PRESSURE: 132 MMHG | BODY MASS INDEX: 45.64 KG/M2 | DIASTOLIC BLOOD PRESSURE: 88 MMHG | OXYGEN SATURATION: 97 % | HEART RATE: 60 BPM | TEMPERATURE: 97.6 F

## 2025-07-07 DIAGNOSIS — L91.8 ST (SKIN TAG): Primary | ICD-10-CM

## 2025-07-07 PROCEDURE — 6360000002 HC RX W HCPCS

## 2025-07-07 PROCEDURE — 11200 RMVL SKIN TAGS UP TO&INC 15: CPT | Performed by: STUDENT IN AN ORGANIZED HEALTH CARE EDUCATION/TRAINING PROGRAM

## 2025-07-07 PROCEDURE — 99213 OFFICE O/P EST LOW 20 MIN: CPT | Performed by: STUDENT IN AN ORGANIZED HEALTH CARE EDUCATION/TRAINING PROGRAM

## 2025-07-07 RX ORDER — LIDOCAINE HYDROCHLORIDE 10 MG/ML
1 INJECTION, SOLUTION INFILTRATION; PERINEURAL ONCE
Status: COMPLETED | OUTPATIENT
Start: 2025-07-07 | End: 2025-07-07

## 2025-07-07 RX ORDER — DOXYCYCLINE HYCLATE 100 MG
100 TABLET ORAL 2 TIMES DAILY
Qty: 60 TABLET | Refills: 3 | Status: SHIPPED | OUTPATIENT
Start: 2025-07-07 | End: 2025-11-04

## 2025-07-07 RX ADMIN — LIDOCAINE HYDROCHLORIDE 1 ML: 10 INJECTION, SOLUTION INFILTRATION; PERINEURAL at 09:30

## 2025-07-07 NOTE — PATIENT INSTRUCTIONS
Return in 2 months.   DO NOT TAKE BACTRIM which was ordered for you last week   Doxycycline prescription has been sent to your pharmacy . Pick it up and start taking as soon as possible.

## 2025-07-07 NOTE — PROGRESS NOTES
Department of General Surgery - Adult  General Surgery Service  Resident Clinic Note      CC:  Gluteal abcesses    History Obtained From:  patient    HISTORY OF PRESENT ILLNESS:  This is a 54 y.o. male with a Hx of afib, diabetes, and CAD presenting today for evaluation of gluteal abscesses that began about 2 weeks ago. He endorses having an extensive history of recurrent gluteal, intertriginous, and flank abscess that were intermittently lanced in various ERs. His first gluteal abscess occurred in 1992. He was referred to us by his primary, who prescribed him Bactrim, but patient was unable to pickup his abx due to the holiday. He denies fevers or chills. He endorses pain at sites of previous abscesses. He also complains of skin tags in his groin that he would like removed today.    Past Medical History:   Diagnosis Date    Abscess of gluteal region 08/02/2019    Arthritis     Arthritis 08/02/2019    Atrial fibrillation     Atrial fibrillation (HCC)     Bradycardia     Chest pain 06/22/2022    Chronic gout of multiple sites 08/30/2018    Last Assessment & Plan:  Condition: stable  Diagnosis based upon a face-to-face evaluation, medication and chart review, physical exam, and ROS.   Requested patient to continue regular follow-up with their PCP/specialist.  Follow up in: one year    Chronic pain syndrome 07/29/2022    Congestive heart failure (HCC)     Coronary artery disease     Coronary artery disease 08/24/2022    Current mild episode of major depressive disorder without prior episode 08/30/2018    Overview:  Depression screening completed this visit - see results. Member has no thoughts, plans or intentions to harm himself / herself or others.     Last Assessment & Plan:  Condition: stable  Diagnosis based upon a face-to-face evaluation, medication and chart review, physical exam, and ROS.   Requested patient to continue regular follow-up with their PCP/specialist.  Follow up in: one year    Depression

## 2025-07-25 DIAGNOSIS — I48.0 PAROXYSMAL A-FIB (HCC): ICD-10-CM

## 2025-07-25 RX ORDER — METOPROLOL TARTRATE 50 MG
50 TABLET ORAL 2 TIMES DAILY
Qty: 180 TABLET | Refills: 0 | Status: SHIPPED | OUTPATIENT
Start: 2025-07-25

## 2025-07-25 NOTE — TELEPHONE ENCOUNTER
Requested Prescriptions     Pending Prescriptions Disp Refills    metoprolol tartrate (LOPRESSOR) 50 MG tablet 180 tablet 0     Sig: Take 1 tablet by mouth 2 times daily       Last Clinic Visit:  7/7/2025     Next Clinic Appointment:  8/4/2025

## 2025-07-30 DIAGNOSIS — I48.0 PAROXYSMAL A-FIB (HCC): ICD-10-CM

## 2025-07-30 NOTE — TELEPHONE ENCOUNTER
Requested Prescriptions     Pending Prescriptions Disp Refills    apixaban (ELIQUIS) 5 MG TABS tablet 60 tablet 1     Sig: Take 1 tablet by mouth 2 times daily       Last Clinic Visit:  7/7/2025     Next Clinic Appointment:  8/4/2025

## 2025-08-01 ENCOUNTER — TELEPHONE (OUTPATIENT)
Dept: INTERNAL MEDICINE CLINIC | Age: 55
End: 2025-08-01

## 2025-08-01 NOTE — TELEPHONE ENCOUNTER
Spoke to this patient regarding his surgical appointment on 8/4/25. Was re-scheduled to 8/11/25 instead per Dr. Berg.

## 2025-08-11 ENCOUNTER — OFFICE VISIT (OUTPATIENT)
Dept: INTERNAL MEDICINE CLINIC | Age: 55
End: 2025-08-11
Payer: MEDICAID

## 2025-08-11 VITALS
RESPIRATION RATE: 16 BRPM | HEIGHT: 72 IN | TEMPERATURE: 97.2 F | DIASTOLIC BLOOD PRESSURE: 87 MMHG | BODY MASS INDEX: 42.66 KG/M2 | HEART RATE: 61 BPM | OXYGEN SATURATION: 98 % | SYSTOLIC BLOOD PRESSURE: 136 MMHG | WEIGHT: 315 LBS

## 2025-08-11 DIAGNOSIS — M17.12 PRIMARY OSTEOARTHRITIS OF LEFT KNEE: Primary | ICD-10-CM

## 2025-08-11 DIAGNOSIS — K59.00 CONSTIPATION, UNSPECIFIED CONSTIPATION TYPE: ICD-10-CM

## 2025-08-11 DIAGNOSIS — L02.31 ABSCESS OF GLUTEAL REGION: Primary | ICD-10-CM

## 2025-08-11 PROCEDURE — 99213 OFFICE O/P EST LOW 20 MIN: CPT

## 2025-08-21 DIAGNOSIS — Z79.4 TYPE 2 DIABETES MELLITUS WITH DIABETIC NEPHROPATHY, WITH LONG-TERM CURRENT USE OF INSULIN (HCC): ICD-10-CM

## 2025-08-21 DIAGNOSIS — E11.21 TYPE 2 DIABETES MELLITUS WITH DIABETIC NEPHROPATHY, WITH LONG-TERM CURRENT USE OF INSULIN (HCC): ICD-10-CM

## 2025-08-22 RX ORDER — IBUPROFEN 600 MG/1
600 TABLET, FILM COATED ORAL 3 TIMES DAILY PRN
Qty: 90 TABLET | Refills: 2 | Status: SHIPPED | OUTPATIENT
Start: 2025-08-22

## 2025-08-22 RX ORDER — SENNA AND DOCUSATE SODIUM 50; 8.6 MG/1; MG/1
1 TABLET, FILM COATED ORAL 2 TIMES DAILY PRN
Qty: 60 TABLET | Refills: 2 | Status: SHIPPED | OUTPATIENT
Start: 2025-08-22

## 2025-09-04 DIAGNOSIS — I10 ESSENTIAL HYPERTENSION: ICD-10-CM

## 2025-09-04 RX ORDER — VALSARTAN 160 MG/1
160 TABLET ORAL DAILY
Qty: 90 TABLET | Refills: 1 | Status: SHIPPED | OUTPATIENT
Start: 2025-09-04

## (undated) DEVICE — SOLUTION IV IRRIG 250ML ST LF 0.9% SODIUM 2F7122

## (undated) DEVICE — FORCEPS BX L240CM JAW DIA2.4MM ORNG L CAP W/ NDL DISP RAD

## (undated) DEVICE — MASC TURNOVER KIT: Brand: MEDLINE INDUSTRIES, INC.

## (undated) DEVICE — WILLIS PACK: Brand: MEDLINE INDUSTRIES, INC.

## (undated) DEVICE — SOLUTION IV IRRIG 500ML 0.9% SODIUM CHL 2F7123

## (undated) DEVICE — GAUZE,SPONGE,4"X4",8PLY,STRL,LF,10/TRAY: Brand: MEDLINE

## (undated) DEVICE — SHEET,DRAPE,53X77,STERILE: Brand: MEDLINE

## (undated) DEVICE — CORD,CAUTERY,BIPOLAR,STERILE: Brand: MEDLINE

## (undated) DEVICE — SNARE VASC L240CM LOOP W10MM SHTH DIA2.4MM RND STIFF CLD

## (undated) DEVICE — Z CONVERTED USE 2273164 BANDAGE COMPR W4INXL4 1/2YD E EC SGL LAYERED CLP CLSR ECONO

## (undated) DEVICE — PADDING CAST W4INXL4YD ST COT RAYON MICROPLEATED HIGHLY

## (undated) DEVICE — DRESSING,GAUZE,XEROFORM,CURAD,1"X8",ST: Brand: CURAD

## (undated) DEVICE — GLOVE SURG SZ 65 CRM LTX FREE POLYISOPRENE POLYMER BEAD ANTI

## (undated) DEVICE — BANDAGE,ELASTIC,ESMARK,STERILE,4"X9',LF: Brand: MEDLINE

## (undated) DEVICE — GLOVE SURG SZ 65 L12IN FNGR THK79MIL GRN LTX FREE

## (undated) DEVICE — GOWN SIRUS NONREIN XL W/TWL: Brand: MEDLINE INDUSTRIES, INC.

## (undated) DEVICE — DRAPE HND W114XL142IN BLU POLYPR W O PCH FEN CRD AND TB HLDR

## (undated) DEVICE — GLOVE ORTHO 8   MSG9480

## (undated) DEVICE — NEEDLE HYPO 27GA L1.25IN GRY POLYPR HUB S STL REG BVL STR

## (undated) DEVICE — CHLORAPREP 26ML ORANGE

## (undated) DEVICE — GLOVE SURG SZ 75 CRM LTX FREE POLYISOPRENE POLYMER BEAD ANTI

## (undated) DEVICE — PACK PROCEDURE SURG EXTREMITY MFFOP CUST

## (undated) DEVICE — CANNULA SAMP CO2 AD GRN 7FT CO2 AND 7FT O2 TBNG UNIV CONN

## (undated) DEVICE — MEDICINE CUP, GRADUATED, STER: Brand: MEDLINE

## (undated) DEVICE — COVER LT HNDL BLU PLAS

## (undated) DEVICE — WRAP COHESIVE W2INXL5YD TAN SELF ADH BNDG HND NON STERILE TEAR CARING

## (undated) DEVICE — SUTURE ETHLN SZ 4-0 L18IN NONABSORBABLE BLK L19MM PS-2 3/8 1667H

## (undated) DEVICE — TOWEL,OR,DSP,ST,BLUE,STD,4/PK,20PK/CS: Brand: MEDLINE